# Patient Record
Sex: FEMALE | Race: BLACK OR AFRICAN AMERICAN | Employment: UNEMPLOYED | URBAN - METROPOLITAN AREA
[De-identification: names, ages, dates, MRNs, and addresses within clinical notes are randomized per-mention and may not be internally consistent; named-entity substitution may affect disease eponyms.]

---

## 2017-01-02 ENCOUNTER — TELEPHONE (OUTPATIENT)
Dept: FAMILY MEDICINE | Facility: CLINIC | Age: 62
End: 2017-01-02

## 2017-01-02 NOTE — TELEPHONE ENCOUNTER
[facility] phone call message- general phone call:    Reason for call: She wanted to let  know she is having PT on her right arm and shoulder.    Return call needed: Yes    OK to leave a message on voice mail? Yes    Primary language: English      needed? No    Call taken on January 2, 2017 at 9:07 AM by Earline Curry

## 2017-01-03 ENCOUNTER — COMMUNICATION - HEALTHEAST (OUTPATIENT)
Dept: CARDIOLOGY | Facility: CLINIC | Age: 62
End: 2017-01-03

## 2017-01-03 DIAGNOSIS — I25.10 CORONARY ATHEROSCLEROSIS: ICD-10-CM

## 2017-01-06 ENCOUNTER — TELEPHONE (OUTPATIENT)
Dept: FAMILY MEDICINE | Facility: CLINIC | Age: 62
End: 2017-01-06

## 2017-01-06 ENCOUNTER — HOME CARE/HOSPICE - HEALTHEAST (OUTPATIENT)
Dept: HOME HEALTH SERVICES | Facility: HOME HEALTH | Age: 62
End: 2017-01-06

## 2017-01-09 ENCOUNTER — HOME CARE/HOSPICE - HEALTHEAST (OUTPATIENT)
Dept: HOME HEALTH SERVICES | Facility: HOME HEALTH | Age: 62
End: 2017-01-09

## 2017-01-09 NOTE — TELEPHONE ENCOUNTER
"Patient called me on 1/5 and asked to meet with me sometime regarding a Part D plan. I returned patient's call on 1/6 to discuss her questions further. Patient explained that she has to pay larger co-pays for her medications now. Patient's Social Security went up slightly which might be why her co-pays have increased. Patient spoke about a \"level 1 Part D Plan\" that some websites help pay for. She also mentioned that there can be help from the state. I recommended that she consult with a  through [employer] to figure out what her best options are. In her area, Nabeel Hebert at [employer] is a  ([phone]). I provided her with his information and she was in agreement with contacting him.    We also discussed patient bringing all of her medications to her next appointment so that she can see a PharmD. She is interested in taking as few medications as possible, especially if her co-pay is increasing.    Routed to Dr Pierre and PharmD.    Eliz Calderón   "

## 2017-01-10 ENCOUNTER — HOME CARE/HOSPICE - HEALTHEAST (OUTPATIENT)
Dept: HOME HEALTH SERVICES | Facility: HOME HEALTH | Age: 62
End: 2017-01-10

## 2017-01-12 ENCOUNTER — OFFICE VISIT (OUTPATIENT)
Dept: PHARMACY | Facility: CLINIC | Age: 62
End: 2017-01-12

## 2017-01-12 ENCOUNTER — OFFICE VISIT (OUTPATIENT)
Dept: FAMILY MEDICINE | Facility: CLINIC | Age: 62
End: 2017-01-12

## 2017-01-12 VITALS
SYSTOLIC BLOOD PRESSURE: 136 MMHG | DIASTOLIC BLOOD PRESSURE: 82 MMHG | BODY MASS INDEX: 30.12 KG/M2 | TEMPERATURE: 97.5 F | HEIGHT: 65 IN | WEIGHT: 180.8 LBS | HEART RATE: 67 BPM

## 2017-01-12 DIAGNOSIS — I25.119 CORONARY ARTERY DISEASE INVOLVING NATIVE CORONARY ARTERY OF NATIVE HEART WITH ANGINA PECTORIS (H): Primary | ICD-10-CM

## 2017-01-12 DIAGNOSIS — M75.41 ROTATOR CUFF IMPINGEMENT SYNDROME OF RIGHT SHOULDER: ICD-10-CM

## 2017-01-12 DIAGNOSIS — G89.4 CHRONIC PAIN SYNDROME: Primary | ICD-10-CM

## 2017-01-12 LAB
AMPHETAMINES QUAL: NEGATIVE
BARBITURATES QUAL URINE: NEGATIVE
BENZODIAZEPINE QUAL URINE: NEGATIVE
BUPRENORPHINE QUAL URINE: NEGATIVE
CANNABINOIDS UR QL SCN: NEGATIVE
COCAINE QUAL URINE: NEGATIVE
METHAMPHETAMINE: NEGATIVE
METHODONE QUAL: NEGATIVE
MORPHINE QUAL: NEGATIVE
OXYCODONE QUAL: NEGATIVE
TEMPERATURE OF URINE WAS BETWEEN 90-100 DEGREES F: YES

## 2017-01-12 RX ORDER — ACETAMINOPHEN 325 MG/1
650 TABLET ORAL DAILY
Qty: 100 TABLET | Refills: 0 | COMMUNITY
Start: 2017-01-12

## 2017-01-12 NOTE — PROGRESS NOTES
Patient s case reviewed. I agree with the written assessment and plan of care.    Nehal Rivera, PharmD.

## 2017-01-12 NOTE — PROGRESS NOTES
Chronic Pain Follow-Up Visit      Location of pain: right shoulder and neck  Analgesia/pain control:         Recent changes:  same        Overall control: Tolerable with discomfort      Care Plan    Chronic Pain Care Plan completed Yes    Are you able to follow the care plan? Yes    Activity level/function:        Daily activities:  Unable to perform most daily activities - chores, hobbies, social activities, driving      Work:  Unable to work    Adverse effects: No     Adherance    How often do you take extra pain medicine:Never    Did you take your pain medication today? YES    Home Exercise? 6-7 days/week for an average of 15-30 minutes     Other treatments         Counseling ?  Yes, Asya tomorrow.         Physical therapy? Having PT twice a week for her shoulder.    Risk Factors:      Sleep:  Fair      Mood/anxiety:  worsened      Recent family or social stressors:  Trouble with scheduling PT, and  Getting PCA restarted.  Very frustrated with complex medical system right now.    [facility] going to have a baby in February and she plans to go there for a retreat and to see the new baby for about a month.       Other risks: none     Database checked today? Yes. Details: as expected.    PHQ-9 SCORE 9/15/2016 10/21/2016 1/12/2017   Total Score - - -   Total Score 12 8 15     FAYE-7 SCORE 8/18/2015   Total Score 18          Going to [facility] next week.  She is going to [place] for a month for this and for Granddaughter baby shower is 2/12.  Functional Assessment  Questionnaire -5    Self-care ability assessment (bathing, toilet, dressing, moving and eating)  2. Requires frequent assistance 2   Family and social ability assessment (chores, hobbies, driving, sex and social activities)  2. Able to perform some 2   Movement ability assessment (Walk climb stairs)  1. Able to get up and walk with assistance, unable to climb stairs 1   Lifting ability assessment  1. Able to lift up to 10 lbs. occasionally 1    "  Work ability assessment  1. Unable to do any work 1                                                                                                                                                Total  7   Physical Functional Ability (FAQ5) Score    2005 Sheldon Neumann MD.                                                                                 Total  X 5 =     35/100      Problem, Medication and Allergy Lists were reviewed and are current..    Patient is an established patient of this clinic..         Review of Systems:   CONSTITUTIONAL: no fatigue, no unexpected change in weight  RESP: no significant cough, no shortness of breath  CV: no chest pain, no palpitations, no new or worsening peripheral edema  GI: no nausea, no vomiting, no constipation, no diarrhea  MUSCULOSKELETAL:as above         Physical Exam:     Filed Vitals:    01/12/17 1042   BP: 136/82   Pulse: 67   Temp: 97.5  F (36.4  C)   TempSrc: Oral   Height: 5' 4.5\" (163.8 cm)   Weight: 180 lb 12.8 oz (82.01 kg)     Body mass index is 30.57 kg/(m^2).  Vitals were reviewed and were normal  GENERAL: healthy, alert, well nourished, well hydrated, no distress  NECK: no tenderness, no adenopathy, no asymmetry, no masses, no stiffness; thyroid- normal to palpation  RESP: lungs clear to auscultation - no rales, no rhonchi, no wheezes  CV: regular rates and rhythm, normal S1 S2, no S3 or S4 and no murmur, no click or rub -  ABDOMEN: soft, no tenderness, no  hepatosplenomegaly, no masses, normal bowel sounds  MS: extremities- no gross deformities noted, no edema  SHOULDER Exam-Right   Inspection: no swelling, no bruising, no discoloration, no obvious deformity, no asymmetry, no glenohumeral joint anterior bulge, no distal clavicle elevation, no muscle atrophy, no scapular winging   Tenderness of: SC joint- no , clavicle(prox-mid)- no , clavicle-(mid-distal)- no , AC joint- no , acromion- no , anterior capsule- YES, prox bicep tendon- no , greater " tuberosity- no , prox humerus- no , supraspinatous- YES, infraspinatous- no , superior trapezious- YES, rhomboids- no    Range of Motion: Active- forward flexion- 150 degrees, abduction- 150 degrees, external rotation- normal, internal rotation- normal, limited due to pain.  Range of Motion: Passive- forward flexion- normal, abduction- normal, external rotation- normal, internal rotation- normal   Strength: forward flexion- 5/5, abduction- 5/5, internal rotation- 5/5, external rotation- 5/5 and bicep- full   Special tests: Neers- POSITIVE, Gray(supraspinatous)- POSITIVE and negative spurling's         Results:     Results for orders placed or performed in visit on 01/12/17   Rapid Urine Drug Screen ([facility])   Result Value Ref Range    Amphetamines Qual NEGATIVE NEGATIVE    Barbiturates Qual Urine NEGATIVE NEGATIVE    Buprenorphine Qual Urine NEGATIVE NEGATIVE    Benzodiazepine Qual Urine NEGATIVE NEGATIVE    Cocaine Qual Urine NEGATIVE NEGATIVE    Cannabinoids Qual Urine NEGATIVE NEGATIVE    Methamphetamine Qual NEGATIVE NEGATIVE    Methadone Qual NEGATIVE NEGATIVE    Morphine Qual NEGATIVE NEGATIVE    Oxycodone Qual NEGATIVE NEGATIVE    Temperature of Urine was Between  Degrees F YES YES        rapid urine drug screen obtained today: Yes    Assessment and Plan    Shira Guthrie is here for follow up of chronic pain caused by multiple issues, but more acutely worse over the last month or so due to shoulder injury.  Clearly has impingement on today's exam.  Neck MRI show possible c7 neuropathy but sxs clinically are shoulder related..  Pt currently has a functional status FAQ 5  of 35 down from baseline due to shoulder issues..  Shira was seen today for recheck.    Diagnoses and all orders for this visit:    Chronic pain syndrome: had tramadol refill already, but she wanted to discuss that she would be gone for a month and input on shoulder pain as well.  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  See documentation below for injection info.       Exercise discussed and patient      Naloxone WILL NOT be prescribed.      If opioids prescribed patient was asked to bring pill bottle to each appointment and was informed that refills would only be provided at office visits.   Asked patient to F/U in 1 month(s) with same provider for 20 minute  Visit.     Sally Pierre MD          Subacromial Injection Note    Shira Guthrie is a patient of Sally Romero here for Shoulder impingement  Pt opts for shoulder subacromial injection.    Informed Consent:  Affirmation of informed consent was signed and scanned into the medical record. Risks, benefits and alternatives were discussed. Patient's questions were elicited and answered.   Procedure safety checklist was completed:  Yes  Time Out (Pause for the Cause) completed: Yes    Technique:  right shoulder was prepped in the usual sterile fashion.    INJECTION:  Using 4 cc of 1% lidocaine mixed with 40 mg of kenalog, the subacromial space was successfully injected without complication.  Patient did experience some pain relief following injection.    Was entire vial of medication used? Yes all kenalog used, but not all lidocaine used.    Follow up: Pt was instructed that there will be a return to pain in a few hours, followed by some relief over the course of days to a week.         Sally Pierre MD

## 2017-01-12 NOTE — PROGRESS NOTES
Medication Management Note                                                       Shira was referred by Dr. Pierre for pharmacy services for medication management.    MEDICATION REVIEW:  Discussed all medication indications, dosage and effectiveness, adverse effects, and adherence with patient/caregiver.    Pt had meds with them: yes  Pt had med list with them: no  Pt was knowledgeable about meds: yes  Medications set up by: Self  Medications administered by someone else (e.g., LTCF): No  Pt uses a medication box or automated dispenser: no  Called pharmacy to obtain or clarify med list:  no  Called HHN or LTCF to obtain or clarify med list:  no      Medication Discrepancies  Medications on EMR med list that pt is NOT taking:  none  Medications pt IS taking that are NOT on EMR med list (e.g., from specialist, hospital): none  OTC meds/ dietary supplements pt taking on own that are NOT on EMR med list:  yes, Tylenol 650 mg  Dosage listed differently than how patient is taking: none  Frequency listed differently than how patient is taking: yes, Gabapentin 2 HS and 1 during night  Duplicate medication on list (two occurrences of the same medication):  none  TOTAL NUMBER OF MEDICATION DISCREPANCIES:  2    Subjective                                                       Patient reports the following problems or concerns with their medications:  Patient was concerned about medication costs, copays recently increased.  Patient reports the following adverse reactions to medications:  none  Pt reports missing doses:  never  Additional subjective information (e.g., reason for visit, frequency of PRNs, reasons meds were D/C ed):  Medications were reviewed to optimize drug therapy in an attempt to decrease medication costs.     Objective                                                       Patient Active Problem List   Diagnosis     CAD (coronary artery disease)     MDD (major depressive disorder)     Chronic constipation      Stroke (H)     Benign essential hypertension     Osteoporosis     PAD (peripheral artery disease) (H)     Fracture closed of upper end of forearm     Synovial sarcoma (H)     Esophageal stricture     Anginal pain (H)     Health Care Home     B12 deficiency     Cataracts, bilateral     Malignant neoplasm of connective and other soft tissue     Chronic pain syndrome     Other polyneuropathy (H)     Primary osteoarthritis involving multiple joints     Lymphedema of left leg     False positive serological test for hepatitis C     Cervical radiculopathy at C7       Current Outpatient Prescriptions   Medication Sig Dispense Refill     traMADol (ULTRAM) 50 MG tablet Take 1 tablet (50 mg) by mouth 3 times daily as needed 90 tablet 1     rosuvastatin (CRESTOR) 20 MG tablet Take 1 tablet (20 mg) by mouth daily 90 tablet 4     diphenhydrAMINE (BENADRYL) 25 MG tablet Take 1 tablet (25 mg) by mouth every 6 hours as needed for itching or other (cough) Do not use with the hydroxyzine. 60 tablet 1     isosorbide mononitrate (IMDUR) 30 MG 24 hr tablet Take 1 tablet (30 mg) by mouth daily 90 tablet 4     dexlansoprazole (DEXILANT) 30 MG CPDR Take 1 capsule (30 mg) by mouth daily 90 capsule 4     linaclotide (LINZESS) 290 MCG capsule Take 1 capsule (290 mcg) by mouth every morning (before breakfast)       order for DME Equipment being ordered: Jobst stockings thigh high 15-20mmHg.  Please measure for fit. 2 pairs. 2 Device 0     docusate sodium (COLACE) 100 MG tablet Take 100 mg by mouth daily 60 tablet 11     gabapentin (NEURONTIN) 300 MG capsule Take 1 capsule (300 mg) by mouth 3 times daily 90 capsule 11     clopidogrel (PLAVIX) 75 MG tablet Take 1 tablet (75 mg) by mouth daily . Give name brand Plavix. Approved by insurance through 2/2/1016. 30 tablet 11     aspirin 81 MG EC tablet Take 1 tablet (81 mg) by mouth daily 90 tablet 4     DULoxetine (CYMBALTA) 60 MG capsule Take 1 capsule (60 mg) by mouth daily 30 capsule 12      polyethylene glycol (MIRALAX) powder Take 17 g (1 capful) by mouth 2 times daily 510 g 12     senna (SENOKOT) 8.6 MG tablet Take 1 tablet by mouth 2 times daily 120 tablet 12     hydrOXYzine (ATARAX) 25 MG tablet 1 to 2 tablets three times a day as needed. 100 tablet 2     metoprolol (TOPROL-XL) 25 MG 24 hr tablet Take 1 tablet (25 mg) by mouth daily 90 tablet 4     nitroglycerin (NITROSTAT) 0.4 MG SL tablet Place 1 tablet (0.4 mg) under the tongue every 5 minutes as needed 30 tablet 12     CANE, ANY MATERIAL 1 Device by Device route as needed 1 Device 0     Heating Pads PADS Apply to painful areas prn. 1 each 0       Social History   Substance Use Topics     Smoking status: Former Smoker -- 0.30 packs/day for 35 years     Types: Cigarettes, Cigars     Quit date: 04/23/2006     Smokeless tobacco: Never Used      Comment: 2 cigars per week, now quit cigarettes in 2006.     Alcohol Use: No       A1C      5.9   8/5/2014  A1C      6.0   4/2/2013  Last Basic Metabolic Panel:  NA      143   8/5/2014   POTASSIUM      4.3   8/5/2014  CHLORIDE      111   8/5/2014  HERMES      9.5   8/5/2014  CO2     28.0   7/11/2013  BUN        5   8/5/2014  CR     0.80   8/5/2014  GLC       84   8/5/2014    BP Readings from Last 3 Encounters:   01/12/17 136/82   12/16/16 124/78   12/02/16 158/81     Assessment                                                       Antiplatlet Therapy:  controlled    Patient currently takes Plavix 75 mg daily and Aspirin 81 mg daily.     Medication Costs:  uncontrolled    Insurance Co-Pays increased recently.      Plan/Recommendations                                                       Updated medication list in the EMR; deleted meds patient no longer taking and added meds patient is now taking, and changed doses where there was a dose discrepancy.    17 medications were reviewed and found to be indicated, effective, safe and convenient/ affordable unless drug therapy problem(s) was/were identified, as are  described below.      Completed at this visit  Antiplatelet Therapy    Discussed with Dr. Pierre patient's extended therapy with Plavix and Aspirin. Deferred to cardiology and neurology.     Medication Costs    Assessed for medication therapy changes yet did not discover any optimization. Referred patient to contact insurance company to determine reason for copay increases.       Options for treatment and/or follow-up care were reviewed with the patient.  Shira was engaged and actively involved in the decision making process, verbalized understanding of the options discussed, and was satisfied with the final plan.      Follow-up                                                       Patient should follow up with Dr. Pierre.      Dr. Pierre was provided the recommendations above  in clinic today and Dr. Pierre was available for supervision during this visit and is the authorizing prescriber for this visit through the pharmacist collaborative practice agreement.    Ash Mckeon PharmD Student      Drug therapy problems identified  1. None    # of medical conditions addressed: 8  # of medications addressed: 16  # of medication discrepancies identified: 2  # of DTP identified: 0  Time spent: 30 minutes  Level of service: 1 NC    The student acted as scribe and the encounter documented was completely performed by myself. I have reviewed and verified the student s documentation and found it to be correct and complete.  Lois Allen, Pharm.D.

## 2017-01-12 NOTE — MR AVS SNAPSHOT
After Visit Summary   2017    Shira Guthrie    MRN: [id number]           Patient Information     Date Of Birth          1955        Visit Information        Provider Department      2017 10:40 AM Sally Pierre MD [facility]        Today's Diagnoses     Chronic pain syndrome    -  1        Follow-ups after your visit        Your next 10 appointments already scheduled     2017  8:30 AM   RETURN EXTENDED with Dior Sky PHD   [facility] ([facility])    [address] [place] [id number]   [phone]              Who to contact     Please call your clinic at [phone] to:    Ask questions about your health    Make or cancel appointments    Discuss your medicines    Learn about your test results    Speak to your doctor   If you have compliments or concerns about an experience at your clinic, or if you wish to file a complaint, please contact [facility] Patient Relations at [phone] or email us at [email or web address]         Additional Information About Your Visit        MyChart Information     [employer] is an electronic gateway that provides easy, online access to your medical records. With [employer], you can request a clinic appointment, read your test results, renew a prescription or communicate with your care team.     To sign up for [employer] visit the website at [email or web address]   You will be asked to enter the access code listed below, as well as some personal information. Please follow the directions to create your username and password.     Your access code is: [email or web address]  Expires: 3/2/2017  9:00 AM     Your access code will  in 90 days. If you need help or a new code, please contact your [facility] or call [phone] for assistance.        Care EveryWhere ID     This is your Care EveryWhere ID. This could be used by other organizations to access your  "[facility] medical records  [id number]        Your Vitals Were     Pulse Temperature Height BMI (Body Mass Index)          67 97.5  F (36.4  C) (Oral) 5' 4.5\" (163.8 cm) 30.57 kg/m2         Blood Pressure from Last 3 Encounters:   01/12/17 136/82   12/16/16 124/78   12/02/16 158/81    Weight from Last 3 Encounters:   01/12/17 180 lb 12.8 oz (82.01 kg)   11/04/16 179 lb (81.194 kg)   10/12/16 176 lb 6.4 oz (80.015 kg)              We Performed the Following     Rapid Urine Drug Screen ([facility])        Primary Care Provider Office Phone # Fax #    Sally Pierre [phone]       [address] [address]        Thank you!     Thank you for choosing [facility]  for your care. Our goal is always to provide you with excellent care. Hearing back from our patients is one way we can continue to improve our services. Please take a few minutes to complete the written survey that you may receive in the mail after your visit with us. Thank you!             Your Updated Medication List - Protect others around you: Learn how to safely use, store and throw away your medicines at www.disposemymeds.org.          This list is accurate as of: 1/12/17 12:09 PM.  Always use your most recent med list.                   Brand Name Dispense Instructions for use    aspirin 81 MG EC tablet     90 tablet    Take 1 tablet (81 mg) by mouth daily       CANE, ANY MATERIAL     1 Device    1 Device by Device route as needed       clopidogrel 75 MG tablet    PLAVIX    30 tablet    Take 1 tablet (75 mg) by mouth daily . Give name brand Plavix. Approved by insurance through 2/2/1016.       dexlansoprazole 30 MG Cpdr CR capsule    DEXILANT    90 capsule    Take 1 capsule (30 mg) by mouth daily       diphenhydrAMINE 25 MG tablet    BENADRYL    60 tablet    Take 1 tablet (25 mg) by mouth every 6 hours as needed for itching or other (cough) Do not use with the hydroxyzine.       docusate sodium 100 MG " tablet    COLACE    60 tablet    Take 100 mg by mouth daily       DULoxetine 60 MG EC capsule    CYMBALTA    30 capsule    Take 1 capsule (60 mg) by mouth daily       gabapentin 300 MG capsule    NEURONTIN    90 capsule    Take 1 capsule (300 mg) by mouth 3 times daily       Heating Pads Pads     1 each    Apply to painful areas prn.       hydrOXYzine 25 MG tablet    ATARAX    100 tablet    1 to 2 tablets three times a day as needed.       isosorbide mononitrate 30 MG 24 hr tablet    IMDUR    90 tablet    Take 1 tablet (30 mg) by mouth daily       LINZESS 290 MCG capsule   Generic drug:  linaclotide      Take 1 capsule (290 mcg) by mouth every morning (before breakfast)       metoprolol 25 MG 24 hr tablet    TOPROL-XL    90 tablet    Take 1 tablet (25 mg) by mouth daily       nitroglycerin 0.4 MG sublingual tablet    NITROSTAT    30 tablet    Place 1 tablet (0.4 mg) under the tongue every 5 minutes as needed       order for DME     2 Device    Equipment being ordered: Jobst stockings thigh high 15-20mmHg.  Please measure for fit. 2 pairs.       polyethylene glycol powder    MIRALAX    510 g    Take 17 g (1 capful) by mouth 2 times daily       rosuvastatin 20 MG tablet    CRESTOR    90 tablet    Take 1 tablet (20 mg) by mouth daily       senna 8.6 MG tablet    SENOKOT    120 tablet    Take 1 tablet by mouth 2 times daily       traMADol 50 MG tablet    ULTRAM    90 tablet    Take 1 tablet (50 mg) by mouth 3 times daily as needed

## 2017-01-13 ENCOUNTER — HOME CARE/HOSPICE - HEALTHEAST (OUTPATIENT)
Dept: HOME HEALTH SERVICES | Facility: HOME HEALTH | Age: 62
End: 2017-01-13

## 2017-01-13 ENCOUNTER — OFFICE VISIT (OUTPATIENT)
Dept: PSYCHOLOGY | Facility: CLINIC | Age: 62
End: 2017-01-13

## 2017-01-13 DIAGNOSIS — G89.4 CHRONIC PAIN SYNDROME: Primary | ICD-10-CM

## 2017-01-13 ASSESSMENT — PATIENT HEALTH QUESTIONNAIRE - PHQ9: SUM OF ALL RESPONSES TO PHQ QUESTIONS 1-9: 15

## 2017-01-13 NOTE — PATIENT INSTRUCTIONS
Personal Care Plan for Chronic Pain    1.  Personal Goals:                * take less medication              * lose weight: goal of losing 30 pounds.              * continue working on keeping thoughts positive through Tawn[employer]   * to feel confident enough that when someone gives me a compliment I can simply say thank you    2.  Sleep:                 *  Basic sleep plan:                          *reduce or eliminate caffeine and daytime naps                          * relaxation before bed                          * limit screen time 1-2 hours prior to bed                          * establish dark/quiet sleep environment                          * go to bed at target bedtime:   pm                          * keep consistent wake time:   am.   *  Minimize switching days and nights by staying active throughout the day.   *  We'll talk more about a consistent sleep plan when we meet next time.                3.  Physical Activity:                 * Formal physical rehabilitation:                          None right now.    Keep practicing your physical therapy exercises while you are away.              * Home/community based activity:                          * Home based exercises given by lymphedema nurse with breathing exercises built in as well - daily                          * Aerobic exercise/endurance exercise:  elliptical machine - 5 minute intervals with sit ups in between for 30 minutes - daily.  Has a  in the builiding.                          * Cleaning and baking with body awareness and stretching    *  Plan to fill out paperwork for PCA with the help of family over the next month.              * Listen to your body.  Pace yourself for success.  Don't over-do it.                  4.  Nutrition/Weight:      * Keep up the good work in keeping food journal so you can track what you are eating.   * Continue to review your I Can Prevent Diabetes  materials.              * Limit processed foods and foods high in sugar, sodium and fat.              * Keep up the good work eating many healthy foods.   * When you make a less healthy choice approach yourself with kindness and compassion.  Try to understand what contributed to that choice:  Was I too hungry?  Was I too tired?  Stressed?  Worried?  Use this information to help support healthier choices in the future.   * Goal weight:  150lbs.  Remember this is a long term goal.  Healthy, sustainable weight loss is about 1-2 lbs per week.    5.  Mood/Stress Management:                 * Formal interventions:    * schedule follow up with Dr. Sky in March.              * Home/community based interventions:                          * Regular contact with family  * Relaxation techniques - breathing exercises  * Create your pyramid to focus you on your strengths and hopes.  * Movies  * Meditation  * Yoga  * Creative activity  * Spiritual /prayer:  Prayer at home, attending services at [facility], wellness auxiliary group  * Service-based activity.              * Medications: Cymbalta, Hydroxyzine as needed.    6.  Tobacco/Alcohol/Drug Use:         * Congratulations on quitting smoking and staying quit!  Keep up the good work managing stress/anxiety in other ways (prayer, talking it out, deep breaths, exercise, etc..                         * Maintain healthy relationship with alcohol                          * For women this would be no more than 1 drink per day                          * For men, this would be no more than 2 drinks per day              * Eliminate recreational drugs    7.  Pain:                 * Non-medication treatments:                          * ice/heat: use heating pad as you are doing.                          * massage                          * acupuncture                          * chiropractor    8.  Pain Medications: as prescribed by Dr. Pierre

## 2017-01-13 NOTE — MR AVS SNAPSHOT
After Visit Summary   1/13/2017    Shira Guthrie    MRN: [id number]           Patient Information     Date Of Birth          1955        Visit Information        Provider Department      1/13/2017 8:30 AM Dior Sky, PHD [facility]        Care Instructions                                           Personal Care Plan for Chronic Pain    1.  Personal Goals:                * take less medication              * lose weight: goal of losing 30 pounds.              * continue working on keeping thoughts positive through Tawny   * to feel confident enough that when someone gives me a compliment I can simply say thank you    2.  Sleep:                 *  Basic sleep plan:                          *reduce or eliminate caffeine and daytime naps                          * relaxation before bed                          * limit screen time 1-2 hours prior to bed                          * establish dark/quiet sleep environment                          * go to bed at target bedtime:   pm                          * keep consistent wake time:   am.   *  Minimize switching days and nights by staying active throughout the day.   *  We'll talk more about a consistent sleep plan when we meet next time.                3.  Physical Activity:                 * Formal physical rehabilitation:                          None right now.    Keep practicing your physical therapy exercises while you are away.              * Home/community based activity:                          * Home based exercises given by lymphedema nurse with breathing exercises built in as well - daily                          * Aerobic exercise/endurance exercise:  elliptical machine - 5 minute intervals with sit ups in between for 30 minutes - daily.  Has a  in the builiding.                          * Cleaning and baking with body awareness and stretching    *  Plan to fill out paperwork for PCA with the help of family over the next  month.              * Listen to your body.  Pace yourself for success.  Don't over-do it.                  4.  Nutrition/Weight:      * Keep up the good work in keeping food journal so you can track what you are eating.   * Continue to review your I Can Prevent Diabetes materials.              * Limit processed foods and foods high in sugar, sodium and fat.              * Keep up the good work eating many healthy foods.   * When you make a less healthy choice approach yourself with kindness and compassion.  Try to understand what contributed to that choice:  Was I too hungry?  Was I too tired?  Stressed?  Worried?  Use this information to help support healthier choices in the future.   * Goal weight:  150lbs.  Remember this is a long term goal.  Healthy, sustainable weight loss is about 1-2 lbs per week.    5.  Mood/Stress Management:                 * Formal interventions:    * schedule follow up with Dr. Sky in March.              * Home/community based interventions:                          * Regular contact with family  * Relaxation techniques - breathing exercises  * Create your pyramid to focus you on your strengths and hopes.  * Movies  * Meditation  * Yoga  * Creative activity  * Spiritual /prayer:  Prayer at home, attending services at [facility], wellness auxiliary group  * Service-based activity.              * Medications: Cymbalta, Hydroxyzine as needed.    6.  Tobacco/Alcohol/Drug Use:         * Congratulations on quitting smoking and staying quit!  Keep up the good work managing stress/anxiety in other ways (prayer, talking it out, deep breaths, exercise, etc..                         * Maintain healthy relationship with alcohol                          * For women this would be no more than 1 drink per day                          * For men, this would be no more than 2 drinks per day              * Eliminate recreational drugs    7.  Pain:                 *  Non-medication treatments:                          * ice/heat: use heating pad as you are doing.                          * massage                          * acupuncture                          * chiropractor    8.  Pain Medications: as prescribed by Dr. Pierre        Follow-ups after your visit        Who to contact     Please call your clinic at [phone] to:    Ask questions about your health    Make or cancel appointments    Discuss your medicines    Learn about your test results    Speak to your doctor   If you have compliments or concerns about an experience at your clinic, or if you wish to file a complaint, please contact [facility] Patient Relations at [phone] or email us at [email or web address]         Additional Information About Your Visit        [employer] Information     [employer] is an electronic gateway that provides easy, online access to your medical records. With [employer], you can request a clinic appointment, read your test results, renew a prescription or communicate with your care team.     To sign up for [employer] visit the website at [email or web address]   You will be asked to enter the access code listed below, as well as some personal information. Please follow the directions to create your username and password.     Your access code is: [email or web address]  Expires: 3/2/2017  9:00 AM     Your access code will  in 90 days. If you need help or a new code, please contact your [facility] or call [phone] for assistance.        Care EveryWhere ID     This is your Care EveryWhere ID. This could be used by other organizations to access your [facility] medical records  [id number]         Blood Pressure from Last 3 Encounters:   17 136/82   16 124/78   16 158/81    Weight from Last 3 Encounters:   17 180 lb 12.8 oz (82.01 kg)   16 179 lb (81.194 kg)   10/12/16 176 lb 6.4 oz (80.015 kg)               Today, you had the following     No orders found for display       Primary Care Provider Office Phone # Fax #    Sally Pierre [phone]       [address] [address]        Thank you!     Thank you for choosing [facility]  for your care. Our goal is always to provide you with excellent care. Hearing back from our patients is one way we can continue to improve our services. Please take a few minutes to complete the written survey that you may receive in the mail after your visit with us. Thank you!             Your Updated Medication List - Protect others around you: Learn how to safely use, store and throw away your medicines at www.disposemymeds.org.          This list is accurate as of: 1/13/17  9:20 AM.  Always use your most recent med list.                   Brand Name Dispense Instructions for use    aspirin 81 MG EC tablet     90 tablet    Take 1 tablet (81 mg) by mouth daily       CANE, ANY MATERIAL     1 Device    1 Device by Device route as needed       clopidogrel 75 MG tablet    PLAVIX    30 tablet    Take 1 tablet (75 mg) by mouth daily . Give name brand Plavix. Approved by insurance through 2/2/1016.       dexlansoprazole 30 MG Cpdr CR capsule    DEXILANT    90 capsule    Take 1 capsule (30 mg) by mouth daily       diphenhydrAMINE 25 MG tablet    BENADRYL    60 tablet    Take 1 tablet (25 mg) by mouth every 6 hours as needed for itching or other (cough) Do not use with the hydroxyzine.       docusate sodium 100 MG tablet    COLACE    60 tablet    Take 100 mg by mouth daily       DULoxetine 60 MG EC capsule    CYMBALTA    30 capsule    Take 1 capsule (60 mg) by mouth daily       gabapentin 300 MG capsule    NEURONTIN    90 capsule    Take 1 capsule (300 mg) by mouth 3 times daily       Heating Pads Pads     1 each    Apply to painful areas prn.       hydrOXYzine 25 MG tablet    ATARAX    100 tablet    1 to 2 tablets three times a day as  needed.       isosorbide mononitrate 30 MG 24 hr tablet    IMDUR    90 tablet    Take 1 tablet (30 mg) by mouth daily       LINZESS 290 MCG capsule   Generic drug:  linaclotide      Take 1 capsule (290 mcg) by mouth every morning (before breakfast)       metoprolol 25 MG 24 hr tablet    TOPROL-XL    90 tablet    Take 1 tablet (25 mg) by mouth daily       nitroglycerin 0.4 MG sublingual tablet    NITROSTAT    30 tablet    Place 1 tablet (0.4 mg) under the tongue every 5 minutes as needed       order for DME     2 Device    Equipment being ordered: Jobst stockings thigh high 15-20mmHg.  Please measure for fit. 2 pairs.       polyethylene glycol powder    MIRALAX    510 g    Take 17 g (1 capful) by mouth 2 times daily       rosuvastatin 20 MG tablet    CRESTOR    90 tablet    Take 1 tablet (20 mg) by mouth daily       senna 8.6 MG tablet    SENOKOT    120 tablet    Take 1 tablet by mouth 2 times daily       traMADol 50 MG tablet    ULTRAM    90 tablet    Take 1 tablet (50 mg) by mouth 3 times daily as needed       TYLENOL 325 MG tablet   Generic drug:  acetaminophen     100 tablet    Take 2 tablets (650 mg) by mouth daily

## 2017-01-13 NOTE — PROGRESS NOTES
Spoke with patent regarding insurance coverage and transportation. Patient has completed the application for MA and will drop it off tomorrow. Discussed that if she gets MA she will have MNET. We can request a transportation evaluation for Dr Pierre to complete and if she is eligible, patient could get medical rides to appointments. Patient is going to spend some time in [place] with her daughter but will see what happens with her insurance coverage after she returns and we can go from there. Patient has a [employer] application that she will get help with for her other rides.    Eliz Calderón
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
## 2017-01-16 PROBLEM — M75.41 ROTATOR CUFF IMPINGEMENT SYNDROME OF RIGHT SHOULDER: Status: ACTIVE | Noted: 2017-01-16

## 2017-01-16 RX ORDER — TRIAMCINOLONE ACETONIDE 40 MG/ML
40 INJECTION, SUSPENSION INTRA-ARTICULAR; INTRAMUSCULAR ONCE
Qty: 1 ML | Refills: 0 | OUTPATIENT
Start: 2017-01-16 | End: 2017-01-16

## 2017-01-17 ENCOUNTER — HOME CARE/HOSPICE - HEALTHEAST (OUTPATIENT)
Dept: HOME HEALTH SERVICES | Facility: HOME HEALTH | Age: 62
End: 2017-01-17

## 2017-01-20 ENCOUNTER — HOME CARE/HOSPICE - HEALTHEAST (OUTPATIENT)
Dept: HOME HEALTH SERVICES | Facility: HOME HEALTH | Age: 62
End: 2017-01-20

## 2017-02-16 ENCOUNTER — MEDICAL CORRESPONDENCE (OUTPATIENT)
Dept: HEALTH INFORMATION MANAGEMENT | Facility: CLINIC | Age: 62
End: 2017-02-16

## 2017-02-22 DIAGNOSIS — I25.9 CHRONIC ISCHEMIC HEART DISEASE: ICD-10-CM

## 2017-02-22 RX ORDER — CLOPIDOGREL BISULFATE 75 MG/1
75 TABLET ORAL DAILY
Qty: 30 TABLET | Refills: 11 | Status: SHIPPED | OUTPATIENT
Start: 2017-02-22 | End: 2017-05-24

## 2017-02-27 ENCOUNTER — OFFICE VISIT (OUTPATIENT)
Dept: FAMILY MEDICINE | Facility: CLINIC | Age: 62
End: 2017-02-27

## 2017-02-27 VITALS
TEMPERATURE: 97.5 F | HEIGHT: 64 IN | DIASTOLIC BLOOD PRESSURE: 77 MMHG | HEART RATE: 70 BPM | BODY MASS INDEX: 30.93 KG/M2 | SYSTOLIC BLOOD PRESSURE: 124 MMHG | WEIGHT: 181.2 LBS

## 2017-02-27 DIAGNOSIS — G62.89 OTHER POLYNEUROPATHY: ICD-10-CM

## 2017-02-27 DIAGNOSIS — G89.4 CHRONIC PAIN SYNDROME: Primary | ICD-10-CM

## 2017-02-27 DIAGNOSIS — M75.41 ROTATOR CUFF IMPINGEMENT SYNDROME OF RIGHT SHOULDER: ICD-10-CM

## 2017-02-27 DIAGNOSIS — M15.0 PRIMARY OSTEOARTHRITIS INVOLVING MULTIPLE JOINTS: ICD-10-CM

## 2017-02-27 RX ORDER — TRAMADOL HYDROCHLORIDE 50 MG/1
50 TABLET ORAL 3 TIMES DAILY PRN
Qty: 90 TABLET | Refills: 1 | Status: SHIPPED | OUTPATIENT
Start: 2017-02-27 | End: 2017-04-14

## 2017-02-27 NOTE — Clinical Note
Trouble with losing weight and this is depressing.  Wondering about taking Pauline wintersia. She bought it in the vitamin store.

## 2017-02-27 NOTE — PATIENT INSTRUCTIONS
Personal Care Plan for Chronic Pain    1.  Personal Goals:                * take less medication              * lose weight: goal of losing 15 to 20 pounds.              * continue working on keeping thoughts positive through Tawny              * to feel confident enough that when someone gives me a compliment I can simply say thank you    2.  Sleep:                 *  Basic sleep plan:                          *reduce or eliminate caffeine and daytime naps                          * relaxation before bed                          * limit screen time 1-2 hours prior to bed                          * establish dark/quiet sleep environment                          * go to bed at target bedtime:   pm                          * keep consistent wake time:   am.              *  Minimize switching days and nights by staying active throughout the day.              * We'll talk more about a consistent sleep plan when we meet next time.                3.  Physical Activity:                 * Formal physical rehabilitation:                          None right now.              * Home/community based activity:                          * Home based exercises given by lymphedema nurse with breathing exercises built in as well - daily                          * Aerobic exercise/endurance exercise:  elliptical machine - 5 minute intervals with sit ups in between for 30 minutes - daily.  Has a  in the builiding. Has exercise tape in her apartment.                          * Cleaning and baking with body awareness and stretching              * Listen to your body.  Pace yourself for success.  Don't over-do it.  Plan to get PCA back to help with cleaning and laundry so as to not overdo it.                4.  Nutrition/Weight:                 * Keep a food journal in a notebook at home and bring this to your next visit with Dr. Sky.  Eat small frequent meals.              * Review your I Can Prevent Diabetes  materials.              * Limit processed foods and foods high in sugar, sodium and fat.              * Keep up the good work eating many healthy foods.              * When you make a less healthy choice approach yourself with kindness and compassion.  Try to understand what contributed to that choice:  Was I too hungry?  Was I too tired?  Stressed?  Worried?  Use this information to help support healthier choices in the future.    5.  Mood/Stress Management:                 * Formal interventions:                          * schedule follow up with Dr. kSy in January to check in on how things are going.                * Home/community based interventions:                          * Regular contact with family  * Relaxation techniques - breathing exercises  * Create your pyramid to focus you on your strengths and hopes.  * Movies  * Meditation  * Yoga  * Creative activity  * Spiritual /prayer:  Prayer at home, attending services at [facility], wellness auxiliary group  * Service-based activity.              * Medications: Cymbalta, Hydroxyzine as needed.    6.  Tobacco/Alcohol/Drug Use:                               * Congratulations on quitting smoking and staying quit!  Keep up the good work managing stress/anxiety in other ways (prayer, talking it out, deep breaths, exercise, etc..                         * Maintain healthy relationship with alcohol                          * For women this would be no more than 1 drink per day                          * For men, this would be no more than 2 drinks per day              * Eliminate recreational drugs    7.  Pain:                 * Non-medication treatments:                          * ice/heat: use heating pad as you are doing.                          * massage                          * acupuncture                          * chiropractor    Get MRI for your shoulder.    8.  Pain Medications: Gabapentin 3 at night, Tramadol one pill  three times day.  Tylenol arthritis as needed for break through.          You are scheduled for MRI referral at:  [address] [address]  [phone]  Date:  Saturday March 4, 2017  Time:  12:00 noon  Please arrive 15 minutes early.  If you have any questions or need to reschedule your appointment, please call the number listed above.    Any other concerns please call the clinic at [phone].    Neva ROWLAND  Referral Coordinator   2/27/17

## 2017-02-27 NOTE — LETTER
March 1, 2017      Shira Guthrie  [address]  [place] [place] [address]        Dear Shira,    Please see below for your test results.    Resulted Orders   Rapid Urine Drug Screen ([facility])   Result Value Ref Range    Amphetamines Qual NEGATIVE NEGATIVE    Barbiturates Qual Urine NEGATIVE NEGATIVE    Buprenorphine Qual Urine NEGATIVE NEGATIVE    Benzodiazepine Qual Urine NEGATIVE NEGATIVE    Cocaine Qual Urine NEGATIVE NEGATIVE    Cannabinoids Qual Urine NEGATIVE NEGATIVE    Methamphetamine Qual NEGATIVE NEGATIVE    Methadone Qual NEGATIVE NEGATIVE    Morphine Qual NEGATIVE NEGATIVE    Oxycodone Qual NEGATIVE NEGATIVE    Temperature of Urine was Between  Degrees F YES YES      Comment:      This is a preliminary screening test that detects drugs-of-abuse in urine at   specified detection levels.  To confirm preliminary results, a more specific   method such as Gas Chromatography/Mass Spectrometry (GC/MS) must be used.        Urine test is as expected    If you have any questions, please call the clinic to make an appointment.    Sincerely,    Sally Pierre MD

## 2017-02-27 NOTE — PROGRESS NOTES
"  Chronic Pain Follow-Up Visit    Pain Update:  Location of pain: right shoulder is the worst pain.  Usually 7/10 but up to 10/10 when it locks. Subacromial injection helped some.  Continues to do home exercise learned from PT for right shoulder.  Has not really gotten better.  not able to drive as the turning motion with the shoulder makes it feel like it pops out and shoots pain down her arm.     Left foot pain from neuropathy is also troublesome but stable.    Analgesia/pain control: Recent changes:  same    Overall control: Tolerable with discomfort    Trouble with losing weight and this is depressing.  Wondering about taking Garcina cambogia.     Adherance     How often do you take extra pain medicine:Never    Did you take your pain medication today? YES, this am.    Adverse effects: No      Database checked today? Yes. Details: as expected    PHQ-9 SCORE 9/15/2016 10/21/2016 1/12/2017   Total Score - - -   Total Score 12 8 15     FAYE-7 SCORE 8/18/2015   Total Score 18       Care Plan discussed and updated as needed.  See the end of this note.         FUNCTIONAL ASSESSMENT QUESTIONNAIRE SCORE 2/27/2017   Total Score 40          Problem, Medication and Allergy Lists were reviewed and are current..           Physical Exam:     Vitals:    02/27/17 1315   BP: 124/77   BP Location: Left arm   Patient Position: Chair   Cuff Size: Adult Large   Pulse: 70   Temp: 97.5  F (36.4  C)   TempSrc: Oral   Weight: 181 lb 3.2 oz (82.2 kg)   Height: 5' 4.37\" (163.5 cm)     Body mass index is 30.75 kg/(m^2).  Vitals were reviewed and were normal  GENERAL: healthy, alert, well nourished, well hydrated, no distress  RESP: lungs clear to auscultation - no rales, no rhonchi, no wheezes  CV: regular rates and rhythm, normal S1 S2, no S3 or S4 and no murmur, no click or rub -  ABDOMEN: soft, no tenderness, no  hepatosplenomegaly, no masses, normal bowel sounds  MS: extremities- no gross deformities noted, no edema.  Right shoulder " decreased abduction and flexion. 5/5 strength through out.  Positive Hawkings and Neer's. Extreme external rotation causing apprehension and feels that it pops out of place.        Results:     Results for orders placed or performed in visit on 02/27/17   Rapid Urine Drug Screen ([facility])   Result Value Ref Range    Amphetamines Qual NEGATIVE NEGATIVE    Barbiturates Qual Urine NEGATIVE NEGATIVE    Buprenorphine Qual Urine NEGATIVE NEGATIVE    Benzodiazepine Qual Urine NEGATIVE NEGATIVE    Cocaine Qual Urine NEGATIVE NEGATIVE    Cannabinoids Qual Urine NEGATIVE NEGATIVE    Methamphetamine Qual NEGATIVE NEGATIVE    Methadone Qual NEGATIVE NEGATIVE    Morphine Qual NEGATIVE NEGATIVE    Oxycodone Qual NEGATIVE NEGATIVE    Temperature of Urine was Between  Degrees F YES YES      rapid urine drug screen obtained today: Yes    Assessment and Plan    Shira Guthrie is here for follow up of chronic pain caused by peripheral neuropathy and acute shoulder issue with impingement..    Shira was seen today for shoulder and medication question.    Diagnoses and all orders for this visit:    Chronic pain syndrome: secondary to peripheral neuropathy and OA. Refilled tramadol.  Chronic pain is stable.  Right shoulder is more acute issue below. Continue current stool regimen.  Other polyneuropathy (H)  Primary osteoarthritis involving multiple joints  -     traMADol (ULTRAM) 50 MG tablet; Take 1 tablet (50 mg) by mouth 3 times daily as needed  -     Rapid Urine Drug Screen ([facility])    Rotator cuff impingement syndrome of right shoulder: now I am also concerned about a labral tear as she feel the shoulder popping out of place.  Due to trauma history and the fact that it is significantly better.  Get MRI and likely Ortho referral.  -     MRI SHOULDER - RIGHT; Future    Chronic Pain Syndrome:  Care plan updated with patient, see below for details.  Naloxone has not been prescribed.       If opioids prescribed patient was asked  to bring pill bottle to each appointment and was informed that refills would only be provided at office visits.   Asked patient to F/U in 1 month(s) with same provider for 20 minute  Visit.     A1c, lipids at next visit for screening.    Sally Pierre MD    Patient Instructions          Personal Care Plan for Chronic Pain    1.  Personal Goals:                * take less medication              * lose weight: goal of losing 15 to 20 pounds.              * continue working on keeping thoughts positive through Tawny              * to feel confident enough that when someone gives me a compliment I can simply say thank you    2.  Sleep:                 *  Basic sleep plan:                          *reduce or eliminate caffeine and daytime naps                          * relaxation before bed                          * limit screen time 1-2 hours prior to bed                          * establish dark/quiet sleep environment                          * go to bed at target bedtime:   pm                          * keep consistent wake time:   am.              *  Minimize switching days and nights by staying active throughout the day.              * We'll talk more about a consistent sleep plan when we meet next time.                3.  Physical Activity:                 * Formal physical rehabilitation:                          None right now.              * Home/community based activity:                          * Home based exercises given by lymphedema nurse with breathing exercises built in as well - daily                          * Aerobic exercise/endurance exercise:  elliptical machine - 5 minute intervals with sit ups in between for 30 minutes - daily.  Has a  in the builiding. Has exercise tape in her apartment.                          * Cleaning and baking with body awareness and stretching              * Listen to your body.  Pace yourself for success.  Don't over-do it.  Plan to get PCA back to help with  cleaning and laundry so as to not overdo it.                4.  Nutrition/Weight:                 * Keep a food journal in a notebook at home and bring this to your next visit with Dr. Sky.  Eat small frequent meals.              * Review your I Can Prevent Diabetes materials.              * Limit processed foods and foods high in sugar, sodium and fat.              * Keep up the good work eating many healthy foods.              * When you make a less healthy choice approach yourself with kindness and compassion.  Try to understand what contributed to that choice:  Was I too hungry?  Was I too tired?  Stressed?  Worried?  Use this information to help support healthier choices in the future.    5.  Mood/Stress Management:                 * Formal interventions:                          * schedule follow up with Dr. Sky in January to check in on how things are going.                * Home/community based interventions:                          * Regular contact with family  * Relaxation techniques - breathing exercises  * Create your pyramid to focus you on your strengths and hopes.  * Movies  * Meditation  * Yoga  * Creative activity  * Spiritual /prayer:  Prayer at home, attending services at [facility], wellness auxiliary group  * Service-based activity.              * Medications: Cymbalta, Hydroxyzine as needed.    6.  Tobacco/Alcohol/Drug Use:                               * Congratulations on quitting smoking and staying quit!  Keep up the good work managing stress/anxiety in other ways (prayer, talking it out, deep breaths, exercise, etc..                         * Maintain healthy relationship with alcohol                          * For women this would be no more than 1 drink per day                          * For men, this would be no more than 2 drinks per day              * Eliminate recreational drugs    7.  Pain:                 * Non-medication  treatments:                          * ice/heat: use heating pad as you are doing.                          * massage                          * acupuncture                          * chiropractor    Get MRI for your shoulder.    8.  Pain Medications: Gabapentin 3 at night, Tramadol one pill three times day.  Tylenol arthritis as needed for break through.          You are scheduled for MRI referral at:  [address] [address]  [phone]  Date:  Saturday March 4, 2017  Time:  12:00 noon  Please arrive 15 minutes early.  If you have any questions or need to reschedule your appointment, please call the number listed above.    Any other concerns please call the clinic at [phone].    Neva ROWLAND  Referral Coordinator   2/27/17

## 2017-02-27 NOTE — MR AVS SNAPSHOT
After Visit Summary   2/27/2017    Shira Guthrie    MRN: [id number]           Patient Information     Date Of Birth          1955        Visit Information        Provider Department      2/27/2017 1:10 PM Sally Pierre MD [facility]        Today's Diagnoses     Chronic pain syndrome    -  1    Other polyneuropathy (H)        Primary osteoarthritis involving multiple joints        Rotator cuff impingement syndrome of right shoulder          Care Instructions         Personal Care Plan for Chronic Pain    1.  Personal Goals:                * take less medication              * lose weight: goal of losing 15 to 20 pounds.              * continue working on keeping thoughts positive through Tawny              * to feel confident enough that when someone gives me a compliment I can simply say thank you    2.  Sleep:                 *  Basic sleep plan:                          *reduce or eliminate caffeine and daytime naps                          * relaxation before bed                          * limit screen time 1-2 hours prior to bed                          * establish dark/quiet sleep environment                          * go to bed at target bedtime:   pm                          * keep consistent wake time:   am.              *  Minimize switching days and nights by staying active throughout the day.              * We'll talk more about a consistent sleep plan when we meet next time.                3.  Physical Activity:                 * Formal physical rehabilitation:                          None right now.              * Home/community based activity:                          * Home based exercises given by lymphedema nurse with breathing exercises built in as well - daily                          * Aerobic exercise/endurance exercise:  elliptical machine - 5 minute intervals with sit ups in between for 30 minutes - daily.  Has a  in the builiding. Has exercise tape in her  apartment.                          * Cleaning and baking with body awareness and stretching              * Listen to your body.  Pace yourself for success.  Don't over-do it.  Plan to get PCA back to help with cleaning and laundry so as to not overdo it.                4.  Nutrition/Weight:                 * Keep a food journal in a notebook at home and bring this to your next visit with Dr. Sky.  Eat small frequent meals.              * Review your I Can Prevent Diabetes materials.              * Limit processed foods and foods high in sugar, sodium and fat.              * Keep up the good work eating many healthy foods.              * When you make a less healthy choice approach yourself with kindness and compassion.  Try to understand what contributed to that choice:  Was I too hungry?  Was I too tired?  Stressed?  Worried?  Use this information to help support healthier choices in the future.    5.  Mood/Stress Management:                 * Formal interventions:                          * schedule follow up with Dr. Sky in January to check in on how things are going.                * Home/community based interventions:                          * Regular contact with family  * Relaxation techniques - breathing exercises  * Create your pyramid to focus you on your strengths and hopes.  * Movies  * Meditation  * Yoga  * Creative activity  * Spiritual /prayer:  Prayer at home, attending services at [facility], wellness auxiliary group  * Service-based activity.              * Medications: Cymbalta, Hydroxyzine as needed.    6.  Tobacco/Alcohol/Drug Use:                               * Congratulations on quitting smoking and staying quit!  Keep up the good work managing stress/anxiety in other ways (prayer, talking it out, deep breaths, exercise, etc..                         * Maintain healthy relationship with alcohol                          * For women this would be no more  than 1 drink per day                          * For men, this would be no more than 2 drinks per day              * Eliminate recreational drugs    7.  Pain:                 * Non-medication treatments:                          * ice/heat: use heating pad as you are doing.                          * massage                          * acupuncture                          * chiropractor    Get MRI for your shoulder.    8.  Pain Medications: Gabapentin 3 at night, Tramadol one pill three times day.  Tylenol arthritis as needed for break through.              Follow-ups after your visit        Follow-up notes from your care team     Return in about 4 weeks (around 3/27/2017), or Chronic pain and shoulder issue.      Future tests that were ordered for you today     Open Future Orders        Priority Expected Expires Ordered    MRI SHOULDER - RIGHT Routine  2/27/2018 2/27/2017            Who to contact     Please call your clinic at [phone] to:    Ask questions about your health    Make or cancel appointments    Discuss your medicines    Learn about your test results    Speak to your doctor   If you have compliments or concerns about an experience at your clinic, or if you wish to file a complaint, please contact [facility] Patient Relations at [phone] or email us at [email or web address]         Additional Information About Your Visit        [employer] Information     Go Vocab is an electronic gateway that provides easy, online access to your medical records. With Go Vocab, you can request a clinic appointment, read your test results, renew a prescription or communicate with your care team.     To sign up for Levert visit the website at [email or web address]   You will be asked to enter the access code listed below, as well as some personal information. Please follow the directions to create your username and password.     Your access code is: [email or web address]  Expires:  "3/2/2017  9:00 AM     Your access code will  in 90 days. If you need help or a new code, please contact your [facility] or call [phone] for assistance.        Care EveryWhere ID     This is your Care EveryWhere ID. This could be used by other organizations to access your [place] medical records  [id number]        Your Vitals Were     Pulse Temperature Height BMI (Body Mass Index)          70 97.5  F (36.4  C) (Oral) 5' 4.37\" (163.5 cm) 30.75 kg/m2         Blood Pressure from Last 3 Encounters:   17 124/77   17 136/82   16 124/78    Weight from Last 3 Encounters:   17 181 lb 3.2 oz (82.2 kg)   17 180 lb 12.8 oz (82 kg)   16 179 lb (81.2 kg)              We Performed the Following     Rapid Urine Drug Screen ([facility])          Where to get your medicines      Some of these will need a paper prescription and others can be bought over the counter.  Ask your nurse if you have questions.     Bring a paper prescription for each of these medications     traMADol 50 MG tablet          Primary Care Provider Office Phone # Fax #    Sally Pierre [phone]       [address] [address]        Thank you!     Thank you for choosing [facility]  for your care. Our goal is always to provide you with excellent care. Hearing back from our patients is one way we can continue to improve our services. Please take a few minutes to complete the written survey that you may receive in the mail after your visit with us. Thank you!             Your Updated Medication List - Protect others around you: Learn how to safely use, store and throw away your medicines at www.disposemymeds.org.          This list is accurate as of: 17  1:49 PM.  Always use your most recent med list.                   Brand Name Dispense Instructions for use    aspirin 81 MG EC tablet     90 tablet    Take 1 tablet (81 mg) by mouth " daily       CANE, ANY MATERIAL     1 Device    1 Device by Device route as needed       clopidogrel 75 MG tablet    PLAVIX    30 tablet    Take 1 tablet (75 mg) by mouth daily . Give name brand Plavix. Approved by insurance through 2/2/1016.       dexlansoprazole 30 MG Cpdr CR capsule    DEXILANT    90 capsule    Take 1 capsule (30 mg) by mouth daily       diphenhydrAMINE 25 MG tablet    BENADRYL    60 tablet    Take 1 tablet (25 mg) by mouth every 6 hours as needed for itching or other (cough) Do not use with the hydroxyzine.       docusate sodium 100 MG tablet    COLACE    60 tablet    Take 100 mg by mouth daily       DULoxetine 60 MG EC capsule    CYMBALTA    30 capsule    Take 1 capsule (60 mg) by mouth daily       gabapentin 300 MG capsule    NEURONTIN    90 capsule    Take 1 capsule (300 mg) by mouth 3 times daily       Heating Pads Pads     1 each    Apply to painful areas prn.       hydrOXYzine 25 MG tablet    ATARAX    100 tablet    1 to 2 tablets three times a day as needed.       isosorbide mononitrate 30 MG 24 hr tablet    IMDUR    90 tablet    Take 1 tablet (30 mg) by mouth daily       LINZESS 290 MCG capsule   Generic drug:  linaclotide      Take 1 capsule (290 mcg) by mouth every morning (before breakfast)       metoprolol 25 MG 24 hr tablet    TOPROL-XL    90 tablet    Take 1 tablet (25 mg) by mouth daily       nitroglycerin 0.4 MG sublingual tablet    NITROSTAT    30 tablet    Place 1 tablet (0.4 mg) under the tongue every 5 minutes as needed       order for DME     2 Device    Equipment being ordered: Jobst stockings thigh high 15-20mmHg.  Please measure for fit. 2 pairs.       polyethylene glycol powder    MIRALAX    510 g    Take 17 g (1 capful) by mouth 2 times daily       rosuvastatin 20 MG tablet    CRESTOR    90 tablet    Take 1 tablet (20 mg) by mouth daily       senna 8.6 MG tablet    SENOKOT    120 tablet    Take 1 tablet by mouth 2 times daily       traMADol 50 MG tablet    ULTRAM    90  tablet    Take 1 tablet (50 mg) by mouth 3 times daily as needed       TYLENOL 325 MG tablet   Generic drug:  acetaminophen     100 tablet    Take 2 tablets (650 mg) by mouth daily

## 2017-02-28 PROBLEM — Z90.710 H/O HYSTERECTOMY FOR BENIGN DISEASE: Status: ACTIVE | Noted: 2017-02-28

## 2017-03-02 ENCOUNTER — TELEPHONE (OUTPATIENT)
Dept: FAMILY MEDICINE | Facility: CLINIC | Age: 62
End: 2017-03-02

## 2017-03-02 NOTE — TELEPHONE ENCOUNTER
Talked to the patient she cant come this month she scheduled for 4/14/17 with  for a CMP appointment. [id number]

## 2017-03-02 NOTE — TELEPHONE ENCOUNTER
----- Message from Dior Sky sent at 3/2/2017 11:34 AM CST -----  Regarding: Outreach call  This patient was supposed to schedule a follow up visit with me for CPM in March, but I do not see her on my schedule yet.  Currently, I have only two lifestyle slots left in March and I was worried she may not get a slot if this was not set up soon.  Would you be able to reach out to her to see if she is still interested in this.  If so, could you help her set this up.  Ok if she no longer wants/needs this, but I just didn't want her to get stuck and not be able to get a slot.  Let me know if you have questions for me on this!  Thanks!  Dior

## 2017-03-06 ENCOUNTER — TELEPHONE (OUTPATIENT)
Dept: FAMILY MEDICINE | Facility: CLINIC | Age: 62
End: 2017-03-06

## 2017-03-06 NOTE — TELEPHONE ENCOUNTER
Patient states that she went to have her MRI of her right shoulder on Saturday at 12 at [facility]. She wanted to let Dr. Pierre know so she can watch for the results. /LATANYA Guthrie  Routed to Dr. Pierre

## 2017-03-06 NOTE — TELEPHONE ENCOUNTER
[facility] phone call message- patient requesting results:    Test: Lab and MRI    Date of test: 2 days ago    Additional Comments: Patient would like a call back in regards to her results.     OK to leave a message on voice mail? Yes    Primary language: English      needed? No    Call taken on March 6, 2017 at 9:53 AM by Michoacano Jackson

## 2017-03-08 DIAGNOSIS — M75.41 ROTATOR CUFF IMPINGEMENT SYNDROME OF RIGHT SHOULDER: ICD-10-CM

## 2017-03-09 DIAGNOSIS — S46.811D TRAUMATIC TEAR OF SUPRASPINATUS TENDON OF RIGHT SHOULDER, SUBSEQUENT ENCOUNTER: Primary | ICD-10-CM

## 2017-03-09 NOTE — PROGRESS NOTES
Discussed with patient on the phone.  She has a partial thickness tear of the supraspinatus tendon in the right shoulder.  Chronic labral tear.    She would like to see ortho.  Referral placed.

## 2017-03-10 NOTE — PATIENT INSTRUCTIONS
Your referral has been scheduled for:    [facility]  [address] [place] [id number]  [phone]  Shoulder Consult  Date: Tuesday March 28 2017   Time: 2:00 PM Dr. Myrna Ornelas     If you have any questions or need to reschedule please call the number listed above.  Any other concerns please call our referral coordinator at [phone]    Deb  Care Coordinator     GAVE TO PATIENT VIA PHONE 10:44 AM 3/10/2017 Deb Cobos

## 2017-03-21 ENCOUNTER — OFFICE VISIT (OUTPATIENT)
Dept: FAMILY MEDICINE | Facility: CLINIC | Age: 62
End: 2017-03-21

## 2017-03-21 VITALS
SYSTOLIC BLOOD PRESSURE: 124 MMHG | BODY MASS INDEX: 30.1 KG/M2 | TEMPERATURE: 97.7 F | DIASTOLIC BLOOD PRESSURE: 78 MMHG | HEART RATE: 74 BPM | WEIGHT: 177.4 LBS

## 2017-03-21 DIAGNOSIS — F33.2 MAJOR DEPRESSIVE DISORDER, RECURRENT, SEVERE WITHOUT PSYCHOTIC FEATURES (H): ICD-10-CM

## 2017-03-21 DIAGNOSIS — Z00.00 ROUTINE GENERAL MEDICAL EXAMINATION AT A HEALTH CARE FACILITY: Primary | ICD-10-CM

## 2017-03-21 DIAGNOSIS — K59.09 CHRONIC CONSTIPATION: ICD-10-CM

## 2017-03-21 DIAGNOSIS — I10 BENIGN ESSENTIAL HYPERTENSION: ICD-10-CM

## 2017-03-21 DIAGNOSIS — M75.111 PARTIAL TEAR OF RIGHT ROTATOR CUFF: ICD-10-CM

## 2017-03-21 DIAGNOSIS — I25.119 CORONARY ARTERY DISEASE INVOLVING NATIVE CORONARY ARTERY OF NATIVE HEART WITH ANGINA PECTORIS (H): ICD-10-CM

## 2017-03-21 DIAGNOSIS — Z11.3 SCREEN FOR STD (SEXUALLY TRANSMITTED DISEASE): ICD-10-CM

## 2017-03-21 LAB
BUN SERPL-MCNC: 11.1 MG/DL (ref 7–19)
CALCIUM SERPL-MCNC: 9.3 MG/DL (ref 8.5–10.1)
CHLORIDE SERPLBLD-SCNC: 107.2 MMOL/L (ref 98–110)
CHOLEST SERPL-MCNC: 161.5 MG/DL (ref 0–200)
CHOLEST/HDLC SERPL: 2.1 {RATIO} (ref 0–5)
CO2 SERPL-SCNC: 21.2 MMOL/L (ref 20–32)
CREAT SERPL-MCNC: 0.9 MG/DL (ref 0.5–1)
GFR SERPL CREATININE-BSD FRML MDRD: 67.2 ML/MIN/1.7 M2
GLUCOSE SERPL-MCNC: 98.6 MG'DL (ref 70–99)
HBA1C MFR BLD: 5.6 % (ref 4.1–5.7)
HDLC SERPL-MCNC: 76 MG/DL
HIV 1+2 AB+HIV1 P24 AG SERPL QL IA: NEGATIVE
LDLC SERPL CALC-MCNC: 72 MG/DL (ref 0–129)
POTASSIUM SERPL-SCNC: 3.8 MMOL/DL (ref 3.2–4.6)
SODIUM SERPL-SCNC: 140.2 MMOL/L (ref 132–142)
TRIGL SERPL-MCNC: 67 MG/DL (ref 0–150)
TSH SERPL DL<=0.05 MIU/L-ACNC: 0.83 UIU/ML (ref 0.3–5)
VLDL CHOLESTEROL: 13.4 MG/DL (ref 7–32)

## 2017-03-21 RX ORDER — DULOXETIN HYDROCHLORIDE 60 MG/1
60 CAPSULE, DELAYED RELEASE ORAL DAILY
Qty: 30 CAPSULE | Refills: 12 | Status: SHIPPED | OUTPATIENT
Start: 2017-03-21 | End: 2018-04-25

## 2017-03-21 RX ORDER — METOPROLOL SUCCINATE 25 MG/1
25 TABLET, EXTENDED RELEASE ORAL DAILY
Qty: 90 TABLET | Refills: 4 | Status: SHIPPED | OUTPATIENT
Start: 2017-03-21 | End: 2018-04-25

## 2017-03-21 NOTE — PATIENT INSTRUCTIONS
Stop colace to see if it helps.  Decrease miralax to every other day if stools are runny.  We will consider switching to fiber powder if you are still having trouble with constipation and diarrhea.

## 2017-03-21 NOTE — MR AVS SNAPSHOT
After Visit Summary   3/21/2017    Shira Guthrie    MRN: [id number]           Patient Information     Date Of Birth          1955        Visit Information        Provider Department      3/21/2017 2:30 PM Sally Pierre MD [facility]        Today's Diagnoses     Routine general medical examination at a health care facility    -  1    Screen for STD (sexually transmitted disease)        Chronic constipation        Partial tear of right rotator cuff        Benign essential hypertension        Coronary artery disease involving native coronary artery of native heart with angina pectoris (H)        Major depressive disorder, recurrent, severe without psychotic features (H)          Care Instructions    Stop colace to see if it helps.  Decrease miralax to every other day if stools are runny.  We will consider switching to fiber powder if you are still having trouble with constipation and diarrhea.            Follow-ups after your visit        Follow-up notes from your care team     Return in about 4 weeks (around 4/18/2017) for Chronic Pain and shoulder, stools..      Your next 10 appointments already scheduled     Apr 14, 2017 10:00 AM CDT   Return Lifestyle with Dior Sky PhD   [facility] ([facility])    [address] [place] [address]   [phone]              Who to contact     Please call your clinic at [phone] to:    Ask questions about your health    Make or cancel appointments    Discuss your medicines    Learn about your test results    Speak to your doctor   If you have compliments or concerns about an experience at your clinic, or if you wish to file a complaint, please contact [facility] Patient Relations at [phone] or email us at [email or web address]         Additional Information About Your Visit        MyChart Information     [employer] is an electronic gateway that provides easy, online access to your medical  records. With Windward, you can request a clinic appointment, read your test results, renew a prescription or communicate with your care team.     To sign up for [facility]ward visit the website at [email or web address]   You will be asked to enter the access code listed below, as well as some personal information. Please follow the directions to create your username and password.     Your access code is: [email or web address]  Expires: 2017  3:34 PM     Your access code will  in 90 days. If you need help or a new code, please contact your [facility] or call [phone] for assistance.        Care EveryWhere ID     This is your Care EveryWhere ID. This could be used by other organizations to access your [place] medical records  [id number]        Your Vitals Were     Pulse Temperature BMI (Body Mass Index)             74 97.7  F (36.5  C) (Oral) 30.1 kg/m2          Blood Pressure from Last 3 Encounters:   17 124/78   17 124/77   17 136/82    Weight from Last 3 Encounters:   17 177 lb 6.4 oz (80.5 kg)   17 181 lb 3.2 oz (82.2 kg)   17 180 lb 12.8 oz (82 kg)              We Performed the Following     Basic Metabolic Panel ([facility])     Chlamydia/Gono Amplified ([facility])     Hemoglobin A1c ([facility])     Hepatitis B Surface Ag ([facility])     HIV Ag/Ab Screen [place] ([facility])     Lipid Panel ([facility])     Syphilis Screen [place] ([facility])     Thyroid [place] ([facility])          Today's Medication Changes          These changes are accurate as of: 3/21/17  3:34 PM.  If you have any questions, ask your nurse or doctor.               Stop taking these medicines if you haven't already. Please contact your care team if you have questions.     diphenhydrAMINE 25 MG tablet   Commonly known as:  BENADRYL   Stopped by:  Sally Pierre MD           docusate sodium 100 MG tablet   Commonly known as:  COLACE   Stopped by:  Sally Pierre MD            senna 8.6 MG tablet   Commonly known as:  SENOKOT   Stopped by:  Sally Pierre MD                Where to get your medicines      These medications were sent to [facility] - [place], [place] - [address], [place] [address]     Phone:  [phone]     aspirin 81 MG EC tablet    DULoxetine 60 MG EC capsule    metoprolol 25 MG 24 hr tablet                Primary Care Provider Office Phone # Fax #    Sally Pierre [phone]       [address] [address]        Thank you!     Thank you for choosing [facility]  for your care. Our goal is always to provide you with excellent care. Hearing back from our patients is one way we can continue to improve our services. Please take a few minutes to complete the written survey that you may receive in the mail after your visit with us. Thank you!             Your Updated Medication List - Protect others around you: Learn how to safely use, store and throw away your medicines at www.disposemymeds.org.          This list is accurate as of: 3/21/17  3:34 PM.  Always use your most recent med list.                   Brand Name Dispense Instructions for use    aspirin 81 MG EC tablet     90 tablet    Take 1 tablet (81 mg) by mouth daily       CANE, ANY MATERIAL     1 Device    1 Device by Device route as needed       clopidogrel 75 MG tablet    PLAVIX    30 tablet    Take 1 tablet (75 mg) by mouth daily . Give name brand Plavix. Approved by insurance through 2/2/1016.       dexlansoprazole 30 MG Cpdr CR capsule    DEXILANT    90 capsule    Take 1 capsule (30 mg) by mouth daily       DULoxetine 60 MG EC capsule    CYMBALTA    30 capsule    Take 1 capsule (60 mg) by mouth daily       gabapentin 300 MG capsule    NEURONTIN    90 capsule    Take 1 capsule (300 mg) by mouth 3 times daily       Heating Pads Pads     1 each    Apply to painful areas prn.       hydrOXYzine 25 MG tablet    ATARAX     100 tablet    1 to 2 tablets three times a day as needed.       isosorbide mononitrate 30 MG 24 hr tablet    IMDUR    90 tablet    Take 1 tablet (30 mg) by mouth daily       LINZESS 290 MCG capsule   Generic drug:  linaclotide      Take 1 capsule (290 mcg) by mouth every morning (before breakfast)       metoprolol 25 MG 24 hr tablet    TOPROL-XL    90 tablet    Take 1 tablet (25 mg) by mouth daily       nitroglycerin 0.4 MG sublingual tablet    NITROSTAT    30 tablet    Place 1 tablet (0.4 mg) under the tongue every 5 minutes as needed       order for DME     2 Device    Equipment being ordered: Jobst stockings thigh high 15-20mmHg.  Please measure for fit. 2 pairs.       polyethylene glycol powder    MIRALAX    510 g    Take 17 g (1 capful) by mouth 2 times daily       rosuvastatin 20 MG tablet    CRESTOR    90 tablet    Take 1 tablet (20 mg) by mouth daily       traMADol 50 MG tablet    ULTRAM    90 tablet    Take 1 tablet (50 mg) by mouth 3 times daily as needed       TYLENOL 325 MG tablet   Generic drug:  acetaminophen     100 tablet    Take 2 tablets (650 mg) by mouth daily

## 2017-03-21 NOTE — LETTER
March 22, 2017      Shira JACOB [place]  [address]  [place] [address]      Please see below for your test results.    Resulted Orders   Hemoglobin A1c ([facility])   Result Value Ref Range    Hemoglobin A1C 5.6 4.1 - 5.7 %   Basic Metabolic Panel ([facility])   Result Value Ref Range    Urea Nitrogen 11.1 7.0 - 19.0 mg/dL    Calcium 9.3 8.5 - 10.1 mg/dL    Chloride 107.2 98.0 - 110.0 mmol/L    Carbon Dioxide 21.2 20.0 - 32.0 mmol/L    Creatinine 0.9 0.5 - 1.0 mg/dL    Glucose 98.6 70.0 - 99.0 mg'dL    Potassium 3.8 3.2 - 4.6 mmol/dL    Sodium 140.2 132.0 - 142.0 mmol/L    GFR Estimate 67.2 >60.0 mL/min/1.7 m2    GFR Estimate If Black 81.3 >60.0 mL/min/1.7 m2   Lipid Panel ([facility])   Result Value Ref Range    Cholesterol 161.5 0.0 - 200.0 mg/dL    Cholesterol/HDL Ratio 2.1 0.0 - 5.0    HDL Cholesterol 76.0 >40.0 mg/dL    LDL Cholesterol Calculated 72 0 - 129 mg/dL    Triglycerides 67.0 0.0 - 150.0 mg/dL    VLDL Cholesterol 13.4 7.0 - 32.0 mg/dL   Chlamydia/Gono Amplified ([employer])   Result Value Ref Range    Chlamydia trac,Amplified Prb Negative Negative    N gonorrhoeae,Amplified Prb Negative Negative    Narrative    Test performed by:  [facility] [address] [place] [address]   Hepatitis B Surface Ag ([facility])   Result Value Ref Range    Hepatitis B Surface Antigen Negative Negative    Narrative    Test performed by:  [facility] [address] [place] [address]   Syphilis Screen [place] ([facility])   Result Value Ref Range    Syphilis Screen [place] Non-Reactive Non-Reactive    Narrative    Test performed by:  [facility] [address] [place] [address]   HIV Ag/Ab Screen [place] ([facility])   Result Value Ref Range    HIV Antigen/Antibody Negative Negative    Narrative    Test performed by:  [facility] [address] [place] [address]   Thyroid [place] ([facility])   Result Value Ref Range    TSH 0.83 0.30 - 5.00 uIU/mL     Narrative    Test performed by:  [facility]  [address] [place] [address]     All of your labs were normal.  Negative for sexual infections.  Normal kidney, liver and thyroid tests.  Normal cholesterol and diabetes screening.      If you have any questions, please call the clinic to make an appointment.    Sincerely,    Sally Pierre MD

## 2017-03-21 NOTE — PROGRESS NOTES
There are no exam notes on file for this visit.  Chief Complaint   Patient presents with     Shoulder     Pt is here to discuss her shoulder and pain medication.      Labs Only     Pt would like blood work for STD's and labs for stool.  She is not sure if it is a bacteria or if it is because she is on too many stool softeners.     Blood pressure 124/78, pulse 74, temperature 97.7  F (36.5  C), temperature source Oral, weight 177 lb 6.4 oz (80.5 kg), not currently breastfeeding.                 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      Has partial rotator cuff tear and sees ortho on 3/28.  Taking tramadol 3 times a day. Is not due for refill yet.     She also would like STD screening as has new sexual partner that she is unsure if he has other partners.  No sxs but just wants to be safe.    Also due for lab work up for yearly lipids and diabetes screening.  Trouble losing weight despite many diet and exercise changes.     She has chronic constipation secondary to multiple GI surgeries and tramadol, which is better since starting linzess  from GI.  Sometimes she is getting lose stool though.  She is wondering about stopping colace or miralax.      PMH, Medications and Allergies were reviewed and updated as needed.         Physical Exam:     Vitals:    03/21/17 1435   BP: 124/78   BP Location: Left arm   Patient Position: Chair   Cuff Size: Adult Large   Pulse: 74   Temp: 97.7  F (36.5  C)   TempSrc: Oral   Weight: 177 lb 6.4 oz (80.5 kg)     Body mass index is 30.1 kg/(m^2).    Exam:  Constitutional: healthy, alert and no distress  Neck: Neck supple. No adenopathy. Thyroid symmetric, normal size,  Cardiovascular:RRR. No murmurs, clicks gallops or rub  Respiratory:  normal respiratory rate and rhythm, lungs clear to auscultation. No wheezes or crackles.  Abdomen: +BS, soft, nontender, nondistended. No HSM.  Extremities: No C/C/E in BLE. 2+DP pulses.    Psychiatric: mentation appears normal and affect normal/bright    PHQ-9 SCORE 9/15/2016 10/21/2016 1/12/2017   Total Score - - -   Total Score 12 8 15     Results for orders placed or performed in visit on 03/21/17   Hemoglobin A1c ([facility])   Result Value Ref Range    Hemoglobin A1C 5.6 4.1 - 5.7 %   Basic Metabolic Panel ([facility])   Result Value Ref Range    Urea Nitrogen 11.1 7.0 - 19.0 mg/dL    Calcium 9.3 8.5 - 10.1 mg/dL    Chloride 107.2 98.0 - 110.0 mmol/L    Carbon Dioxide 21.2 20.0 - 32.0 mmol/L    Creatinine 0.9 0.5 - 1.0 mg/dL    Glucose 98.6 70.0 - 99.0 mg'dL    Potassium 3.8 3.2 - 4.6 mmol/dL    Sodium 140.2 132.0 - 142.0 mmol/L    GFR Estimate 67.2 >60.0 mL/min/1.7 m2    GFR Estimate If Black 81.3 >60.0 mL/min/1.7 m2   Lipid Panel ([facility])   Result Value Ref Range    Cholesterol 161.5 0.0 - 200.0 mg/dL    Cholesterol/HDL Ratio 2.1 0.0 - 5.0    HDL Cholesterol 76.0 >40.0 mg/dL    LDL Cholesterol Calculated 72 0 - 129 mg/dL    Triglycerides 67.0 0.0 - 150.0 mg/dL    VLDL Cholesterol 13.4 7.0 - 32.0 mg/dL   Chlamydia/Gono Amplified ([facility])   Result Value Ref Range    Chlamydia  trac,Amplified Prb Negative Negative    N gonorrhoeae,Amplified Prb Negative Negative    Narrative    Test performed by:  [facility] [address] [place] [address]   Hepatitis B Surface Ag ([facility])   Result Value Ref Range    Hepatitis B Surface Antigen Negative Negative    Narrative    Test performed by:  [facility] [address] [place] [address]   Syphilis Screen [place] ([facility])   Result Value Ref Range    Syphilis Screen [place] Non-Reactive Non-Reactive    Narrative    Test performed by:  [facility] [address] [place] [address]   HIV Ag/Ab Screen [place] ([facility])   Result Value Ref Range    HIV Antigen/Antibody Negative Negative    Narrative    Test performed by:  [facility] [address] [place] [address]   Thyroid [place] ([facility])   Result Value Ref Range    TSH 0.83 0.30 - 5.00 uIU/mL    Narrative    Test performed by:  [facility] [address] [place] [address]       Assessment and Plan     Shira was seen today for shoulder and labs only.    Diagnoses and all orders for this visit:    Routine general medical examination at a health care facility: due for screening labs and check thyroid due to difficulty losing weight.  -     Hemoglobin A1c ([facility])  -     Basic Metabolic Panel ([facility])  -     Lipid Panel ([facility])  -     Thyroid [place] ([facility])    Screen for STD (sexually transmitted disease): screening due to new partner.  -     Chlamydia/Gono Amplified ([facility])  -     Hepatitis B Surface Ag ([facility])  -     Syphilis Screen [place] ([facility])  -     HIV Ag/Ab Screen [place] ([facility])    Chronic constipation: better with current meds but loose stools now and issues.  Discuss that she should stop colace and decrease miralax prn for regular stools.  May benefit from fiber supplement as well.    Partial tear of right rotator cuff: Seeing ortho next week for management  plan, will likely need procedure due to catching sxs and persistent pain not allowing her to drive despite PT for a couple months. Tramadol prn still had refill.    Benign essential hypertension:  Refilled today. Stable.  -     metoprolol (TOPROL-XL) 25 MG 24 hr tablet; Take 1 tablet (25 mg) by mouth daily    Coronary artery disease involving native coronary artery of native heart with angina pectoris (H): stable refilled.  -     aspirin 81 MG EC tablet; Take 1 tablet (81 mg) by mouth daily    Major depressive disorder, recurrent, severe without psychotic features (H): Refilled medication, stable.  -     DULoxetine (CYMBALTA) 60 MG EC capsule; Take 1 capsule (60 mg) by mouth daily      Patient Instructions   Stop colace to see if it helps.  Decrease miralax to every other day if stools are runny.  We will consider switching to fiber powder if you are still having trouble with constipation and diarrhea.        Return in about 4 weeks (around 4/18/2017) for Chronic Pain and shoulder, stools..       Options for treatment and/or follow-up care were reviewed with the patient. Shira Guthrie was engaged and actively involved in the decision making process. She verbalized understanding of the options discussed and was satisfied with the final plan.    Sally Pierre MD

## 2017-03-22 LAB
C TRACH RRNA SPEC QL NAA+PROBE: NEGATIVE
HBV SURFACE AG SERPL QL IA: NEGATIVE
N GONORRHOEA RRNA SPEC QL NAA+PROBE: NEGATIVE
RPR SER QL: NORMAL

## 2017-03-22 NOTE — PROGRESS NOTES
All of your labs were normal.  Negative for sexual infections.  Normal kidney, liver and thyroid tests.  Normal cholesterol and diabetes screening.

## 2017-03-28 ENCOUNTER — TRANSFERRED RECORDS (OUTPATIENT)
Dept: HEALTH INFORMATION MANAGEMENT | Facility: CLINIC | Age: 62
End: 2017-03-28

## 2017-03-29 ENCOUNTER — TELEPHONE (OUTPATIENT)
Dept: FAMILY MEDICINE | Facility: CLINIC | Age: 62
End: 2017-03-29

## 2017-03-29 NOTE — TELEPHONE ENCOUNTER
Called [place] and left a message for Dr. Coronado's team.  Asked them to call the patient and clarify the plan and help her set of PT.  Discussed too that she had already had 2 month of therapy.    Sally Pierre MD

## 2017-03-29 NOTE — TELEPHONE ENCOUNTER
P Family Medicine phone call message- general phone call:    Reason for call: the pt called to let the Dr know that she is going to get a Cortizone shot at [facility] and would like to talk to the Dr about it     Return call needed: Yes    OK to leave a message on voice mail? Yes    Primary language: English      needed? No    Call taken on March 29, 2017 at 9:54 AM by Karel Diaz

## 2017-03-29 NOTE — TELEPHONE ENCOUNTER
Patient states she saw her ortho provider today and is scheduled for a cortisone injection on April 3rd. She was also given an rx for PT for up to 12 visits. Patient is confused as to how she goes about setting up home PT for herself and she was not given any direction on how to do this from the ortho provider. She states she has had PT in the past for 2 months and it did not help. She would like Dr. Pierre opinion on this treatment plan. Please advise. /LATANYA Guthrie  Routed to Dr. Pierre

## 2017-04-03 ENCOUNTER — TELEPHONE (OUTPATIENT)
Dept: FAMILY MEDICINE | Facility: CLINIC | Age: 62
End: 2017-04-03

## 2017-04-03 NOTE — TELEPHONE ENCOUNTER
Calling regarding her message from last week. States she is going to the radiologist office today for her injection but would like to talk with Dr. Pierre about a recent fall and also about the recommendations of her ortho provider. Patient would like a call back. /LATANYA Guthrie  Routed to Dr. Pierre

## 2017-04-03 NOTE — TELEPHONE ENCOUNTER
[facility] Family Medicine phone call message- general phone call:    Reason for call: she needs a call back re a fall she had this weekend.She is on her way to her radiology appointment so please call her before 10:30pm    Return call needed: Yes    OK to leave a message on voice mail? Yes      Primary language: English      needed? No    Call taken on April 3, 2017 at 9:39 AM by Earline Curry

## 2017-04-05 NOTE — TELEPHONE ENCOUNTER
Wondering about whether she should do PT for 12 sessions like ORtho recommended.  She got her steroid injection on Monday and it went okay.  She is going to see her great grandbaby in [place] April 18 until May 18.  Would like to wait for PT until after that.  Discussed that is fine.  She will come for med refills for pain etc before her trip.    Fell recently at home when her left leg gave out in the AM.   her fall with hand elbow and left arm. Elbow is still a little sore.  Plans to see Dr. Smith if continues to hurt, but improving.  Using cane for ambulation regularly.    Sally Pierre MD

## 2017-04-14 ENCOUNTER — OFFICE VISIT (OUTPATIENT)
Dept: FAMILY MEDICINE | Facility: CLINIC | Age: 62
End: 2017-04-14

## 2017-04-14 ENCOUNTER — OFFICE VISIT (OUTPATIENT)
Dept: PSYCHOLOGY | Facility: CLINIC | Age: 62
End: 2017-04-14

## 2017-04-14 VITALS
DIASTOLIC BLOOD PRESSURE: 82 MMHG | TEMPERATURE: 98 F | SYSTOLIC BLOOD PRESSURE: 134 MMHG | WEIGHT: 177.8 LBS | BODY MASS INDEX: 30.17 KG/M2 | HEART RATE: 98 BPM

## 2017-04-14 DIAGNOSIS — M15.0 PRIMARY OSTEOARTHRITIS INVOLVING MULTIPLE JOINTS: ICD-10-CM

## 2017-04-14 DIAGNOSIS — G62.89 OTHER POLYNEUROPATHY: ICD-10-CM

## 2017-04-14 DIAGNOSIS — G89.4 CHRONIC PAIN SYNDROME: Primary | ICD-10-CM

## 2017-04-14 DIAGNOSIS — M75.41 ROTATOR CUFF IMPINGEMENT SYNDROME OF RIGHT SHOULDER: ICD-10-CM

## 2017-04-14 DIAGNOSIS — K59.09 CHRONIC CONSTIPATION: ICD-10-CM

## 2017-04-14 RX ORDER — TRAMADOL HYDROCHLORIDE 50 MG/1
50 TABLET ORAL 3 TIMES DAILY PRN
Qty: 90 TABLET | Refills: 1 | Status: SHIPPED | OUTPATIENT
Start: 2017-04-14 | End: 2017-06-07

## 2017-04-14 RX ORDER — POLYETHYLENE GLYCOL 3350 17 G/17G
1 POWDER, FOR SOLUTION ORAL 2 TIMES DAILY
Qty: 510 G | Refills: 12 | Status: SHIPPED | OUTPATIENT
Start: 2017-04-14 | End: 2018-04-25

## 2017-04-14 NOTE — PROGRESS NOTES
Chronic Pain Follow-Up Visit    Pain Update:  Location of pain: Shoulder (R), legs and back  Analgesia/pain control: Improving Recent changes:  improved    Overall control: Tolerable with discomfort    Doing PT at home and exercise room every day.    Fell one morning and hurting left arm.   Created space by giving away furniture to neighbors.    Plans to remove rug which is trip hazard.    Adherance     How often do you take extra pain medicine:as below.  2 tramadol a day usually, occasionally need the third.     Did you take your pain medication today? YES, tramadol    3 gabapentin a night.  Had a couple Percocet from ortho.  Flushed them. Got too constipated and did not help.  Adverse effects:  YES constipation from percocet, better since avoiding percocet.     Database checked today? Yes. Details: as expected.    PHQ-9 SCORE 10/21/2016 1/12/2017 4/17/2017   Total Score - - -   Total Score 8 15 8     FAYE-7 SCORE 8/18/2015   Total Score 18       Care Plan discussed and updated as needed.  See the end of this note.         FUNCTIONAL ASSESSMENT QUESTIONNAIRE SCORE 2/27/2017 4/14/2017   Total Score 40 40          Problem, Medication and Allergy Lists were reviewed and are current..           Physical Exam:     Vitals:    04/14/17 1610   BP: 134/82   BP Location: Left arm   Patient Position: Chair   Cuff Size: Adult Large   Pulse: 98   Temp: 98  F (36.7  C)   TempSrc: Oral   Weight: 177 lb 12.8 oz (80.6 kg)     Body mass index is 30.17 kg/(m^2).  Vitals were reviewed and were normal  GENERAL: healthy, alert, well nourished, well hydrated, no distress  NECK: no tenderness, no adenopathy, no asymmetry, no masses, no stiffness; thyroid- normal to palpation  RESP: lungs clear to auscultation - no rales, no rhonchi, no wheezes  CV: regular rates and rhythm, normal S1 S2, no S3 or S4 and no murmur, no click or rub -  ABDOMEN: soft, no tenderness, no  hepatosplenomegaly, no masses, normal bowel sounds  MS: extremities-  no gross deformities noted, no edema        Results:     Results for orders placed or performed in visit on 04/14/17   Rapid Urine Drug Screen ([facility] [place])   Result Value Ref Range    Amphetamines Qual NEGATIVE NEGATIVE    Barbiturates Qual Urine NEGATIVE NEGATIVE    Buprenorphine Qual Urine NEGATIVE NEGATIVE    Benzodiazepine Qual Urine NEGATIVE NEGATIVE    Cocaine Qual Urine NEGATIVE NEGATIVE    Cannabinoids Qual Urine NEGATIVE NEGATIVE    Methamphetamine Qual NEGATIVE NEGATIVE    Methadone Qual NEGATIVE NEGATIVE    Morphine Qual NEGATIVE NEGATIVE    Oxycodone Qual NEGATIVE NEGATIVE    Temperature of Urine was Between  Degrees F YES YES      rapid urine drug screen obtained today: Yes    Assessment and Plan    Shira Guthrie is here for follow up of chronic pain caused by OA, rotator cuff tendonitis.    Shira was seen today for pain management.    Diagnoses and all orders for this visit:    Chronic pain syndrome: refilled tramadol.  Stable pain. See full plan below.  -     Rapid Urine Drug Screen ([facility] [place])  -     traMADol (ULTRAM) 50 MG tablet; Take 1 tablet (50 mg) by mouth 3 times daily as needed    Chronic constipation: refilled meds for constipation.  -     linaclotide (LINZESS) 290 MCG capsule; Take 1 capsule (290 mcg) by mouth every morning (before breakfast)  -     polyethylene glycol (MIRALAX) powder; Take 17 g (1 capful) by mouth 2 times daily    Rotator cuff impingement syndrome of right shoulder: plans to do PT at Ortho when she return from [place] trip.     Primary osteoarthritis involving multiple joints:  -     traMADol (ULTRAM) 50 MG tablet; Take 1 tablet (50 mg) by mouth 3 times daily as needed    Other polyneuropathy (H)  -     traMADol (ULTRAM) 50 MG tablet; Take 1 tablet (50 mg) by mouth 3 times daily as needed      Chronic Pain Syndrome:  Care plan updated with patient, see below for details.  Naloxone has not been prescribed.       If opioids prescribed patient was asked to bring pill  bottle to each appointment and was informed that refills would only be provided at office visits.   Asked patient to F/U in 1 month(s) with same provider for 20 minute  Visit.     Sally Pierre MD    Patient Instructions          Personal Care Plan for Chronic Pain    1.  Personal Goals:                * take less medication              * lose weight: goal of losing 15 to 20 pounds.              * continue working on keeping thoughts positive through Tawny              * to feel confident enough that when someone gives me a compliment I can simply say thank you    2.  Sleep:                 *  Basic sleep plan:                          *reduce or eliminate caffeine and daytime naps                          * relaxation before bed                          * limit screen time 1-2 hours prior to bed                          * establish dark/quiet sleep environment                          * go to bed at target bedtime:   pm                          * keep consistent wake time:   am.              *  Minimize switching days and nights by staying active throughout the day.              * We'll talk more about a consistent sleep plan when we meet next time.                3.  Physical Activity:                 * Formal physical rehabilitation:                          None right now.              * Home/community based activity:                          * Home based exercises given by lymphedema nurse with breathing exercises built in as well - daily                          * Aerobic exercise/endurance exercise:  elliptical machine - 5 minute intervals with sit ups in between for 30 minutes - daily.  Has a  in the builiding. Has exercise tape in her apartment.                          * Cleaning and baking with body awareness and stretching              * Listen to your body.  Pace yourself for success.  Don't over-do it.  Plan to get PCA back to help with cleaning and laundry so as to not overdo  it.                4.  Nutrition/Weight:                 * Keep a food journal in a notebook at home and bring this to your next visit with Dr. Sky.  Eat small frequent meals.              * Review your I Can Prevent Diabetes materials.              * Limit processed foods and foods high in sugar, sodium and fat.              * Keep up the good work eating many healthy foods.              * When you make a less healthy choice approach yourself with kindness and compassion.  Try to understand what contributed to that choice:  Was I too hungry?  Was I too tired?  Stressed?  Worried?  Use this information to help support healthier choices in the future.    5.  Mood/Stress Management:     SELF COMPASSION!              * Formal interventions:                        * schedule follow up with Dr. Sky in about month or so.              * Home/community based interventions:                          * Regular contact with family  * Relaxation techniques - breathing exercises  * Create your pyramid to focus you on your strengths and hopes.  * Movies  * Meditation  * Yoga  * Creative activity  * Spiritual /prayer:  Prayer at home, attending services at [facility], wellness auxiliary group  * Service-based activity.              * Medications: Cymbalta, Hydroxyzine as needed.    6.  Tobacco/Alcohol/Drug Use:                               * Congratulations on quitting smoking and staying quit!  Keep up the good work managing stress/anxiety in other ways (prayer, talking it out, deep breaths, exercise, etc..                         * Maintain healthy relationship with alcohol                          * For women this would be no more than 1 drink per day                          * For men, this would be no more than 2 drinks per day              * Eliminate recreational drugs    7.  Pain:                 * Non-medication treatments:                          * ice/heat: use heating pad as you are  doing.                          * massage                          * acupuncture  [facility] is planned for when you return.  PT for shoulder when you return.                           8.  Pain Medications: Gabapentin 3 at night, Tramadol one pill three times day.  Tylenol arthritis as needed for break through.

## 2017-04-14 NOTE — PATIENT INSTRUCTIONS
Personal Care Plan for Chronic Pain    1.  Personal Goals:                * take less medication              * lose weight: goal of losing 30 pounds.              * continue working on keeping thoughts positive through Tawny   * to feel confident enough that when someone gives me a compliment I can simply say thank you    2.  Sleep:                 *  Basic sleep plan:                          *reduce or eliminate caffeine and daytime naps                          * relaxation before bed                          * limit screen time 1-2 hours prior to bed                          * establish dark/quiet sleep environment                          * go to bed at target bedtime:   pm                          * keep consistent wake time:   am.   *  Minimize switching days and nights by staying active throughout the day.   *  We'll talk more about a consistent sleep plan when we meet next time.                3.  Physical Activity:                 * Formal physical rehabilitation:     * Look into taking class to improve balance in your building.    * Keep practicing your physical therapy exercises while you are away.              * Home/community based activity:    * Let Dr. Sky know if and when you are ready to consider community yoga class                          * Home based exercises given by lymphedema nurse with breathing exercises built in as well - daily                          * Aerobic exercise/endurance exercise:  elliptical machine - 5 minute intervals with sit ups in between for 30 minutes - daily.  Has a  in the building.                          * Cleaning and baking with body awareness and stretching    *  Plan to fill out paperwork for PCA with the help of family when you get back from your trip.              * Listen to your body.  Pace yourself for success.  Don't over-do it.                  4.  Nutrition/Weight:      * Keep up the good work in keeping food journal so you can track what you  are eating.   * Continue to review your I Can Prevent Diabetes materials.   * When you get the urge to snack (as a replacement to tobacco), go exercise instead.   * If you do snack, have fruits and veggies or a smoothie.              * Limit processed foods and foods high in sugar, sodium and fat.              * Keep up the good work eating many healthy foods.   * When you make a less healthy choice approach yourself with kindness and compassion.  Try to understand what contributed to that choice:  Was I too hungry?  Was I too tired?  Stressed?  Worried?  Use this information to help support healthier choices in the future.   * Goal weight:  150lbs.  Remember this is a long term goal.  Healthy, sustainable weight loss is about 1-2 lbs per week.    5.  Mood/Stress Management:                 * Formal interventions:    * schedule follow up with Dr. Sky in May.              * Home/community based interventions:                          * Regular contact with family  * Relaxation techniques - breathing exercises  * Create your pyramid to focus you on your strengths and hopes.  * Movies  * Meditation  * Yoga  * Creative activity  * Spiritual /prayer:  Prayer at home, attending services at [facility], wellness auxiliary group    * Keep exploring development of prayer line.  * Service-based activity.              * Medications: Cymbalta, Hydroxyzine as needed.    6.  Tobacco/Alcohol/Drug Use:         * Congratulations on quitting smoking and staying quit!  Keep up the good work managing stress/anxiety in other ways (prayer, talking it out, deep breaths, exercise, etc..                         * Maintain healthy relationship with alcohol                          * For women this would be no more than 1 drink per day                          * For men, this would be no more than 2 drinks per day              * Eliminate recreational drugs    7.  Pain:                 * Non-medication  treatments:                          * ice/heat: use heating pad as you are doing.                          * massage                          * acupuncture                          * chiropractor    8.  Pain Medications: as prescribed by Dr. Pierre    Schedule follow up with Dr. Sky when you return from your trip (late May or early June)

## 2017-04-14 NOTE — PROGRESS NOTES
"Behavioral Health Chronic Pain Management Visit     Visit type: Follow Up  Number of visits post Initial Assessment:  4  Meeting lasted: 20-30 minutes  Others present: no one    Reason for Consultation:  Shira Guthrie is a 61 year old,  female referred by Dr. Pierre for behavioral health consultation as part of the Chronic Pain Management protocol at [facility]. Goal of behavioral health visit is to help the patient with the psychosocial aspects of pain management. Ms. Guthrie was initially seen by this provider for her initial  CPM consult on 12/15/16.  This is the fourth follow up visit since that time.  Current goals for our work include support with weight loss to improve pain experience and to manage stress/anxiety.  Ultimately, Ms. Guthrie would like to be able to reduce her reliance on medications for pain control.    Topics Discussed:   1. Weight:  Has lost a few pounds and felt good about this.  Forgot her \"I Can Prevent Diabetes\" Book today, but still using this as a guide for healthy eating/lifestyle.  Has gotten a ZeaKal  recently and plans to start making fruit smoothies to support getting sufficient servings of fruits and vegetables.   2.  Tobacco free:  Still tobacco free and congratulated on this.  Worried she has replaced tobacco with too many snacks.  Chocolate and peanut butter.  Discussed plan for reducing this.  3.  Shoulder pain:  Stopped PT as it was making pain in arm worse.  Pain was not related to pinched nerve in neck.  Recent MRI indicated partial rotator cuff tear.  Has also been to [facility] - ordered injection and PT. Got injection.  Wants to take a break from PT to understand if injection was effective (without confounding variable of PT). Will plan to keep doing PT exercises she knows at home independently.  4.  Falls:  Balance issues related to stroke.  Has had a few falls which exacerbate injury/cause bruises.  Eliminated excess furniture " "to see if this will help reduce tripping hazards.  Discussed physical activity to improve balance.  They offer a class on this in her building.  Discussed yoga as another option.  See care plan for updated activity plan.  5.  Social/spiritual wellbeing:  Planning trip to visit family, including grandchildren, from April 18 - May 18.  Really looking forward to this.  \"They fill me with estefania.\"  Also connected to Sikhism/spiritual life. Considering starting her own prayer line with friends so they can offer support to one another whenever needed.  \"For people who don't have someone like you or can't reach you when needed.\"  May open this up to others in the public at some point.  Excited about this opportunity.  6.  Additional stressors: Broke her glasses.  Needs a new pair.  Has still not obtained a PCA as she has not completed the paperwork for this.  We continued goal of asking family for help with this.    Objective: Ms. Guthrie appears to be awake and alert for today's visit.        Wt Readings from Last 4 Encounters:   04/14/17 177 lb 12.8 oz (80.6 kg)   03/21/17 177 lb 6.4 oz (80.5 kg)   02/27/17 181 lb 3.2 oz (82.2 kg)   01/12/17 180 lb 12.8 oz (82 kg)       Assessment:   Ms. Guthrie was referred by Dr. Pierre for a behavioral health evaluation to address chronic pain syndrome. She is making good progress on goals set in our work together.  Shira would likely benefit from continued meetings with this provider to support improved pain management via weight loss, healthy food choices, regular physical activity, improved sleep and improved management of mood/stress.    Stage of change: Action  Diagnosis: chronic pain syndrome    Plan:   1.  Updated Personal Care Plan for Chronic Pain (see patient instructions).  2.  We did not address sleep at our visit today.  Will plan to check back in around this at our next visit.  3.  Shira stated her intention to schedule follow up with this provider when she returns from her " trip.  Will perform outreach call if I do not see her on my schedule by the end of May.

## 2017-04-14 NOTE — PATIENT INSTRUCTIONS
Personal Care Plan for Chronic Pain    1.  Personal Goals:                * take less medication              * lose weight: goal of losing 15 to 20 pounds.              * continue working on keeping thoughts positive through Tawny              * to feel confident enough that when someone gives me a compliment I can simply say thank you    2.  Sleep:                 *  Basic sleep plan:                          *reduce or eliminate caffeine and daytime naps                          * relaxation before bed                          * limit screen time 1-2 hours prior to bed                          * establish dark/quiet sleep environment                          * go to bed at target bedtime:   pm                          * keep consistent wake time:   am.              *  Minimize switching days and nights by staying active throughout the day.              * We'll talk more about a consistent sleep plan when we meet next time.                3.  Physical Activity:                 * Formal physical rehabilitation:                          None right now.              * Home/community based activity:                          * Home based exercises given by lymphedema nurse with breathing exercises built in as well - daily                          * Aerobic exercise/endurance exercise:  elliptical machine - 5 minute intervals with sit ups in between for 30 minutes - daily.  Has a  in the builiding. Has exercise tape in her apartment.                          * Cleaning and baking with body awareness and stretching              * Listen to your body.  Pace yourself for success.  Don't over-do it.  Plan to get PCA back to help with cleaning and laundry so as to not overdo it.                4.  Nutrition/Weight:                 * Keep a food journal in a notebook at home and bring this to your next visit with Dr. Sky.  Eat small frequent meals.              * Review your I Can Prevent Diabetes  materials.              * Limit processed foods and foods high in sugar, sodium and fat.              * Keep up the good work eating many healthy foods.              * When you make a less healthy choice approach yourself with kindness and compassion.  Try to understand what contributed to that choice:  Was I too hungry?  Was I too tired?  Stressed?  Worried?  Use this information to help support healthier choices in the future.    5.  Mood/Stress Management:     SELF COMPASSION!              * Formal interventions:                        * schedule follow up with Dr. Sky in about month or so.              * Home/community based interventions:                          * Regular contact with family  * Relaxation techniques - breathing exercises  * Create your pyramid to focus you on your strengths and hopes.  * Movies  * Meditation  * Yoga  * Creative activity  * Spiritual /prayer:  Prayer at home, attending services at [facility], wellness auxiliary group  * Service-based activity.              * Medications: Cymbalta, Hydroxyzine as needed.    6.  Tobacco/Alcohol/Drug Use:                               * Congratulations on quitting smoking and staying quit!  Keep up the good work managing stress/anxiety in other ways (prayer, talking it out, deep breaths, exercise, etc..                         * Maintain healthy relationship with alcohol                          * For women this would be no more than 1 drink per day                          * For men, this would be no more than 2 drinks per day              * Eliminate recreational drugs    7.  Pain:                 * Non-medication treatments:                          * ice/heat: use heating pad as you are doing.                          * massage                          * acupuncture  [facility] is planned for when you return.  PT for shoulder when you return.                           8.  Pain Medications: Gabapentin 3 at night,  Tramadol one pill three times day.  Tylenol arthritis as needed for break through.

## 2017-04-14 NOTE — MR AVS SNAPSHOT
After Visit Summary   4/14/2017    Shira Guthrie    MRN: [id number]           Patient Information     Date Of Birth          1955        Visit Information        Provider Department      4/14/2017 4:10 PM Sally Pierre MD [facility]        Today's Diagnoses     Chronic pain syndrome    -  1    Chronic constipation        Rotator cuff impingement syndrome of right shoulder        Primary osteoarthritis involving multiple joints        Other polyneuropathy (H)          Care Instructions         Personal Care Plan for Chronic Pain    1.  Personal Goals:                * take less medication              * lose weight: goal of losing 15 to 20 pounds.              * continue working on keeping thoughts positive through [employer]              * to feel confident enough that when someone gives me a compliment I can simply say thank you    2.  Sleep:                 *  Basic sleep plan:                          *reduce or eliminate caffeine and daytime naps                          * relaxation before bed                          * limit screen time 1-2 hours prior to bed                          * establish dark/quiet sleep environment                          * go to bed at target bedtime:   pm                          * keep consistent wake time:   am.              *  Minimize switching days and nights by staying active throughout the day.              * We'll talk more about a consistent sleep plan when we meet next time.                3.  Physical Activity:                 * Formal physical rehabilitation:                          None right now.              * Home/community based activity:                          * Home based exercises given by lymphedema nurse with breathing exercises built in as well - daily                          * Aerobic exercise/endurance exercise:  elliptical machine - 5 minute intervals with sit ups in between for 30 minutes - daily.  Has a  in the builiding.  Has exercise tape in her apartment.                          * Cleaning and baking with body awareness and stretching              * Listen to your body.  Pace yourself for success.  Don't over-do it.  Plan to get PCA back to help with cleaning and laundry so as to not overdo it.                4.  Nutrition/Weight:                 * Keep a food journal in a notebook at home and bring this to your next visit with Dr. Sky.  Eat small frequent meals.              * Review your I Can Prevent Diabetes materials.              * Limit processed foods and foods high in sugar, sodium and fat.              * Keep up the good work eating many healthy foods.              * When you make a less healthy choice approach yourself with kindness and compassion.  Try to understand what contributed to that choice:  Was I too hungry?  Was I too tired?  Stressed?  Worried?  Use this information to help support healthier choices in the future.    5.  Mood/Stress Management:     SELF COMPASSION!              * Formal interventions:                        * schedule follow up with Dr. Sky in about month or so.              * Home/community based interventions:                          * Regular contact with family  * Relaxation techniques - breathing exercises  * Create your pyramid to focus you on your strengths and hopes.  * Movies  * Meditation  * Yoga  * Creative activity  * Spiritual /prayer:  Prayer at home, attending services at [facility], wellness auxiliary group  * Service-based activity.              * Medications: Cymbalta, Hydroxyzine as needed.    6.  Tobacco/Alcohol/Drug Use:                               * Congratulations on quitting smoking and staying quit!  Keep up the good work managing stress/anxiety in other ways (prayer, talking it out, deep breaths, exercise, etc..                         * Maintain healthy relationship with alcohol                          * For women this would  be no more than 1 drink per day                          * For men, this would be no more than 2 drinks per day              * Eliminate recreational drugs    7.  Pain:                 * Non-medication treatments:                          * ice/heat: use heating pad as you are doing.                          * massage                          * acupuncture  YOGA is planned for when you return.  PT for shoulder when you return.                           8.  Pain Medications: Gabapentin 3 at night, Tramadol one pill three times day.  Tylenol arthritis as needed for break through.                Follow-ups after your visit        Follow-up notes from your care team     Return in about 4 weeks (around 2017) for Chronic pain and weight loss..      Who to contact     Please call your clinic at [phone] to:    Ask questions about your health    Make or cancel appointments    Discuss your medicines    Learn about your test results    Speak to your doctor   If you have compliments or concerns about an experience at your clinic, or if you wish to file a complaint, please contact [facility] Patient Relations at [phone] or email us at [email or web address]         Additional Information About Your Visit        [employer] Information     [employer] is an electronic gateway that provides easy, online access to your medical records. With [employer], you can request a clinic appointment, read your test results, renew a prescription or communicate with your care team.     To sign up for [employer] visit the website at [email or web address]   You will be asked to enter the access code listed below, as well as some personal information. Please follow the directions to create your username and password.     Your access code is: [email or web address]  Expires: 2017  3:34 PM     Your access code will  in 90 days. If you need help or a new code, please contact your [facility] or call [phone] for assistance.        Care EveryWhere ID     This is your Care EveryWhere ID. This could be used by other organizations to access your [place] medical records  [id number]        Your Vitals Were     Pulse Temperature BMI (Body Mass Index)             98 98  F (36.7  C) (Oral) 30.17 kg/m2          Blood Pressure from Last 3 Encounters:   04/14/17 134/82   03/21/17 124/78   02/27/17 124/77    Weight from Last 3 Encounters:   04/14/17 177 lb 12.8 oz (80.6 kg)   03/21/17 177 lb 6.4 oz (80.5 kg)   02/27/17 181 lb 3.2 oz (82.2 kg)              We Performed the Following     Rapid Urine Drug Screen ([facility])          Where to get your medicines      These medications were sent to [facility] - [place], [place] - [address], [place] [address]     Phone:  [phone]     linaclotide 290 MCG capsule    polyethylene glycol powder         Some of these will need a paper prescription and others can be bought over the counter.  Ask your nurse if you have questions.     Bring a paper prescription for each of these medications     traMADol 50 MG tablet          Primary Care Provider Office Phone # Fax #    Sally Pierre [phone]       [address] [address]        Thank you!     Thank you for choosing [facility]  for your care. Our goal is always to provide you with excellent care. Hearing back from our patients is one way we can continue to improve our services. Please take a few minutes to complete the written survey that you may receive in the mail after your visit with us. Thank you!             Your Updated Medication List - Protect others around you: Learn how to safely use, store and throw away your medicines at www.disposemymeds.org.          This list is accurate as of: 4/14/17  5:08 PM.  Always use your most recent med list.                   Brand Name Dispense Instructions for use     aspirin 81 MG EC tablet     90 tablet    Take 1 tablet (81 mg) by mouth daily       CANE, ANY MATERIAL     1 Device    1 Device by Device route as needed       clopidogrel 75 MG tablet    PLAVIX    30 tablet    Take 1 tablet (75 mg) by mouth daily . Give name brand Plavix. Approved by insurance through 2/2/1016.       dexlansoprazole 30 MG Cpdr CR capsule    DEXILANT    90 capsule    Take 1 capsule (30 mg) by mouth daily       DULoxetine 60 MG EC capsule    CYMBALTA    30 capsule    Take 1 capsule (60 mg) by mouth daily       gabapentin 300 MG capsule    NEURONTIN    90 capsule    Take 1 capsule (300 mg) by mouth 3 times daily       Heating Pads Pads     1 each    Apply to painful areas prn.       hydrOXYzine 25 MG tablet    ATARAX    100 tablet    1 to 2 tablets three times a day as needed.       isosorbide mononitrate 30 MG 24 hr tablet    IMDUR    90 tablet    Take 1 tablet (30 mg) by mouth daily       linaclotide 290 MCG capsule    LINZESS    90 capsule    Take 1 capsule (290 mcg) by mouth every morning (before breakfast)       metoprolol 25 MG 24 hr tablet    TOPROL-XL    90 tablet    Take 1 tablet (25 mg) by mouth daily       nitroglycerin 0.4 MG sublingual tablet    NITROSTAT    30 tablet    Place 1 tablet (0.4 mg) under the tongue every 5 minutes as needed       order for DME     2 Device    Equipment being ordered: Jobst stockings thigh high 15-20mmHg.  Please measure for fit. 2 pairs.       polyethylene glycol powder    MIRALAX    510 g    Take 17 g (1 capful) by mouth 2 times daily       rosuvastatin 20 MG tablet    CRESTOR    90 tablet    Take 1 tablet (20 mg) by mouth daily       traMADol 50 MG tablet    ULTRAM    90 tablet    Take 1 tablet (50 mg) by mouth 3 times daily as needed       TYLENOL 325 MG tablet   Generic drug:  acetaminophen     100 tablet    Take 2 tablets (650 mg) by mouth daily

## 2017-04-18 ASSESSMENT — PATIENT HEALTH QUESTIONNAIRE - PHQ9: SUM OF ALL RESPONSES TO PHQ QUESTIONS 1-9: 8

## 2017-05-12 DIAGNOSIS — G62.89 OTHER POLYNEUROPATHY: ICD-10-CM

## 2017-05-15 RX ORDER — GABAPENTIN 300 MG/1
300 CAPSULE ORAL 3 TIMES DAILY
Qty: 90 CAPSULE | Refills: 11 | Status: SHIPPED | OUTPATIENT
Start: 2017-05-15 | End: 2018-04-25

## 2017-05-24 DIAGNOSIS — I25.9 CHRONIC ISCHEMIC HEART DISEASE: ICD-10-CM

## 2017-05-24 RX ORDER — CLOPIDOGREL BISULFATE 75 MG/1
75 TABLET ORAL DAILY
Qty: 90 TABLET | Refills: 3 | Status: SHIPPED | OUTPATIENT
Start: 2017-05-24 | End: 2018-03-06

## 2017-06-07 ENCOUNTER — OFFICE VISIT (OUTPATIENT)
Dept: FAMILY MEDICINE | Facility: CLINIC | Age: 62
End: 2017-06-07

## 2017-06-07 VITALS
SYSTOLIC BLOOD PRESSURE: 129 MMHG | BODY MASS INDEX: 29.93 KG/M2 | DIASTOLIC BLOOD PRESSURE: 70 MMHG | HEART RATE: 70 BPM | WEIGHT: 176.4 LBS

## 2017-06-07 DIAGNOSIS — M15.0 PRIMARY OSTEOARTHRITIS INVOLVING MULTIPLE JOINTS: ICD-10-CM

## 2017-06-07 DIAGNOSIS — G62.89 OTHER POLYNEUROPATHY: ICD-10-CM

## 2017-06-07 DIAGNOSIS — G89.4 CHRONIC PAIN SYNDROME: Primary | ICD-10-CM

## 2017-06-07 DIAGNOSIS — I89.0 LYMPHEDEMA OF LEFT LEG: ICD-10-CM

## 2017-06-07 LAB
AMPHETAMINES QUAL: NEGATIVE
BARBITURATES QUAL URINE: NEGATIVE
BENZODIAZEPINE QUAL URINE: NEGATIVE
BUN SERPL-MCNC: 7.2 MG/DL (ref 7–19)
BUPRENORPHINE QUAL URINE: NEGATIVE
CALCIUM SERPL-MCNC: 9.3 MG/DL (ref 8.5–10.1)
CANNABINOIDS UR QL SCN: NEGATIVE
CHLORIDE SERPLBLD-SCNC: 98.7 MMOL/L (ref 98–110)
CO2 SERPL-SCNC: 24.1 MMOL/L (ref 20–32)
COCAINE QUAL URINE: NEGATIVE
CREAT SERPL-MCNC: 1 MG/DL (ref 0.5–1)
GFR SERPL CREATININE-BSD FRML MDRD: 59.5 ML/MIN/1.7 M2
GLUCOSE SERPL-MCNC: 89.7 MG'DL (ref 70–99)
METHAMPHETAMINE: NEGATIVE
METHODONE QUAL: NEGATIVE
MORPHINE QUAL: NEGATIVE
OXYCODONE QUAL: NEGATIVE
POTASSIUM SERPL-SCNC: 3.7 MMOL/DL (ref 3.2–4.6)
SODIUM SERPL-SCNC: 131.2 MMOL/L (ref 132–142)
TEMPERATURE OF URINE WAS BETWEEN 90-100 DEGREES F: YES

## 2017-06-07 RX ORDER — FUROSEMIDE 20 MG
TABLET ORAL
Qty: 30 TABLET | Refills: 3 | Status: SHIPPED | OUTPATIENT
Start: 2017-06-07 | End: 2018-04-11

## 2017-06-07 RX ORDER — TRAMADOL HYDROCHLORIDE 50 MG/1
50 TABLET ORAL 3 TIMES DAILY PRN
Qty: 90 TABLET | Refills: 1 | Status: SHIPPED | OUTPATIENT
Start: 2017-06-07 | End: 2017-07-17

## 2017-06-07 NOTE — PROGRESS NOTES
Chronic Pain Follow-Up Visit    Pain Update:  Location of pain: back and feet  Analgesia/pain control: Recent changes:  same    Overall control: Tolerable with discomfort    Adherance     How often do you take extra pain medicine:Never    Did you take your pain medication today? YES    Shoulder injection helped for about 3 weeks and then it wore off.  She also thinks she overdid things a little too.    Adverse effects: No     Took two lasix water pills from a friend and it helped with her swelling. She is wondering if she can have some of these to help at times with her swelling.  She continues to wear compression stockings as well.  Swelling does cause discomfort at times as well.     Database checked today? Yes. Details: as expected    PHQ-9 SCORE 10/21/2016 1/12/2017 4/17/2017   Total Score - - -   Total Score 8 15 8     FAYE-7 SCORE 8/18/2015   Total Score 18       Care Plan discussed and updated as needed.  See the end of this note.         FUNCTIONAL ASSESSMENT QUESTIONNAIRE SCORE 4/14/2017 6/7/2017   Total Score 40 40          Problem, Medication and Allergy Lists were reviewed and are current..           Physical Exam:     Vitals:    06/07/17 1418   BP: 129/70   BP Location: Right arm   Patient Position: Chair   Cuff Size: Adult Regular   Pulse: 70   Weight: 176 lb 6.4 oz (80 kg)     Body mass index is 29.93 kg/(m^2).  Vitals were reviewed and were normal  GENERAL: healthy, alert, well nourished, well hydrated, no distress  RESP: lungs clear to auscultation - no rales, no rhonchi, no wheezes  CV: regular rates and rhythm, normal S1 S2, no S3 or S4 and no murmur, no click or rub -  ABDOMEN: soft, no tenderness,, no masses, normal bowel sounds        Results:     Results for orders placed or performed in visit on 04/14/17   Rapid Urine Drug Screen ([facility])   Result Value Ref Range    Amphetamines Qual NEGATIVE NEGATIVE    Barbiturates Qual Urine NEGATIVE NEGATIVE    Buprenorphine Qual Urine NEGATIVE  NEGATIVE    Benzodiazepine Qual Urine NEGATIVE NEGATIVE    Cocaine Qual Urine NEGATIVE NEGATIVE    Cannabinoids Qual Urine NEGATIVE NEGATIVE    Methamphetamine Qual NEGATIVE NEGATIVE    Methadone Qual NEGATIVE NEGATIVE    Morphine Qual NEGATIVE NEGATIVE    Oxycodone Qual NEGATIVE NEGATIVE    Temperature of Urine was Between  Degrees F YES YES      rapid urine drug screen obtained today: Yes    Assessment and Plan    Shira Guthrie is here for follow up of chronic pain caused by neuropathy and arthritis.    Shira was seen today for pain management.    Diagnoses and all orders for this visit:    Chronic pain syndrome: stable. Refilled meds.    -     traMADol (ULTRAM) 50 MG tablet; Take 1 tablet (50 mg) by mouth 3 times daily as needed  -     Rapid Urine Drug Screen ([facility])    Other polyneuropathy (H)  -     traMADol (ULTRAM) 50 MG tablet; Take 1 tablet (50 mg) by mouth 3 times daily as needed    Primary osteoarthritis involving multiple joints  -     traMADol (ULTRAM) 50 MG tablet; Take 1 tablet (50 mg) by mouth 3 times daily as needed    Lymphedema of left leg: would like to try prn lasix for swelling.  Baseline BMP and follow potassium .  -     Basic Metabolic Panel ([facility])  -     furosemide (LASIX) 20 MG tablet; Take 1 tablet (20mg) every other day as needed.          Chronic Pain Syndrome:  Care plan updated with patient, see below for details.  Naloxone has not been prescribed.           If opioids prescribed patient was asked to bring pill bottle to each appointment and was informed that refills would only be provided at office visits.   Asked patient to F/U in 1 month(s) with same provider for 20 minute  Visit.     Sally Pierre MD    Patient Instructions          Personal Care Plan for Chronic Pain    1.  Personal Goals:                * take less medication              * lose weight: goal of losing 15 to 20 pounds.              * continue working on keeping thoughts positive through  [employer]              * to feel confident enough that when someone gives me a compliment I can simply say thank you    2.  Sleep:                 *  Basic sleep plan:                          *reduce or eliminate caffeine and daytime naps                          * relaxation before bed                          * limit screen time 1-2 hours prior to bed                          * establish dark/quiet sleep environment                          * go to bed at target bedtime:   pm                          * keep consistent wake time:   am.              *  Minimize switching days and nights by staying active throughout the day.              * We'll talk more about a consistent sleep plan when we meet next time.                3.  Physical Activity:                 * Formal physical rehabilitation:                          None right now.              * Home/community based activity:                          * Home based exercises given by lymphedema nurse with breathing exercises built in as well - daily                          * Aerobic exercise/endurance exercise:  elliptical machine - 5 minute intervals with sit ups in between for 30 minutes - daily.  Has a  in the builiding. Has exercise tape in her apartment.                          * Cleaning and baking with body awareness and stretching              * Listen to your body.  Pace yourself for success.  Don't over-do it.  Plan to get PCA back to help with cleaning and laundry so as to not overdo it.                4.  Nutrition/Weight:                 * Keep a food journal in a notebook at home and bring this to your next visit with Dr. Sky.  Eat small frequent meals.              * Review your I Can Prevent Diabetes materials.              * Limit processed foods and foods high in sugar, sodium and fat.              * Keep up the good work eating many healthy foods.              * When you make a less healthy choice approach yourself with kindness and  compassion.  Try to understand what contributed to that choice:  Was I too hungry?  Was I too tired?  Stressed?  Worried?  Use this information to help support healthier choices in the future.    5.  Mood/Stress Management:     SELF COMPASSION!              * Formal interventions:                        * schedule follow up with Dr. Sky in about month or so.              * Home/community based interventions:                          * Regular contact with family  * Relaxation techniques - breathing exercises  * Create your pyramid to focus you on your strengths and hopes.  * Movies  * Meditation  * Yoga  * Creative activity  * Spiritual /prayer:  Prayer at home, attending services at [facility], wellness auxiliary group  * Service-based activity.              * Medications: Cymbalta, Hydroxyzine as needed.    6.  Tobacco/Alcohol/Drug Use:                               * Congratulations on quitting smoking and staying quit!  Keep up the good work managing stress/anxiety in other ways (prayer, talking it out, deep breaths, exercise, etc..                         * Maintain healthy relationship with alcohol                          * For women this would be no more than 1 drink per day                          * For men, this would be no more than 2 drinks per day              * Eliminate recreational drugs    7.  Pain:                 * Non-medication treatments:                          * ice/heat: use heating pad as you are doing.                          * massage                          * acupuncture  YOGA is planned for when you return.  PT for shoulder when you return.                           8.  Pain Medications: Gabapentin 3 at night, Tramadol one pill three times day.  Tylenol arthritis as needed for break through.          The [place] Suns: The Epic Story of [place]'s [place] Migration   Author Laura Wong  Pulitzer Prize Winner

## 2017-06-07 NOTE — LETTER
June 8, 2017      Shira Guthrie  [address]  [address]  [place] [address]        Dear Shira,    Your sodium is a tiny bit low.  We should watch this closely.  If it is lower at the next visit, let's rethink the lasix (furosemide) medication.    Please see below for your test results.    Resulted Orders   Rapid Urine Drug Screen ([facility])   Result Value Ref Range    Amphetamines Qual NEGATIVE NEGATIVE    Barbiturates Qual Urine NEGATIVE NEGATIVE    Buprenorphine Qual Urine NEGATIVE NEGATIVE    Benzodiazepine Qual Urine NEGATIVE NEGATIVE    Cocaine Qual Urine NEGATIVE NEGATIVE    Cannabinoids Qual Urine NEGATIVE NEGATIVE    Methamphetamine Qual NEGATIVE NEGATIVE    Methadone Qual NEGATIVE NEGATIVE    Morphine Qual NEGATIVE NEGATIVE    Oxycodone Qual NEGATIVE NEGATIVE    Temperature of Urine was Between  Degrees F YES YES      Comment:      This is a preliminary screening test that detects drugs-of-abuse in urine at   specified detection levels.  To confirm preliminary results, a more specific   method such as Gas Chromatography/Mass Spectrometry (GC/MS) must be used.        Basic Metabolic Panel ([facility])   Result Value Ref Range    Urea Nitrogen 7.2 7.0 - 19.0 mg/dL    Calcium 9.3 8.5 - 10.1 mg/dL    Chloride 98.7 98.0 - 110.0 mmol/L    Carbon Dioxide 24.1 20.0 - 32.0 mmol/L    Creatinine 1.0 0.5 - 1.0 mg/dL    Glucose 89.7 70.0 - 99.0 mg'dL    Potassium 3.7 3.2 - 4.6 mmol/dL    Sodium 131.2 (L) 132.0 - 142.0 mmol/L    GFR Estimate 59.5 (L) >60.0 mL/min/1.7 m2    GFR Estimate If Black 72.0 >60.0 mL/min/1.7 m2       If you have any questions, please call the clinic to make an appointment.    Sincerely,    Sally Pierre MD

## 2017-06-07 NOTE — MR AVS SNAPSHOT
After Visit Summary   6/7/2017    Shira Guthrie    MRN: [id number]           Patient Information     Date Of Birth          1955        Visit Information        Provider Department      6/7/2017 2:10 PM Sally Pierre MD [facility]        Today's Diagnoses     Chronic pain syndrome    -  1    Other polyneuropathy (H)        Primary osteoarthritis involving multiple joints        Lymphedema of left leg          Care Instructions         Personal Care Plan for Chronic Pain    1.  Personal Goals:                * take less medication              * lose weight: goal of losing 15 to 20 pounds.              * continue working on keeping thoughts positive through Tawny              * to feel confident enough that when someone gives me a compliment I can simply say thank you    2.  Sleep:                 *  Basic sleep plan:                          *reduce or eliminate caffeine and daytime naps                          * relaxation before bed                          * limit screen time 1-2 hours prior to bed                          * establish dark/quiet sleep environment                          * go to bed at target bedtime:   pm                          * keep consistent wake time:   am.              *  Minimize switching days and nights by staying active throughout the day.              * We'll talk more about a consistent sleep plan when we meet next time.                3.  Physical Activity:                 * Formal physical rehabilitation:                          None right now.              * Home/community based activity:                          * Home based exercises given by lymphedema nurse with breathing exercises built in as well - daily                          * Aerobic exercise/endurance exercise:  elliptical machine - 5 minute intervals with sit ups in between for 30 minutes - daily.  Has a  in the builiding. Has exercise tape in her  apartment.                          * Cleaning and baking with body awareness and stretching              * Listen to your body.  Pace yourself for success.  Don't over-do it.  Plan to get PCA back to help with cleaning and laundry so as to not overdo it.                4.  Nutrition/Weight:                 * Keep a food journal in a notebook at home and bring this to your next visit with Dr. Sky.  Eat small frequent meals.              * Review your I Can Prevent Diabetes materials.              * Limit processed foods and foods high in sugar, sodium and fat.              * Keep up the good work eating many healthy foods.              * When you make a less healthy choice approach yourself with kindness and compassion.  Try to understand what contributed to that choice:  Was I too hungry?  Was I too tired?  Stressed?  Worried?  Use this information to help support healthier choices in the future.    5.  Mood/Stress Management:     SELF COMPASSION!              * Formal interventions:                        * schedule follow up with Dr. Sky in about month or so.              * Home/community based interventions:                          * Regular contact with family  * Relaxation techniques - breathing exercises  * Create your pyramid to focus you on your strengths and hopes.  * Movies  * Meditation  * Yoga  * Creative activity  * Spiritual /prayer:  Prayer at home, attending services at [facility], wellness auxiliary group  * Service-based activity.              * Medications: Cymbalta, Hydroxyzine as needed.    6.  Tobacco/Alcohol/Drug Use:                               * Congratulations on quitting smoking and staying quit!  Keep up the good work managing stress/anxiety in other ways (prayer, talking it out, deep breaths, exercise, etc..                         * Maintain healthy relationship with alcohol                          * For women this would be no more than 1 drink  per day                          * For men, this would be no more than 2 drinks per day              * Eliminate recreational drugs    7.  Pain:                 * Non-medication treatments:                          * ice/heat: use heating pad as you are doing.                          * massage                          * acupuncture  YOGA is planned for when you return.  PT for shoulder when you return.                           8.  Pain Medications: Gabapentin 3 at night, Tramadol one pill three times day.  Tylenol arthritis as needed for break through.          The Warmth of other Suns: The Epic Story of [place]'s Great Migration   Author Laura Wong  Pulitzer Prize Winner            Follow-ups after your visit        Follow-up notes from your care team     Return in about 4 weeks (around 7/5/2017) for Chronic pain, shoulder pain.      Who to contact     Please call your clinic at [phone] to:    Ask questions about your health    Make or cancel appointments    Discuss your medicines    Learn about your test results    Speak to your doctor   If you have compliments or concerns about an experience at your clinic, or if you wish to file a complaint, please contact [facility] Patient Relations at [phone] or email us at [email or web address]         Additional Information About Your Visit        [employer]hart Information     Xunleit is an electronic gateway that provides easy, online access to your medical records. With nap- [employer], you can request a clinic appointment, read your test results, renew a prescription or communicate with your care team.     To sign up for [employer]leit visit the website at [email or web address]   You will be asked to enter the access code listed below, as well as some personal information. Please follow the directions to create your username and password.     Your access code is: [email or web address]  Expires: 6/19/2017  3:34 PM     Your access code will   in 90 days. If you need help or a new code, please contact your [facility] or call [phone] for assistance.        Care EveryWhere ID     This is your Care EveryWhere ID. This could be used by other organizations to access your [facility] medical records  [id number]        Your Vitals Were     Pulse BMI (Body Mass Index)                70 29.93 kg/m2           Blood Pressure from Last 3 Encounters:   17 129/70   17 134/82   17 124/78    Weight from Last 3 Encounters:   17 176 lb 6.4 oz (80 kg)   17 177 lb 12.8 oz (80.6 kg)   17 177 lb 6.4 oz (80.5 kg)              We Performed the Following     Basic Metabolic Panel (LabDAQ)     Rapid Urine Drug Screen ([facility])          Today's Medication Changes          These changes are accurate as of: 17  2:54 PM.  If you have any questions, ask your nurse or doctor.               Start taking these medicines.        Dose/Directions    furosemide 20 MG tablet   Commonly known as:  LASIX   Used for:  Lymphedema of left leg   Started by:  Sally Pierre MD        Take 1 tablet (20mg) every other day as needed.   Quantity:  30 tablet   Refills:  3            Where to get your medicines      These medications were sent to [facility] - [place], [place] - [address], [place] [address]     Phone:  [phone]     furosemide 20 MG tablet         Some of these will need a paper prescription and others can be bought over the counter.  Ask your nurse if you have questions.     Bring a paper prescription for each of these medications     traMADol 50 MG tablet                Primary Care Provider Office Phone # Fax #    Sally Pierre [phone]       [address] [address]        Thank you!     Thank you for choosing [facility]  for your care. Our goal is always to provide you with excellent care. Hearing back from our  patients is one way we can continue to improve our services. Please take a few minutes to complete the written survey that you may receive in the mail after your visit with us. Thank you!             Your Updated Medication List - Protect others around you: Learn how to safely use, store and throw away your medicines at www.disposemymeds.org.          This list is accurate as of: 6/7/17  2:54 PM.  Always use your most recent med list.                   Brand Name Dispense Instructions for use    aspirin 81 MG EC tablet     90 tablet    Take 1 tablet (81 mg) by mouth daily       CANE, ANY MATERIAL     1 Device    1 Device by Device route as needed       clopidogrel 75 MG tablet    PLAVIX    90 tablet    Take 1 tablet (75 mg) by mouth daily . Give name brand Plavix. Approved by insurance through 2/2/1016.       dexlansoprazole 30 MG Cpdr CR capsule    DEXILANT    90 capsule    Take 1 capsule (30 mg) by mouth daily       DULoxetine 60 MG EC capsule    CYMBALTA    30 capsule    Take 1 capsule (60 mg) by mouth daily       furosemide 20 MG tablet    LASIX    30 tablet    Take 1 tablet (20mg) every other day as needed.       gabapentin 300 MG capsule    NEURONTIN    90 capsule    Take 1 capsule (300 mg) by mouth 3 times daily       Heating Pads Pads     1 each    Apply to painful areas prn.       hydrOXYzine 25 MG tablet    ATARAX    100 tablet    1 to 2 tablets three times a day as needed.       isosorbide mononitrate 30 MG 24 hr tablet    IMDUR    90 tablet    Take 1 tablet (30 mg) by mouth daily       linaclotide 290 MCG capsule    LINZESS    90 capsule    Take 1 capsule (290 mcg) by mouth every morning (before breakfast)       metoprolol 25 MG 24 hr tablet    TOPROL-XL    90 tablet    Take 1 tablet (25 mg) by mouth daily       nitroglycerin 0.4 MG sublingual tablet    NITROSTAT    30 tablet    Place 1 tablet (0.4 mg) under the tongue every 5 minutes as needed       order for DME     2 Device    Equipment being ordered:  Jobst stockings thigh high 15-20mmHg.  Please measure for fit. 2 pairs.       polyethylene glycol powder    MIRALAX    510 g    Take 17 g (1 capful) by mouth 2 times daily       rosuvastatin 20 MG tablet    CRESTOR    90 tablet    Take 1 tablet (20 mg) by mouth daily       traMADol 50 MG tablet    ULTRAM    90 tablet    Take 1 tablet (50 mg) by mouth 3 times daily as needed       TYLENOL 325 MG tablet   Generic drug:  acetaminophen     100 tablet    Take 2 tablets (650 mg) by mouth daily

## 2017-06-07 NOTE — PROGRESS NOTES
Your sodium is a tiny bit low.  We should watch this closely.  If it is lower at the next visit, let's rethink the lasix (furosemide) medication.

## 2017-06-07 NOTE — PATIENT INSTRUCTIONS
Personal Care Plan for Chronic Pain    1.  Personal Goals:                * take less medication              * lose weight: goal of losing 15 to 20 pounds.              * continue working on keeping thoughts positive through Tawny              * to feel confident enough that when someone gives me a compliment I can simply say thank you    2.  Sleep:                 *  Basic sleep plan:                          *reduce or eliminate caffeine and daytime naps                          * relaxation before bed                          * limit screen time 1-2 hours prior to bed                          * establish dark/quiet sleep environment                          * go to bed at target bedtime:   pm                          * keep consistent wake time:   am.              *  Minimize switching days and nights by staying active throughout the day.              * We'll talk more about a consistent sleep plan when we meet next time.                3.  Physical Activity:                 * Formal physical rehabilitation:                          None right now.              * Home/community based activity:                          * Home based exercises given by lymphedema nurse with breathing exercises built in as well - daily                          * Aerobic exercise/endurance exercise:  elliptical machine - 5 minute intervals with sit ups in between for 30 minutes - daily.  Has a  in the builiding. Has exercise tape in her apartment.                          * Cleaning and baking with body awareness and stretching              * Listen to your body.  Pace yourself for success.  Don't over-do it.  Plan to get PCA back to help with cleaning and laundry so as to not overdo it.                4.  Nutrition/Weight:                 * Keep a food journal in a notebook at home and bring this to your next visit with Dr. Sky.  Eat small frequent meals.              * Review your I Can Prevent Diabetes  materials.              * Limit processed foods and foods high in sugar, sodium and fat.              * Keep up the good work eating many healthy foods.              * When you make a less healthy choice approach yourself with kindness and compassion.  Try to understand what contributed to that choice:  Was I too hungry?  Was I too tired?  Stressed?  Worried?  Use this information to help support healthier choices in the future.    5.  Mood/Stress Management:     SELF COMPASSION!              * Formal interventions:                        * schedule follow up with Dr. Sky in about month or so.              * Home/community based interventions:                          * Regular contact with family  * Relaxation techniques - breathing exercises  * Create your pyramid to focus you on your strengths and hopes.  * Movies  * Meditation  * Yoga  * Creative activity  * Spiritual /prayer:  Prayer at home, attending services at [facility], wellness auxiliary group  * Service-based activity.              * Medications: Cymbalta, Hydroxyzine as needed.    6.  Tobacco/Alcohol/Drug Use:                               * Congratulations on quitting smoking and staying quit!  Keep up the good work managing stress/anxiety in other ways (prayer, talking it out, deep breaths, exercise, etc..                         * Maintain healthy relationship with alcohol                          * For women this would be no more than 1 drink per day                          * For men, this would be no more than 2 drinks per day              * Eliminate recreational drugs    7.  Pain:                 * Non-medication treatments:                          * ice/heat: use heating pad as you are doing.                          * massage                          * acupuncture  YOGA is planned for when you return.  PT for shoulder when you return.                           8.  Pain Medications: Gabapentin 3 at night,  Tramadol one pill three times day.  Tylenol arthritis as needed for break through.          The Warmth of other Suns: The Epic Story of [place]'s Great Migration   Author Laura Wong  Pulitzer Prize Winner

## 2017-07-10 ENCOUNTER — HOME CARE/HOSPICE - HEALTHEAST (OUTPATIENT)
Dept: HOME HEALTH SERVICES | Facility: HOME HEALTH | Age: 62
End: 2017-07-10

## 2017-07-12 ENCOUNTER — HOME CARE/HOSPICE - HEALTHEAST (OUTPATIENT)
Dept: HOME HEALTH SERVICES | Facility: HOME HEALTH | Age: 62
End: 2017-07-12

## 2017-07-15 ENCOUNTER — HOME CARE/HOSPICE - HEALTHEAST (OUTPATIENT)
Dept: HOME HEALTH SERVICES | Facility: HOME HEALTH | Age: 62
End: 2017-07-15

## 2017-07-17 ENCOUNTER — OFFICE VISIT (OUTPATIENT)
Dept: FAMILY MEDICINE | Facility: CLINIC | Age: 62
End: 2017-07-17

## 2017-07-17 VITALS
TEMPERATURE: 97.7 F | SYSTOLIC BLOOD PRESSURE: 115 MMHG | OXYGEN SATURATION: 96 % | HEART RATE: 76 BPM | DIASTOLIC BLOOD PRESSURE: 71 MMHG | BODY MASS INDEX: 31.12 KG/M2 | WEIGHT: 183.4 LBS

## 2017-07-17 DIAGNOSIS — M15.0 PRIMARY OSTEOARTHRITIS INVOLVING MULTIPLE JOINTS: ICD-10-CM

## 2017-07-17 DIAGNOSIS — G89.4 CHRONIC PAIN SYNDROME: Primary | ICD-10-CM

## 2017-07-17 DIAGNOSIS — G62.89 OTHER POLYNEUROPATHY: ICD-10-CM

## 2017-07-17 RX ORDER — TRAMADOL HYDROCHLORIDE 50 MG/1
50 TABLET ORAL 3 TIMES DAILY PRN
Qty: 90 TABLET | Refills: 1 | Status: SHIPPED | OUTPATIENT
Start: 2017-07-17 | End: 2017-10-02

## 2017-07-17 NOTE — PROGRESS NOTES
Chronic Pain Follow-Up Visit    Pain Update:  Location of pain: back, elbow and lower legs  Analgesia/pain control: Recent changes:  Same, worse with home pt program starting.    Overall control: Tolerable with discomfort    Taking 3 gabapentin at night and sometimes one in the day.    Doing PT at home for right rotator cuff.  Plans to let Dr. Dao at [facility].      Adherance     How often do you take extra pain medicine:Never    Did you take your pain medication today? YES, one this AM.    Adverse effects: No      Database checked today? Yes. Details: as expected.    PHQ-9 SCORE 1/12/2017 4/17/2017 7/18/2017   Total Score - - -   Total Score 15 8 3     FAYE-7 SCORE 8/18/2015 7/18/2017   Total Score 18 -   Total Score - 5       Care Plan discussed and updated as needed.  See the end of this note.           FUNCTIONAL ASSESSMENT QUESTIONNAIRE SCORE 6/7/2017 7/17/2017   Total Score 40 40          Problem, Medication and Allergy Lists were reviewed and are current..           Physical Exam:     Vitals:    07/17/17 1453   BP: 115/71   Pulse: 76   Temp: 97.7  F (36.5  C)   TempSrc: Oral   SpO2: 96%   Weight: 183 lb 6.4 oz (83.2 kg)     Body mass index is 31.12 kg/(m^2).  Vitals were reviewed and were normal  GENERAL: healthy, alert, well nourished, well hydrated, no distress  RESP: lungs clear to auscultation - no rales, no rhonchi, no wheezes  CV: regular rates and rhythm, normal S1 S2, no S3 or S4 and no murmur, no click or rub -  ABDOMEN: soft, no tenderness, no  hepatosplenomegaly, no masses, normal bowel sounds  MS: extremities- no gross deformities noted, no edema        Results:     Results for orders placed or performed in visit on 07/17/17   Rapid Urine Drug Screen ([facility])   Result Value Ref Range    Amphetamines Qual NEGATIVE NEGATIVE    Barbiturates Qual Urine NEGATIVE NEGATIVE    Buprenorphine Qual Urine NEGATIVE NEGATIVE    Benzodiazepine Qual Urine NEGATIVE NEGATIVE    Cocaine Qual Urine  NEGATIVE NEGATIVE    Cannabinoids Qual Urine NEGATIVE NEGATIVE    Methamphetamine Qual NEGATIVE NEGATIVE    Methadone Qual NEGATIVE NEGATIVE    Morphine Qual NEGATIVE NEGATIVE    Oxycodone Qual NEGATIVE NEGATIVE    Temperature of Urine was Between  Degrees F YES YES      rapid urine drug screen obtained today: Yes    Assessment and Plan    Shira Guthrie is here for follow up of chronic pain caused by arthritis and neuropathy.    Shira was seen today for recheck.    Diagnoses and all orders for this visit:    Chronic pain syndrome: stable refilled meds. Getting PCA.  Self cares  -     Rapid Urine Drug Screen ([facility])  -     traMADol (ULTRAM) 50 MG tablet; Take 1 tablet (50 mg) by mouth 3 times daily as needed    Other polyneuropathy (H)  -     traMADol (ULTRAM) 50 MG tablet; Take 1 tablet (50 mg) by mouth 3 times daily as needed    Primary osteoarthritis involving multiple joints  -     traMADol (ULTRAM) 50 MG tablet; Take 1 tablet (50 mg) by mouth 3 times daily as needed    MDD: having a rough day today and a little tearful.  Plans to do self cares as noted below.    Chronic Pain Syndrome:  Care plan updated with patient, see below for details.  Naloxone has been prescribed.       If opioids prescribed patient was asked to bring pill bottle to each appointment and was informed that refills would only be provided at office visits.   Asked patient to F/U in 1 month(s) with same provider for 20 minute  Visit.     Sally Pierre MD    Patient Instructions          Personal Care Plan for Chronic Pain    1.  Personal Goals:                * take less medication              * lose weight: goal of losing 15 to 20 pounds.              * continue working on keeping thoughts positive through Tawny              * to feel confident enough that when someone gives me a compliment I can simply say thank you    2.  Sleep:                 *  Basic sleep plan:                          *reduce or eliminate caffeine and  daytime naps                          * relaxation before bed                          * limit screen time 1-2 hours prior to bed                          * establish dark/quiet sleep environment                          * go to bed at target bedtime:   pm                          * keep consistent wake time:   am.              *  Minimize switching days and nights by staying active throughout the day.              * We'll talk more about a consistent sleep plan when we meet next time.                3.  Physical Activity:                 * Formal physical rehabilitation:                          HOME PT for shoulder              * Home/community based activity:                          * Home based exercises given by lymphedema nurse with breathing exercises built in as well - daily                          * Aerobic exercise/endurance exercise:  elliptical machine - 5 minute intervals with sit ups in between for 30 minutes - daily.  Has a  in the builiding. Has exercise tape in her apartment.                          * Cleaning and baking with body awareness and stretching              * Listen to your body.  Pace yourself for success.  Don't over-do it.  Plan to get PCA back to help with cleaning and laundry so as to not overdo it.                4.  Nutrition/Weight:                 * Keep a food journal in a notebook at home and bring this to your next visit with Dr. Sky.  Eat small frequent meals.              * Review your I Can Prevent Diabetes materials.              * Limit processed foods and foods high in sugar, sodium and fat.              * Keep up the good work eating many healthy foods.              * When you make a less healthy choice approach yourself with kindness and compassion.  Try to understand what contributed to that choice:  Was I too hungry?  Was I too tired?  Stressed?  Worried?  Use this information to help support healthier choices in the future.    5.  Mood/Stress Management:      SELF COMPASSION!              * Formal interventions:                        * schedule follow up with Dr. Sky in about month or so.              * Home/community based interventions:                          * Regular contact with family  * Relaxation techniques - breathing exercises  * Create your pyramid to focus you on your strengths and hopes.  * Movies  * Meditation  * Yoga  * Creative activity  * Spiritual /prayer:  Prayer at home, attending services at [facility], wellness auxiliary group  * Service-based activity.              * Medications: Cymbalta, Hydroxyzine as needed.    6.  Tobacco/Alcohol/Drug Use:                               * Congratulations on quitting smoking and staying quit!  Keep up the good work managing stress/anxiety in other ways (prayer, talking it out, deep breaths, exercise, etc..                         * Maintain healthy relationship with alcohol                          * For women this would be no more than 1 drink per day                          * For men, this would be no more than 2 drinks per day              * Eliminate recreational drugs    7.  Pain:                 * Non-medication treatments:                          * ice/heat: use heating pad as you are doing.                          * massage                          * acupuncture  YOGA is planned for when you return.  PT for shoulder when you return.                           8.  Pain Medications: Gabapentin 3 at night, Tramadol one pill three times day.  Tylenol arthritis as needed for break through.          The Warmth of other Suns: The Epic Story of [place]'s Great Migration   Author Laura Wong  Pulitzer Prize Winner

## 2017-07-17 NOTE — PATIENT INSTRUCTIONS
Personal Care Plan for Chronic Pain    1.  Personal Goals:                * take less medication              * lose weight: goal of losing 15 to 20 pounds.              * continue working on keeping thoughts positive through [employer]              * to feel confident enough that when someone gives me a compliment I can simply say thank you    2.  Sleep:                 *  Basic sleep plan:                          *reduce or eliminate caffeine and daytime naps                          * relaxation before bed                          * limit screen time 1-2 hours prior to bed                          * establish dark/quiet sleep environment                          * go to bed at target bedtime:   pm                          * keep consistent wake time:   am.              *  Minimize switching days and nights by staying active throughout the day.              * We'll talk more about a consistent sleep plan when we meet next time.                3.  Physical Activity:                 * Formal physical rehabilitation:                          HOME PT for shoulder              * Home/community based activity:                          * Home based exercises given by lymphedema nurse with breathing exercises built in as well - daily                          * Aerobic exercise/endurance exercise:  elliptical machine - 5 minute intervals with sit ups in between for 30 minutes - daily.  Has a  in the builiding. Has exercise tape in her apartment.                          * Cleaning and baking with body awareness and stretching              * Listen to your body.  Pace yourself for success.  Don't over-do it.  Plan to get PCA back to help with cleaning and laundry so as to not overdo it.                4.  Nutrition/Weight:                 * Keep a food journal in a notebook at home and bring this to your next visit with Dr. Sky.  Eat small frequent meals.              * Review your I Can Prevent Diabetes  materials.              * Limit processed foods and foods high in sugar, sodium and fat.              * Keep up the good work eating many healthy foods.              * When you make a less healthy choice approach yourself with kindness and compassion.  Try to understand what contributed to that choice:  Was I too hungry?  Was I too tired?  Stressed?  Worried?  Use this information to help support healthier choices in the future.    5.  Mood/Stress Management:     SELF COMPASSION!              * Formal interventions:                        * schedule follow up with Dr. Sky in about month or so.              * Home/community based interventions:                          * Regular contact with family  * Relaxation techniques - breathing exercises  * Create your pyramid to focus you on your strengths and hopes.  * Movies  * Meditation  * Yoga  * Creative activity  * Spiritual /prayer:  Prayer at home, attending services at [facility], wellness auxiliary group  * Service-based activity.              * Medications: Cymbalta, Hydroxyzine as needed.    6.  Tobacco/Alcohol/Drug Use:                               * Congratulations on quitting smoking and staying quit!  Keep up the good work managing stress/anxiety in other ways (prayer, talking it out, deep breaths, exercise, etc..                         * Maintain healthy relationship with alcohol                          * For women this would be no more than 1 drink per day                          * For men, this would be no more than 2 drinks per day              * Eliminate recreational drugs    7.  Pain:                 * Non-medication treatments:                          * ice/heat: use heating pad as you are doing.                          * massage                          * acupuncture  [facility] is planned for when you return.  PT for shoulder when you return.                           8.  Pain Medications: Gabapentin 3 at night,  Tramadol one pill three times day.  Tylenol arthritis as needed for break through.          The Warmth [employer] other Suns: The Epic Story of [place]'s Great Migration   Author Laura Wong  Pulitzer Prize Winner

## 2017-07-17 NOTE — NURSING NOTE
Don't review the medication today because patient doesn't recall the name of the medication she is taking.

## 2017-07-17 NOTE — LETTER
July 18, 2017      Shira Guthrie  [address]  [place] [address]        Dear Shira,    Your urine is as expected.     Please see below for your test results.    Resulted Orders   Rapid Urine Drug Screen ([facility])   Result Value Ref Range    Amphetamines Qual NEGATIVE NEGATIVE    Barbiturates Qual Urine NEGATIVE NEGATIVE    Buprenorphine Qual Urine NEGATIVE NEGATIVE    Benzodiazepine Qual Urine NEGATIVE NEGATIVE    Cocaine Qual Urine NEGATIVE NEGATIVE    Cannabinoids Qual Urine NEGATIVE NEGATIVE    Methamphetamine Qual NEGATIVE NEGATIVE    Methadone Qual NEGATIVE NEGATIVE    Morphine Qual NEGATIVE NEGATIVE    Oxycodone Qual NEGATIVE NEGATIVE    Temperature of Urine was Between  Degrees F YES YES      Comment:      This is a preliminary screening test that detects drugs-of-abuse in urine at   specified detection levels.  To confirm preliminary results, a more specific   method such as Gas Chromatography/Mass Spectrometry (GC/MS) must be used.            If you have any questions, please call the clinic to make an appointment.    Sincerely,    Sally Pierre MD

## 2017-07-18 ENCOUNTER — HOME CARE/HOSPICE - HEALTHEAST (OUTPATIENT)
Dept: HOME HEALTH SERVICES | Facility: HOME HEALTH | Age: 62
End: 2017-07-18

## 2017-07-18 ASSESSMENT — ANXIETY QUESTIONNAIRES
5. BEING SO RESTLESS THAT IT IS HARD TO SIT STILL: NOT AT ALL
2. NOT BEING ABLE TO STOP OR CONTROL WORRYING: SEVERAL DAYS
6. BECOMING EASILY ANNOYED OR IRRITABLE: MORE THAN HALF THE DAYS
7. FEELING AFRAID AS IF SOMETHING AWFUL MIGHT HAPPEN: NOT AT ALL
3. WORRYING TOO MUCH ABOUT DIFFERENT THINGS: NOT AT ALL
1. FEELING NERVOUS, ANXIOUS, OR ON EDGE: SEVERAL DAYS
GAD7 TOTAL SCORE: 5

## 2017-07-18 ASSESSMENT — PATIENT HEALTH QUESTIONNAIRE - PHQ9: 5. POOR APPETITE OR OVEREATING: SEVERAL DAYS

## 2017-07-19 ASSESSMENT — PATIENT HEALTH QUESTIONNAIRE - PHQ9: SUM OF ALL RESPONSES TO PHQ QUESTIONS 1-9: 3

## 2017-07-19 ASSESSMENT — ANXIETY QUESTIONNAIRES: GAD7 TOTAL SCORE: 5

## 2017-07-21 ENCOUNTER — TELEPHONE (OUTPATIENT)
Dept: FAMILY MEDICINE | Facility: CLINIC | Age: 62
End: 2017-07-21

## 2017-07-21 ENCOUNTER — HOME CARE/HOSPICE - HEALTHEAST (OUTPATIENT)
Dept: HOME HEALTH SERVICES | Facility: HOME HEALTH | Age: 62
End: 2017-07-21

## 2017-07-21 NOTE — TELEPHONE ENCOUNTER
Need a prior auth for her Crestor. Patient states she gets all of her medication at [facility]. Please advise./LATANYA Guthrie  Routed to Dr. Pierre and Routed to Resource Nurse. /[place] RN

## 2017-07-21 NOTE — TELEPHONE ENCOUNTER
[facility] phone call message- medication clarification/question:    Full Medication Name: rosuvastatin   Dose: 20 MG    Question: The pt called to ask for the name brand she is allergic to the generic and would like a call back -[phone]    Pharmacy confirmed as    [facility] - [place], [place] - [address] [facility] [address] [place] [address] [facility] - [place], [place] - [address] [place] [address].: Yes    OK to leave a message on voice mail? Yes    Primary language: English      needed? No    Call taken on July 21, 2017 at 10:08 AM by Karel Diaz

## 2017-07-25 ENCOUNTER — HOME CARE/HOSPICE - HEALTHEAST (OUTPATIENT)
Dept: HOME HEALTH SERVICES | Facility: HOME HEALTH | Age: 62
End: 2017-07-25

## 2017-07-25 NOTE — TELEPHONE ENCOUNTER
Discussed w/ Dr. Pierre, it's okay for pt to take rosuvastatin. Gave this msg to pt. She is okay with this. Called [facility] pharmacy, they will get this ready for her. /LATANYA Sun

## 2017-07-28 ENCOUNTER — HOME CARE/HOSPICE - HEALTHEAST (OUTPATIENT)
Dept: HOME HEALTH SERVICES | Facility: HOME HEALTH | Age: 62
End: 2017-07-28

## 2017-08-02 ENCOUNTER — HOME CARE/HOSPICE - HEALTHEAST (OUTPATIENT)
Dept: HOME HEALTH SERVICES | Facility: HOME HEALTH | Age: 62
End: 2017-08-02

## 2017-08-15 ENCOUNTER — COMMUNICATION - HEALTHEAST (OUTPATIENT)
Dept: ADMINISTRATIVE | Facility: CLINIC | Age: 62
End: 2017-08-15

## 2017-08-31 DIAGNOSIS — I25.9 CHRONIC ISCHEMIC HEART DISEASE: ICD-10-CM

## 2017-09-01 RX ORDER — ISOSORBIDE MONONITRATE 30 MG/1
30 TABLET, EXTENDED RELEASE ORAL DAILY
Qty: 90 TABLET | Refills: 4 | Status: SHIPPED | OUTPATIENT
Start: 2017-09-01 | End: 2018-09-21

## 2017-09-06 ENCOUNTER — TELEPHONE (OUTPATIENT)
Dept: FAMILY MEDICINE | Facility: CLINIC | Age: 62
End: 2017-09-06

## 2017-09-06 DIAGNOSIS — G89.4 CHRONIC PAIN SYNDROME: ICD-10-CM

## 2017-09-06 DIAGNOSIS — K59.09 CHRONIC CONSTIPATION: ICD-10-CM

## 2017-09-06 RX ORDER — ASPIRIN 81 MG
100 TABLET, DELAYED RELEASE (ENTERIC COATED) ORAL DAILY
Qty: 60 TABLET | Refills: 11 | Status: SHIPPED | OUTPATIENT
Start: 2017-09-06 | End: 2018-08-16

## 2017-10-02 ENCOUNTER — OFFICE VISIT (OUTPATIENT)
Dept: FAMILY MEDICINE | Facility: CLINIC | Age: 62
End: 2017-10-02

## 2017-10-02 VITALS
BODY MASS INDEX: 32.74 KG/M2 | SYSTOLIC BLOOD PRESSURE: 123 MMHG | HEART RATE: 70 BPM | TEMPERATURE: 97.5 F | DIASTOLIC BLOOD PRESSURE: 78 MMHG | WEIGHT: 191.8 LBS | HEIGHT: 64 IN | OXYGEN SATURATION: 100 %

## 2017-10-02 DIAGNOSIS — I89.0 LYMPHEDEMA OF LEFT LEG: ICD-10-CM

## 2017-10-02 DIAGNOSIS — Z11.3 SCREEN FOR STD (SEXUALLY TRANSMITTED DISEASE): ICD-10-CM

## 2017-10-02 DIAGNOSIS — Z23 NEED FOR PROPHYLACTIC VACCINATION/INOCULATION AGAINST VIRAL DISEASE: ICD-10-CM

## 2017-10-02 DIAGNOSIS — G62.89 OTHER POLYNEUROPATHY: ICD-10-CM

## 2017-10-02 DIAGNOSIS — G89.4 CHRONIC PAIN SYNDROME: Primary | ICD-10-CM

## 2017-10-02 DIAGNOSIS — M15.0 PRIMARY OSTEOARTHRITIS INVOLVING MULTIPLE JOINTS: ICD-10-CM

## 2017-10-02 RX ORDER — TRAMADOL HYDROCHLORIDE 50 MG/1
50 TABLET ORAL 3 TIMES DAILY PRN
Qty: 90 TABLET | Refills: 5 | Status: SHIPPED | OUTPATIENT
Start: 2017-10-02 | End: 2018-04-25

## 2017-10-02 RX ORDER — BACLOFEN 10 MG/1
10 TABLET ORAL 3 TIMES DAILY PRN
Qty: 90 TABLET | Refills: 3 | Status: SHIPPED | OUTPATIENT
Start: 2017-10-02 | End: 2019-02-01

## 2017-10-02 NOTE — PROGRESS NOTES
"  Chronic Pain Follow-Up Visit    Pain Update:  Location of pain: shoulders, legs and elbow  Analgesia/pain control: Recent changes:  same    Overall control: Tolerable with discomfort    Adherance     How often do you take extra pain medicine:Never    Did you take your pain medication today? No, ran out.    Adverse effects: No      Database checked today? Yes. Details: as expected.     She also has had increased lymphedema in the left leg and now in right as well. The furosemide does not seem to help.  She is taking it every other day. She does have wraps that vascular clinic had done for her in the past and she knows how to do the wraps.    She also would like STD testing.      She is planning to move to [place] for the winter and may be considering marrying boyfriend.    PHQ-9 SCORE 1/12/2017 4/17/2017 7/18/2017   Total Score - - -   Total Score 15 8 3     FAYE-7 SCORE 8/18/2015 7/18/2017   Total Score 18 -   Total Score - 5         FUNCTIONAL ASSESSMENT QUESTIONNAIRE SCORE 7/17/2017 10/2/2017   Total Score 40 35        Problem, Medication and Allergy Lists were reviewed and are current..           Physical Exam:     Vitals:    10/02/17 1438   BP: 123/78   Pulse: 70   Temp: 97.5  F (36.4  C)   SpO2: 100%   Weight: 191 lb 12.8 oz (87 kg)   Height: 5' 4.25\" (163.2 cm)     Body mass index is 32.67 kg/(m^2).  Vitals were reviewed and were normal  GENERAL: healthy, alert, well nourished, well hydrated, no distress  RESP: lungs clear to auscultation - no rales, no rhonchi, no wheezes  CV: regular rates and rhythm, normal S1 S2, no S3 or S4 and no murmur, no click or rub -  ABDOMEN: soft, no tenderness, no  hepatosplenomegaly, no masses, normal bowel sounds  MS: extremities- BLE edema with L>R.  Some stasis dermatitis, no infections or ulcers.        Results:     Results for orders placed or performed in visit on 10/02/17   Rapid Urine Drug Screen ([facility])   Result Value Ref Range    Amphetamines Qual NEGATIVE " NEGATIVE    Barbiturates Qual Urine NEGATIVE NEGATIVE    Buprenorphine Qual Urine NEGATIVE NEGATIVE    Benzodiazepine Qual Urine NEGATIVE NEGATIVE    Cocaine Qual Urine NEGATIVE NEGATIVE    Cannabinoids Qual Urine NEGATIVE NEGATIVE    Methamphetamine Qual NEGATIVE NEGATIVE    Methadone Qual NEGATIVE NEGATIVE    Morphine Qual NEGATIVE NEGATIVE    Oxycodone Qual NEGATIVE NEGATIVE    Temperature of Urine was Between  Degrees F YES YES   Chlamydia/Gono Amplified ([facility])   Result Value Ref Range    Chlamydia trac,Amplified Prb Negative Negative    N gonorrhoeae,Amplified Prb Negative Negative    Narrative    Test performed by:  [facility] [address] [place] [address]      rapid urine drug screen obtained today: Yes    Assessment and Plan    Shira Guthrie is here for follow up of chronic pain     Shira was seen today for pain, std and swelling.    Diagnoses and all orders for this visit:    Chronic pain syndrome: Stable. Refilled meds.  -     Rapid Urine Drug Screen ([facility])  -     traMADol (ULTRAM) 50 MG tablet; Take 1 tablet (50 mg) by mouth 3 times daily as needed  -     baclofen (LIORESAL) 10 MG tablet; Take 1 tablet (10 mg) by mouth 3 times daily as needed for muscle spasms  Primary osteoarthritis involving multiple joints: shoulder is still not functional.  Discussed that she should see the specialist and consider surgical repair since she still can't drive.  -     traMADol (ULTRAM) 50 MG tablet; Take 1 tablet (50 mg) by mouth 3 times daily as needed  -     baclofen (LIORESAL) 10 MG tablet; Take 1 tablet (10 mg) by mouth 3 times daily as needed for muscle spasms  Other polyneuropathy  -     traMADol (ULTRAM) 50 MG tablet; Take 1 tablet (50 mg) by mouth 3 times daily as needed    Lymphedema of left leg: new compression stockings and try the lymphedema wraps again.   -     order for DME; Bilateral compression stockings 15-20mmHg. One pair knee high and one pair thigh high.  Please  measure for fit.    Screen for STD (sexually transmitted disease): needed screening due to new sexual contact.   -     Chlamydia/Gono Amplified ([facility])    Need for prophylactic vaccination/inoculation against viral disease  -     ADMIN VACCINE, INITIAL  -     Flu vaccine, quad, with preservative, 0.5 ml      Chronic Pain Syndrome:  Care plan updated with patient, see below for details.  Naloxone has not been prescribed.           If opioids prescribed patient was asked to bring pill bottle to each appointment and was informed that refills would only be provided at office visits.   Asked patient to F/U in 1 month(s) with same provider for 20 minute  Visit.  Discussed that we should do complete physical and recheck labs in the Spring as that is when they are last checked.     Sally Pierre MD    Patient Instructions   Make appointment with Shoulder specialist.    Try baclofen for breakthrough pain.    Refilled tramadol for 6 months.    Continue your current exercise.    Try rewrapping legs for swelling.    Myrna Dao -[phone]

## 2017-10-02 NOTE — PATIENT INSTRUCTIONS
Make appointment with Shoulder specialist.    Try baclofen for breakthrough pain.    Refilled tramadol for 6 months.    Continue your current exercise.    Try rewrapping legs for swelling.    Myrna Dao -[phone]

## 2017-10-02 NOTE — NURSING NOTE
Shira Guthrie      1.  Has the patient received the information for the influenza vaccine? YES    2.  Does the patient have any of the following contraindications?     Allergy to eggs? No     Allergic reaction to previous influenza vaccines? No     Any other problems to previous influenza vaccines? No     Paralyzed by Guillain-Woodland syndrome? No     Currently pregnant? NO     Current moderate or severe illness? No    Vaccination given by Bernice Huynh, CMA.  Recorded by Bernice Huynh

## 2017-10-02 NOTE — LETTER
October 4, 2017      Shira Guthrie  [address]  [address]  [place] [address]        Dear Shira,    Urine tests negative for infections.    Please see below for your test results.    Resulted Orders   Rapid Urine Drug Screen ([facility])   Result Value Ref Range    Amphetamines Qual NEGATIVE NEGATIVE    Barbiturates Qual Urine NEGATIVE NEGATIVE    Buprenorphine Qual Urine NEGATIVE NEGATIVE    Benzodiazepine Qual Urine NEGATIVE NEGATIVE    Cocaine Qual Urine NEGATIVE NEGATIVE    Cannabinoids Qual Urine NEGATIVE NEGATIVE    Methamphetamine Qual NEGATIVE NEGATIVE    Methadone Qual NEGATIVE NEGATIVE    Morphine Qual NEGATIVE NEGATIVE    Oxycodone Qual NEGATIVE NEGATIVE    Temperature of Urine was Between  Degrees F YES YES      Comment:      This is a preliminary screening test that detects drugs-of-abuse in urine at   specified detection levels.  To confirm preliminary results, a more specific   method such as Gas Chromatography/Mass Spectrometry (GC/MS) must be used.        Chlamydia/Gono Amplified ([facility])   Result Value Ref Range    Chlamydia trac,Amplified Prb Negative Negative    N gonorrhoeae,Amplified Prb Negative Negative    Narrative    Test performed by:  [facility] [address] [place] [address]       If you have any questions, please call the clinic to make an appointment.    Sincerely,    Sally Pierre MD

## 2017-10-02 NOTE — MR AVS SNAPSHOT
After Visit Summary   10/2/2017    Shira Guthrie    MRN: [id number]           Patient Information     Date Of Birth          1955        Visit Information        Provider Department      10/2/2017 2:30 PM Sally Pierre MD [facility]        Today's Diagnoses     Chronic pain syndrome    -  1    Lymphedema of left leg        Primary osteoarthritis involving multiple joints        Other polyneuropathy (H)        Screen for STD (sexually transmitted disease)          Care Instructions    Make appointment with Shoulder specialist.    Try baclofen for breakthrough pain.    Refilled tramadol for 6 months.    Continue your current exercise.    Try rewrapping legs for swelling.    Myrna Dao -[phone]          Follow-ups after your visit        Who to contact     Please call your clinic at [phone] to:    Ask questions about your health    Make or cancel appointments    Discuss your medicines    Learn about your test results    Speak to your doctor   If you have compliments or concerns about an experience at your clinic, or if you wish to file a complaint, please contact [facility] Patient Relations at [phone] or email us at [email or web address]         Additional Information About Your Visit        MyChart Information     [employer] is an electronic gateway that provides easy, online access to your medical records. With [employer], you can request a clinic appointment, read your test results, renew a prescription or communicate with your care team.     To sign up for [employer] visit the website at [email or web address]   You will be asked to enter the access code listed below, as well as some personal information. Please follow the directions to create your username and password.     Your access code is: [email or web address]  Expires: 10/15/2017  3:30 PM     Your access code will  in 90 days. If you need help or a new code, please contact your  "[facility] or call [phone] for assistance.        Care EveryWhere ID     This is your Care EveryWhere ID. This could be used by other organizations to access your [place] medical records  [id number]        Your Vitals Were     Pulse Temperature Height Pulse Oximetry BMI (Body Mass Index)       70 97.5  F (36.4  C) 5' 4.25\" (163.2 cm) 100% 32.67 kg/m2        Blood Pressure from Last 3 Encounters:   10/02/17 123/78   07/17/17 115/71   06/07/17 129/70    Weight from Last 3 Encounters:   10/02/17 191 lb 12.8 oz (87 kg)   07/17/17 183 lb 6.4 oz (83.2 kg)   06/07/17 176 lb 6.4 oz (80 kg)              We Performed the Following     Chlamydia/Gono Amplified ([facility])     Rapid Urine Drug Screen ([facility])          Today's Medication Changes          These changes are accurate as of: 10/2/17  3:31 PM.  If you have any questions, ask your nurse or doctor.               Start taking these medicines.        Dose/Directions    baclofen 10 MG tablet   Commonly known as:  LIORESAL   Used for:  Chronic pain syndrome, Primary osteoarthritis involving multiple joints   Started by:  Sally Pierre MD        Dose:  10 mg   Take 1 tablet (10 mg) by mouth 3 times daily as needed for muscle spasms   Quantity:  90 tablet   Refills:  3         These medicines have changed or have updated prescriptions.        Dose/Directions    * order for DME   This may have changed:  Another medication with the same name was added. Make sure you understand how and when to take each.   Used for:  Leg edema, left   Changed by:  Sally Pierre MD        Equipment being ordered: Jobst stockings thigh high 15-20mmHg.  Please measure for fit. 2 pairs.   Quantity:  2 Device   Refills:  0       * order for DME   This may have changed:  You were already taking a medication with the same name, and this prescription was added. Make sure you understand how and when to take each.   Used for:  Lymphedema of left leg   Changed " by:  Sally Pierre MD        Bilateral compression stockings 15-20mmHg. One pair knee high and one pair thigh high.  Please measure for fit.   Quantity:  2 Device   Refills:  0       * Notice:  This list has 2 medication(s) that are the same as other medications prescribed for you. Read the directions carefully, and ask your doctor or other care provider to review them with you.         Where to get your medicines      These medications were sent to [facility] - [place], [place] - [address], [place] [address]     Phone:  [phone]     baclofen 10 MG tablet         Some of these will need a paper prescription and others can be bought over the counter.  Ask your nurse if you have questions.     Bring a paper prescription for each of these medications     order for DME    traMADol 50 MG tablet                Primary Care Provider Office Phone # Fax #    Sally Pierre [phone]       [address] [address]        Equal Access to Services     KASANDRA VALLECILLO AH: Hadii aad ku hadasho Soomaali, waaxda luqadaha, qaybta kaalmada adeegyada, waxay idiin hayaan shell mcbride . So [facility] [phone].    ATENCIÓN: Si habla español, tiene a monson disposición servicios gratuitos de asistencia lingüística. Llame al [phone].    We comply with applicable federal civil rights laws and [place] laws. We do not discriminate on the basis of race, color, national origin, age, disability, sex, sexual orientation, or gender identity.            Thank you!     Thank you for choosing [facility]  for your care. Our goal is always to provide you with excellent care. Hearing back from our patients is one way we can continue to improve our services. Please take a few minutes to complete the written survey that you may receive in the mail after your visit with us. Thank you!             Your Updated Medication List - Protect others around you:  Learn how to safely use, store and throw away your medicines at www.disposemymeds.org.          This list is accurate as of: 10/2/17  3:31 PM.  Always use your most recent med list.                   Brand Name Dispense Instructions for use Diagnosis    aspirin 81 MG EC tablet     90 tablet    Take 1 tablet (81 mg) by mouth daily    Coronary artery disease involving native coronary artery of native heart with angina pectoris (H)       baclofen 10 MG tablet    LIORESAL    90 tablet    Take 1 tablet (10 mg) by mouth 3 times daily as needed for muscle spasms    Chronic pain syndrome, Primary osteoarthritis involving multiple joints       CANE, ANY MATERIAL     1 Device    1 Device by Device route as needed    Peripheral neuropathy (H), Leg edema, left, Stroke (H)       clopidogrel 75 MG tablet    PLAVIX    90 tablet    Take 1 tablet (75 mg) by mouth daily . Give name brand Plavix. Approved by insurance through 2/2/1016.    Chronic ischemic heart disease       dexlansoprazole 30 MG Cpdr CR capsule    DEXILANT    90 capsule    Take 1 capsule (30 mg) by mouth daily    Esophageal stricture       docusate sodium 100 MG tablet    COLACE    60 tablet    Take 100 mg by mouth daily    Chronic constipation, Chronic pain syndrome       DULoxetine 60 MG EC capsule    CYMBALTA    30 capsule    Take 1 capsule (60 mg) by mouth daily    Major depressive disorder, recurrent, severe without psychotic features (H)       furosemide 20 MG tablet    LASIX    30 tablet    Take 1 tablet (20mg) every other day as needed.    Lymphedema of left leg       gabapentin 300 MG capsule    NEURONTIN    90 capsule    Take 1 capsule (300 mg) by mouth 3 times daily    Other polyneuropathy (H)       Heating Pads Pads     1 each    Apply to painful areas prn.    Stroke (H), Peripheral neuropathy (H), Leg edema, left       hydrOXYzine 25 MG tablet    ATARAX    100 tablet    1 to 2 tablets three times a day as needed.    Major depressive disorder, recurrent  episode, moderate (H), Anxiety       isosorbide mononitrate 30 MG 24 hr tablet    IMDUR    90 tablet    Take 1 tablet (30 mg) by mouth daily    Chronic ischemic heart disease       linaclotide 290 MCG capsule    LINZESS    90 capsule    Take 1 capsule (290 mcg) by mouth every morning (before breakfast)    Chronic constipation       metoprolol 25 MG 24 hr tablet    TOPROL-XL    90 tablet    Take 1 tablet (25 mg) by mouth daily    Benign essential hypertension       nitroGLYcerin 0.4 MG sublingual tablet    NITROSTAT    30 tablet    Place 1 tablet (0.4 mg) under the tongue every 5 minutes as needed    Anginal pain (H)       * order for DME     2 Device    Equipment being ordered: Jobst stockings thigh high 15-20mmHg.  Please measure for fit. 2 pairs.    Leg edema, left       * order for DME     2 Device    Bilateral compression stockings 15-20mmHg. One pair knee high and one pair thigh high.  Please measure for fit.    Lymphedema of left leg       polyethylene glycol powder    MIRALAX    510 g    Take 17 g (1 capful) by mouth 2 times daily    Chronic constipation       rosuvastatin 20 MG tablet    CRESTOR    90 tablet    Take 1 tablet (20 mg) by mouth daily    Coronary artery disease involving native coronary artery of native heart with angina pectoris (H)       traMADol 50 MG tablet    ULTRAM    90 tablet    Take 1 tablet (50 mg) by mouth 3 times daily as needed    Chronic pain syndrome, Other polyneuropathy (H), Primary osteoarthritis involving multiple joints       TYLENOL 325 MG tablet   Generic drug:  acetaminophen     100 tablet    Take 2 tablets (650 mg) by mouth daily        * Notice:  This list has 2 medication(s) that are the same as other medications prescribed for you. Read the directions carefully, and ask your doctor or other care provider to review them with you.

## 2017-10-03 LAB
C TRACH RRNA SPEC QL NAA+PROBE: NEGATIVE
N GONORRHOEA RRNA SPEC QL NAA+PROBE: NEGATIVE

## 2017-10-11 DIAGNOSIS — K22.2 ESOPHAGEAL STRICTURE: ICD-10-CM

## 2017-10-11 RX ORDER — DEXLANSOPRAZOLE 30 MG/1
30 CAPSULE, DELAYED RELEASE ORAL DAILY
Qty: 90 CAPSULE | Refills: 4 | Status: SHIPPED | OUTPATIENT
Start: 2017-10-11 | End: 2018-10-30

## 2017-10-24 ENCOUNTER — TELEPHONE (OUTPATIENT)
Dept: FAMILY MEDICINE | Facility: CLINIC | Age: 62
End: 2017-10-24

## 2017-10-24 DIAGNOSIS — K59.09 CHRONIC CONSTIPATION: ICD-10-CM

## 2017-10-24 NOTE — TELEPHONE ENCOUNTER
She can have mammogram every year if she wishes, but needs at least every 2 years.    I will send linzess for her.  Sally Pierre MD

## 2017-10-24 NOTE — TELEPHONE ENCOUNTER
Pt called back and she would like Dr Pierre to take a look and decide if she needs a mammogram or not. She did have one in August of 2016.

## 2017-10-24 NOTE — TELEPHONE ENCOUNTER
It would like to continue to take the medication Linzess, it helps have regular BMs. Pt received a letter to see the GI doctor or Dr. Pierre to continue to get this medication. Pt is wondering if Dr. Pierre would write this rx for her.     Pt had a mammo in Aug 2016, pt wants to know if she should have one every year? According to Health maintenance, next mammo due 2018 but I did not give this to the pt, would like Dr. Pierre to review first. Appt has been scheduled already for her next mammo. Pt states she doesn't have any breast symptoms and would like to double check w/ Dr. Pierre about this.     Routed to Dr. Pierre. /LATANYA Sun

## 2017-10-24 NOTE — TELEPHONE ENCOUNTER
[facility] phone call message- general phone call:    Reason for call: She needs a call back re her medication and if  is filling her medication or if she has to get them from her specialist     Return call needed: Yes    OK to leave a message on voice mail? Yes    Primary language: English      needed? No    Call taken on October 24, 2017 at 9:45 AM by Earline Curry

## 2017-10-27 ENCOUNTER — TRANSFERRED RECORDS (OUTPATIENT)
Dept: HEALTH INFORMATION MANAGEMENT | Facility: CLINIC | Age: 62
End: 2017-10-27

## 2017-11-06 ENCOUNTER — COMMUNICATION - HEALTHEAST (OUTPATIENT)
Dept: CARDIOLOGY | Facility: CLINIC | Age: 62
End: 2017-11-06

## 2017-11-06 ENCOUNTER — TRANSFERRED RECORDS (OUTPATIENT)
Dept: HEALTH INFORMATION MANAGEMENT | Facility: CLINIC | Age: 62
End: 2017-11-06

## 2017-11-06 ENCOUNTER — OFFICE VISIT - HEALTHEAST (OUTPATIENT)
Dept: CARDIOLOGY | Facility: CLINIC | Age: 62
End: 2017-11-06

## 2017-11-06 DIAGNOSIS — I69.359: ICD-10-CM

## 2017-11-06 DIAGNOSIS — R60.0 PERIPHERAL EDEMA: ICD-10-CM

## 2017-11-06 DIAGNOSIS — I25.10 CORONARY ARTERY DISEASE INVOLVING NATIVE CORONARY ARTERY OF NATIVE HEART WITHOUT ANGINA PECTORIS: ICD-10-CM

## 2017-11-06 DIAGNOSIS — I10 ESSENTIAL HYPERTENSION: ICD-10-CM

## 2017-11-06 DIAGNOSIS — E78.00 PURE HYPERCHOLESTEROLEMIA: ICD-10-CM

## 2017-11-06 ASSESSMENT — MIFFLIN-ST. JEOR: SCORE: 1359.15

## 2017-11-07 ENCOUNTER — TRANSFERRED RECORDS (OUTPATIENT)
Dept: HEALTH INFORMATION MANAGEMENT | Facility: CLINIC | Age: 62
End: 2017-11-07

## 2017-11-07 ENCOUNTER — MEDICAL CORRESPONDENCE (OUTPATIENT)
Dept: HEALTH INFORMATION MANAGEMENT | Facility: CLINIC | Age: 62
End: 2017-11-07

## 2017-11-08 ENCOUNTER — OFFICE VISIT (OUTPATIENT)
Dept: FAMILY MEDICINE | Facility: CLINIC | Age: 62
End: 2017-11-08

## 2017-11-08 VITALS
DIASTOLIC BLOOD PRESSURE: 83 MMHG | SYSTOLIC BLOOD PRESSURE: 138 MMHG | WEIGHT: 186.2 LBS | HEIGHT: 65 IN | HEART RATE: 62 BPM | BODY MASS INDEX: 31.02 KG/M2

## 2017-11-08 DIAGNOSIS — G89.4 CHRONIC PAIN SYNDROME: Primary | ICD-10-CM

## 2017-11-08 DIAGNOSIS — M15.0 PRIMARY OSTEOARTHRITIS INVOLVING MULTIPLE JOINTS: ICD-10-CM

## 2017-11-08 DIAGNOSIS — I25.119 CORONARY ARTERY DISEASE INVOLVING NATIVE CORONARY ARTERY OF NATIVE HEART WITH ANGINA PECTORIS (H): ICD-10-CM

## 2017-11-08 DIAGNOSIS — M75.41 ROTATOR CUFF IMPINGEMENT SYNDROME OF RIGHT SHOULDER: ICD-10-CM

## 2017-11-08 DIAGNOSIS — I89.0 LYMPHEDEMA OF LEFT LEG: ICD-10-CM

## 2017-11-08 ASSESSMENT — ANXIETY QUESTIONNAIRES
3. WORRYING TOO MUCH ABOUT DIFFERENT THINGS: SEVERAL DAYS
6. BECOMING EASILY ANNOYED OR IRRITABLE: MORE THAN HALF THE DAYS
GAD7 TOTAL SCORE: 5
1. FEELING NERVOUS, ANXIOUS, OR ON EDGE: NOT AT ALL
2. NOT BEING ABLE TO STOP OR CONTROL WORRYING: SEVERAL DAYS
7. FEELING AFRAID AS IF SOMETHING AWFUL MIGHT HAPPEN: NOT AT ALL
5. BEING SO RESTLESS THAT IT IS HARD TO SIT STILL: NOT AT ALL

## 2017-11-08 ASSESSMENT — PATIENT HEALTH QUESTIONNAIRE - PHQ9
5. POOR APPETITE OR OVEREATING: SEVERAL DAYS
SUM OF ALL RESPONSES TO PHQ QUESTIONS 1-9: 6

## 2017-11-08 NOTE — PROGRESS NOTES
"  Chronic Pain Follow-Up Visit    Pain Update:  Location of pain: right shoulder and arm, both feet, and lower legs  Analgesia/pain control: Recent changes:  same    Overall control: Tolerable with discomfort    Adherance     How often do you take extra pain medicine:Never    Did you take your pain medication today? YES,     Adverse effects: No, constipation very good right now.     Database checked today? Yes. Details: as expected.    Saw Dr. Leos, Cardiology.  Has echo and venous ultrasound tomorrow. Increased lasix to twice a day.  Left leg swelling is getting worse and feeling firm.    Saw Dr. Muniz yesterday at [facility].  Reviewed MRIs with him and he thinks she needs surgery for torn rotator cuff in the right shoulder.  She is planning this after the holidays.  6 months to a year to heal well.  She will schedule preop after the first of the year.       PHQ-9 SCORE 4/17/2017 7/18/2017 11/8/2017   Total Score - - -   Total Score 8 3 6     FAYE-7 SCORE 8/18/2015 7/18/2017 11/8/2017   Total Score 18 - -   Total Score - 5 5       Care Plan discussed and updated as needed.  See the end of this note.       FUNCTIONAL ASSESSMENT QUESTIONNAIRE SCORE 10/2/2017 11/8/2017   Total Score 35 30          Problem, Medication and Allergy Lists were reviewed and are current..           Physical Exam:     Vitals:    11/08/17 1006 11/08/17 1010   BP: 144/81 138/83   Pulse: 66 62   Weight: 186 lb 3.2 oz (84.5 kg)    Height: 5' 4.75\" (164.5 cm)      Body mass index is 31.23 kg/(m^2).  Vitals were reviewed and were normal  GENERAL: healthy, alert, well nourished, well hydrated, no distress  RESP: lungs clear to auscultation - no rales, no rhonchi, no wheezes  CV: regular rates and rhythm, normal S1 S2, no S3 or S4 and no murmur, no click or rub -  ABDOMEN: soft, no tenderness, no  hepatosplenomegaly, no masses, normal bowel sounds  MS: extremities- trace edema in right leg 2+ in left with chronic venous stasis changes, firm " in places.  BACK: no CVA tenderness, no paralumbar tenderness        Results:   Utox in past have been as expected.     rapid urine drug screen obtained today: No, opioid refill not due today.    Assessment and Plan    Shira Guthrie is here for follow up of chronic pain caused by OA, neuropathy in leg, and rotator cuff tear.    Shira was seen today for recheck.    Diagnoses and all orders for this visit:    Chronic pain syndrome  Rotator cuff impingement syndrome of right shoulder  Primary osteoarthritis involving multiple joints:   Planning surgery for shoulder in January 2018.  Will come for preop.  Continue current pain management plan.    Coronary artery disease involving native coronary artery of native heart with angina pectoris (H): echo and venous US tomorrow per cardiology.      Lymphedema of left leg: Has seen vascular clinic and confirmed lymphedema in the past. This as been chronic but due to worsening recently agree with cardiology workup.    Legal circumstance: Issue with the county not dispersing all of the money benefits that she is due from disability. She brought paperwork and requested that I make a copy for the [facility] .  She will not get money until January but needs it now for her bills.  -     Legal Services Referral - [place] only        Chronic Pain Syndrome:  Care plan updated with patient, see below for details.  Naloxone has not been prescribed.       If opioids prescribed patient was asked to bring pill bottle to each appointment and was informed that refills would only be provided at office visits.     Asked patient to F/U in 2 month(s) with same provider for Preop     Sally Pierre MD    Patient Instructions          Personal Care Plan for Chronic Pain    1.  Personal Goals:                * take less medication              * lose weight: goal of losing 15 to 20 pounds.              * continue working on keeping thoughts positive through [employer]              * to feel  confident enough that when someone gives me a compliment I can simply say thank you    2.  Sleep:                 *  Basic sleep plan:                          *reduce or eliminate caffeine and daytime naps                          * relaxation before bed                          * limit screen time 1-2 hours prior to bed                          * establish dark/quiet sleep environment                          * go to bed at target bedtime:   pm                          * keep consistent wake time:   am.              *  Minimize switching days and nights by staying active throughout the day.              * We'll talk more about a consistent sleep plan when we meet next time.                3.  Physical Activity:                 * Formal physical rehabilitation:                          HOME PT for shoulder              * Home/community based activity:                          * Home based exercises given by lymphedema nurse with breathing exercises built in as well - daily                          * Aerobic exercise/endurance exercise:  elliptical machine - 5 minute intervals with sit ups in between for 30 minutes - daily.  Has a  in the builiding. Has exercise tape in her apartment.                          * Cleaning and baking with body awareness and stretching              * Listen to your body.  Pace yourself for success.  Don't over-do it.  Plan to get PCA back to help with cleaning and laundry so as to not overdo it.                4.  Nutrition/Weight:                 * Keep a food journal in a notebook at home and bring this to your next visit with Dr. Sky.  Eat small frequent meals.              * Review your I Can Prevent Diabetes materials.              * Limit processed foods and foods high in sugar, sodium and fat.              * Keep up the good work eating many healthy foods.              * When you make a less healthy choice approach yourself with kindness and compassion.  Try to understand  what contributed to that choice:  Was I too hungry?  Was I too tired?  Stressed?  Worried?  Use this information to help support healthier choices in the future.    5.  Mood/Stress Management:     SELF COMPASSION!              * Formal interventions:                        * schedule follow up with Dr. Sky in about month or so.              * Home/community based interventions:                          * Regular contact with family  * Relaxation techniques - breathing exercises  * Create your pyramid to focus you on your strengths and hopes.  * Movies  * Meditation  * Yoga  * Creative activity  * Spiritual /prayer:  Prayer at home, attending services at [facility], wellness auxiliary group  * Service-based activity.              * Medications: Cymbalta, Hydroxyzine as needed.    6.  Tobacco/Alcohol/Drug Use:                               * Congratulations on quitting smoking and staying quit!  Keep up the good work managing stress/anxiety in other ways (prayer, talking it out, deep breaths, exercise, etc..                         * Maintain healthy relationship with alcohol                          * For women this would be no more than 1 drink per day                          * For men, this would be no more than 2 drinks per day              * Eliminate recreational drugs    7.  Pain:                 * Non-medication treatments:                          * ice/heat: use heating pad as you are doing.                          * massage                          * acupuncture  YOGA is planned for when you return.  PT for shoulder when you return.                           8.  Pain Medications: Gabapentin 3 at night, Tramadol one pill three times day.  Tylenol arthritis as needed for break through.        Legal referral has been sent to Jasmyn Malone from [facility].  She will review, advise, and contact the patient.  Na Zacarias  11/08/17

## 2017-11-08 NOTE — MR AVS SNAPSHOT
After Visit Summary   11/8/2017    Shira Guthrie    MRN: [id number]           Patient Information     Date Of Birth          1955        Visit Information        Provider Department      11/8/2017 10:00 AM Sally Pierre MD [facility]        Today's Diagnoses     Chronic pain syndrome    -  1    Rotator cuff impingement syndrome of right shoulder        Primary osteoarthritis involving multiple joints        Coronary artery disease involving native coronary artery of native heart with angina pectoris (H)        Lymphedema of left leg        Legal circumstance          Care Instructions         Personal Care Plan for Chronic Pain    1.  Personal Goals:                * take less medication              * lose weight: goal of losing 15 to 20 pounds.              * continue working on keeping thoughts positive through Tawny              * to feel confident enough that when someone gives me a compliment I can simply say thank you    2.  Sleep:                 *  Basic sleep plan:                          *reduce or eliminate caffeine and daytime naps                          * relaxation before bed                          * limit screen time 1-2 hours prior to bed                          * establish dark/quiet sleep environment                          * go to bed at target bedtime:   pm                          * keep consistent wake time:   am.              *  Minimize switching days and nights by staying active throughout the day.              * We'll talk more about a consistent sleep plan when we meet next time.                3.  Physical Activity:                 * Formal physical rehabilitation:                          HOME PT for shoulder              * Home/community based activity:                          * Home based exercises given by lymphedema nurse with breathing exercises built in as well - daily                          * Aerobic exercise/endurance exercise:  elliptical  machine - 5 minute intervals with sit ups in between for 30 minutes - daily.  Has a  in the builiding. Has exercise tape in her apartment.                          * Cleaning and baking with body awareness and stretching              * Listen to your body.  Pace yourself for success.  Don't over-do it.  Plan to get PCA back to help with cleaning and laundry so as to not overdo it.                4.  Nutrition/Weight:                 * Keep a food journal in a notebook at home and bring this to your next visit with Dr. Sky.  Eat small frequent meals.              * Review your I Can Prevent Diabetes materials.              * Limit processed foods and foods high in sugar, sodium and fat.              * Keep up the good work eating many healthy foods.              * When you make a less healthy choice approach yourself with kindness and compassion.  Try to understand what contributed to that choice:  Was I too hungry?  Was I too tired?  Stressed?  Worried?  Use this information to help support healthier choices in the future.    5.  Mood/Stress Management:     SELF COMPASSION!              * Formal interventions:                        * schedule follow up with Dr. Sky in about month or so.              * Home/community based interventions:                          * Regular contact with family  * Relaxation techniques - breathing exercises  * Create your pyramid to focus you on your strengths and hopes.  * Movies  * Meditation  * Yoga  * Creative activity  * Spiritual /prayer:  Prayer at home, attending services at [facility], wellness auxiliary group  * Service-based activity.              * Medications: Cymbalta, Hydroxyzine as needed.    6.  Tobacco/Alcohol/Drug Use:                               * Congratulations on quitting smoking and staying quit!  Keep up the good work managing stress/anxiety in other ways (prayer, talking it out, deep breaths, exercise,  etc..                         * Maintain healthy relationship with alcohol                          * For women this would be no more than 1 drink per day                          * For men, this would be no more than 2 drinks per day              * Eliminate recreational drugs    7.  Pain:                 * Non-medication treatments:                          * ice/heat: use heating pad as you are doing.                          * massage                          * acupuncture  YOGA is planned for when you return.  PT for shoulder when you return.                           8.  Pain Medications: Gabapentin 3 at night, Tramadol one pill three times day.  Tylenol arthritis as needed for break through.                  Follow-ups after your visit        Follow-up notes from your care team     Return in about 2 months (around 1/8/2018) for Preop.      Who to contact     Please call your clinic at [phone] to:    Ask questions about your health    Make or cancel appointments    Discuss your medicines    Learn about your test results    Speak to your doctor   If you have compliments or concerns about an experience at your clinic, or if you wish to file a complaint, please contact [facility] Patient Relations at [phone] or email us at [email or web address]         Additional Information About Your Visit        [employer] Information     appweevr is an electronic gateway that provides easy, online access to your medical records. With appweevr, you can request a clinic appointment, read your test results, renew a prescription or communicate with your care team.     To sign up for [employer] visit the website at [email or web address]   You will be asked to enter the access code listed below, as well as some personal information. Please follow the directions to create your username and password.     Your access code is: [email or web address]  Expires: 2/6/2018 10:40 AM     Your access code  "will  in 90 days. If you need help or a new code, please contact your [facility] or call [phone] for assistance.        Care EveryWhere ID     This is your Care EveryWhere ID. This could be used by other organizations to access your [place] medical records  [id number]        Your Vitals Were     Pulse Height BMI (Body Mass Index)             62 5' 4.75\" (164.5 cm) 31.23 kg/m2          Blood Pressure from Last 3 Encounters:   17 138/83   10/02/17 123/78   17 115/71    Weight from Last 3 Encounters:   17 186 lb 3.2 oz (84.5 kg)   10/02/17 191 lb 12.8 oz (87 kg)   17 183 lb 6.4 oz (83.2 kg)              Today, you had the following     No orders found for display       Primary Care Provider Office Phone # Fax #    Sally Pierre [phone]       [place] [address]        Equal Access to Services     [facility]: Hadii aad ku hadasho Soomaali, waaxda luqadaha, qaybta kaalmada adeegyada, una mcbride . So [facility] [phone].    ATENCIÓN: Si habla español, tiene a monson disposición servicios gratuitos de asistencia lingüística. Llame al [phone].    We comply with applicable federal civil rights laws and [place] laws. We do not discriminate on the basis of race, color, national origin, age, disability, sex, sexual orientation, or gender identity.            Thank you!     Thank you for choosing [facility]  for your care. Our goal is always to provide you with excellent care. Hearing back from our patients is one way we can continue to improve our services. Please take a few minutes to complete the written survey that you may receive in the mail after your visit with us. Thank you!             Your Updated Medication List - Protect others around you: Learn how to safely use, store and throw away your medicines at www.disposemymeds.org.          This list is accurate " as of: 11/8/17 10:40 AM.  Always use your most recent med list.                   Brand Name Dispense Instructions for use Diagnosis    aspirin 81 MG EC tablet     90 tablet    Take 1 tablet (81 mg) by mouth daily    Coronary artery disease involving native coronary artery of native heart with angina pectoris (H)       baclofen 10 MG tablet    LIORESAL    90 tablet    Take 1 tablet (10 mg) by mouth 3 times daily as needed for muscle spasms    Chronic pain syndrome, Primary osteoarthritis involving multiple joints       CANE, ANY MATERIAL     1 Device    1 Device by Device route as needed    Peripheral neuropathy, Leg edema, left, Stroke (H)       clopidogrel 75 MG tablet    PLAVIX    90 tablet    Take 1 tablet (75 mg) by mouth daily . Give name brand Plavix. Approved by insurance through 2/2/1016.    Chronic ischemic heart disease       dexlansoprazole 30 MG Cpdr CR capsule    DEXILANT    90 capsule    Take 1 capsule (30 mg) by mouth daily    Esophageal stricture       docusate sodium 100 MG tablet    COLACE    60 tablet    Take 100 mg by mouth daily    Chronic constipation, Chronic pain syndrome       DULoxetine 60 MG EC capsule    CYMBALTA    30 capsule    Take 1 capsule (60 mg) by mouth daily    Major depressive disorder, recurrent, severe without psychotic features (H)       furosemide 20 MG tablet    LASIX    30 tablet    Take 1 tablet (20mg) every other day as needed.    Lymphedema of left leg       gabapentin 300 MG capsule    NEURONTIN    90 capsule    Take 1 capsule (300 mg) by mouth 3 times daily    Other polyneuropathy       Heating Pads Pads     1 each    Apply to painful areas prn.    Stroke (H), Peripheral neuropathy, Leg edema, left       hydrOXYzine 25 MG tablet    ATARAX    100 tablet    1 to 2 tablets three times a day as needed.    Major depressive disorder, recurrent episode, moderate (H), Anxiety       isosorbide mononitrate 30 MG 24 hr tablet    IMDUR    90 tablet    Take 1 tablet (30 mg) by  mouth daily    Chronic ischemic heart disease       linaclotide 290 MCG capsule    LINZESS    90 capsule    Take 1 capsule (290 mcg) by mouth every morning (before breakfast)    Chronic constipation       metoprolol 25 MG 24 hr tablet    TOPROL-XL    90 tablet    Take 1 tablet (25 mg) by mouth daily    Benign essential hypertension       nitroGLYcerin 0.4 MG sublingual tablet    NITROSTAT    30 tablet    Place 1 tablet (0.4 mg) under the tongue every 5 minutes as needed    Anginal pain (H)       * order for DME     2 Device    Equipment being ordered: Jobst stockings thigh high 15-20mmHg.  Please measure for fit. 2 pairs.    Leg edema, left       * order for DME     2 Device    Bilateral compression stockings 15-20mmHg. One pair knee high and one pair thigh high.  Please measure for fit.    Lymphedema of left leg       polyethylene glycol powder    MIRALAX    510 g    Take 17 g (1 capful) by mouth 2 times daily    Chronic constipation       rosuvastatin 20 MG tablet    CRESTOR    90 tablet    Take 1 tablet (20 mg) by mouth daily    Coronary artery disease involving native coronary artery of native heart with angina pectoris (H)       traMADol 50 MG tablet    ULTRAM    90 tablet    Take 1 tablet (50 mg) by mouth 3 times daily as needed    Chronic pain syndrome, Other polyneuropathy, Primary osteoarthritis involving multiple joints       TYLENOL 325 MG tablet   Generic drug:  acetaminophen     100 tablet    Take 2 tablets (650 mg) by mouth daily        * Notice:  This list has 2 medication(s) that are the same as other medications prescribed for you. Read the directions carefully, and ask your doctor or other care provider to review them with you.

## 2017-11-08 NOTE — PATIENT INSTRUCTIONS
Personal Care Plan for Chronic Pain    1.  Personal Goals:                * take less medication              * lose weight: goal of losing 15 to 20 pounds.              * continue working on keeping thoughts positive through [employer]              * to feel confident enough that when someone gives me a compliment I can simply say thank you    2.  Sleep:                 *  Basic sleep plan:                          *reduce or eliminate caffeine and daytime naps                          * relaxation before bed                          * limit screen time 1-2 hours prior to bed                          * establish dark/quiet sleep environment                          * go to bed at target bedtime:   pm                          * keep consistent wake time:   am.              *  Minimize switching days and nights by staying active throughout the day.              * We'll talk more about a consistent sleep plan when we meet next time.                3.  Physical Activity:                 * Formal physical rehabilitation:                          HOME PT for shoulder              * Home/community based activity:                          * Home based exercises given by lymphedema nurse with breathing exercises built in as well - daily                          * Aerobic exercise/endurance exercise:  elliptical machine - 5 minute intervals with sit ups in between for 30 minutes - daily.  Has a  in the builiding. Has exercise tape in her apartment.                          * Cleaning and baking with body awareness and stretching              * Listen to your body.  Pace yourself for success.  Don't over-do it.  Plan to get PCA back to help with cleaning and laundry so as to not overdo it.                4.  Nutrition/Weight:                 * Keep a food journal in a notebook at home and bring this to your next visit with Dr. Sky.  Eat small frequent meals.              * Review your I Can Prevent Diabetes  materials.              * Limit processed foods and foods high in sugar, sodium and fat.              * Keep up the good work eating many healthy foods.              * When you make a less healthy choice approach yourself with kindness and compassion.  Try to understand what contributed to that choice:  Was I too hungry?  Was I too tired?  Stressed?  Worried?  Use this information to help support healthier choices in the future.    5.  Mood/Stress Management:     SELF COMPASSION!              * Formal interventions:                        * schedule follow up with Dr. Sky in about month or so.              * Home/community based interventions:                          * Regular contact with family  * Relaxation techniques - breathing exercises  * Create your pyramid to focus you on your strengths and hopes.  * Movies  * Meditation  * Yoga  * Creative activity  * Spiritual /prayer:  Prayer at home, attending services at [facility], wellness auxiliary group  * Service-based activity.              * Medications: Cymbalta, Hydroxyzine as needed.    6.  Tobacco/Alcohol/Drug Use:                               * Congratulations on quitting smoking and staying quit!  Keep up the good work managing stress/anxiety in other ways (prayer, talking it out, deep breaths, exercise, etc..                         * Maintain healthy relationship with alcohol                          * For women this would be no more than 1 drink per day                          * For men, this would be no more than 2 drinks per day              * Eliminate recreational drugs    7.  Pain:                 * Non-medication treatments:                          * ice/heat: use heating pad as you are doing.                          * massage                          * acupuncture  [facility] is planned for when you return.  PT for shoulder when you return.                           8.  Pain Medications: Gabapentin 3 at night,  Tramadol one pill three times day.  Tylenol arthritis as needed for break through.        Legal referral has been sent to Jasmyn Malone from [facility].  She will review, advise, and contact the patient.  Na Zacarias  11/08/17

## 2017-11-09 ENCOUNTER — HOSPITAL ENCOUNTER (OUTPATIENT)
Dept: CARDIOLOGY | Facility: CLINIC | Age: 62
Discharge: HOME | End: 2017-11-09
Attending: INTERNAL MEDICINE

## 2017-11-09 ENCOUNTER — RECORDS - HEALTHEAST (OUTPATIENT)
Dept: VASCULAR ULTRASOUND | Facility: CLINIC | Age: 62
End: 2017-11-09

## 2017-11-09 ENCOUNTER — TRANSFERRED RECORDS (OUTPATIENT)
Dept: HEALTH INFORMATION MANAGEMENT | Facility: CLINIC | Age: 62
End: 2017-11-09

## 2017-11-09 ENCOUNTER — RECORDS - HEALTHEAST (OUTPATIENT)
Dept: ADMINISTRATIVE | Facility: OTHER | Age: 62
End: 2017-11-09

## 2017-11-09 DIAGNOSIS — R60.9 EDEMA, UNSPECIFIED: ICD-10-CM

## 2017-11-09 DIAGNOSIS — R60.0 PERIPHERAL EDEMA: ICD-10-CM

## 2017-11-09 LAB
AORTIC ROOT: 2.5 CM
AORTIC VALVE MEAN VELOCITY: 89.2 CM/S
AV DIMENSIONLESS INDEX VTI: 0.9
AV MEAN GRADIENT: 4 MMHG
AV PEAK GRADIENT: 6.8 MMHG
AV VALVE AREA: 2.4 CM2
AV VELOCITY RATIO: 0.9
BSA FOR ECHO PROCEDURE: 1.94 M2
CV BLOOD PRESSURE: NORMAL MMHG
CV ECHO HEIGHT: 64 IN
CV ECHO WEIGHT: 185 LBS
DOP CALC AO PEAK VEL: 130 CM/S
DOP CALC AO VTI: 29.6 CM
DOP CALC LVOT AREA: 2.54 CM2
DOP CALC LVOT DIAMETER: 1.8 CM
DOP CALC LVOT PEAK VEL: 122 CM/S
DOP CALC LVOT STROKE VOLUME: 70.7 CM3
DOP CALCLVOT PEAK VEL VTI: 27.8 CM
EJECTION FRACTION: 59 % (ref 55–75)
FRACTIONAL SHORTENING: 37.8 % (ref 28–44)
INTERVENTRICULAR SEPTUM IN END DIASTOLE: 1 CM (ref 0.6–0.9)
IVS/PW RATIO: 1.1
LA AREA 1: 16.5 CM2
LA AREA 2: 18.1 CM2
LEFT ATRIUM LENGTH: 4.94 CM
LEFT ATRIUM SIZE: 3.6 CM
LEFT ATRIUM TO AORTIC ROOT RATIO: 1.44 NO UNITS
LEFT ATRIUM VOLUME INDEX: 26.5 ML/M2
LEFT ATRIUM VOLUME: 51.4 CM3
LEFT VENTRICLE CARDIAC INDEX: 2.5 L/MIN/M2
LEFT VENTRICLE CARDIAC OUTPUT: 4.8 L/MIN
LEFT VENTRICLE DIASTOLIC VOLUME INDEX: 40.2 CM3/M2 (ref 34–74)
LEFT VENTRICLE DIASTOLIC VOLUME: 78 CM3 (ref 46–106)
LEFT VENTRICLE HEART RATE: 68 BPM
LEFT VENTRICLE MASS INDEX: 73.7 G/M2
LEFT VENTRICLE SYSTOLIC VOLUME INDEX: 16.5 CM3/M2 (ref 11–31)
LEFT VENTRICLE SYSTOLIC VOLUME: 32 CM3 (ref 14–42)
LEFT VENTRICULAR INTERNAL DIMENSION IN DIASTOLE: 4.5 CM (ref 3.8–5.2)
LEFT VENTRICULAR INTERNAL DIMENSION IN SYSTOLE: 2.8 CM (ref 2.2–3.5)
LEFT VENTRICULAR MASS: 142.9 G
LEFT VENTRICULAR OUTFLOW TRACT MEAN GRADIENT: 3 MMHG
LEFT VENTRICULAR OUTFLOW TRACT MEAN VELOCITY: 80.3 CM/S
LEFT VENTRICULAR OUTFLOW TRACT PEAK GRADIENT: 6 MMHG
LEFT VENTRICULAR POSTERIOR WALL IN END DIASTOLE: 0.9 CM (ref 0.6–0.9)
LV STROKE VOLUME INDEX: 36.4 ML/M2
MITRAL VALVE E/A RATIO: 0.9
MV AVERAGE E/E' RATIO: 7.3 CM/S
MV DECELERATION TIME: 182 MS
MV E'TISSUE VEL-LAT: 8.97 CM/S
MV E'TISSUE VEL-MED: 13 CM/S
MV LATERAL E/E' RATIO: 8.9
MV MEDIAL E/E' RATIO: 6.2
MV PEAK A VELOCITY: 87.1 CM/S
MV PEAK E VELOCITY: 80.2 CM/S
NUC REST DIASTOLIC VOLUME INDEX: 2960 LBS
NUC REST SYSTOLIC VOLUME INDEX: 64 IN
TRICUSPID REGURGITATION PEAK PRESSURE GRADIENT: 35 MMHG
TRICUSPID VALVE ANULAR PLANE SYSTOLIC EXCURSION: 2.6 CM
TRICUSPID VALVE PEAK REGURGITANT VELOCITY: 296 CM/S

## 2017-11-09 ASSESSMENT — ANXIETY QUESTIONNAIRES: GAD7 TOTAL SCORE: 5

## 2017-11-09 ASSESSMENT — MIFFLIN-ST. JEOR: SCORE: 1359.15

## 2017-11-10 ENCOUNTER — TELEPHONE (OUTPATIENT)
Dept: FAMILY MEDICINE | Facility: CLINIC | Age: 62
End: 2017-11-10

## 2017-11-10 NOTE — TELEPHONE ENCOUNTER
[facility] phone call message- general phone call:    Reason for call: She has a venous ultrasound and a echo cardiogram yesterday she would like the results on these.    Return call needed: Yes    OK to leave a message on voice mail? Yes    Primary language: English      needed? No    Call taken on November 10, 2017 at 10:46 AM by Earline Curry

## 2017-11-13 NOTE — TELEPHONE ENCOUNTER
Normal results below given to patient. . /LATANYA Guthrie  Routed to Dr. Pierre         BILATERAL LOWER EXTREMITY VENOUS DUPLEX ULTRASOUND WITH INSUFFICIENCY STUDY  2017 9:29 AM     INDICATIONS: Symptomatic varicose veins. Leg pain and swelling.     TECHNIQUE: Venous duplex ultrasound with gray-scale images, graded compression, and color-flow, Doppler, and spectral analysis. Abnormal reflux is defined as 600 msec for superficial veins, 350 msec for perforating veins, and 1 sec for deep veins.  COMPARISONS: 9/3/2015.     FINDINGS: No abnormal reflux is detected in the right or left greater or lesser saphenous veins. No significant deep venous reflux is detected, and no incompetent perforating veins are identified.     There is no evidence for deep vein thrombosis. There is normal flow and compressibility in the femoral, popliteal, and posterior tibial veins.     CONCLUSIONS:   1.  No significant abnormal venous reflux detected.  2.  No evidence for lower extremity deep venous thrombosis.       Echo Complete  Order# [id number]  Reading physician: Ish Mo MDOrdering physician: JAZMINE Armentatudy date: 17  Patient Information  Patient Name MRN Sex              Age  Shira Guthrie [id number] Female 1955 (62 y.o.)  Indications  Dx: Peripheral edema [R60.9 (ICD-10-CM)]  Summary  Normal left ventricular size and systolic performance with a visually estimated ejection fraction of 60%.   No significant valvular heart disease is identified on this study.   Normal right ventricular size and systolic performance.      When compared to the prior real-time echocardiogram dated 2013, there has been little appreciable interval change.   Measurements  Height:   64 in  Weight:   185 lbs  BSA:   1.94 m2  HR:   68 bpm  BP:   125/80 mmHg  Technical Details  Technical Quality  Sonographer: JUAN   Technical Quality: Adequate visualization   Rhythm:   Contrast Medium:  Findings

## 2017-11-29 DIAGNOSIS — M15.0 PRIMARY OSTEOARTHRITIS INVOLVING MULTIPLE JOINTS: ICD-10-CM

## 2017-11-29 DIAGNOSIS — G62.89 OTHER POLYNEUROPATHY: ICD-10-CM

## 2017-11-29 DIAGNOSIS — G89.4 CHRONIC PAIN SYNDROME: ICD-10-CM

## 2017-11-30 RX ORDER — TRAMADOL HYDROCHLORIDE 50 MG/1
50 TABLET ORAL 3 TIMES DAILY PRN
Qty: 90 TABLET | Refills: 5 | OUTPATIENT
Start: 2017-11-30

## 2017-11-30 NOTE — TELEPHONE ENCOUNTER
Tramadol was refilled October for 6 months worth (5 refills).  Refill is not due yet.  Due in April. Please call pharmacy and inform them of this.    Routed to [facility]  Sally Pierre MD

## 2017-12-20 ENCOUNTER — TELEPHONE (OUTPATIENT)
Dept: FAMILY MEDICINE | Facility: CLINIC | Age: 62
End: 2017-12-20

## 2017-12-20 NOTE — TELEPHONE ENCOUNTER
[facility] phone call message- general phone call:    Reason for call: She need a call re her surgery date.she has some questions.    Return call needed: Yes    OK to leave a message on voice mail? Yes    Primary language: English      needed? No    Call taken on December 20, 2017 at 4:33 PM by Earline Curyr

## 2017-12-21 NOTE — TELEPHONE ENCOUNTER
[facility] Jan 30th surgery. Pt would like to schedule a pre-op, transferred call to appt line. /LATANYA Sun

## 2018-01-18 ENCOUNTER — TELEPHONE (OUTPATIENT)
Dept: FAMILY MEDICINE | Facility: CLINIC | Age: 63
End: 2018-01-18

## 2018-01-18 ENCOUNTER — OFFICE VISIT (OUTPATIENT)
Dept: FAMILY MEDICINE | Facility: CLINIC | Age: 63
End: 2018-01-18
Payer: MEDICARE

## 2018-01-18 VITALS
HEART RATE: 98 BPM | TEMPERATURE: 98.2 F | HEIGHT: 64 IN | BODY MASS INDEX: 32.37 KG/M2 | DIASTOLIC BLOOD PRESSURE: 68 MMHG | OXYGEN SATURATION: 97 % | SYSTOLIC BLOOD PRESSURE: 107 MMHG | WEIGHT: 189.6 LBS

## 2018-01-18 DIAGNOSIS — Z01.818 PRE-OP EXAM: ICD-10-CM

## 2018-01-18 DIAGNOSIS — Z01.818 PRE-OP EXAM: Primary | ICD-10-CM

## 2018-01-18 DIAGNOSIS — J00 ACUTE NASOPHARYNGITIS: Primary | ICD-10-CM

## 2018-01-18 LAB
BUN SERPL-MCNC: 5.5 MG/DL (ref 7–19)
CALCIUM SERPL-MCNC: 9.6 MG/DL (ref 8.5–10.1)
CHLORIDE SERPLBLD-SCNC: 107.3 MMOL/L (ref 98–110)
CO2 SERPL-SCNC: 27.6 MMOL/L (ref 20–32)
CREAT SERPL-MCNC: 0.9 MG/DL (ref 0.5–1)
GFR SERPL CREATININE-BSD FRML MDRD: 67 ML/MIN/1.7 M2
GLUCOSE SERPL-MCNC: 89.5 MG'DL (ref 70–99)
HCT VFR BLD AUTO: 37.2 % (ref 35–47)
HEMOGLOBIN: 12.7 G/DL (ref 11.7–15.7)
MCH RBC QN AUTO: 31.1 PG (ref 26.5–35)
MCHC RBC AUTO-ENTMCNC: 34.1 G/DL (ref 32–36)
MCV RBC AUTO: 90.9 FL (ref 78–100)
PLATELET # BLD AUTO: 277 K/UL (ref 150–450)
POTASSIUM SERPL-SCNC: 4 MMOL/DL (ref 3.2–4.6)
RBC # BLD AUTO: 4.1 M/UL (ref 3.8–5.2)
SODIUM SERPL-SCNC: 142.4 MMOL/L (ref 132–142)
WBC # BLD AUTO: 5.6 K/UL (ref 4–11)

## 2018-01-18 RX ORDER — DIPHENHYDRAMINE HCL 25 MG
25-50 TABLET ORAL EVERY 6 HOURS PRN
Qty: 30 TABLET | Refills: 1 | Status: SHIPPED | OUTPATIENT
Start: 2018-01-18 | End: 2018-02-06

## 2018-01-18 NOTE — PATIENT INSTRUCTIONS
Stop aspirin and plavix 7 days before the surgery 1/23/2018  Hold furosemide and tramadol on the day of surgery.

## 2018-01-18 NOTE — MR AVS SNAPSHOT
After Visit Summary   2018    Shira Guthrie    MRN: [id number]           Patient Information     Date Of Birth          1955        Visit Information        Provider Department      2018 10:40 AM Sally Pierre MD [facility]        Today's Diagnoses     Pre-op exam          Care Instructions    Stop aspirin and plavix 7 days before the surgery 2018  Hold furosemide and tramadol on the day of surgery.          Follow-ups after your visit        Follow-up notes from your care team     Return in about 4 weeks (around 2/15/2018) for Chronic Pain.      Who to contact     Please call your clinic at [phone] to:    Ask questions about your health    Make or cancel appointments    Discuss your medicines    Learn about your test results    Speak to your doctor   If you have compliments or concerns about an experience at your clinic, or if you wish to file a complaint, please contact [facility] Patient Relations at [phone] or email us at [email or web address]         Additional Information About Your Visit        MyChart Information     Rosum is an electronic gateway that provides easy, online access to your medical records. With Rosum, you can request a clinic appointment, read your test results, renew a prescription or communicate with your care team.     To sign up for Rosum visit the website at [email or web address]   You will be asked to enter the access code listed below, as well as some personal information. Please follow the directions to create your username and password.     Your access code is: [email or web address]  Expires: 2018 10:40 AM     Your access code will  in 90 days. If you need help or a new code, please contact your [facility] Clinic or call [phone] for assistance.        Care EveryWhere ID     This is your Care EveryWhere ID. This could be used by other organizations to  "access your [facility] medical records  [id number]        Your Vitals Were     Pulse Temperature Height Pulse Oximetry BMI (Body Mass Index)       98 98.2  F (36.8  C) 5' 4.25\" (163.2 cm) 97% 32.29 kg/m2        Blood Pressure from Last 3 Encounters:   01/18/18 107/68   11/08/17 138/83   10/02/17 123/78    Weight from Last 3 Encounters:   01/18/18 189 lb 9.6 oz (86 kg)   11/08/17 186 lb 3.2 oz (84.5 kg)   10/02/17 191 lb 12.8 oz (87 kg)              We Performed the Following     Basic Metabolic Panel ([facility])     CBC with Plt ([facility])     EKG 12-lead complete w/read - Clinics        Primary Care Provider Office Phone # Fax #    Sally Pierre [phone]       [place] [address]        Equal Access to Services     KASANDRA VALLECILLO : Hadii aad ku hadasho Soomaali, waaxda luqadaha, qaybta kaalmada adeegyada, una mcbride . So [facility] [phone].    ATENCIÓN: Si alfiela espshalom, tiene a monson disposición servicios gratuitos de asistencia lingüística. Llame al [phone].    We comply with applicable federal civil rights laws and [place] laws. We do not discriminate on the basis of race, color, national origin, age, disability, sex, sexual orientation, or gender identity.            Thank you!     Thank you for choosing [facility]  for your care. Our goal is always to provide you with excellent care. Hearing back from our patients is one way we can continue to improve our services. Please take a few minutes to complete the written survey that you may receive in the mail after your visit with us. Thank you!             Your Updated Medication List - Protect others around you: Learn how to safely use, store and throw away your medicines at www.disposemymeds.org.          This list is accurate as of: 1/18/18 11:44 AM.  Always use your most recent med list.                   Brand Name Dispense Instructions for use Diagnosis    aspirin 81 MG EC " tablet     90 tablet    Take 1 tablet (81 mg) by mouth daily    Coronary artery disease involving native coronary artery of native heart with angina pectoris (H)       baclofen 10 MG tablet    LIORESAL    90 tablet    Take 1 tablet (10 mg) by mouth 3 times daily as needed for muscle spasms    Chronic pain syndrome, Primary osteoarthritis involving multiple joints       CANE, ANY MATERIAL     1 Device    1 Device by Device route as needed    Peripheral neuropathy, Leg edema, left, Stroke (H)       clopidogrel 75 MG tablet    PLAVIX    90 tablet    Take 1 tablet (75 mg) by mouth daily . Give name brand Plavix. Approved by insurance through 2/2/1016.    Chronic ischemic heart disease       dexlansoprazole 30 MG Cpdr CR capsule    DEXILANT    90 capsule    Take 1 capsule (30 mg) by mouth daily    Esophageal stricture       docusate sodium 100 MG tablet    COLACE    60 tablet    Take 100 mg by mouth daily    Chronic constipation, Chronic pain syndrome       DULoxetine 60 MG EC capsule    CYMBALTA    30 capsule    Take 1 capsule (60 mg) by mouth daily    Major depressive disorder, recurrent, severe without psychotic features (H)       furosemide 20 MG tablet    LASIX    30 tablet    Take 1 tablet (20mg) every other day as needed.    Lymphedema of left leg       gabapentin 300 MG capsule    NEURONTIN    90 capsule    Take 1 capsule (300 mg) by mouth 3 times daily    Other polyneuropathy       Heating Pads Pads     1 each    Apply to painful areas prn.    Stroke (H), Peripheral neuropathy, Leg edema, left       hydrOXYzine 25 MG tablet    ATARAX    100 tablet    1 to 2 tablets three times a day as needed.    Major depressive disorder, recurrent episode, moderate (H), Anxiety       isosorbide mononitrate 30 MG 24 hr tablet    IMDUR    90 tablet    Take 1 tablet (30 mg) by mouth daily    Chronic ischemic heart disease       linaclotide 290 MCG capsule    LINZESS    90 capsule    Take 1 capsule (290 mcg) by mouth every morning  (before breakfast)    Chronic constipation       metoprolol succinate 25 MG 24 hr tablet    TOPROL-XL    90 tablet    Take 1 tablet (25 mg) by mouth daily    Benign essential hypertension       nitroGLYcerin 0.4 MG sublingual tablet    NITROSTAT    30 tablet    Place 1 tablet (0.4 mg) under the tongue every 5 minutes as needed    Anginal pain (H)       * order for DME     2 Device    Equipment being ordered: Jobst stockings thigh high 15-20mmHg.  Please measure for fit. 2 pairs.    Leg edema, left       * order for DME     2 Device    Bilateral compression stockings 15-20mmHg. One pair knee high and one pair thigh high.  Please measure for fit.    Lymphedema of left leg       polyethylene glycol powder    MIRALAX    510 g    Take 17 g (1 capful) by mouth 2 times daily    Chronic constipation       rosuvastatin 20 MG tablet    CRESTOR    90 tablet    Take 1 tablet (20 mg) by mouth daily    Coronary artery disease involving native coronary artery of native heart with angina pectoris (H)       traMADol 50 MG tablet    ULTRAM    90 tablet    Take 1 tablet (50 mg) by mouth 3 times daily as needed    Chronic pain syndrome, Other polyneuropathy, Primary osteoarthritis involving multiple joints       TYLENOL 325 MG tablet   Generic drug:  acetaminophen     100 tablet    Take 2 tablets (650 mg) by mouth daily        * Notice:  This list has 2 medication(s) that are the same as other medications prescribed for you. Read the directions carefully, and ask your doctor or other care provider to review them with you.

## 2018-01-18 NOTE — TELEPHONE ENCOUNTER
Pt was seen today for a pre op and was told to call when she gets home to give the MA some additional information.  Please call.

## 2018-01-18 NOTE — PROGRESS NOTES
Preoperative History and Physical Examination  Date of Examination: 1/18/2018   Proposed Surgery: Right Rotator cuff repair  Surgeon: Dr. Muniz, [facility].  Date of Surgery: 1/30/2018  [place] [facility]   Fax Number for H&P: [phone]  Dr. Muniz has asked to have Shira Guthrie see us for a pre-operative evaluation for the above-mentioned procedure. She  is otherwise in her usual state of health except getting over a cold.  Indication for Surgery: Rotator cuff tear.    Previous diagnostic and therapeutic evaluation: MRI and Xray.  Past Medical History:   Past Medical History:   Diagnosis Date     Cancer (H)      Coronary artery disease      CVA (cerebral infarction)      Depressive disorder      History of blood transfusion      Hypertension       Patient Active Problem List   Diagnosis     CAD (coronary artery disease)     MDD (major depressive disorder)     Chronic constipation     Stroke (H)     Benign essential hypertension     Osteoporosis     PAD (peripheral artery disease) (H)     Fracture closed of upper end of forearm     Synovial sarcoma (H)     Esophageal stricture     Anginal pain (H)     Health Care Home     B12 deficiency     Cataracts, bilateral     Malignant neoplasm of connective and other soft tissue     Chronic pain syndrome     Other polyneuropathy     Primary osteoarthritis involving multiple joints     Lymphedema of left leg     False positive serological test for hepatitis C     Cervical radiculopathy at C7     Rotator cuff impingement syndrome of right shoulder     H/O hysterectomy for benign disease      Current Outpatient Prescriptions   Medication     linaclotide (LINZESS) 290 MCG capsule     dexlansoprazole (DEXILANT) 30 MG CPDR CR capsule     traMADol (ULTRAM) 50 MG tablet     baclofen (LIORESAL) 10 MG tablet     order for DME     docusate sodium (COLACE) 100 MG tablet     isosorbide mononitrate (IMDUR) 30 MG 24 hr tablet     furosemide (LASIX) 20  MG tablet     clopidogrel (PLAVIX) 75 MG tablet     gabapentin (NEURONTIN) 300 MG capsule     polyethylene glycol (MIRALAX) powder     metoprolol (TOPROL-XL) 25 MG 24 hr tablet     aspirin 81 MG EC tablet     DULoxetine (CYMBALTA) 60 MG EC capsule     acetaminophen (TYLENOL) 325 MG tablet     rosuvastatin (CRESTOR) 20 MG tablet     order for DME     hydrOXYzine (ATARAX) 25 MG tablet     nitroglycerin (NITROSTAT) 0.4 MG SL tablet     CANE, ANY MATERIAL     Heating Pads PADS     Allergies   Allergen Reactions     Jerry Aspirin      FULL DOSE - gives her dry heeves     Penicillins Itching     Gives her the dry heeves      PAST SURGICAL HISTORY:   Past Surgical History:   Procedure Laterality Date     APPENDECTOMY  1968     C REPAIR VAGINAL PROCTOCOLECTOMY SPACE FISTULA  1976    9 procedures in 6 months     EXCISE MASS WRIST  2012    right wrist sarcoma rmoved     HC LAP,LYSIS OF ADHESIONS  2000     HYSTERECTOMY TOTAL ABDOMINAL  2000    ovaries still in place     OPEN REDUCTION INTERNAL FIXATION FOREARM  2011    left elbow     RELEASE CARPAL TUNNEL  2012    right     TAKEDOWN COLOSTOMY  1976      SURGICAL EVALUATION QUESTIONS:  Any more short of breath on exertion than other people of your age? No   Do you have any chest pain on exertion (anginal type)? No   What is the date of your last menstrual period?No   Personal/Family History of Anesthetic Reaction? No   Personal/Family History of easy bruising/bleeding disorder? Yes - but on asa and plavix so gets easy bruising.   Personal History of Snoring/Sleep Apnea? No   Personal/Family History of VTE/PE? No   Current Aspirin Use? Yes - baby aspirin and plavix daily.   Recent Steroid Use? No   Other Significant Family History:   Family History   Problem Relation Age of Onset     C.A.D. Mother      Respiratory Mother      Emphysema     Hypertension Mother      DIABETES Mother      Alcohol/Drug Father      GASTROINTESTINAL DISEASE Father      cirrhosis from etoh      Hypertension Father      CEREBROVASCULAR DISEASE Sister      TORRIE. Brother      2 brothers     HIV/AIDS Brother       of AIDS     Coronary Artery Disease No family hx of      Hyperlipidemia No family hx of      Breast Cancer No family hx of      Colon Cancer No family hx of      Prostate Cancer No family hx of      Other Cancer No family hx of      Depression No family hx of      Anxiety Disorder No family hx of      MENTAL ILLNESS No family hx of      Substance Abuse No family hx of      Anesthesia Reaction No family hx of      Asthma No family hx of      OSTEOPOROSIS No family hx of      Genetic Disorder No family hx of      Thyroid Disease No family hx of      Obesity No family hx of      Unknown/Adopted No family hx of       Social History     Social History     Marital status: Single     Spouse name: N/A     Number of children: N/A     Years of education: N/A     Social History Main Topics     Smoking status: Former Smoker     Packs/day: 0.30     Years: 35.00     Types: Cigarettes, Cigars     Quit date: 2006     Smokeless tobacco: Never Used      Comment: 2 cigars per week, now quit cigarettes in .     Alcohol use No     Drug use: No     Sexual activity: No     Other Topics Concern     Not on file     Social History Narrative    Ms. Guthrie previously lived in [place], but since she has had increasing health problems including heart attacks, stroke and fall causing an elbow fracture in , she decided to live closer to her family in the [facility].  She moved here in 2013.          She has two children, her daughter lives in [place] and her son lives here.  She is living independently. She has four grandchildren all of who are in college and she is very proud of them. 2 Great grandchildren.  Her family is very supportive of her.  She has never been .  She was working and would like to work.  However, she has not been able to maintain employment since she had a stroke and heart  "attack and multiple medical problems.  She has been on disability for the last few years.     Review of Systems -   General: No fevers, chills, weight loss  Head: No headaches or recent trauma  Eyes: No acute change in vision or other eye disease  Ears: No acute change in hearing  Oropharynx: No sore throat. Having some postnasal drip and congestion from a cold that is getting better.   CV: No chest pain or palpitations.  Resp: No shortness of breath or cough. No hemoptysis.  GI: No nausea, vomiting, constipation, diarrhea, melena or abdominal pain  : No urinary pain, change in color, or frequency   MS: No arthralgias or myalgias. Arthritis pain, but is stable.  Psych: No significant change in mood, anxiety level   Skin: No new rashes or lesions  Neuro: No alterations in thinking, paresthesias, or weakness     OBJECTIVE:  /68  Pulse 98  Temp 98.2  F (36.8  C)  Ht 5' 4.25\" (163.2 cm)  Wt 189 lb 9.6 oz (86 kg)  SpO2 97%  BMI 32.29 kg/m2    General - healthy, alert and no distress    Head - Normocephalic, atraumatic.    Eyes - Pupils are equal, round and reactive to light bilaterally.  Extraocular movements are intact bilaterally. Sclera and conjunctiva clear. Lids without lesions    Ears - Tympanic membranes clear bilaterally. External canals without lesion. Nose: congestion,clear discharge.    Mouth - oropharynx is clear without exudates.    Neck - no cervical adenopathy, thyromegally, JVD or carotid bruits noted.    Lungs - Clear to auscultation bilaterally, no wheezes, rales or rhonchi.    CV - Regular rate and rhythm, no murmurs, rubs or gallops.    Abdomen - Non-tender, non-distended, positive bowel sounds, no masses, no hepatosplenomegaly. No rebound or guarding.     Upper Extremities - No edema or deformities. Radial pulses strong bilaterally.    Lower Extremities - No edema in right leg, left has chronic edema, 1+. Dorsalis pedis pulses strong bilaterally. Cap refill is brisk bilaterally.    Skin " - warm, dry, intact. No rashes or erythema.    Neurologic - Cranial nerves 2-12 intact, Pateller, achilles, biceps  reflexes intact. Muscle tone, bulk and strength within normal limits throughout. Left leg is weaker at baseline than left.    Psych - Judgment and mental status are clear, patient has reasonable insight. Mood is stable.    Results for orders placed or performed in visit on 01/18/18 (from the past 24 hour(s))   Basic Metabolic Panel ([facility])   Result Value Ref Range    Urea Nitrogen 5.5 (L) 7.0 - 19.0 mg/dL    Calcium 9.6 8.5 - 10.1 mg/dL    Chloride 107.3 98.0 - 110.0 mmol/L    Carbon Dioxide 27.6 20.0 - 32.0 mmol/L    Creatinine 0.9 0.5 - 1.0 mg/dL    Glucose 89.5 70.0 - 99.0 mg'dL    Potassium 4.0 3.2 - 4.6 mmol/dL    Sodium 142.4 (H) 132.0 - 142.0 mmol/L    GFR Estimate 67.0 >60.0 mL/min/1.7 m2    GFR Estimate If Black 81.1 >60.0 mL/min/1.7 m2   CBC with Plt (P FM)   Result Value Ref Range    WBC 5.6 4.0 - 11.0 K/uL    RBC 4.1 3.8 - 5.2 M/uL    Hemoglobin 12.7 11.7 - 15.7 g/dL    Hematocrit 37.2 35.0 - 47.0 %    MCV 90.9 78.0 - 100.0 fL    MCH 31.1 26.5 - 35.0    MCHC 34.1 32.0 - 36.0 g/dL    Platelets 277.0 150.0 - 450.0 K/uL       EKG: indicated and ordered  CXR: not indicated    EKG Interpretation  Indication:Pre op evaluation    Interpretation: Normal Sinus Rhythm, rate 80, normal axis, normal intervals, no acute ST/T changes c/w ischemia  Unchanged from 11/2016,  Poor R wave progression and q waves in III and AVF, but unchanged from prior  Patient informed at visit.    RECOMMENDATIONS:  1) Take her  usual medications except for furosemide and tramadol on the morning of surgery with a sip of water.  2) Hold plavix starting 1/23/18.  3) Hold aspirin starting 1/23/18.    Shira Guthrie is a 62 year old female here for preoperative evaluation.  Approval given to proceed with proposed procedure, without further diagnostic evaluation.     Dr. Muniz,Please see the above history and physical on  our mutual patient, Shira Guthrie. Thank you for allowing me to participate in the pre operative consultation of this patient. Regards,   Sally Pierre

## 2018-01-24 ASSESSMENT — MIFFLIN-ST. JEOR: SCORE: 1381.27

## 2018-01-25 ASSESSMENT — MIFFLIN-ST. JEOR: SCORE: 1377.3

## 2018-01-29 ENCOUNTER — HOME CARE/HOSPICE - HEALTHEAST (OUTPATIENT)
Dept: HOME HEALTH SERVICES | Facility: HOME HEALTH | Age: 63
End: 2018-01-29

## 2018-01-30 ENCOUNTER — TRANSFERRED RECORDS (OUTPATIENT)
Dept: HEALTH INFORMATION MANAGEMENT | Facility: CLINIC | Age: 63
End: 2018-01-30

## 2018-01-30 ENCOUNTER — ANESTHESIA - HEALTHEAST (OUTPATIENT)
Dept: SURGERY | Facility: CLINIC | Age: 63
End: 2018-01-30

## 2018-01-30 ENCOUNTER — SURGERY - HEALTHEAST (OUTPATIENT)
Dept: SURGERY | Facility: CLINIC | Age: 63
End: 2018-01-30

## 2018-01-30 ASSESSMENT — MIFFLIN-ST. JEOR
SCORE: 1350.65
SCORE: 1350.65

## 2018-02-02 ENCOUNTER — HOME CARE/HOSPICE - HEALTHEAST (OUTPATIENT)
Dept: HOME HEALTH SERVICES | Facility: HOME HEALTH | Age: 63
End: 2018-02-02

## 2018-02-05 ENCOUNTER — HOME CARE/HOSPICE - HEALTHEAST (OUTPATIENT)
Dept: HOME HEALTH SERVICES | Facility: HOME HEALTH | Age: 63
End: 2018-02-05

## 2018-02-06 ENCOUNTER — HOME CARE/HOSPICE - HEALTHEAST (OUTPATIENT)
Dept: HOME HEALTH SERVICES | Facility: HOME HEALTH | Age: 63
End: 2018-02-06

## 2018-02-06 DIAGNOSIS — J00 ACUTE NASOPHARYNGITIS: ICD-10-CM

## 2018-02-06 RX ORDER — DIPHENHYDRAMINE HCL 25 MG
25-50 TABLET ORAL EVERY 6 HOURS PRN
Qty: 100 TABLET | Refills: 1 | Status: SHIPPED | OUTPATIENT
Start: 2018-02-06 | End: 2020-04-24

## 2018-02-08 ENCOUNTER — HOME CARE/HOSPICE - HEALTHEAST (OUTPATIENT)
Dept: HOME HEALTH SERVICES | Facility: HOME HEALTH | Age: 63
End: 2018-02-08

## 2018-02-12 ENCOUNTER — HOME CARE/HOSPICE - HEALTHEAST (OUTPATIENT)
Dept: HOME HEALTH SERVICES | Facility: HOME HEALTH | Age: 63
End: 2018-02-12

## 2018-02-13 ENCOUNTER — HOME CARE/HOSPICE - HEALTHEAST (OUTPATIENT)
Dept: HOME HEALTH SERVICES | Facility: HOME HEALTH | Age: 63
End: 2018-02-13

## 2018-02-14 ENCOUNTER — HOME CARE/HOSPICE - HEALTHEAST (OUTPATIENT)
Dept: HOME HEALTH SERVICES | Facility: HOME HEALTH | Age: 63
End: 2018-02-14

## 2018-02-15 ENCOUNTER — HOME CARE/HOSPICE - HEALTHEAST (OUTPATIENT)
Dept: HOME HEALTH SERVICES | Facility: HOME HEALTH | Age: 63
End: 2018-02-15

## 2018-02-16 ENCOUNTER — HOME CARE/HOSPICE - HEALTHEAST (OUTPATIENT)
Dept: HOME HEALTH SERVICES | Facility: HOME HEALTH | Age: 63
End: 2018-02-16

## 2018-02-19 ENCOUNTER — HOME CARE/HOSPICE - HEALTHEAST (OUTPATIENT)
Dept: HOME HEALTH SERVICES | Facility: HOME HEALTH | Age: 63
End: 2018-02-19

## 2018-02-21 ENCOUNTER — HOME CARE/HOSPICE - HEALTHEAST (OUTPATIENT)
Dept: HOME HEALTH SERVICES | Facility: HOME HEALTH | Age: 63
End: 2018-02-21

## 2018-02-22 ENCOUNTER — HOME CARE/HOSPICE - HEALTHEAST (OUTPATIENT)
Dept: HOME HEALTH SERVICES | Facility: HOME HEALTH | Age: 63
End: 2018-02-22

## 2018-02-23 ENCOUNTER — HOME CARE/HOSPICE - HEALTHEAST (OUTPATIENT)
Dept: HOME HEALTH SERVICES | Facility: HOME HEALTH | Age: 63
End: 2018-02-23

## 2018-02-23 DIAGNOSIS — I25.119 CORONARY ARTERY DISEASE INVOLVING NATIVE CORONARY ARTERY OF NATIVE HEART WITH ANGINA PECTORIS (H): ICD-10-CM

## 2018-02-23 RX ORDER — ROSUVASTATIN CALCIUM 20 MG/1
20 TABLET, COATED ORAL DAILY
Qty: 90 TABLET | Refills: 4 | Status: SHIPPED | OUTPATIENT
Start: 2018-02-23 | End: 2019-04-24

## 2018-02-27 ENCOUNTER — HOME CARE/HOSPICE - HEALTHEAST (OUTPATIENT)
Dept: HOME HEALTH SERVICES | Facility: HOME HEALTH | Age: 63
End: 2018-02-27

## 2018-02-28 ENCOUNTER — HOME CARE/HOSPICE - HEALTHEAST (OUTPATIENT)
Dept: HOME HEALTH SERVICES | Facility: HOME HEALTH | Age: 63
End: 2018-02-28

## 2018-03-02 ENCOUNTER — HOME CARE/HOSPICE - HEALTHEAST (OUTPATIENT)
Dept: HOME HEALTH SERVICES | Facility: HOME HEALTH | Age: 63
End: 2018-03-02

## 2018-03-06 ENCOUNTER — HOME CARE/HOSPICE - HEALTHEAST (OUTPATIENT)
Dept: HOME HEALTH SERVICES | Facility: HOME HEALTH | Age: 63
End: 2018-03-06

## 2018-03-06 DIAGNOSIS — I25.9 CHRONIC ISCHEMIC HEART DISEASE: ICD-10-CM

## 2018-03-06 RX ORDER — CLOPIDOGREL BISULFATE 75 MG/1
75 TABLET ORAL DAILY
Qty: 90 TABLET | Refills: 3 | Status: SHIPPED | OUTPATIENT
Start: 2018-03-06 | End: 2019-08-12

## 2018-03-07 ENCOUNTER — HOME CARE/HOSPICE - HEALTHEAST (OUTPATIENT)
Dept: HOME HEALTH SERVICES | Facility: HOME HEALTH | Age: 63
End: 2018-03-07

## 2018-03-07 ENCOUNTER — TELEPHONE (OUTPATIENT)
Dept: FAMILY MEDICINE | Facility: CLINIC | Age: 63
End: 2018-03-07

## 2018-03-07 NOTE — TELEPHONE ENCOUNTER
"Patient states that her son was recently diagnosis with Mucinous adenocarcioma and she would like more information regarding this type of cancer. She states she was there at the time of discharge and was given information but she would rather speak with \"her doctor because he trust her doctor\" She is aware that information from her sons records can not be shared, she only wants to know what Dr. Pierre could tell her about the dx only so she can know how to best support her son at this time. /LATANYA Guthrie    Routed to Dr. Pierre    "

## 2018-03-08 ENCOUNTER — HOME CARE/HOSPICE - HEALTHEAST (OUTPATIENT)
Dept: HOME HEALTH SERVICES | Facility: HOME HEALTH | Age: 63
End: 2018-03-08

## 2018-03-09 ENCOUNTER — HOME CARE/HOSPICE - HEALTHEAST (OUTPATIENT)
Dept: HOME HEALTH SERVICES | Facility: HOME HEALTH | Age: 63
End: 2018-03-09

## 2018-03-15 ENCOUNTER — HOME CARE/HOSPICE - HEALTHEAST (OUTPATIENT)
Dept: HOME HEALTH SERVICES | Facility: HOME HEALTH | Age: 63
End: 2018-03-15

## 2018-03-16 NOTE — TELEPHONE ENCOUNTER
Called patient back.  Son Randolph Montejo is starting chemo at [facility] today.  He has meet oncology and other specialist and repeated some imaging there this week.  He will be there for 5 hours or so today.  She is wondering if I could be his primary because they were told I was close to new patients.  I said that I often make exception for family members of current patients.  Also she is wondering if I know anymore about the type of cancer he has.  Discussed that I know it is newly identified as a separate type of cancer, that it is rare, and poor prognosis but could be improving sine the articles I read were from 2014 or so.  I suggested that the oncologist and cancer team at the [facility] were much more knowledgeable than me about this and they will be a great resource.  I am happy to help explain anything more if they don't understand.    Sally Pierre MD

## 2018-03-27 ENCOUNTER — HOME CARE/HOSPICE - HEALTHEAST (OUTPATIENT)
Dept: HOME HEALTH SERVICES | Facility: HOME HEALTH | Age: 63
End: 2018-03-27

## 2018-03-27 ENCOUNTER — MEDICAL CORRESPONDENCE (OUTPATIENT)
Dept: HEALTH INFORMATION MANAGEMENT | Facility: CLINIC | Age: 63
End: 2018-03-27

## 2018-04-11 ENCOUNTER — OFFICE VISIT (OUTPATIENT)
Dept: FAMILY MEDICINE | Facility: CLINIC | Age: 63
End: 2018-04-11
Payer: MEDICARE

## 2018-04-11 VITALS
DIASTOLIC BLOOD PRESSURE: 73 MMHG | BODY MASS INDEX: 31.99 KG/M2 | SYSTOLIC BLOOD PRESSURE: 112 MMHG | HEART RATE: 79 BPM | TEMPERATURE: 97.8 F | WEIGHT: 187.8 LBS

## 2018-04-11 DIAGNOSIS — Z98.890 S/P ROTATOR CUFF REPAIR: Primary | ICD-10-CM

## 2018-04-11 DIAGNOSIS — I89.0 LYMPHEDEMA OF LEFT LEG: ICD-10-CM

## 2018-04-11 RX ORDER — FUROSEMIDE 20 MG
TABLET ORAL
COMMUNITY
Start: 2018-04-11 | End: 2018-07-27

## 2018-04-11 NOTE — PATIENT INSTRUCTIONS
We will try and get more home therapy for your shoulder to get more strength and range of motion.    Follow up in 2 weeks for pain management.  Appointment is scheduled for March 25, 2018 at 1:10 pm with Dr. Pierre for CPM.

## 2018-04-11 NOTE — MR AVS SNAPSHOT
After Visit Summary   2018    Shira Guthrie    MRN: [id number]           Patient Information     Date Of Birth          1955        Visit Information        Provider Department      2018 8:40 AM Sally Pierre MD [facility]        Today's Diagnoses     S/P rotator cuff repair    -  1    Lymphedema of left leg          Care Instructions    We will try and get more home therapy for your shoulder to get more strength and range of motion.    Follow up in 2 weeks for pain management.  Appointment is scheduled for 2018 at 1:10 pm with Dr. Pierre for CPM.           Follow-ups after your visit        Your next 10 appointments already scheduled     2018  1:10 PM CDT   Return Visit with Sally Pierre MD   [facility])    [address] [place] [address]   [phone]              Who to contact     Please call your clinic at [phone] to:    Ask questions about your health    Make or cancel appointments    Discuss your medicines    Learn about your test results    Speak to your doctor            Additional Information About Your Visit        MyChart Information     [employer] is an electronic gateway that provides easy, online access to your medical records. With [employer], you can request a clinic appointment, read your test results, renew a prescription or communicate with your care team.     To sign up for Tripology visit the website at [email or web address]   You will be asked to enter the access code listed below, as well as some personal information. Please follow the directions to create your username and password.     Your access code is: [email or web address]  Expires: 7/10/2018  9:31 AM     Your access code will  in 90 days. If you need help or a new code, please contact your [facility] or call [phone] for assistance.        Care EveryWhere ID     This is your Care EveryWhere ID. This could be used  by other organizations to access your [facility] medical records  [id number]        Your Vitals Were     Pulse Temperature BMI (Body Mass Index)             79 97.8  F (36.6  C) (Oral) 31.99 kg/m2          Blood Pressure from Last 3 Encounters:   04/11/18 112/73   01/18/18 107/68   11/08/17 138/83    Weight from Last 3 Encounters:   04/11/18 187 lb 12.8 oz (85.2 kg)   01/18/18 189 lb 9.6 oz (86 kg)   11/08/17 186 lb 3.2 oz (84.5 kg)              Today, you had the following     No orders found for display         Today's Medication Changes          These changes are accurate as of 4/11/18  9:32 AM.  If you have any questions, ask your nurse or doctor.               These medicines have changed or have updated prescriptions.        Dose/Directions    LASIX 20 MG tablet   This may have changed:  additional instructions   Used for:  Lymphedema of left leg   Generic drug:  furosemide   Changed by:  Sally Pierre MD        2 tablets a day per cardiology.   Refills:  0                Primary Care Provider Office Phone # Fax #    Sally Pierre [phone]       [place] [address]        Equal Access to Services     KASANDRA VALLECILLO : Hadii thelma cormier hadasho Soomaali, waaxda luqadaha, qaybta kaalmada adeegyada, una tafoya. So [facility] [phone].    ATENCIÓN: Si habla español, tiene a monson disposición servicios gratuitos de asistencia lingüística. Llame al [phone].    We comply with applicable federal civil rights laws and [place] laws. We do not discriminate on the basis of race, color, national origin, age, disability, sex, sexual orientation, or gender identity.            Thank you!     Thank you for choosing [facility]  for your care. Our goal is always to provide you with excellent care. Hearing back from our patients is one way we can continue to improve our services. Please take a few minutes to complete the written survey that  you may receive in the mail after your visit with us. Thank you!             Your Updated Medication List - Protect others around you: Learn how to safely use, store and throw away your medicines at www.Taking [email or web address].          This list is accurate as of 4/11/18  9:32 AM.  Always use your most recent med list.                   Brand Name Dispense Instructions for use Diagnosis    aspirin 81 MG EC tablet     90 tablet    Take 1 tablet (81 mg) by mouth daily    Coronary artery disease involving native coronary artery of native heart with angina pectoris (H)       baclofen 10 MG tablet    LIORESAL    90 tablet    Take 1 tablet (10 mg) by mouth 3 times daily as needed for muscle spasms    Chronic pain syndrome, Primary osteoarthritis involving multiple joints       CANE, ANY MATERIAL     1 Device    1 Device by Device route as needed    Peripheral neuropathy, Leg edema, left, Stroke (H)       clopidogrel 75 MG tablet    PLAVIX    90 tablet    Take 1 tablet (75 mg) by mouth daily . Give name brand Plavix. Approved by insurance through 2/2/1016.    Chronic ischemic heart disease       dexlansoprazole 30 MG Cpdr CR capsule    DEXILANT    90 capsule    Take 1 capsule (30 mg) by mouth daily    Esophageal stricture       diphenhydrAMINE 25 MG tablet    BENADRYL    100 tablet    Take 1-2 tablets (25-50 mg) by mouth every 6 hours as needed for other (for congestion, cough)    Acute nasopharyngitis       docusate sodium 100 MG tablet    COLACE    60 tablet    Take 100 mg by mouth daily    Chronic constipation, Chronic pain syndrome       DULoxetine 60 MG EC capsule    CYMBALTA    30 capsule    Take 1 capsule (60 mg) by mouth daily    Major depressive disorder, recurrent, severe without psychotic features (H)       gabapentin 300 MG capsule    NEURONTIN    90 capsule    Take 1 capsule (300 mg) by mouth 3 times daily    Other polyneuropathy       Heating Pads Pads     1 each    Apply to painful areas prn.    Stroke (H),  Peripheral neuropathy, Leg edema, left       isosorbide mononitrate 30 MG 24 hr tablet    IMDUR    90 tablet    Take 1 tablet (30 mg) by mouth daily    Chronic ischemic heart disease       LASIX 20 MG tablet   Generic drug:  furosemide      2 tablets a day per cardiology.    Lymphedema of left leg       linaclotide 290 MCG capsule    LINZESS    90 capsule    Take 1 capsule (290 mcg) by mouth every morning (before breakfast)    Chronic constipation       metoprolol succinate 25 MG 24 hr tablet    TOPROL-XL    90 tablet    Take 1 tablet (25 mg) by mouth daily    Benign essential hypertension       nitroGLYcerin 0.4 MG sublingual tablet    NITROSTAT    30 tablet    Place 1 tablet (0.4 mg) under the tongue every 5 minutes as needed    Anginal pain (H)       * order for DME     2 Device    Equipment being ordered: Jobst stockings thigh high 15-20mmHg.  Please measure for fit. 2 pairs.    Leg edema, left       * order for DME     2 Device    Bilateral compression stockings 15-20mmHg. One pair knee high and one pair thigh high.  Please measure for fit.    Lymphedema of left leg       polyethylene glycol powder    MIRALAX    510 g    Take 17 g (1 capful) by mouth 2 times daily    Chronic constipation       rosuvastatin 20 MG tablet    CRESTOR    90 tablet    Take 1 tablet (20 mg) by mouth daily    Coronary artery disease involving native coronary artery of native heart with angina pectoris (H)       traMADol 50 MG tablet    ULTRAM    90 tablet    Take 1 tablet (50 mg) by mouth 3 times daily as needed    Chronic pain syndrome, Other polyneuropathy, Primary osteoarthritis involving multiple joints       TYLENOL 325 MG tablet   Generic drug:  acetaminophen     100 tablet    Take 2 tablets (650 mg) by mouth daily        * Notice:  This list has 2 medication(s) that are the same as other medications prescribed for you. Read the directions carefully, and ask your doctor or other care provider to review them with you.

## 2018-04-11 NOTE — PROGRESS NOTES
There are no exam notes on file for this visit.  Chief Complaint   Patient presents with     RECHECK     f/u from surgry and medication refills      Blood pressure 112/73, pulse 79, temperature 97.8  F (36.6  C), temperature source Oral, weight 187 lb 12.8 oz (85.2 kg), not currently breastfeeding.                 HPI     Shira Guthrie is a 62 year old  female with a PMH significant for:     Patient Active Problem List   Diagnosis     CAD (coronary artery disease)     MDD (major depressive disorder)     Chronic constipation     Stroke (H)     Benign essential hypertension     Osteoporosis     PAD (peripheral artery disease) (H)     Fracture closed of upper end of forearm     Synovial sarcoma (H)     Esophageal stricture     Anginal pain (H)     Health Care Home     B12 deficiency     Cataracts, bilateral     Malignant neoplasm of connective and other soft tissue     Chronic pain syndrome     Other polyneuropathy     Primary osteoarthritis involving multiple joints     Lymphedema of left leg     False positive serological test for hepatitis C     Cervical radiculopathy at C7     Rotator cuff impingement syndrome of right shoulder     H/O hysterectomy for benign disease     She presents with follow-up of rotator cuff repair of right shoulder.  Repaired by [facility] Dr. Sahni on 1/30/18.  After the surgery she did have home OT PT and nursing come to her home to help her.  This ended at the end of March.  She still has decreased range of motion and cannot comb her hair or do other things with her right shoulder that she wishes to do.  She asked for more therapy however the orthopedic office ordered outpatient therapy instead of home therapy which is nearly impossible for her to get to.  She cannot drive due to the pain in decreased range of motion in her shoulder.  She can use public transportation if absolutely necessary however in the cold weather especially she has chronic neuropathic pain  lymphedema and peripheral artery disease that decrease her ability to be mobile outside the home.  Her son had previously helped her with things outside the home and in her home.  He was just diagnosed with a rare form of lung cancer that has a poor prognosis however.  See last phone note about more details.  He is unable to help her at this time.    She has not had any chest pain or shortness of breath.  Cardiology increased her furosemide to 2 pills daily.  She does think this helps her leg swelling.  He has chronic lymphedema especially in the left leg secondary to a stroke on that side.    She continues to do self-care including going to Buddhist regular prayers and praise to God.  Very connected spiritually.  Family is very close around dealing with son's lung cancer diagnosis.  He did not really fell well cared for at the [facility] treatment center.  He likes the doctors but felt he was being studied more than being cared for when getting his medications.  Due to this factor he went to [facility] treatment [facility] near [place] recently and is going to get his lung cancer cared for there.  They are happier with the attention paid to their spirituality there.    Chronic pain has been stable she has had increased pain in her right shoulder after the surgery and been on stronger pain medicines due to this.  She has tramadol left.  We will discuss her chronic pain in more detail at the next visit.  She still has postoperative pain pills left and she is breaking them in half and in fourths and using very minimal dosing.  She also would like to follow-up with Dr. Sky when the weather is warmer.    PMH, Medications and Allergies were reviewed and updated as needed.                Physical Exam:     Vitals:    04/11/18 0901   BP: 112/73   Pulse: 79   Temp: 97.8  F (36.6  C)   TempSrc: Oral   Weight: 187 lb 12.8 oz (85.2 kg)     Body mass index is 31.99 kg/(m^2).    Exam:  Constitutional: healthy, alert and no  distress  Cardiovascular:RRR. No murmurs, clicks gallops or rub  Respiratory:  normal respiratory rate and rhythm, lungs clear to auscultation. No wheezes or crackles.  Extremities: 1+ edema on right leg 2+ on left leg.  Right shoulder has decreased active range of motion both flexion and abduction to only about 45 .  Limited by severe pain.    Psychiatric: mentation appears normal and affect normal/bright      PHQ-9 SCORE 4/17/2017 7/18/2017 11/8/2017   Total Score - - -   Total Score 8 3 6     Results for orders placed or performed in visit on 01/18/18   Basic Metabolic Panel ([facility])   Result Value Ref Range    Urea Nitrogen 5.5 (L) 7.0 - 19.0 mg/dL    Calcium 9.6 8.5 - 10.1 mg/dL    Chloride 107.3 98.0 - 110.0 mmol/L    Carbon Dioxide 27.6 20.0 - 32.0 mmol/L    Creatinine 0.9 0.5 - 1.0 mg/dL    Glucose 89.5 70.0 - 99.0 mg'dL    Potassium 4.0 3.2 - 4.6 mmol/dL    Sodium 142.4 (H) 132.0 - 142.0 mmol/L    GFR Estimate 67.0 >60.0 mL/min/1.7 m2    GFR Estimate If Black 81.1 >60.0 mL/min/1.7 m2   CBC with Plt ([facility])   Result Value Ref Range    WBC 5.6 4.0 - 11.0 K/uL    RBC 4.1 3.8 - 5.2 M/uL    Hemoglobin 12.7 11.7 - 15.7 g/dL    Hematocrit 37.2 35.0 - 47.0 %    MCV 90.9 78.0 - 100.0 fL    MCH 31.1 26.5 - 35.0    MCHC 34.1 32.0 - 36.0 g/dL    Platelets 277.0 150.0 - 450.0 K/uL       Assessment and Plan     Shira was seen today for recheck.    Diagnoses and all orders for this visit:    S/P rotator cuff repair: She is upset with the orthopedic office for not helping her more to get the therapy she needs.  She would like more home therapy.  Ask care coordinator to call orthopedic office and requested that they order it this way.  I will ordered if we cannot get the correct order from them.  I did warn her that sometimes insurance does not want to pay for home therapy unless we verify that your homebound.  As stated above she has significant barriers to leaving her home above and beyond her shoulder issue.   Including peripheral neuropathy chronic back and leg pain peripheral artery disease and lymphedema.  She cannot drive herself due to shoulder pain and range of motion at this time.  Using public transportation is not feasible in the cold weather due to it exacerbating chronic pain when she has to wait outside.    I do think that more therapy is essential to regain more range of motion for her.  She is at risk of frozen shoulder at this time.  We discussed that in detail.    Lymphedema of left leg: Increased furosemide dose per cardiology is helping more with this.  Will repeat BMP at next visit to make sure potassium is not too low.    Coronary artery disease: Due for lipid check will do this at next visit as well.    CPM: We will discuss her chronic pain more at next visit and refill tramadol.        Patient Instructions   We will try and get more home therapy for your shoulder to get more strength and range of motion.    Follow up in 2 weeks for pain management.  Appointment is scheduled for March 25, 2018 at 1:10 pm with Dr. Pierre for CPM.        Options for treatment and/or follow-up care were reviewed with the patient. Shira Guthrie was engaged and actively involved in the decision making process. She verbalized understanding of the options discussed and was satisfied with the final plan.    Sally Pierre MD

## 2018-04-16 ENCOUNTER — HOME CARE/HOSPICE - HEALTHEAST (OUTPATIENT)
Dept: HOME HEALTH SERVICES | Facility: HOME HEALTH | Age: 63
End: 2018-04-16

## 2018-04-16 NOTE — PROGRESS NOTES
LONG followed up with Tawanna De Los Santos, Dr.Brent Muniz's office, as a request from . SW was unable to meet with patient in clinic but was able to gather information from  that patient is primarily home bound. She requires more in home PT to increase her ROM.     Call to [facility] (P: [phone]). LONG was able to speak with 's team. They referred patient to in home PT through [facility] in the past. They will resend the orders. They anticipate the only barrier, if any, would be insurance coverage. However, [facility] will help to facilitate and work through that part of the process.       VIBHA Varghese

## 2018-04-18 ENCOUNTER — HOME CARE/HOSPICE - HEALTHEAST (OUTPATIENT)
Dept: HOME HEALTH SERVICES | Facility: HOME HEALTH | Age: 63
End: 2018-04-18

## 2018-04-21 ENCOUNTER — HOME CARE/HOSPICE - HEALTHEAST (OUTPATIENT)
Dept: HOME HEALTH SERVICES | Facility: HOME HEALTH | Age: 63
End: 2018-04-21

## 2018-04-23 ENCOUNTER — HOME CARE/HOSPICE - HEALTHEAST (OUTPATIENT)
Dept: HOME HEALTH SERVICES | Facility: HOME HEALTH | Age: 63
End: 2018-04-23

## 2018-04-25 ENCOUNTER — HOME CARE/HOSPICE - HEALTHEAST (OUTPATIENT)
Dept: HOME HEALTH SERVICES | Facility: HOME HEALTH | Age: 63
End: 2018-04-25

## 2018-04-25 ENCOUNTER — OFFICE VISIT (OUTPATIENT)
Dept: FAMILY MEDICINE | Facility: CLINIC | Age: 63
End: 2018-04-25
Payer: MEDICARE

## 2018-04-25 VITALS
SYSTOLIC BLOOD PRESSURE: 127 MMHG | HEIGHT: 65 IN | HEART RATE: 75 BPM | DIASTOLIC BLOOD PRESSURE: 75 MMHG | OXYGEN SATURATION: 99 % | BODY MASS INDEX: 30.92 KG/M2 | WEIGHT: 185.6 LBS

## 2018-04-25 DIAGNOSIS — I10 BENIGN ESSENTIAL HYPERTENSION: ICD-10-CM

## 2018-04-25 DIAGNOSIS — M75.41 ROTATOR CUFF IMPINGEMENT SYNDROME OF RIGHT SHOULDER: ICD-10-CM

## 2018-04-25 DIAGNOSIS — I20.9 ANGINAL PAIN (H): ICD-10-CM

## 2018-04-25 DIAGNOSIS — K59.09 CHRONIC CONSTIPATION: ICD-10-CM

## 2018-04-25 DIAGNOSIS — G62.89 OTHER POLYNEUROPATHY: ICD-10-CM

## 2018-04-25 DIAGNOSIS — F33.2 MAJOR DEPRESSIVE DISORDER, RECURRENT, SEVERE WITHOUT PSYCHOTIC FEATURES (H): ICD-10-CM

## 2018-04-25 DIAGNOSIS — M15.0 PRIMARY OSTEOARTHRITIS INVOLVING MULTIPLE JOINTS: ICD-10-CM

## 2018-04-25 DIAGNOSIS — G89.4 CHRONIC PAIN SYNDROME: Primary | ICD-10-CM

## 2018-04-25 LAB
AMPHETAMINES QUAL: NEGATIVE
BARBITURATES QUAL URINE: NEGATIVE
BENZODIAZEPINE QUAL URINE: NEGATIVE
BUPRENORPHINE QUAL URINE: NEGATIVE
CANNABINOIDS UR QL SCN: NEGATIVE
COCAINE QUAL URINE: NEGATIVE
METHAMPHETAMINE: NEGATIVE
METHODONE QUAL: NEGATIVE
MORPHINE QUAL: POSITIVE
OXYCODONE QUAL: NEGATIVE
TEMPERATURE OF URINE WAS BETWEEN 90-100 DEGREES F: YES

## 2018-04-25 RX ORDER — TRAMADOL HYDROCHLORIDE 50 MG/1
50 TABLET ORAL 3 TIMES DAILY PRN
Qty: 90 TABLET | Refills: 0 | Status: SHIPPED | OUTPATIENT
Start: 2018-04-25 | End: 2018-05-31

## 2018-04-25 RX ORDER — DULOXETIN HYDROCHLORIDE 60 MG/1
60 CAPSULE, DELAYED RELEASE ORAL DAILY
Qty: 90 CAPSULE | Refills: 4 | Status: SHIPPED | OUTPATIENT
Start: 2018-04-25 | End: 2019-04-22

## 2018-04-25 RX ORDER — POLYETHYLENE GLYCOL 3350 17 G/17G
1 POWDER, FOR SOLUTION ORAL 2 TIMES DAILY
Qty: 510 G | Refills: 12 | Status: SHIPPED | OUTPATIENT
Start: 2018-04-25

## 2018-04-25 RX ORDER — GABAPENTIN 300 MG/1
600 CAPSULE ORAL
Qty: 360 CAPSULE | Refills: 3 | Status: SHIPPED | OUTPATIENT
Start: 2018-04-25 | End: 2018-11-06

## 2018-04-25 RX ORDER — METOPROLOL SUCCINATE 25 MG/1
25 TABLET, EXTENDED RELEASE ORAL DAILY
Qty: 90 TABLET | Refills: 4 | Status: SHIPPED | OUTPATIENT
Start: 2018-04-25 | End: 2019-08-07

## 2018-04-25 RX ORDER — OXYCODONE HYDROCHLORIDE 5 MG/1
5 TABLET ORAL AT BEDTIME
Qty: 30 TABLET | Refills: 0 | Status: SHIPPED | OUTPATIENT
Start: 2018-04-25 | End: 2018-05-30

## 2018-04-25 RX ORDER — NITROGLYCERIN 0.4 MG/1
0.4 TABLET SUBLINGUAL EVERY 5 MIN PRN
Qty: 30 TABLET | Refills: 12 | Status: SHIPPED | OUTPATIENT
Start: 2018-04-25 | End: 2019-08-12

## 2018-04-25 NOTE — PROGRESS NOTES
Chronic Pain Follow-Up Visit    Pain Update:  Location of pain: right shoulder post op pain.  Burning like bee sting and goes straight through.    Arthritis in low back, and wrists.  Fingers are stiff and lock.  Analgesia/pain control: Recent changes:  same    Overall control: Tolerable with discomfort    Adherance     How often do you take extra pain medicine: recently took extra due to post op pain and out of post op oxycodone. Was taking 5mg  At bedtime.    Did you take your pain medication today? Tramadol today.    Adverse effects: No     Doing PT at home 3 times a week.  Has good and bad days.  Still in pain a lot at night and after therapy. In between it is manageable.    Doubled up on tramadol. Because out of post surgery oxycodone.   She has been working through the pain but would like stronger pain medication at night to help with pain.  Working with posture with PT too.  Wants to do tramadol in the AM to help with arthritis pain because stronger medicine does not help as much with that.    23rd psalm helps her with getting through pain.      Still wants to do Yoga to help with pain as well.  Continues to follow with Dr. Sky.     Working on eating better with her son and family. Worried about vit D deficiency.  Looking for foods with vit d.      Working on getting stronger for herself.  Son not able to help her as much due to his chemo.  Going to get [employer] mobility.       Database checked today? No.    PHQ-9 SCORE 4/17/2017 7/18/2017 11/8/2017   Total Score - - -   Total Score 8 3 6     FAYE-7 SCORE 8/18/2015 7/18/2017 11/8/2017   Total Score 18 - -   Total Score - 5 5       Care Plan discussed and updated as needed.  See the end of this note.     FUNCTIONAL ASSESSMENT QUESTIONNAIRE SCORE 11/8/2017 4/25/2018   Total Score 30 40          Problem, Medication and Allergy Lists were reviewed and are current..           Physical Exam:     Vitals:    04/25/18 1324   BP: 127/75   Pulse: 75   SpO2: 99%  "  Weight: 185 lb 9.6 oz (84.2 kg)   Height: 5' 4.5\" (163.8 cm)     Body mass index is 31.37 kg/(m^2).  Vitals were reviewed and were normal  GENERAL: healthy, alert, well nourished, well hydrated, no distress  RESP: lungs clear to auscultation - no rales, no rhonchi, no wheezes  CV: regular rates and rhythm, normal S1 S2, no S3 or S4 and no murmur, no click or rub -  ABDOMEN: soft, no tenderness, no  hepatosplenomegaly, no masses, normal bowel sounds  MS: extremities- no gross deformities noted, Left leg has 1+ edema, chronic.        Results:     Results for orders placed or performed in visit on 04/25/18   Rapid Urine Drug Screen ([facility])   Result Value Ref Range    Amphetamines Qual NEGATIVE NEGATIVE    Barbiturates Qual Urine NEGATIVE NEGATIVE    Buprenorphine Qual Urine NEGATIVE NEGATIVE    Benzodiazepine Qual Urine NEGATIVE NEGATIVE    Cocaine Qual Urine NEGATIVE NEGATIVE    Cannabinoids Qual Urine NEGATIVE NEGATIVE    Methamphetamine Qual NEGATIVE NEGATIVE    Methadone Qual NEGATIVE NEGATIVE    Morphine Qual POSITIVE (A) NEGATIVE    Oxycodone Qual NEGATIVE NEGATIVE    Temperature of Urine was Between  Degrees F YES YES      rapid urine drug screen obtained today: Yes    Assessment and Plan    Shira Guthrie is here for follow up of chronic pain caused by right rotator cuff repair and arthritis.    Shira was seen today for pain.    Diagnoses and all orders for this visit:    Chronic pain syndrome: Going to do 2 tramadol a day and one oxycodone at night for the next month as she rehabs shoulder.  Plan to stop oxycodone in the long run and just go back to tramadol for chronic arthritis pain.  She is very aware to not mix the two medications.   -     Rapid Urine Drug Screen ([facility])  -     traMADol (ULTRAM) 50 MG tablet; Take 1 tablet (50 mg) by mouth 3 times daily as needed  -     oxyCODONE IR (ROXICODONE) 5 MG tablet; Take 1 tablet (5 mg) by mouth At Bedtime    Rotator cuff impingement syndrome of right " shoulder:  Continue home PT to work on ROM. May need to follow up with orthopedics again.  Does not want to see the surgeon but likes Myrna Dao MD and will make follow up with her in [facility].    Primary osteoarthritis involving multiple joints  -     traMADol (ULTRAM) 50 MG tablet; Take 1 tablet (50 mg) by mouth 3 times daily as needed    Other polyneuropathy: Increasing gabapentin to 600mg twice a day.  -     gabapentin (NEURONTIN) 300 MG capsule; Take 2 capsules (600 mg) by mouth 2 times daily  -     traMADol (ULTRAM) 50 MG tablet; Take 1 tablet (50 mg) by mouth 3 times daily as needed      Refilled multiple medications and will fill out [employer] mobility forms when she gets them.    Chronic Pain Syndrome:  Care plan updated with patient, see below for details.  Patient is being prescribed 5mg of oxycodone IR (Percocet) per day. 15 mg MEDD  Care Plan Date: April/2018  Naloxone has not been prescribed.       If opioids prescribed patient was asked to bring pill bottle to each appointment and was informed that refills would only be provided at office visits.   Asked patient to F/U in 1 month(s) with same provider for 20 minute  Visit.     Sally Pierre MD    Patient Instructions          Myrna Dao at [facility]. [phone]        Personal Care Plan for Chronic Pain    1.  Personal Goals:                * take less medication              * lose weight: goal of losing 15 to 20 pounds.              * continue working on keeping thoughts positive through [employer]              * to feel confident enough that when someone gives me a compliment I can simply say thank you    2.  Sleep:                 *  Basic sleep plan:                          *reduce or eliminate caffeine and daytime naps                          * relaxation before bed                          * limit screen time 1-2 hours prior to bed                          * establish dark/quiet sleep environment                          * go to bed at target  bedtime:   pm                          * keep consistent wake time:   am.              *  Minimize switching days and nights by staying active throughout the day.              * We'll talk more about a consistent sleep plan when we meet next time.                3.  Physical Activity:                 * Formal physical rehabilitation:                          HOME PT for shoulder              * Home/community based activity:                          * Home based exercises given by lymphedema nurse with breathing exercises built in as well - daily                          * Aerobic exercise/endurance exercise:  elliptical machine - 5 minute intervals with sit ups in between for 30 minutes - daily.  Has a  in the builiding. Has exercise tape in her apartment.                          * Cleaning and baking with body awareness and stretching              * Listen to your body.  Pace yourself for success.  Don't over-do it.  Plan to get PCA back to help with cleaning and laundry so as to not overdo it.                4.  Nutrition/Weight:                 * Keep a food journal in a notebook at home and bring this to your next visit with Dr. Sky.  Eat small frequent meals.              * Review your I Can Prevent Diabetes materials.              * Limit processed foods and foods high in sugar, sodium and fat.              * Keep up the good work eating many healthy foods.              * When you make a less healthy choice approach yourself with kindness and compassion.  Try to understand what contributed to that choice:  Was I too hungry?  Was I too tired?  Stressed?  Worried?  Use this information to help support healthier choices in the future.    5.  Mood/Stress Management:     SELF COMPASSION!              * Formal interventions:                        * schedule follow up with Dr. Sky in about month or so.              * Home/community based interventions:                          * Regular contact with  family  * Relaxation techniques - breathing exercises  * Create your pyramid to focus you on your strengths and hopes.  * Movies  * Meditation  * Yoga  * Creative activity  * Spiritual /prayer:  Prayer at home, attending services at [facility], wellness auxiliary group  * Service-based activity.              * Medications: Cymbalta, Hydroxyzine as needed.    6.  Tobacco/Alcohol/Drug Use:                               * Congratulations on quitting smoking and staying quit!  Keep up the good work managing stress/anxiety in other ways (prayer, talking it out, deep breaths, exercise, etc..                         * Maintain healthy relationship with alcohol                          * For women this would be no more than 1 drink per day                          * For men, this would be no more than 2 drinks per day              * Eliminate recreational drugs    7.  Pain:                 * Non-medication treatments:                          * ice/heat: use heating pad as you are doing.                          * massage                          * acupuncture  [facility] is planned for when you return.                          8.  Pain Medications: Gabapentin 600mg twice a day Tramadol one pill two times day.   Oxycodone 5mg at night for the next month and reevaluate.  Tylenol arthritis as needed for break through.

## 2018-04-25 NOTE — MR AVS SNAPSHOT
After Visit Summary   4/25/2018    Shira Guthrie    MRN: [id number]           Patient Information     Date Of Birth          1955        Visit Information        Provider Department      4/25/2018 1:10 PM Sally Pierre MD [facility]        Today's Diagnoses     Chronic pain syndrome    -  1    Rotator cuff impingement syndrome of right shoulder        Primary osteoarthritis involving multiple joints        Other polyneuropathy          Care Instructions         Myrna Dao at [facility]. [phone]        Personal Care Plan for Chronic Pain    1.  Personal Goals:                * take less medication              * lose weight: goal of losing 15 to 20 pounds.              * continue working on keeping thoughts positive through Tawny              * to feel confident enough that when someone gives me a compliment I can simply say thank you    2.  Sleep:                 *  Basic sleep plan:                          *reduce or eliminate caffeine and daytime naps                          * relaxation before bed                          * limit screen time 1-2 hours prior to bed                          * establish dark/quiet sleep environment                          * go to bed at target bedtime:   pm                          * keep consistent wake time:   am.              *  Minimize switching days and nights by staying active throughout the day.              * We'll talk more about a consistent sleep plan when we meet next time.                3.  Physical Activity:                 * Formal physical rehabilitation:                          HOME PT for shoulder              * Home/community based activity:                          * Home based exercises given by lymphedema nurse with breathing exercises built in as well - daily                          * Aerobic exercise/endurance exercise:  elliptical machine - 5 minute intervals with sit ups in between for 30 minutes - daily.  Has a   in the builiding. Has exercise tape in her apartment.                          * Cleaning and baking with body awareness and stretching              * Listen to your body.  Pace yourself for success.  Don't over-do it.  Plan to get PCA back to help with cleaning and laundry so as to not overdo it.                4.  Nutrition/Weight:                 * Keep a food journal in a notebook at home and bring this to your next visit with Dr. Sky.  Eat small frequent meals.              * Review your I Can Prevent Diabetes materials.              * Limit processed foods and foods high in sugar, sodium and fat.              * Keep up the good work eating many healthy foods.              * When you make a less healthy choice approach yourself with kindness and compassion.  Try to understand what contributed to that choice:  Was I too hungry?  Was I too tired?  Stressed?  Worried?  Use this information to help support healthier choices in the future.    5.  Mood/Stress Management:     SELF COMPASSION!              * Formal interventions:                        * schedule follow up with Dr. Sky in about month or so.              * Home/community based interventions:                          * Regular contact with family  * Relaxation techniques - breathing exercises  * Create your pyramid to focus you on your strengths and hopes.  * Movies  * Meditation  * Yoga  * Creative activity  * Spiritual /prayer:  Prayer at home, attending services at [facility], wellness auxiliary group  * Service-based activity.              * Medications: Cymbalta, Hydroxyzine as needed.    6.  Tobacco/Alcohol/Drug Use:                               * Congratulations on quitting smoking and staying quit!  Keep up the good work managing stress/anxiety in other ways (prayer, talking it out, deep breaths, exercise, etc..                         * Maintain healthy relationship with  alcohol                          * For women this would be no more than 1 drink per day                          * For men, this would be no more than 2 drinks per day              * Eliminate recreational drugs    7.  Pain:                 * Non-medication treatments:                          * ice/heat: use heating pad as you are doing.                          * massage                          * acupuncture  YOGA is planned for when you return.                          8.  Pain Medications: Gabapentin 600mg twice a day Tramadol one pill two times day.   Oxycodone 5mg at night for the next month and reevaluate.  Tylenol arthritis as needed for break through.                      Follow-ups after your visit        Follow-up notes from your care team     Return in about 1 month (around 2018).      Your next 10 appointments already scheduled     May 02, 2018  9:00 AM CDT   Return Visit with Dior Sky, PhD   [facility] ([facility])    [address] [place] [id number] [phone]              Who to contact     Please call your clinic at [phone] to:    Ask questions about your health    Make or cancel appointments    Discuss your medicines    Learn about your test results    Speak to your doctor            Additional Information About Your Visit        [employer] Information     [employer] is an electronic gateway that provides easy, online access to your medical records. With [employer], you can request a clinic appointment, read your test results, renew a prescription or communicate with your care team.     To sign up for [employer] visit the website at [email or web address]   You will be asked to enter the access code listed below, as well as some personal information. Please follow the directions to create your username and password.     Your access code is: [email or web address]  Expires: 7/10/2018  9:31 AM     Your access code will  in 90 days. If you need help or a new code, please  "contact your [facility] or call [phone] for assistance.        Care EveryWhere ID     This is your Care EveryWhere ID. This could be used by other organizations to access your [place] medical records  [id number]        Your Vitals Were     Pulse Height Pulse Oximetry BMI (Body Mass Index)          75 5' 4.5\" (163.8 cm) 99% 31.37 kg/m2         Blood Pressure from Last 3 Encounters:   04/25/18 127/75   04/11/18 112/73   01/18/18 107/68    Weight from Last 3 Encounters:   04/25/18 185 lb 9.6 oz (84.2 kg)   04/11/18 187 lb 12.8 oz (85.2 kg)   01/18/18 189 lb 9.6 oz (86 kg)              We Performed the Following     Rapid Urine Drug Screen ([facility])          Today's Medication Changes          These changes are accurate as of 4/25/18  2:01 PM.  If you have any questions, ask your nurse or doctor.               Start taking these medicines.        Dose/Directions    oxyCODONE IR 5 MG tablet   Commonly known as:  ROXICODONE   Used for:  Chronic pain syndrome   Started by:  Sally Pierre MD        Dose:  5 mg   Take 1 tablet (5 mg) by mouth At Bedtime maximum 6 tablet(s) per day   Quantity:  30 tablet   Refills:  0         These medicines have changed or have updated prescriptions.        Dose/Directions    gabapentin 300 MG capsule   Commonly known as:  NEURONTIN   This may have changed:    - how much to take  - when to take this   Used for:  Other polyneuropathy   Changed by:  Sally Pierre MD        Dose:  600 mg   Take 2 capsules (600 mg) by mouth 2 times daily   Quantity:  360 capsule   Refills:  3            Where to get your medicines      [facility] [place], [place] - [address], [place] [address]     Phone:  [phone]     gabapentin 300 MG capsule         Some of these will need a paper prescription and others can be bought over the counter.  Ask your nurse if you have questions.     Bring a paper " prescription for each of these medications     oxyCODONE IR 5 MG tablet    traMADol 50 MG tablet               Information about OPIOIDS     PRESCRIPTION OPIOIDS: WHAT YOU NEED TO KNOW   You have a prescription for an opioid (narcotic) pain medicine. Opioids can cause addiction. If you have a history of chemical dependency of any type, you are at a higher risk of becoming addicted to opioids. Only take this medicine after all other options have been tried. Take it for as short a time and as few doses as possible.     Do not:    Drive. If you drive while taking these medicines, you could be arrested for driving under the influence (DUI).    Operate heavy machinery    Do any other dangerous activities while taking these medicines.     Drink any alcohol while taking these medicines.      Take with any other medicines that contain acetaminophen. Read all labels carefully. Look for the word  acetaminophen  or  Tylenol.  Ask your pharmacist if you have questions or are unsure.    Store your pills in a secure place, locked if possible. We will not replace any lost or stolen medicine. If you don t finish your medicine, please throw away (dispose) as directed by your pharmacist. The [place] Pollution Control Agency has more information about safe disposal: https://www.pca.[facility].[place].us/living-green/managing-unwanted-medications    All opioids tend to cause constipation. Drink plenty of water and eat foods that have a lot of fiber, such as fruits, vegetables, prune juice, apple juice and high-fiber cereal. Take a laxative (Miralax, milk of magnesia, Colace, Senna) if you don t move your bowels at least every other day.          Primary Care Provider Office Phone # Fax #    Sally Pierre [phone]       [address] [address]        Equal Access to Services     [facility] RUTH ANN AH: Raj Bautista, lori holt, una elias  bentley cruzaaloida ah. So [facility] [phone].    ATENCIÓN: Si alfiela monty, tiene a monson disposición servicios gratuitos de asistencia lingüística. Vipul de la garza [phone].    We comply with applicable federal civil rights laws and [place] laws. We do not discriminate on the basis of race, color, national origin, age, disability, sex, sexual orientation, or gender identity.            Thank you!     Thank you for choosing [facility]  for your care. Our goal is always to provide you with excellent care. Hearing back from our patients is one way we can continue to improve our services. Please take a few minutes to complete the written survey that you may receive in the mail after your visit with us. Thank you!             Your Updated Medication List - Protect others around you: Learn how to safely use, store and throw away your medicines at www.disposemymeds.org.          This list is accurate as of 4/25/18  2:01 PM.  Always use your most recent med list.                   Brand Name Dispense Instructions for use Diagnosis    aspirin 81 MG EC tablet     90 tablet    Take 1 tablet (81 mg) by mouth daily    Coronary artery disease involving native coronary artery of native heart with angina pectoris (H)       baclofen 10 MG tablet    LIORESAL    90 tablet    Take 1 tablet (10 mg) by mouth 3 times daily as needed for muscle spasms    Chronic pain syndrome, Primary osteoarthritis involving multiple joints       CANE, ANY MATERIAL     1 Device    1 Device by Device route as needed    Peripheral neuropathy, Leg edema, left, Stroke (H)       clopidogrel 75 MG tablet    PLAVIX    90 tablet    Take 1 tablet (75 mg) by mouth daily . Give name brand Plavix. Approved by insurance through 2/2/1016.    Chronic ischemic heart disease       dexlansoprazole 30 MG Cpdr CR capsule    DEXILANT    90 capsule    Take 1 capsule (30 mg) by mouth daily    Esophageal stricture       diphenhydrAMINE 25 MG tablet    BENADRYL    100 tablet    Take  1-2 tablets (25-50 mg) by mouth every 6 hours as needed for other (for congestion, cough)    Acute nasopharyngitis       docusate sodium 100 MG tablet    COLACE    60 tablet    Take 100 mg by mouth daily    Chronic constipation, Chronic pain syndrome       DULoxetine 60 MG EC capsule    CYMBALTA    30 capsule    Take 1 capsule (60 mg) by mouth daily    Major depressive disorder, recurrent, severe without psychotic features (H)       gabapentin 300 MG capsule    NEURONTIN    360 capsule    Take 2 capsules (600 mg) by mouth 2 times daily    Other polyneuropathy       Heating Pads Pads     1 each    Apply to painful areas prn.    Stroke (H), Peripheral neuropathy, Leg edema, left       isosorbide mononitrate 30 MG 24 hr tablet    IMDUR    90 tablet    Take 1 tablet (30 mg) by mouth daily    Chronic ischemic heart disease       LASIX 20 MG tablet   Generic drug:  furosemide      2 tablets a day per cardiology.    Lymphedema of left leg       linaclotide 290 MCG capsule    LINZESS    90 capsule    Take 1 capsule (290 mcg) by mouth every morning (before breakfast)    Chronic constipation       metoprolol succinate 25 MG 24 hr tablet    TOPROL-XL    90 tablet    Take 1 tablet (25 mg) by mouth daily    Benign essential hypertension       nitroGLYcerin 0.4 MG sublingual tablet    NITROSTAT    30 tablet    Place 1 tablet (0.4 mg) under the tongue every 5 minutes as needed    Anginal pain (H)       * order for DME     2 Device    Equipment being ordered: Jobst stockings thigh high 15-20mmHg.  Please measure for fit. 2 pairs.    Leg edema, left       * order for DME     2 Device    Bilateral compression stockings 15-20mmHg. One pair knee high and one pair thigh high.  Please measure for fit.    Lymphedema of left leg       oxyCODONE IR 5 MG tablet    ROXICODONE    30 tablet    Take 1 tablet (5 mg) by mouth At Bedtime maximum 6 tablet(s) per day    Chronic pain syndrome       polyethylene glycol powder    MIRALAX    510 g     Take 17 g (1 capful) by mouth 2 times daily    Chronic constipation       rosuvastatin 20 MG tablet    CRESTOR    90 tablet    Take 1 tablet (20 mg) by mouth daily    Coronary artery disease involving native coronary artery of native heart with angina pectoris (H)       traMADol 50 MG tablet    ULTRAM    90 tablet    Take 1 tablet (50 mg) by mouth 3 times daily as needed    Chronic pain syndrome, Other polyneuropathy, Primary osteoarthritis involving multiple joints       TYLENOL 325 MG tablet   Generic drug:  acetaminophen     100 tablet    Take 2 tablets (650 mg) by mouth daily        * Notice:  This list has 2 medication(s) that are the same as other medications prescribed for you. Read the directions carefully, and ask your doctor or other care provider to review them with you.

## 2018-04-25 NOTE — PATIENT INSTRUCTIONS
Myrna Dao at [facility]. [phone]        Personal Care Plan for Chronic Pain    1.  Personal Goals:                * take less medication              * lose weight: goal of losing 15 to 20 pounds.              * continue working on keeping thoughts positive through Tawny              * to feel confident enough that when someone gives me a compliment I can simply say thank you    2.  Sleep:                 *  Basic sleep plan:                          *reduce or eliminate caffeine and daytime naps                          * relaxation before bed                          * limit screen time 1-2 hours prior to bed                          * establish dark/quiet sleep environment                          * go to bed at target bedtime:   pm                          * keep consistent wake time:   am.              *  Minimize switching days and nights by staying active throughout the day.              * We'll talk more about a consistent sleep plan when we meet next time.                3.  Physical Activity:                 * Formal physical rehabilitation:                          HOME PT for shoulder              * Home/community based activity:                          * Home based exercises given by lymphedema nurse with breathing exercises built in as well - daily                          * Aerobic exercise/endurance exercise:  elliptical machine - 5 minute intervals with sit ups in between for 30 minutes - daily.  Has a  in the builiding. Has exercise tape in her apartment.                          * Cleaning and baking with body awareness and stretching              * Listen to your body.  Pace yourself for success.  Don't over-do it.  Plan to get PCA back to help with cleaning and laundry so as to not overdo it.                4.  Nutrition/Weight:                 * Keep a food journal in a notebook at home and bring this to your next visit with Dr. Sky.  Eat small frequent  meals.              * Review your I Can Prevent Diabetes materials.              * Limit processed foods and foods high in sugar, sodium and fat.              * Keep up the good work eating many healthy foods.              * When you make a less healthy choice approach yourself with kindness and compassion.  Try to understand what contributed to that choice:  Was I too hungry?  Was I too tired?  Stressed?  Worried?  Use this information to help support healthier choices in the future.    5.  Mood/Stress Management:     SELF COMPASSION!              * Formal interventions:                        * schedule follow up with Dr. Sky in about month or so.              * Home/community based interventions:                          * Regular contact with family  * Relaxation techniques - breathing exercises  * Create your pyramid to focus you on your strengths and hopes.  * Movies  * Meditation  * Yoga  * Creative activity  * Spiritual /prayer:  Prayer at home, attending services at [facility], wellness auxiliary group  * Service-based activity.              * Medications: Cymbalta, Hydroxyzine as needed.    6.  Tobacco/Alcohol/Drug Use:                               * Congratulations on quitting smoking and staying quit!  Keep up the good work managing stress/anxiety in other ways (prayer, talking it out, deep breaths, exercise, etc..                         * Maintain healthy relationship with alcohol                          * For women this would be no more than 1 drink per day                          * For men, this would be no more than 2 drinks per day              * Eliminate recreational drugs    7.  Pain:                 * Non-medication treatments:                          * ice/heat: use heating pad as you are doing.                          * massage                          * acupuncture  YOGA is planned for when you return.                          8.  Pain Medications:  Gabapentin 600mg twice a day Tramadol one pill two times day.   Oxycodone 5mg at night for the next month and reevaluate.  Tylenol arthritis as needed for break through.

## 2018-04-26 DIAGNOSIS — I10 BENIGN ESSENTIAL HYPERTENSION: ICD-10-CM

## 2018-04-26 RX ORDER — METOPROLOL SUCCINATE 25 MG/1
25 TABLET, EXTENDED RELEASE ORAL DAILY
Qty: 90 TABLET | Refills: 4 | Status: CANCELLED | OUTPATIENT
Start: 2018-04-26

## 2018-04-27 ENCOUNTER — HOME CARE/HOSPICE - HEALTHEAST (OUTPATIENT)
Dept: HOME HEALTH SERVICES | Facility: HOME HEALTH | Age: 63
End: 2018-04-27

## 2018-04-30 ENCOUNTER — HOME CARE/HOSPICE - HEALTHEAST (OUTPATIENT)
Dept: HOME HEALTH SERVICES | Facility: HOME HEALTH | Age: 63
End: 2018-04-30

## 2018-05-02 ENCOUNTER — HOME CARE/HOSPICE - HEALTHEAST (OUTPATIENT)
Dept: HOME HEALTH SERVICES | Facility: HOME HEALTH | Age: 63
End: 2018-05-02

## 2018-05-02 ENCOUNTER — OFFICE VISIT (OUTPATIENT)
Dept: PSYCHOLOGY | Facility: CLINIC | Age: 63
End: 2018-05-02
Payer: MEDICARE

## 2018-05-02 VITALS — BODY MASS INDEX: 31.23 KG/M2 | WEIGHT: 184.8 LBS

## 2018-05-02 DIAGNOSIS — G89.4 CHRONIC PAIN SYNDROME: Primary | ICD-10-CM

## 2018-05-02 NOTE — Clinical Note
Saw Ms. Guthrie today and per her report today she thinks she is doing better than when you last met.  See my note for additional detail.  Let me know if you have questions or would like additional follow up from me.  Thanks!  Dior

## 2018-05-02 NOTE — PROGRESS NOTES
"Behavioral Health Chronic Pain Management Visit     Visit type: Follow Up  Number of visits post Initial Assessment:  5  Meeting lasted: 60 minutes  Others present: no one    Reason for Consultation:  Shira Guthrie is a 62 year old,  female referred by Dr. Pierre for behavioral health consultation as part of the Chronic Pain Management protocol at [facility]. The goal of behavioral health visits is to help the patient with the psychosocial aspects of pain management. Ms. Guthrie was initially seen by this provider for her initial  CPM consult on 12/15/16.  Our last visit was in April 2017.  Given that it has been over one year since our last visit, will check in on current goals for our work.  Past goals included support with weight loss to improve pain experience and to manage stress/anxiety as well as reduced reliance on medications for pain control.    Topics Discussed:   1.  Pain experience:  Has been working to recover from shoulder surgery which was difficult and triggered depressed mood.  Was quite frustrated by lack of function and lack of supportive services in home (e.g. Was supposed to get PCA services, but this never happened).  Had a session of physical therapy on Monday in which the therapist used Accupressure/massage which was quite painful initially, but has resulted in increased range of motion and decreased pain for which Ms. Guthrie is very appreciative.  \"I can comb my hair now!\"     * Pacing:  Ms. Guthrie does report that pacing continues to be an issue for her as she \"over-did it\" in response to decreased pain which led to increase in pain experience.  She is open to feedback on pacing and will try to balance activity with rest going forward.  2.  Physical activity:   * Physical therapy:  Ms. Guthrie thinks she may have 2-3 weeks remaining for physical therapy which takes place three times per week.  She thinks this has been helpful although it is hard work.     * " "Balance:  Has re-arranged her apartment a bit (got rid of love seat) to decrease risk of tripping/falling.  Also practicing exercises at home to help with balance.  Trying to walk more slowly/cautiously.   * Yoga:  Would like help finding class to increase her activity which she hopes will help with weight loss goal as well as balance and stress management.  Reviewed resources for this and put in AVS.  Asked her to call us if she encounters difficulty with this so that we can provide more support as needed.  3.  Weight: Requested that we take a weight today and was pleased to see continued progress towards weight loss goal.  Would like to be under 160lbs if possible.  Had put on some weight over the winter as it was hard to be active due to shoulder surgery.  She has been focusing on nutrition.  Eating lots of salad.  Small dishes.  A bit hard to cook due to shoulder, but starting to be able to do more there.  Eating less meat and more fruits and veggies/smoothies (bought Nykaa ).  Encouraged focus on behaviors/choices rather than outcome given complexity of weight loss.  Encouraged self-compassion and curiosity if she gets off track in order to get back on the right path.  Ms. Guthrie also noted that she would like to start a food journal again as this has been helpful in the past.  Still using \"I Can Prevent Diabetes\" workbook as a guide.  Congratulated on her sustained efforts in this area.  4.  Mood/Anxiety:  Reports being unable to get out of bed for a week a couple of weeks ago due to depression.  Disappointed with self over this.  Receiving spiritual  that this is a \"spiritual issue\" which needs to be resolved.   * Triggers for negative mood:  Son was diagnosed with lung cancer recently.  Frustration with pain/loss of function.  Can become self-critical if she gets angry, etc.   * Making space for sadness:  Reflected on how Ms. Guthrie often tries to suppress negative emotion which can lead to " "\"break downs\" of high intensity.  Discussed potential value of \"making a little space for sadness\" and normalized feelings of sadness, anger, fear and frustration as part of the human experience.  Discussed seeing our suffering as connecting us to other human beings rather than isolating us or making us \"less than\" others.   Ms. Guthrie appeared receptive to this feedback.   * Coping:  Tawny continues to be very important (participates in 5am prayer group every morning - felt guilty about missing this morning as she allowed herself to sleep in.  Encouraged greater self-compassion and flexibility in the face of these situations).  Receives good support from family/son/grandchildren.   * ACT:  Provided education on the basic principles of acceptance and commitment approach to managing suffering.  Used horizon line metaphor for identified values (e.g., I want to act with self-compassion, I want to be supportive and encouraging of my family, but accept that I cannot control their lives/choices).  Reflected on the value of evaluating our thoughts/actions in light of identified values and approaching self with curiosity and compassion when we get off track.  Ask yourself \"how can I help myself get back on track?\" rather than approach self with judgement and anger.  Ms. Guthrie was receptive to this feedback and willing to incorporate into plan today.   * Addressing thinking:  Reviewed value of using \"the 3Cs\" for healthy thinking:  Catch it, Check It, Change it.  This is in line with work that Ms. Guthrie has already been doing and she was receptive to trying to use this strategy more purposefully going forward.  Getting better at identifying problematic thinking and \"self-editing.\"  5.  Community based services:   * Transportation:  Looking into [employer] [facility] and plans to complete paperwork jointly with Dr. Pierre at their next visit given past frustrations with lost paperwork, etc.   * PCA:  Had decided to give up on getting " PCA (particularly in light of frustrations trying to set this up after surgery).  Thinks she may not need this anymore due to recent increased function.  Encouraged her to let us know if she needed more help in the future so we could provide support and advocacy if needed.    Objective: Ms. Guthrie appears to be awake and alert for today's visit.      PHQ-9 SCORE 4/17/2017 7/18/2017 11/8/2017   Total Score - - -   Total Score 8 3 6     FAYE-7 SCORE 8/18/2015 7/18/2017 11/8/2017   Total Score 18 - -   Total Score - 5 5     Wt Readings from Last 4 Encounters:   05/02/18 184 lb 12.8 oz (83.8 kg)   04/25/18 185 lb 9.6 oz (84.2 kg)   04/11/18 187 lb 12.8 oz (85.2 kg)   01/18/18 189 lb 9.6 oz (86 kg)       Assessment:   Ms. Guthrie was referred by Dr. Pierre for a behavioral health evaluation to address chronic pain syndrome. Although she has had some ups and downs in the year since we last met, today she appears to be making good progress on goals set in our work together.  Ms. Guthrie may benefit from continued meetings with this provider to support improved pain management via weight loss, healthy food choices, regular physical activity, improved sleep and improved management of mood/stress and we have agreed to schedule this on an as needed basis.    Stage of change: Action  Diagnosis: chronic pain syndrome    Plan:   1.  Updated Personal Care Plan for Chronic Pain (see patient instructions).  2.  Shira would like to check in after she has completed course of physical therapy (possibly first week in July?) or sooner if needed. Agreed that I would place outreach call if I did not see her on my schedule by that time.    3.  Follow up with Dr. Pierre on June 5.

## 2018-05-02 NOTE — PATIENT INSTRUCTIONS
Personal Care Plan for Chronic Pain     1.  Personal Goals:                * take less medication              * lose weight: goal of losing 15 to 20 pounds.              * continue working on keeping thoughts positive through [employer]              * to feel confident enough that when someone gives me a compliment I can simply say thank you   * routine practice of mindful self-compassion     2.  Sleep:                 *  Basic sleep plan:                          *reduce or eliminate caffeine and daytime naps                          * relaxation before bed                          * limit screen time 1-2 hours prior to bed                          * establish dark/quiet sleep environment                          * go to bed at target bedtime:   pm                          * keep consistent wake time:   am.              *  Minimize switching days and nights by staying active throughout the day.              * We'll talk more about a consistent sleep plan when we meet next time.      3.  Physical Activity:                 * Formal physical rehabilitation:                          HOME PT for shoulder              * Home/community based activity:                          * Home based exercises given by lymphedema nurse with breathing exercises built in as well - daily                          * Aerobic exercise/endurance exercise:  elliptical machine - 5 minute intervals with sit ups in between for 30 minutes - daily.  Has a  in the building. Has exercise tape in her apartment.                          * Cleaning and baking with body awareness and stretching              * Listen to your body.  Pace yourself for success.  Don't over-do it.      Options for Adaptive Yoga via [employer]:    [facility]   [address] [place] [facility] [place] [id number] [place] [address]   [address] [place] [address]   [address] [place] [address]              * [facility] classes require preregistration. Call [phone] to register.  [facility]   [address] [place] [address]    FEES & REGISTRATION INFORMATION  Thanks to the generous support of our donors we subsidize the cost of adaptive classes and individual instruction for those living with mobility disabilities. [facility] also offers two classes exclusively for our veterans, one of which is for women only. These classes are free to veterans (and a family member accompanying them) as is any class held in our [facility] studio.       4.  Nutrition/Weight:                 * Keep a food journal in a notebook at home and bring this to your next visit with Dr. Sky.  Eat small frequent meals.              * Review your I Can Prevent Diabetes materials.              * Limit processed foods and foods high in sugar, sodium and fat.              * Keep up the good work eating many healthy foods.              * When you make a less healthy choice approach yourself with kindness and compassion.  Try to understand what contributed to that choice:  Was I too hungry?  Was I too tired?  Stressed?  Worried?  Use this information to help support healthier choices in the future.     5.  Mood/Stress Management:     SELF COMPASSION!              * Formal interventions:                        * schedule follow up with Dr. Sky as needed (possibly two months - first week in July)                               3 C's for Healthy Thinking    Catch it    Pay attention to thoughts    Observe which ones are happening when you notice you feel more depressed    Notice how these thoughts make you feel    Check it    Is the thought accurate?    Does it tell the whole story?    Is it helpful?    Change it    If it's not telling the full story, change it to make it more accurate    If it's not helpful, refocus on what's important to you    Acceptance and Commitment:  Remember your values and keep moving towards them.  If you get off the path  towards that horizon line, approach yourself with self-compassion and ask yourself what will help you to get back on this path.  Accept what you don't have control over and shift your focus back towards your own actions and how to keep approaching your values.                * Home/community based interventions:                          * Regular contact with family  * Relaxation techniques - breathing exercises  * Create your pyramid to focus you on your strengths and hopes.  * Movies  * Meditation  * Yoga  * Creative activity  * Spiritual /prayer:  Prayer at home, attending services at [facility], wellness auxiliary group  * Service-based activity.              * Medications: Cymbalta, Hydroxyzine as needed.     6.  Tobacco/Alcohol/Drug Use:                               * Congratulations on quitting smoking and staying quit!  Keep up the good work managing stress/anxiety in other ways (prayer, talking it out, deep breaths, exercise, etc..                         * Maintain healthy relationship with alcohol                          * For women this would be no more than 1 drink per day                          * For men, this would be no more than 2 drinks per day              * Eliminate recreational drugs     7.  Pain:                 * Non-medication treatments:                          * ice/heat: use heating pad as you are doing.                          * massage                          * acupuncture     8.  Pain Medications: Gabapentin 600mg twice a day Tramadol one pill two times day.   Oxycodone 5mg at night for the next month and reevaluate.  Tylenol arthritis as needed for break through.

## 2018-05-04 ENCOUNTER — HOME CARE/HOSPICE - HEALTHEAST (OUTPATIENT)
Dept: HOME HEALTH SERVICES | Facility: HOME HEALTH | Age: 63
End: 2018-05-04

## 2018-05-07 ENCOUNTER — HOME CARE/HOSPICE - HEALTHEAST (OUTPATIENT)
Dept: HOME HEALTH SERVICES | Facility: HOME HEALTH | Age: 63
End: 2018-05-07

## 2018-05-09 ENCOUNTER — HOME CARE/HOSPICE - HEALTHEAST (OUTPATIENT)
Dept: HOME HEALTH SERVICES | Facility: HOME HEALTH | Age: 63
End: 2018-05-09

## 2018-05-10 ENCOUNTER — MEDICAL CORRESPONDENCE (OUTPATIENT)
Dept: HEALTH INFORMATION MANAGEMENT | Facility: CLINIC | Age: 63
End: 2018-05-10

## 2018-05-10 ENCOUNTER — HOME CARE/HOSPICE - HEALTHEAST (OUTPATIENT)
Dept: HOME HEALTH SERVICES | Facility: HOME HEALTH | Age: 63
End: 2018-05-10

## 2018-05-29 ENCOUNTER — TELEPHONE (OUTPATIENT)
Dept: FAMILY MEDICINE | Facility: CLINIC | Age: 63
End: 2018-05-29

## 2018-05-29 NOTE — TELEPHONE ENCOUNTER
She has an appointment on 6/5.  We should just do a UDS on that day.  I agree with what Isacc stated.  Why does she want to do it early?    Sally Pierre MD

## 2018-05-29 NOTE — TELEPHONE ENCOUNTER
[facility] phone call message- general phone call:    Reason for call: She would like a call back re if she can come in before her appointment on the 5th with  to do her urine analysis .There are no orders in please give a call back.    Return call needed: Yes    OK to leave a message on voice mail? Yes      Primary language: English      needed? No    Call taken on May 29, 2018 at 11:09 AM by Earline Curry

## 2018-05-29 NOTE — TELEPHONE ENCOUNTER
Patient calling to see if she can have an order placed for her UDS. She would like to come in this week to have the done if possible. She questions if she can only have the urine or If she needs to come in to see the doctor. It was explained whether a new med is added or not an office visit is always required for cpm and the UDS is done on the same day. Patient verbalize understanding and would like to know Dr. Pierre opinion.   Routed to Dr. Pierre

## 2018-05-30 NOTE — TELEPHONE ENCOUNTER
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 She questions if Dr. Pierre would be willing to give her a call later on today if possible. /LATANYA Guthrie      Routed to Dr. Pierre

## 2018-05-30 NOTE — TELEPHONE ENCOUNTER
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  She is very happy because her pain is much better after recent PT treatment.  She has not needed oxycodone for a long time and is back to Tramadol twice a day.  Sometimes needs the  Third pill at night but since pain is better has decreased the need for it.     She would like to reschedule appointment with me from 6/5 to tomorrow but I am working in the hospital. She will not have enough tramadol for the whole trip to [place].  Discussed that she should see one of my partners tomorrow.  Dr. Lorenzo in 3 in 1 in the AM.  Asked her to schedule with her and cancel 6/5 appointment.  She should schedule follow up with me at the end of June now as well to make sure it works in her schedule.    We discussed giving her tramadol 50mg BID prn #60 tomorrow until she can see me next month. Discontinued oxycodone off the list.     Routed to Dr. Lorenzo for FYI.  Sally Pierre MD

## 2018-05-31 ENCOUNTER — OFFICE VISIT (OUTPATIENT)
Dept: FAMILY MEDICINE | Facility: CLINIC | Age: 63
End: 2018-05-31
Payer: MEDICARE

## 2018-05-31 VITALS
HEIGHT: 65 IN | SYSTOLIC BLOOD PRESSURE: 109 MMHG | TEMPERATURE: 97.8 F | DIASTOLIC BLOOD PRESSURE: 69 MMHG | HEART RATE: 70 BPM | BODY MASS INDEX: 30.69 KG/M2 | OXYGEN SATURATION: 98 % | WEIGHT: 184.2 LBS

## 2018-05-31 DIAGNOSIS — G89.4 CHRONIC PAIN SYNDROME: Primary | ICD-10-CM

## 2018-05-31 DIAGNOSIS — M15.0 PRIMARY OSTEOARTHRITIS INVOLVING MULTIPLE JOINTS: ICD-10-CM

## 2018-05-31 DIAGNOSIS — G62.89 OTHER POLYNEUROPATHY: ICD-10-CM

## 2018-05-31 RX ORDER — TRAMADOL HYDROCHLORIDE 50 MG/1
50 TABLET ORAL 2 TIMES DAILY PRN
Qty: 60 TABLET | Refills: 0 | Status: SHIPPED | OUTPATIENT
Start: 2018-05-31 | End: 2018-07-27

## 2018-05-31 NOTE — PATIENT INSTRUCTIONS
For chronic pain;  1. Continue tramadol 50 mg twice daily as needed  2. Continue to do physical therapy  3. Follow up with Myrna Dao   Call to schedule appointment at [place] [phone]    Follow up with Dr. Pierre and Dr. Sky in July as discussed

## 2018-05-31 NOTE — PROGRESS NOTES
Chronic Pain Follow-Up Visit      Location of pain: right shoulder rotator cuff issue, wrist arthritis, neuropathy  Analgesia/pain control: Using tramadol and oxycodone.  She is doing PT at home TID, which is helpful.  Making big improvements in her functional ability.        Recent changes:  Improved function with PT        Overall control: Tolerable with discomfort      Care Plan    Completed at past visit with PCP    Activity level/function:        Daily activities:  Able to do light housework, cooking      Work:  Unable to work    Adverse effects: No     Adherance    How often do you take extra pain medicine: Had been taking the oxycodone for therapy, but this has gotten better.    Did you take your pain medication today? YES, tramadol    Home Exercise? PT three times daily at home     Other treatments         Counseling ?  yes         Physical therapy? yes       Database checked today? Yes. Details: appropriate.    PHQ-9 SCORE 4/17/2017 7/18/2017 11/8/2017   Total Score - - -   Total Score 8 3 6     FAYE-7 SCORE 8/18/2015 7/18/2017 11/8/2017   Total Score 18 - -   Total Score - 5 5          Functional Assessment  Questionnaire -5  Physical Functional Ability Questionnaire:    Instructions: Select the number (1-4) in each of the groups that best summarizes ability. Add the numbers and multiply by 5 for a total score out of 100.         Self-care ability assessment: 3  1. Require total care: for bathing, toilet, dressing, moving, and eating  2. Require frequent assistance  3. Require occasional assistance  4. Independent with self-care      Family and social ability assessment: 2  1. Unable to perform any: chores, hobbies, driving, sex, and social activities  2. Able to perform some  3. Able to perform many  4. Able to perform all      Movement ability assessment: 1  1. Able to get up and walk with assistance, unable to climb stairs  2. Able to get up and walk independently, able to climb one flight of  "stairs  3. Able to walk short distances and climb more than one flight of stairs  4. Able to walk long distances and climb stairs without difficulty      Lifting Ability Assessment: 1  1. Able to lift up to 10 lbs. occasionally  2. Able to lift up to 20 lbs. occasionally  3. Able to lift up to 50 lbs. occasionally  4. Able to lift over 50 lbs. occasionally      Work Ability Assessment: 1  1. Unable to do any work  2. Able to work part-time and with physical limitations  3. Able to work part-time or with physical limitations  4. Able to perform normal work    Physical Functional Ability (FAQ5) Score: 40/100        FUNCTIONAL ASSESSMENT QUESTIONNAIRE SCORE 11/8/2017 4/25/2018   Total Score 30 40        Problem, Medication and Allergy Lists were reviewed and are current..    Patient is an established patient of this clinic..    5 point ROS is negative, other than noted above.         Physical Exam:     Vitals:    05/31/18 0853   BP: 109/69   Pulse: 70   Temp: 97.8  F (36.6  C)   SpO2: 98%   Weight: 184 lb 3.2 oz (83.6 kg)   Height: 5' 4.5\" (163.8 cm)     Body mass index is 31.13 kg/(m^2).  General: Alert, appropriate, and cooperative.  Appears stated age.  In no acute distress. Painful with right shoulder ROM.  HEENT:  Atraumatic.  Pupils equal, round, and reactive bilaterally.  Extraocular movements intact. MMM.  Neck:  Supple.  No adenopathy.    CV:  Regular rhythm and rate.  No murmurs, rubs, or gallops.  Lungs:  Clear to auscultation bilaterally.  No wheezes, rhonchi, or rales.  Abd:  Soft, non-distended, non-tender.  Bowel sounds present.    Ext:  Trace LE edema bilaterally.  MSK:  Patient needs to assist herself with active ROM of right shoulder.  With assistance, can get to 190 degrees of abduction and 190 degrees of extension and flexion.  In obvious pain with ROM.      Results:     Results for IDALIA SABA (MRN [id number]) as of 5/31/2018 09:20   Ref. Range 5/31/2018 08:55   Buprenorphine Qual Urine " Latest Ref Range: NEGATIVE  NEGATIVE   Methamphetamine Qual Latest Ref Range: NEGATIVE  NEGATIVE   Morphine Qual Latest Ref Range: NEGATIVE  NEGATIVE   Oxycodone Qual Latest Ref Range: NEGATIVE  NEGATIVE   Amphetamines Qual Latest Ref Range: NEGATIVE  NEGATIVE   Methadone Qual Latest Ref Range: NEGATIVE  NEGATIVE   Cocaine Qual Urine Latest Ref Range: NEGATIVE  NEGATIVE   Cannabinoids Qual Urine Latest Ref Range: NEGATIVE  NEGATIVE   Barbiturates Qual Urine Latest Ref Range: NEGATIVE  NEGATIVE   Benzodiazepine Qual Urine Latest Ref Range: NEGATIVE  NEGATIVE   Temperature of Urine was Between  Degrees F Latest Ref Range: YES  YES       Assessment and Plan    Shira Guthrie is here for follow up of chronic pain caused by right shoulder impingement, multiple joint arthritis, polyneuropathy.  Pt currently has a functional status FAQ 5  of 40.    Shira was seen today for pain.    Diagnoses and all orders for this visit:    Chronic pain syndrome  Other polyneuropathy  Primary osteoarthritis involving multiple joints  Patient typically sees Dr. lane for management of her chronic pain syndrome secondary to polyneuropathy, osteoarthritis of multiple joints, right shoulder impingement.  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  Primary care provider recommended refilling tramadol twice a day as needed for the next 30 days.  PCP discontinued oxycodone, as patient is doing better with physical therapy.   Still has not scheduled orthopedics appointment with Myrna Dao yet, but will do this soon.  is appropriate.  UDS negative, but tramadol often has false negatives.  -     Rapid Urine Drug Screen (Labdaq)  -     traMADol (ULTRAM) 50 MG tablet; Take 1 tablet (50 mg) by mouth 2 times daily as needed, 60 tablets provided    Exercise discussed and patient      Naloxone WILL NOT be prescribed.       If opioids prescribed patient was asked  to bring pill bottle to each appointment and was informed that refills would only be provided at office visits.   Asked patient to F/U in 1 month(s) with primary care provider for 20 minute  Visit.    Staffed with Dr. Watt.     Shirley Lorenzo MD PGY-3  [facility]  5/31/2018

## 2018-05-31 NOTE — MR AVS SNAPSHOT
After Visit Summary   2018    Shira Guthrie    MRN: [id number]           Patient Information     Date Of Birth          1955        Visit Information        Provider Department      2018 8:40 AM Shirley Lorenzo MD [facility]        Today's Diagnoses     Chronic pain syndrome    -  1    Other polyneuropathy        Primary osteoarthritis involving multiple joints        Chronic constipation        Chronic ischemic heart disease          Care Instructions    For chronic pain;  1. Continue tramadol 50 mg twice daily as needed  2. Continue to do physical therapy  3. Follow up with Myrna Dao   Call to schedule appointment at [facility] [phone]    Follow up with Dr. Pierre and Dr. Sky in July as discussed          Follow-ups after your visit        Your next 10 appointments already scheduled     2018  1:10 PM CDT   Return Visit with Sally Pierre MD   [facility] ([facility])    [address] [place] [id number]   [phone]              Who to contact     Please call your clinic at [phone] to:    Ask questions about your health    Make or cancel appointments    Discuss your medicines    Learn about your test results    Speak to your doctor            Additional Information About Your Visit        MyChart Information     [employer] is an electronic gateway that provides easy, online access to your medical records. With [employer], you can request a clinic appointment, read your test results, renew a prescription or communicate with your care team.     To sign up for [employer] visit the website at [email or web address]   You will be asked to enter the access code listed below, as well as some personal information. Please follow the directions to create your username and password.     Your access code is: [email or web address]  Expires: 7/10/2018  9:31 AM     Your access code will  in 90 days. If you need help or a new code, please contact your  "[facility] or call [phone] for assistance.        Care EveryWhere ID     This is your Care EveryWhere ID. This could be used by other organizations to access your [place] medical records  [id number]        Your Vitals Were     Pulse Temperature Height Pulse Oximetry BMI (Body Mass Index)       70 97.8  F (36.6  C) 5' 4.5\" (163.8 cm) 98% 31.13 kg/m2        Blood Pressure from Last 3 Encounters:   05/31/18 109/69   04/25/18 127/75   04/11/18 112/73    Weight from Last 3 Encounters:   05/31/18 184 lb 3.2 oz (83.6 kg)   05/02/18 184 lb 12.8 oz (83.8 kg)   04/25/18 185 lb 9.6 oz (84.2 kg)              We Performed the Following     Rapid Urine Drug Screen ([facility])          Today's Medication Changes          These changes are accurate as of 5/31/18  9:17 AM.  If you have any questions, ask your nurse or doctor.               These medicines have changed or have updated prescriptions.        Dose/Directions    traMADol 50 MG tablet   Commonly known as:  ULTRAM   This may have changed:  when to take this   Used for:  Chronic pain syndrome, Other polyneuropathy, Primary osteoarthritis involving multiple joints   Changed by:  Shirley Lorenzo MD        Dose:  50 mg   Take 1 tablet (50 mg) by mouth 2 times daily as needed   Quantity:  60 tablet   Refills:  0            Where to get your medicines      Some of these will need a paper prescription and others can be bought over the counter.  Ask your nurse if you have questions.     Bring a paper prescription for each of these medications     traMADol 50 MG tablet               Information about OPIOIDS     PRESCRIPTION OPIOIDS: WHAT YOU NEED TO KNOW   You have a prescription for an opioid (narcotic) pain medicine. Opioids can cause addiction. If you have a history of chemical dependency of any type, you are at a higher risk of becoming addicted to opioids. Only take this medicine after all other options have been tried. Take it " for as short a time and as few doses as possible.     Do not:    Drive. If you drive while taking these medicines, you could be arrested for driving under the influence (DUI).    Operate heavy machinery    Do any other dangerous activities while taking these medicines.     Drink any alcohol while taking these medicines.      Take with any other medicines that contain acetaminophen. Read all labels carefully. Look for the word  acetaminophen  or  Tylenol.  Ask your pharmacist if you have questions or are unsure.    Store your pills in a secure place, locked if possible. We will not replace any lost or stolen medicine. If you don t finish your medicine, please throw away (dispose) as directed by your pharmacist. The [place] Pollution Control Agency has more information about safe disposal: https://www.pca.[facility].[place].us/living-green/managing-unwanted-medications    All opioids tend to cause constipation. Drink plenty of water and eat foods that have a lot of fiber, such as fruits, vegetables, prune juice, apple juice and high-fiber cereal. Take a laxative (Miralax, milk of magnesia, Colace, Senna) if you don t move your bowels at least every other day.          Primary Care Provider Office Phone # Fax #    Sally Pierre [phone]       [address] [place] [address]        Equal Access to Services     KASANDRA VALLECILLO AH: Hadii aad ku hadasho Somirianali, waaxda luqadaha, qaybta kaalmada adeegyada, una tafoya. So [facility] [phone].    ATENCIÓN: Si habla español, tiene a monson disposición servicios gratuitos de asistencia lingüística. Llame al [phone].    We comply with applicable federal civil rights laws and [place] laws. We do not discriminate on the basis of race, color, national origin, age, disability, sex, sexual orientation, or gender identity.            Thank you!     Thank you for choosing [facility]  for your care. Our goal is always to provide you with  excellent care. Hearing back from our patients is one way we can continue to improve our services. Please take a few minutes to complete the written survey that you may receive in the mail after your visit with us. Thank you!             Your Updated Medication List - Protect others around you: Learn how to safely use, store and throw away your medicines at www.disposemymeds.org.          This list is accurate as of 5/31/18  9:17 AM.  Always use your most recent med list.                   Brand Name Dispense Instructions for use Diagnosis    aspirin 81 MG EC tablet     90 tablet    Take 1 tablet (81 mg) by mouth daily    Coronary artery disease involving native coronary artery of native heart with angina pectoris (H)       baclofen 10 MG tablet    LIORESAL    90 tablet    Take 1 tablet (10 mg) by mouth 3 times daily as needed for muscle spasms    Chronic pain syndrome, Primary osteoarthritis involving multiple joints       CANE, ANY MATERIAL     1 Device    1 Device by Device route as needed    Peripheral neuropathy, Leg edema, left, Stroke (H)       clopidogrel 75 MG tablet    PLAVIX    90 tablet    Take 1 tablet (75 mg) by mouth daily . Give name brand Plavix. Approved by insurance through 2/2/1016.    Chronic ischemic heart disease       dexlansoprazole 30 MG Cpdr CR capsule    DEXILANT    90 capsule    Take 1 capsule (30 mg) by mouth daily    Esophageal stricture       diphenhydrAMINE 25 MG tablet    BENADRYL    100 tablet    Take 1-2 tablets (25-50 mg) by mouth every 6 hours as needed for other (for congestion, cough)    Acute nasopharyngitis       docusate sodium 100 MG tablet    COLACE    60 tablet    Take 100 mg by mouth daily    Chronic constipation, Chronic pain syndrome       DULoxetine 60 MG EC capsule    CYMBALTA    90 capsule    Take 1 capsule (60 mg) by mouth daily    Major depressive disorder, recurrent, severe without psychotic features (H)       gabapentin 300 MG capsule    NEURONTIN    360  capsule    Take 2 capsules (600 mg) by mouth 2 times daily    Other polyneuropathy       Heating Pads Pads     1 each    Apply to painful areas prn.    Stroke (H), Peripheral neuropathy, Leg edema, left       isosorbide mononitrate 30 MG 24 hr tablet    IMDUR    90 tablet    Take 1 tablet (30 mg) by mouth daily    Chronic ischemic heart disease       LASIX 20 MG tablet   Generic drug:  furosemide      2 tablets a day per cardiology.    Lymphedema of left leg       linaclotide 290 MCG capsule    LINZESS    90 capsule    Take 1 capsule (290 mcg) by mouth every morning (before breakfast)    Chronic constipation       metoprolol succinate 25 MG 24 hr tablet    TOPROL-XL    90 tablet    Take 1 tablet (25 mg) by mouth daily    Benign essential hypertension       nitroGLYcerin 0.4 MG sublingual tablet    NITROSTAT    30 tablet    Place 1 tablet (0.4 mg) under the tongue every 5 minutes as needed    Anginal pain (H)       * order for DME     2 Device    Equipment being ordered: Jobst stockings thigh high 15-20mmHg.  Please measure for fit. 2 pairs.    Leg edema, left       * order for DME     2 Device    Bilateral compression stockings 15-20mmHg. One pair knee high and one pair thigh high.  Please measure for fit.    Lymphedema of left leg       polyethylene glycol powder    MIRALAX    510 g    Take 17 g (1 capful) by mouth 2 times daily    Chronic constipation       rosuvastatin 20 MG tablet    CRESTOR    90 tablet    Take 1 tablet (20 mg) by mouth daily    Coronary artery disease involving native coronary artery of native heart with angina pectoris (H)       traMADol 50 MG tablet    ULTRAM    60 tablet    Take 1 tablet (50 mg) by mouth 2 times daily as needed    Chronic pain syndrome, Other polyneuropathy, Primary osteoarthritis involving multiple joints       TYLENOL 325 MG tablet   Generic drug:  acetaminophen     100 tablet    Take 2 tablets (650 mg) by mouth daily        * Notice:  This list has 2 medication(s) that  are the same as other medications prescribed for you. Read the directions carefully, and ask your doctor or other care provider to review them with you.

## 2018-05-31 NOTE — PROGRESS NOTES
Preceptor Attestation:   Patient seen, evaluated and discussed with the resident. I have verified the content of the note, which accurately reflects my assessment of the patient and the plan of care.   Supervising Physician:  Jairo Watt MD

## 2018-06-01 DIAGNOSIS — I25.9 CHRONIC ISCHEMIC HEART DISEASE: ICD-10-CM

## 2018-06-07 RX ORDER — CLOPIDOGREL BISULFATE 75 MG
75 TABLET ORAL DAILY
Qty: 90 TABLET | Refills: 3 | Status: SHIPPED | OUTPATIENT
Start: 2018-06-07 | End: 2019-08-07

## 2018-06-07 RX ORDER — CLOPIDOGREL BISULFATE 75 MG/1
75 TABLET ORAL DAILY
Qty: 90 TABLET | Refills: 3 | Status: CANCELLED | OUTPATIENT
Start: 2018-06-07

## 2018-06-21 ENCOUNTER — TELEPHONE (OUTPATIENT)
Dept: FAMILY MEDICINE | Facility: CLINIC | Age: 63
End: 2018-06-21

## 2018-06-21 NOTE — TELEPHONE ENCOUNTER
[facility] phone call message- general phone call:    Reason for call: The Pt called to ask if the Pa was done yet and if the nurse could call her back     Return call needed: Yes    OK to leave a message on voice mail? Yes    Primary language: English      needed? No    Call taken on June 21, 2018 at 9:45 AM by aKrel Diaz

## 2018-06-26 ENCOUNTER — TELEPHONE (OUTPATIENT)
Dept: FAMILY MEDICINE | Facility: CLINIC | Age: 63
End: 2018-06-26

## 2018-06-26 NOTE — TELEPHONE ENCOUNTER
[facility] phone call message- general phone call:    Reason for call: She is out of town and had a fall. She went to the hospital she wants to talk to  or a nurse .    Return call needed: Yes    OK to leave a message on voice mail? Yes    Primary language: English      needed? No    Call taken on June 26, 2018 at 8:44 AM by Earline Curry

## 2018-06-26 NOTE — TELEPHONE ENCOUNTER
Aditi called back to asked if Dr. Pierre could place an order for pain clinic from injuries sustained from her fall in [place].  Please advise.  Neva De Paz

## 2018-06-26 NOTE — TELEPHONE ENCOUNTER
Patient states she fell at a  gas station on Saturday in [place] and injured her left arm, busted her lip and also has some burning in her right arm. She was seen in the ER in [place] and she was dx'd with a bruised fracture between her wrist and her elbow. She was given a splint and was put on some pain medications.  States she has the print out of her results from the ER and a disk image of her xray.    States she has an appt to see Dr. Pierre when she returns and to see Dr. Smith who is the provider who took the steel out of her left arm of a prior injury. /LATANYA Guthrie        Routed to Dr. Pierre

## 2018-07-02 ENCOUNTER — TELEPHONE (OUTPATIENT)
Dept: FAMILY MEDICINE | Facility: CLINIC | Age: 63
End: 2018-07-02

## 2018-07-02 DIAGNOSIS — G89.4 CHRONIC PAIN SYNDROME: Primary | ICD-10-CM

## 2018-07-02 DIAGNOSIS — I89.0 LYMPHEDEMA OF LEFT LEG: ICD-10-CM

## 2018-07-02 DIAGNOSIS — M75.41 ROTATOR CUFF IMPINGEMENT SYNDROME OF RIGHT SHOULDER: ICD-10-CM

## 2018-07-02 DIAGNOSIS — S52.92XG: ICD-10-CM

## 2018-07-02 DIAGNOSIS — M15.0 PRIMARY OSTEOARTHRITIS INVOLVING MULTIPLE JOINTS: ICD-10-CM

## 2018-07-02 NOTE — TELEPHONE ENCOUNTER
Please call pt:  I'm covering for Dr Pierre but met pt when she was in with Dr Lorenzo.  If she has a fracture that is now causing acute pain, that can be managed by the orthopedist she is seeing in [facility].    A pain specialist would be helpful for chronic pain, but this seems acute, so I don't understand the rationale for the referral, and I don't know of this pain specialist, either.

## 2018-07-02 NOTE — TELEPHONE ENCOUNTER
Patient called today asking for a referral to a pain clinic in [place], [place] for pain in the left arm from injury sustained from a fall. Below is the location and fax number to fax referral:    [facility] [address] [facility] [place]  [address]  PH:  [phone]  FAX:  [phone]  Dr. Mac Brink    Please place order for patient.  Routed to Dr. Watt due to Dr. Pierre on vacation.

## 2018-07-03 ENCOUNTER — TRANSFERRED RECORDS (OUTPATIENT)
Dept: HEALTH INFORMATION MANAGEMENT | Facility: CLINIC | Age: 63
End: 2018-07-03

## 2018-07-03 NOTE — TELEPHONE ENCOUNTER
S/w patient regarding Dr. Watt's note. Pt states she went to the ED in [place], [place] and was recently discharged today. She had a fall and sprained her left arm. ED recommended she sees ortho and pain clinic, per pt. Pt was discharged with tramadol for pain, has not picked this up yet. Pt had tramadol before and it did not help. Pt would like referral to the local pain clinic since it is easier (distance wise) to get to a pain clinic and ED than any other health care centers and she has complicated history of chronic pain, pt prefers to go to pain clinic. Pt states she already scheduled an ortho appt in [place] with Dr. Smith on 7/16 @ [email or web address]. She hasn't called to schedule any ortho appts in [place] yet because they are far from where she is residing currently. She plans on coming back to [place] on 7/12. She is very busy with [employer] events right now.    Advised to take the tramadol as prescribed for her acute pain and she can call the clinic if she has any concerns. Dr. Pierre will be back after 7/4 and can address pain clinic referral at that time if pt is okay with this.     Pt did sign LATONYA for the ED to release ED notes to Dr. Pierre. She was seen at [facility] in [place], [place]. Phone [phone].    Routed to Dr. Pierre and Dr. Watt. /LATANYA Sun

## 2018-07-05 NOTE — TELEPHONE ENCOUNTER
PAIN MANAGEMENT REFERRAL  Referral placed online with [facility] who will contact patient within 24 hours to set up appointment

## 2018-07-06 ENCOUNTER — DOCUMENTATION ONLY (OUTPATIENT)
Dept: FAMILY MEDICINE | Facility: CLINIC | Age: 63
End: 2018-07-06

## 2018-07-06 DIAGNOSIS — G89.4 CHRONIC PAIN SYNDROME: ICD-10-CM

## 2018-07-06 NOTE — PROGRESS NOTES
Bedside opioid-overdose calculator (RIOSORD)      In the past six months, has your patient had an outpatient,          Points for  inpatient or ED visit for any of the following:           'yes' answer    Substance use disorder (addiction) of any kind,     25   including alcohol and cannabis?  Bipolar disorder or schizophrenia?       10  Stroke or other cerebrovascular disease?          9  Signifi cant chronic kidney disease?         8  Heart failure?           7  Nonmalignant pancreatic disease?         7  Chronic pulmonary disease?          5  Chronic headache?           5  Does your patient take:  Fentanyl (transdermal or transmucosal)?      13  Morphine?          11  Methadone?          10  Hydromorphone?           7  Extended-release or long-acting (ER/LA) opioid?          5  Benzodiazepine?           9  Antidepressant?           8   >100 mg MME/day?                      7  TOTAL   17    Average probability of overdose or serious opioid-induced respiratory depression in the next six months   Points    Risk   0-4     2%   5-7     5%   8-9     7%   10-17     15%   18-25     30%   26-41     55%   42     83%

## 2018-07-09 ENCOUNTER — TELEPHONE (OUTPATIENT)
Dept: FAMILY MEDICINE | Facility: CLINIC | Age: 63
End: 2018-07-09

## 2018-07-09 NOTE — TELEPHONE ENCOUNTER
Received phone call from Shannon stating she reached out to patient for scheduling and patient declined services at this time.

## 2018-07-13 NOTE — TELEPHONE ENCOUNTER
FUTURE VISIT INFORMATION      FUTURE VISIT INFORMATION:    Date: 7/20/18    Time:     Location: [facility]  REFERRAL INFORMATION:    Referring provider:  Sally Pierre    Referring providers clinic:      Reason for visit/diagnosis  Chronic pain syndrome. Appt per Pt. Sally Pierre referring    RECORDS REQUESTED FROM:       Clinic name Comments Records Status Imaging Status    Sally Pierre internal internal                                   RECORDS STATUS

## 2018-07-17 NOTE — TELEPHONE ENCOUNTER
PAIN MANAGEMENT REFERRAL:  [address]  4[address]  [facility]  [place] [address]  [phone]  Date:  Tuesday July 24, 2018  Time:  11:30 AM with Dr. Hagen  Patient aware of appointment.  Neva De Paz  7/17/18    ORTHOPEDICS APPOINTMENT:  [facility]  [address] [place] [address]  [phone]  Date:  Monday July 30, 2018  Time:  10:30 AM with Dr. Smith  Patient aware of appointment.  Neva De Paz  7/17/18

## 2018-07-20 ENCOUNTER — PRE VISIT (OUTPATIENT)
Dept: ANESTHESIOLOGY | Facility: CLINIC | Age: 63
End: 2018-07-20

## 2018-07-24 ENCOUNTER — OFFICE VISIT (OUTPATIENT)
Dept: ANESTHESIOLOGY | Facility: CLINIC | Age: 63
End: 2018-07-24
Payer: MEDICARE

## 2018-07-24 VITALS — SYSTOLIC BLOOD PRESSURE: 116 MMHG | DIASTOLIC BLOOD PRESSURE: 71 MMHG | HEART RATE: 68 BPM

## 2018-07-24 DIAGNOSIS — M15.0 PRIMARY OSTEOARTHRITIS INVOLVING MULTIPLE JOINTS: ICD-10-CM

## 2018-07-24 DIAGNOSIS — I89.0 LYMPHEDEMA OF LEFT LEG: ICD-10-CM

## 2018-07-24 DIAGNOSIS — G89.4 CHRONIC PAIN SYNDROME: ICD-10-CM

## 2018-07-24 DIAGNOSIS — S52.92XG: ICD-10-CM

## 2018-07-24 DIAGNOSIS — M75.41 ROTATOR CUFF IMPINGEMENT SYNDROME OF RIGHT SHOULDER: ICD-10-CM

## 2018-07-24 ASSESSMENT — ENCOUNTER SYMPTOMS
WEIGHT GAIN: 1
HEARTBURN: 1
CHILLS: 0
JAUNDICE: 0
HOT FLASHES: 1
CONSTIPATION: 1
PALPITATIONS: 1
ORTHOPNEA: 0
NAIL CHANGES: 0
DIARRHEA: 1
SINUS PAIN: 0
WEIGHT LOSS: 1
LIGHT-HEADEDNESS: 1
SINUS CONGESTION: 0
TINGLING: 1
SMELL DISTURBANCE: 0
HYPERTENSION: 1
ABDOMINAL PAIN: 1
EXERCISE INTOLERANCE: 0
MEMORY LOSS: 0
MUSCLE CRAMPS: 1
SYNCOPE: 0
MUSCLE WEAKNESS: 1
INCREASED ENERGY: 1
DEPRESSION: 1
JOINT SWELLING: 1
NECK PAIN: 1
SEIZURES: 0
DISTURBANCES IN COORDINATION: 1
NECK MASS: 1
DECREASED LIBIDO: 0
BLOOD IN STOOL: 0
BACK PAIN: 1
POOR WOUND HEALING: 0
NUMBNESS: 1
HYPOTENSION: 0
NAUSEA: 1
POLYDIPSIA: 1
NERVOUS/ANXIOUS: 1
TROUBLE SWALLOWING: 1
LOSS OF CONSCIOUSNESS: 0
DECREASED APPETITE: 1
POLYPHAGIA: 0
WEAKNESS: 1
ALTERED TEMPERATURE REGULATION: 0
TASTE DISTURBANCE: 0
ARTHRALGIAS: 1
MYALGIAS: 1
FATIGUE: 1
NIGHT SWEATS: 1
VOMITING: 0
SLEEP DISTURBANCES DUE TO BREATHING: 0
HOARSE VOICE: 0
INSOMNIA: 1
RECTAL PAIN: 0
BLOATING: 1
TREMORS: 1
DECREASED CONCENTRATION: 1
SPEECH CHANGE: 0
SORE THROAT: 0
HALLUCINATIONS: 0
LEG PAIN: 1
FEVER: 1
STIFFNESS: 1

## 2018-07-24 ASSESSMENT — ANXIETY QUESTIONNAIRES
4. TROUBLE RELAXING: MORE THAN HALF THE DAYS
GAD7 TOTAL SCORE: 6
3. WORRYING TOO MUCH ABOUT DIFFERENT THINGS: SEVERAL DAYS
1. FEELING NERVOUS, ANXIOUS, OR ON EDGE: SEVERAL DAYS
6. BECOMING EASILY ANNOYED OR IRRITABLE: SEVERAL DAYS
5. BEING SO RESTLESS THAT IT IS HARD TO SIT STILL: NOT AT ALL
2. NOT BEING ABLE TO STOP OR CONTROL WORRYING: SEVERAL DAYS
7. FEELING AFRAID AS IF SOMETHING AWFUL MIGHT HAPPEN: NOT AT ALL
7. FEELING AFRAID AS IF SOMETHING AWFUL MIGHT HAPPEN: NOT AT ALL

## 2018-07-24 ASSESSMENT — PAIN SCALES - GENERAL: PAINLEVEL: EXTREME PAIN (9)

## 2018-07-24 NOTE — LETTER
"7/24/2018       RE: Shira Guthrie  [address]  [address]  [place] [address]     Dear Colleague,    Thank you for referring your patient, Shira Guthrie, to the [facility] at [facility]. Please see a copy of my visit note below.    I had the pleasure of meeting Ms. Shira Guthrie on 7/24/2018 in the outpatient pain clinic in consult for Dr. Pierre with regards to her chronic pain syndrome.   HPI:  Patient presents with past medical history of synovial sarcoma, lymphedema of left leg, HTN, PAD, rotator cuff syndrome of right shoulder, osteoporosis and polyneuropathy; she is here today for evaluation of chronic pain.  She notes her most prominent complaints as neuropathy of lower extremities which she describes as a burning sensation localized to the dorsal and plantar aspect of bilateral feet, her history of a left humerus fracture with continued pain along forearm and \"locking\", her lower extremity lymphedema for which she does wrapping and attempts to remain off her feet, and her history of a right rotator cuff repair in January 2018.  She states she experienced a fall on June 23 of this year in [place] which greatly aggravated her pain.  She describes her pain as 24/7.  Current and past therapies include psychology with focus on pain coping mechanisms; physical therapy for neck shoulder and arms which she continues to participate in home exercise program; patient plans to soon begin yoga.  She also states that her spiritual believes strongly assist her in managing her chronic pain.  In regards to medications, she takes gabapentin 600 mg at bedtime but states she will increase by 1-2 pills if she is awakening in pain.  She also has taken tramadol 1-2 per day as needed which she believes has worked well, however, patient states she was previously on a regimen of oxycodone \"tends \"which she found to be more helpful.  She notes " that she has an appointment with orthopedics tomorrow for consult.       ?  Past Medical History:   Diagnosis Date     Cancer (H)      Coronary artery disease      CVA (cerebral infarction)      Depressive disorder      History of blood transfusion      Hypertension     past medical history reviewed with patient.   Past Surgical History:   Procedure Laterality Date     APPENDECTOMY  1968     C REPAIR VAGINAL PROCTOCOLECTOMY SPACE FISTULA  1976    9 procedures in 6 months     EXCISE MASS WRIST  2012    right wrist sarcoma rmoved     HC LAP,LYSIS OF ADHESIONS  2000     HYSTERECTOMY TOTAL ABDOMINAL  2000    ovaries still in place     OPEN REDUCTION INTERNAL FIXATION FOREARM  2011    left elbow     RELEASE CARPAL TUNNEL  2012    right     TAKEDOWN COLOSTOMY  1976    past surgical history reviewed with patient.   Medications:  Current Outpatient Prescriptions   Medication     acetaminophen (TYLENOL) 325 MG tablet     aspirin 81 MG EC tablet     baclofen (LIORESAL) 10 MG tablet     CANE, ANY MATERIAL     clopidogrel (PLAVIX) 75 MG tablet     dexlansoprazole (DEXILANT) 30 MG CPDR CR capsule     diphenhydrAMINE (BENADRYL) 25 MG tablet     docusate sodium (COLACE) 100 MG tablet     DULoxetine (CYMBALTA) 60 MG EC capsule     furosemide (LASIX) 20 MG tablet     gabapentin (NEURONTIN) 300 MG capsule     Heating Pads PADS     isosorbide mononitrate (IMDUR) 30 MG 24 hr tablet     linaclotide (LINZESS) 290 MCG capsule     metoprolol succinate (TOPROL-XL) 25 MG 24 hr tablet     order for DME     order for DME     PLAVIX 75 MG tablet     polyethylene glycol (MIRALAX) powder     rosuvastatin (CRESTOR) 20 MG tablet     traMADol (ULTRAM) 50 MG tablet     nitroGLYcerin (NITROSTAT) 0.4 MG sublingual tablet     No current facility-administered medications for this visit.      A multi-state Prescription Monitoring Program reviewed and no aberrancies noted.     Allergies:     Allergies   Allergen Reactions     Jerry Aspirin      FULL DOSE -  gives her dry heeves     Penicillins Itching     Gives her the dry heeves     Family History:  family history includes Alcohol/Drug in her father; C.A.D. in her brother and mother; Cerebrovascular Disease in her sister; Diabetes in her mother; GASTROINTESTINAL DISEASE in her father; HIV/AIDS in her brother; Hypertension in her father and mother; Respiratory in her mother. There is no history of Coronary Artery Disease, Hyperlipidemia, Breast Cancer, Colon Cancer, Prostate Cancer, Other Cancer, Depression, Anxiety Disorder, Mental Illness, Substance Abuse, Anesthesia Reaction, Asthma, Osteoperosis, Genetic Disorder, Thyroid Disease, Obesity, or Unknown/Adopted.  Social history: she lives in [place].  She is disabled.    Smoking: former smoker. Alcohol: denies. Street drugs: denies.     ROS:  A 14 point review of systems was completed; results are listed at end of note.     Objective:   /71 (BP Location: Left arm, Patient Position: Sitting, Cuff Size: Adult Regular)  Pulse 68  There is no height or weight on file to calculate BMI.  General: In no apparent distress  Mental status: Normal mood and affect, pleasant  Neuro: Alert, oriented. No sensory deficits.   Head: Atraumatic, normocephalic  Eyes: Extra-ocular movements grossly intact, no scleral icterus  Neck: Supple, Range of motion full  Respiratory: Non-labored breathing; No respiratory distress  Skin: Bruising noted along left forearm and proximal to olecranon; no further lesions observed. Well healed scar at right shoulder.   Msk:   Patient unable to fully pronate or supinate her left forearm; CMS intact. Grasp strength 5/5 bilaterally. Patient keeps left arm at 90 degree flexion for comfort. Gait is antalgic, but symmetric.     Imaging:   Recent imaging studies performed in [place]: Bilateral knee XR, CT cervical spine, CT brain, right shoulder XR, left elbow XR. As these are on a disc, I was unable to review during our visit.     Assessment:  Ms.  "Shira Guthrie is a 61 yo female seen today for multiple musculoskeletal issues contributing to her chronic pain: she demonstrates multi-joint osteoarthritis, history of lower extremity lymphedema, history of right rotator cuff syndrome of right shoulder, myofascial neck pain, and left olecranon pain and dysfunction.     Plan:   1. Patient education: I went over the above diagnoses and treatment plan with her and answered all of her questions.  2. Imaging review: I was unable to review the images with patient today as she presents a disc from [place]; she has an appointment with Dr. Smith in orthopedics tomorrow, so I did elect to not take the disc from her possession to sent to health information for downloading and filing to system; Dr. Smith will likely want to review imaging prior to health records accomplishing this.   3. Interventions: None indicated at this time. LATONYA signed for [facility] radiology to obtain cervical and lumbar imaging studies.   4. Medications  1. Trial topical diclofenac 1% gel to painful joint areas qid prn.   -PCP may consider alteration of gabapentin dosing to bid/tid as tolerated; be watchful for increased drowsiness. Patient does not appear to be taking a consistent dose of 600 mg at night- often self-escalating to 1200 mg at night.   -Patient is requesting addition of \"oxycodone 10's\" to her medication regimen; I advised that she must discuss this with her primary care provider as I'm uncertain of concerns with abhorrent behavior. UDS are absent of tramadol; there was a positive morphine result with 4/2018 UDS. I would recommend ordering of [id number] for continued urine drug screenings as this will offer a quantitative and qualitative urine drug screening. Typically, I would not recommend continuance of opioid medications for chronic musculoskeletal pain, however, I will leave this at the discretion of primary care provider. Tramadol appears to be dosed appropriately and with improved " function in patient.   5. Exercise program: I did discuss return to physical therapy for new onset pain; patient declines today, stating she persists with her HEP from former therapy sessions.   6. Behavioral Psychology: Would recommend integration with pain psychology; this was not discussed within our visit today.   7. Follow up: Return in 3 months or as needed.     Total time spent was 40 minutes, and more than 50% of face to face time was spent in counseling     Again, thank you for allowing me to participate in the care of your patient.      Sincerely,    ROWENA Middleton CNP

## 2018-07-24 NOTE — NURSING NOTE
LPN reviewed AVS with Pt.  Pt verbalized an understanding of information, and was asked to contact clinic with questions.    Eula Leon LPN

## 2018-07-24 NOTE — PROGRESS NOTES
"I had the pleasure of meeting Ms. Shira Guthrie on 7/24/2018 in the outpatient pain clinic in consult for Dr. Pierre with regards to her chronic pain syndrome.   HPI:  Patient presents with past medical history of synovial sarcoma, lymphedema of left leg, HTN, PAD, rotator cuff syndrome of right shoulder, osteoporosis and polyneuropathy; she is here today for evaluation of chronic pain.  She notes her most prominent complaints as neuropathy of lower extremities which she describes as a burning sensation localized to the dorsal and plantar aspect of bilateral feet, her history of a left humerus fracture with continued pain along forearm and \"locking\", her lower extremity lymphedema for which she does wrapping and attempts to remain off her feet, and her history of a right rotator cuff repair in January 2018.  She states she experienced a fall on June 23 of this year in [place] which greatly aggravated her pain.  She describes her pain as 24/7.  Current and past therapies include psychology with focus on pain coping mechanisms; physical therapy for neck shoulder and arms which she continues to participate in home exercise program; patient plans to soon begin yoga.  She also states that her spiritual believes strongly assist her in managing her chronic pain.  In regards to medications, she takes gabapentin 600 mg at bedtime but states she will increase by 1-2 pills if she is awakening in pain.  She also has taken tramadol 1-2 per day as needed which she believes has worked well, however, patient states she was previously on a regimen of oxycodone \"tends \"which she found to be more helpful.  She notes that she has an appointment with orthopedics tomorrow for consult.       ?  Past Medical History:   Diagnosis Date     Cancer (H)      Coronary artery disease      CVA (cerebral infarction)      Depressive disorder      History of blood transfusion      Hypertension     past medical history reviewed with patient.   Past " Surgical History:   Procedure Laterality Date     APPENDECTOMY  1968     C REPAIR VAGINAL PROCTOCOLECTOMY SPACE FISTULA  1976    9 procedures in 6 months     EXCISE MASS WRIST  2012    right wrist sarcoma rmoved     HC LAP,LYSIS OF ADHESIONS  2000     HYSTERECTOMY TOTAL ABDOMINAL  2000    ovaries still in place     OPEN REDUCTION INTERNAL FIXATION FOREARM  2011    left elbow     RELEASE CARPAL TUNNEL  2012    right     TAKEDOWN COLOSTOMY  1976    past surgical history reviewed with patient.   Medications:  Current Outpatient Prescriptions   Medication     acetaminophen (TYLENOL) 325 MG tablet     aspirin 81 MG EC tablet     baclofen (LIORESAL) 10 MG tablet     CANE, ANY MATERIAL     clopidogrel (PLAVIX) 75 MG tablet     dexlansoprazole (DEXILANT) 30 MG CPDR CR capsule     diphenhydrAMINE (BENADRYL) 25 MG tablet     docusate sodium (COLACE) 100 MG tablet     DULoxetine (CYMBALTA) 60 MG EC capsule     furosemide (LASIX) 20 MG tablet     gabapentin (NEURONTIN) 300 MG capsule     Heating Pads PADS     isosorbide mononitrate (IMDUR) 30 MG 24 hr tablet     linaclotide (LINZESS) 290 MCG capsule     metoprolol succinate (TOPROL-XL) 25 MG 24 hr tablet     order for DME     order for DME     PLAVIX 75 MG tablet     polyethylene glycol (MIRALAX) powder     rosuvastatin (CRESTOR) 20 MG tablet     traMADol (ULTRAM) 50 MG tablet     nitroGLYcerin (NITROSTAT) 0.4 MG sublingual tablet     No current facility-administered medications for this visit.      A multi-state Prescription Monitoring Program reviewed and no aberrancies noted.     Allergies:     Allergies   Allergen Reactions     Jerry Aspirin      FULL DOSE - gives her dry heeves     Penicillins Itching     Gives her the dry heeves     Family History:  family history includes Alcohol/Drug in her father; C.A.D. in her brother and mother; Cerebrovascular Disease in her sister; Diabetes in her mother; GASTROINTESTINAL DISEASE in her father; HIV/AIDS in her brother; Hypertension  in her father and mother; Respiratory in her mother. There is no history of Coronary Artery Disease, Hyperlipidemia, Breast Cancer, Colon Cancer, Prostate Cancer, Other Cancer, Depression, Anxiety Disorder, Mental Illness, Substance Abuse, Anesthesia Reaction, Asthma, Osteoperosis, Genetic Disorder, Thyroid Disease, Obesity, or Unknown/Adopted.  Social history: she lives in [place].  She is disabled.    Smoking: former smoker. Alcohol: denies. Street drugs: denies.     ROS:  A 14 point review of systems was completed; results are listed at end of note.     Objective:   /71 (BP Location: Left arm, Patient Position: Sitting, Cuff Size: Adult Regular)  Pulse 68  There is no height or weight on file to calculate BMI.  General: In no apparent distress  Mental status: Normal mood and affect, pleasant  Neuro: Alert, oriented. No sensory deficits.   Head: Atraumatic, normocephalic  Eyes: Extra-ocular movements grossly intact, no scleral icterus  Neck: Supple, Range of motion full  Respiratory: Non-labored breathing; No respiratory distress  Skin: Bruising noted along left forearm and proximal to olecranon; no further lesions observed. Well healed scar at right shoulder.   Msk:   Patient unable to fully pronate or supinate her left forearm; CMS intact. Grasp strength 5/5 bilaterally. Patient keeps left arm at 90 degree flexion for comfort. Gait is antalgic, but symmetric.     Imaging:   Recent imaging studies performed in [place]: Bilateral knee XR, CT cervical spine, CT brain, right shoulder XR, left elbow XR. As these are on a disc, I was unable to review during our visit.     Assessment:  Ms. Shira Guthrie is a 61 yo female seen today for multiple musculoskeletal issues contributing to her chronic pain: she demonstrates multi-joint osteoarthritis, history of lower extremity lymphedema, history of right rotator cuff syndrome of right shoulder, myofascial neck pain, and left olecranon pain and dysfunction.  "    Plan:   1. Patient education: I went over the above diagnoses and treatment plan with her and answered all of her questions.  2. Imaging review: I was unable to review the images with patient today as she presents a disc from [place]; she has an appointment with Dr. Smith in orthopedics tomorrow, so I did elect to not take the disc from her possession to sent to health information for downloading and filing to system; Dr. Smith will likely want to review imaging prior to health records accomplishing this.   3. Interventions: None indicated at this time. LATONYA signed for [facility] radiology to obtain cervical and lumbar imaging studies.   4. Medications  1. Trial topical diclofenac 1% gel to painful joint areas qid prn.   -PCP may consider alteration of gabapentin dosing to bid/tid as tolerated; be watchful for increased drowsiness. Patient does not appear to be taking a consistent dose of 600 mg at night- often self-escalating to 1200 mg at night.   -Patient is requesting addition of \"oxycodone 10's\" to her medication regimen; I advised that she must discuss this with her primary care provider as I'm uncertain of concerns with abhorrent behavior. UDS are absent of tramadol; there was a positive morphine result with 4/2018 UDS. I would recommend ordering of [id number] for continued urine drug screenings as this will offer a quantitative and qualitative urine drug screening. Typically, I would not recommend continuance of opioid medications for chronic musculoskeletal pain, however, I will leave this at the discretion of primary care provider. Tramadol appears to be dosed appropriately and with improved function in patient.   5. Exercise program: I did discuss return to physical therapy for new onset pain; patient declines today, stating she persists with her HEP from former therapy sessions.   6. Behavioral Psychology: Would recommend integration with pain psychology; this was not discussed within our visit today. "   7. Follow up: Return in 3 months or as needed.     Total time spent was 40 minutes, and more than 50% of face to face time was spent in counseling and/or coordination of care regarding the above plan.    Thank you for the consult.   ROWENA Middleton, [facility]              Answers for HPI/ROS submitted by the patient on 7/24/2018   General Symptoms: Yes  Skin Symptoms: Yes  HENT Symptoms: Yes  EYE SYMPTOMS: No  HEART SYMPTOMS: Yes  LUNG SYMPTOMS: No  INTESTINAL SYMPTOMS: Yes  URINARY SYMPTOMS: No  GYNECOLOGIC SYMPTOMS: Yes  BREAST SYMPTOMS: No  SKELETAL SYMPTOMS: Yes  BLOOD SYMPTOMS: No  NERVOUS SYSTEM SYMPTOMS: Yes  MENTAL HEALTH SYMPTOMS: Yes  Fever: Yes  Loss of appetite: Yes  Weight loss: Yes  Weight gain: Yes  Fatigue: Yes  Night sweats: Yes  Chills: No  Excessive hunger: No  Excessive thirst: Yes  Feeling hot or cold when others believe the temperature is normal: No  Loss of height: No  Surgical site pain: Yes  Hallucinations: No  Change in or Loss of Energy: Yes  Changes in hair: No  Itching: Yes  Rashes: Yes  Changes in nails: No  Acne: Yes  Hair in places you don't want it: No  Change in facial hair: No  Warts: No  Non-healing sores: No  Scarring: No  Flaking of skin: No  Color changes of hands/feet in cold : Yes  Sun sensitivity: No  Skin thickening: No  Ear pain: No  Ear discharge: No  Tinnitus: Yes  Nosebleeds: Yes  Congestion: No  Sinus pain: No  Trouble swallowing: Yes   Voice hoarseness: No  Mouth sores: No  Sore throat: No  Tooth pain: No  Gum tenderness: Yes  Bleeding gums: No  Change in taste: No  Change in sense of smell: No  Dry mouth: Yes  Hearing aid used: No  Neck lump: Yes  Chest pain or pressure: Yes  Fast or irregular heartbeat: Yes  Pain in legs with walking: Yes  Trouble breathing while lying down: No  Fingers or toes appear blue: Yes  High blood pressure: Yes  Low blood pressure: No  Fainting: No  Murmurs: Yes  Pacemaker: No  Varicose  veins: No  Edema or swelling: Yes  Wake up at night with shortness of breath: No  Light-headedness: Yes  Exercise intolerance: No  Heart burn or indigestion: Yes  Nausea: Yes  Vomiting: No  Abdominal pain: Yes  Bloating: Yes  Constipation: Yes  Diarrhea: Yes  Blood in stool: No  Black stools: No  Rectal or Anal pain: No  Yellowing of skin or eyes: No  Vomit with blood: No  Change in stools: No  Back pain: Yes  Muscle aches: Yes  Neck pain: Yes  Swollen joints: Yes  Joint pain: Yes  Bone pain: Yes  Muscle cramps: Yes  Muscle weakness: Yes  Joint stiffness: Yes  Bone fracture: Yes  Trouble with coordination: Yes  Fainting or black-out spells: No  Memory loss: No  Seizures: No  Speech problems: No  Tingling: Yes  Tremor: Yes  Weakness: Yes  Difficulty walking: Yes  Numbness: Yes  Bleeding or spotting between periods: No  Heavy or painful periods: No  Irregular periods: No  Vaginal discharge: No  Hot flashes: Yes  Vaginal dryness: Yes  Genital ulcers: No  Reduced libido: No  Painful intercourse: Yes  Difficulty with sexual arousal: No  Nervous or Anxious: Yes  Depression: Yes  Trouble sleeping: Yes  Trouble thinking or concentrating: Yes  Mood changes: Yes  FAYE 7 TOTAL SCORE: 6  PHQ-2 Score: 1

## 2018-07-24 NOTE — PATIENT INSTRUCTIONS
1. Diclofenac 1 % gel- Apply small amount to the affected aime, three times a day as needed.     2. Medications recommendations will be made in the Providers note for your PCP to review.     3. Please Sign a Release of Information for [facility] for Lumbar and Cervical Imaging Studies.     Follow up: 3 months or as needed.       To speak with a nurse, schedule/reschedule/cancel a clinic appointment, or request a medication refill call: [phone]     You can also reach us by Glimpse: [email or web address]    For refills, please call on Monday, 1 week before your medication runs out. The doctors are not always in clinic, so this gives us time to get your prescriptions ready.  Please let us know the name of the medication you are requesting a refill of.

## 2018-07-24 NOTE — MR AVS SNAPSHOT
After Visit Summary   7/24/2018    Shira Guthrie    MRN: [id number]           Patient Information     Date Of Birth          1955        Visit Information        Provider Department      7/24/2018 11:30 AM Heike Hagen APRN [facility]        Today's Diagnoses     Chronic pain syndrome        Closed fracture of proximal end of left forearm with delayed healing, subsequent encounter        Primary osteoarthritis involving multiple joints        Rotator cuff impingement syndrome of right shoulder        Lymphedema of left leg          Care Instructions    1. Diclofenac 1 % gel- Apply small amount to the affected aime, three times a day as needed.     2. Medications recommendations will be made in the Providers note for your PCP to review.     3. Please Sign a Release of Information for [facility] Studies.     Follow up: 3 months or as needed.       To speak with a nurse, schedule/reschedule/cancel a clinic appointment, or request a medication refill call: [phone]     You can also reach us by [employer]: [email or web address]    For refills, please call on Monday, 1 week before your medication runs out. The doctors are not always in clinic, so this gives us time to get your prescriptions ready.  Please let us know the name of the medication you are requesting a refill of.                                     Follow-ups after your visit        Your next 10 appointments already scheduled     Jul 27, 2018  1:10 PM CDT   Return Visit with Sally Pierre MD   [facility])    [address] [place], [place] [address]   [phone]              Who to contact     Please call your clinic at [phone] to:    Ask questions about your health    Make or cancel appointments    Discuss your medicines    Learn about your test results    Speak to your doctor            Additional  Information About Your Visit        Harry [employer] Information     [employer] is an electronic gateway that provides easy, online access to your medical records. With Harry [employer] David, you can request a clinic appointment, read your test results, renew a prescription or communicate with your care team.     To sign up for [employer] David visit the website at [email or web address]   You will be asked to enter the access code listed below, as well as some personal information. Please follow the directions to create your username and password.     Your access code is: [email or web address]  Expires: 10/16/2018  6:30 AM     Your access code will  in 90 days. If you need help or a new code, please contact your [facility] or call [phone] for assistance.        Care EveryWhere ID     This is your Care EveryWhere ID. This could be used by other organizations to access your [facility] medical records  [id number]        Your Vitals Were     Pulse                   68            Blood Pressure from Last 3 Encounters:   18 116/71   18 109/69   18 127/75    Weight from Last 3 Encounters:   18 83.6 kg (184 lb 3.2 oz)   18 83.8 kg (184 lb 12.8 oz)   18 84.2 kg (185 lb 9.6 oz)              We Performed the Following     PAIN MANAGEMENT REFERRAL (External)        Primary Care Provider Office Phone # Fax #    Sally Pierre [phone]       [address] [place], [place] [address]        Equal Access to Services     KASANDRA VALLECILLO : Hadii aad ku hadasho Soomaali, waaxda luqadaha, qaybta kaalmada adeegyada, una mcbride . So [facility] [phone].    ATENCIÓN: Si habla español, tiene a monson disposición servicios gratuitos de asistencia lingüística. Llame al [phone].    We comply with applicable federal civil rights laws and [place] laws. We do not discriminate on the basis of race, color, national origin, age, disability, sex, sexual orientation, or  gender identity.            Thank you!     Thank you for choosing [facility]  for your care. Our goal is always to provide you with excellent care. Hearing back from our patients is one way we can continue to improve our services. Please take a few minutes to complete the written survey that you may receive in the mail after your visit with us. Thank you!             Your Updated Medication List - Protect others around you: Learn how to safely use, store and throw away your medicines at www.disposemymeds.org.          This list is accurate as of 7/24/18 12:41 PM.  Always use your most recent med list.                   Brand Name Dispense Instructions for use Diagnosis    aspirin 81 MG EC tablet     90 tablet    Take 1 tablet (81 mg) by mouth daily    Coronary artery disease involving native coronary artery of native heart with angina pectoris (H)       baclofen 10 MG tablet    LIORESAL    90 tablet    Take 1 tablet (10 mg) by mouth 3 times daily as needed for muscle spasms    Chronic pain syndrome, Primary osteoarthritis involving multiple joints       CANE, ANY MATERIAL     1 Device    1 Device by Device route as needed    Peripheral neuropathy, Leg edema, left, Stroke (H)       * clopidogrel 75 MG tablet    PLAVIX    90 tablet    Take 1 tablet (75 mg) by mouth daily . Give name brand Plavix. Approved by insurance through 2/2/1016.    Chronic ischemic heart disease       * PLAVIX 75 MG tablet   Generic drug:  clopidogrel     90 tablet    Take 1 tablet (75 mg) by mouth daily    Chronic ischemic heart disease       dexlansoprazole 30 MG Cpdr CR capsule    DEXILANT    90 capsule    Take 1 capsule (30 mg) by mouth daily    Esophageal stricture       diphenhydrAMINE 25 MG tablet    BENADRYL    100 tablet    Take 1-2 tablets (25-50 mg) by mouth every 6 hours as needed for other (for congestion, cough)    Acute nasopharyngitis       docusate sodium 100 MG tablet    COLACE    60  tablet    Take 100 mg by mouth daily    Chronic constipation, Chronic pain syndrome       DULoxetine 60 MG EC capsule    CYMBALTA    90 capsule    Take 1 capsule (60 mg) by mouth daily    Major depressive disorder, recurrent, severe without psychotic features (H)       gabapentin 300 MG capsule    NEURONTIN    360 capsule    Take 2 capsules (600 mg) by mouth 2 times daily    Other polyneuropathy       Heating Pads Pads     1 each    Apply to painful areas prn.    Stroke (H), Peripheral neuropathy, Leg edema, left       isosorbide mononitrate 30 MG 24 hr tablet    IMDUR    90 tablet    Take 1 tablet (30 mg) by mouth daily    Chronic ischemic heart disease       LASIX 20 MG tablet   Generic drug:  furosemide      2 tablets a day per cardiology.    Lymphedema of left leg       linaclotide 290 MCG capsule    LINZESS    90 capsule    Take 1 capsule (290 mcg) by mouth every morning (before breakfast)    Chronic constipation       metoprolol succinate 25 MG 24 hr tablet    TOPROL-XL    90 tablet    Take 1 tablet (25 mg) by mouth daily    Benign essential hypertension       nitroGLYcerin 0.4 MG sublingual tablet    NITROSTAT    30 tablet    Place 1 tablet (0.4 mg) under the tongue every 5 minutes as needed    Anginal pain (H)       * order for DME     2 Device    Equipment being ordered: Jobst stockings thigh high 15-20mmHg.  Please measure for fit. 2 pairs.    Leg edema, left       * order for DME     2 Device    Bilateral compression stockings 15-20mmHg. One pair knee high and one pair thigh high.  Please measure for fit.    Lymphedema of left leg       polyethylene glycol powder    MIRALAX    510 g    Take 17 g (1 capful) by mouth 2 times daily    Chronic constipation       rosuvastatin 20 MG tablet    CRESTOR    90 tablet    Take 1 tablet (20 mg) by mouth daily    Coronary artery disease involving native coronary artery of native heart with angina pectoris (H)       traMADol 50 MG tablet    ULTRAM    60 tablet    Take 1  tablet (50 mg) by mouth 2 times daily as needed    Chronic pain syndrome, Other polyneuropathy, Primary osteoarthritis involving multiple joints       TYLENOL 325 MG tablet   Generic drug:  acetaminophen     100 tablet    Take 2 tablets (650 mg) by mouth daily        * Notice:  This list has 4 medication(s) that are the same as other medications prescribed for you. Read the directions carefully, and ask your doctor or other care provider to review them with you.

## 2018-07-25 ASSESSMENT — ANXIETY QUESTIONNAIRES: GAD7 TOTAL SCORE: 6

## 2018-07-27 ENCOUNTER — OFFICE VISIT (OUTPATIENT)
Dept: FAMILY MEDICINE | Facility: CLINIC | Age: 63
End: 2018-07-27
Payer: MEDICARE

## 2018-07-27 VITALS
WEIGHT: 184.2 LBS | RESPIRATION RATE: 16 BRPM | OXYGEN SATURATION: 100 % | TEMPERATURE: 98.2 F | BODY MASS INDEX: 31.13 KG/M2 | HEART RATE: 72 BPM | SYSTOLIC BLOOD PRESSURE: 130 MMHG | DIASTOLIC BLOOD PRESSURE: 74 MMHG

## 2018-07-27 DIAGNOSIS — G62.89 OTHER POLYNEUROPATHY: ICD-10-CM

## 2018-07-27 DIAGNOSIS — Z11.3 SCREEN FOR STD (SEXUALLY TRANSMITTED DISEASE): ICD-10-CM

## 2018-07-27 DIAGNOSIS — G89.4 CHRONIC PAIN SYNDROME: Primary | ICD-10-CM

## 2018-07-27 DIAGNOSIS — I89.0 LYMPHEDEMA OF LEFT LEG: ICD-10-CM

## 2018-07-27 DIAGNOSIS — M15.0 PRIMARY OSTEOARTHRITIS INVOLVING MULTIPLE JOINTS: ICD-10-CM

## 2018-07-27 DIAGNOSIS — M25.522 LEFT ELBOW PAIN: ICD-10-CM

## 2018-07-27 LAB
AMPHETAMINES QUAL: NEGATIVE
BARBITURATES QUAL URINE: NEGATIVE
BENZODIAZEPINE QUAL URINE: NEGATIVE
BUPRENORPHINE QUAL URINE: NEGATIVE
CANNABINOIDS UR QL SCN: NEGATIVE
COCAINE QUAL URINE: NEGATIVE
HIV 1+2 AB+HIV1 P24 AG SERPL QL IA: NEGATIVE
METHAMPHETAMINE: NEGATIVE
METHODONE QUAL: NEGATIVE
MORPHINE QUAL: NEGATIVE
OXYCODONE QUAL: NEGATIVE
PHENCYCLIDINE: NEGATIVE
PROPOXYPHENE: NEGATIVE
TEMPERATURE OF URINE WAS BETWEEN 90-100 DEGREES F: YES
TRICYCLIC ANTIDEPRESSANTS: NEGATIVE

## 2018-07-27 RX ORDER — OXYCODONE HYDROCHLORIDE 10 MG/1
10 TABLET ORAL
Qty: 20 TABLET | Refills: 0 | Status: SHIPPED | OUTPATIENT
Start: 2018-07-27 | End: 2018-08-16

## 2018-07-27 RX ORDER — FUROSEMIDE 20 MG
40 TABLET ORAL DAILY
Qty: 180 TABLET | Refills: 4 | Status: SHIPPED | OUTPATIENT
Start: 2018-07-27 | End: 2019-08-12

## 2018-07-27 RX ORDER — TRAMADOL HYDROCHLORIDE 50 MG/1
50 TABLET ORAL 2 TIMES DAILY PRN
Qty: 60 TABLET | Refills: 0 | Status: SHIPPED | OUTPATIENT
Start: 2018-07-27 | End: 2018-08-16

## 2018-07-27 NOTE — PATIENT INSTRUCTIONS
Follow up Dr. Smith and Myrna for elbow and shoulder as scheduled.     Make appointment with Dr. Sky!    Personal Care Plan for Chronic Pain    1.  Personal Goals:                * take less medication              * lose weight: goal of losing 15 to 20 pounds.              * continue working on keeping thoughts positive through [employer]              * to feel confident enough that when someone gives me a compliment I can simply say thank you    2.  Sleep:                 *  Basic sleep plan:                          *reduce or eliminate caffeine and daytime naps                          * relaxation before bed                          * limit screen time 1-2 hours prior to bed                          * establish dark/quiet sleep environment                          * go to bed at target bedtime:   pm                          * keep consistent wake time:   am.              *  Minimize switching days and nights by staying active throughout the day.              * We'll talk more about a consistent sleep plan when we meet next time.                3.  Physical Activity:                 * Formal physical rehabilitation:                          HOME PT for shoulder              * Home/community based activity:                          * Home based exercises given by lymphedema nurse with breathing exercises built in as well - daily                          * Aerobic exercise/endurance exercise:  elliptical machine - 5 minute intervals with sit ups in between for 30 minutes - daily.  Has a  in the builiding. Has exercise tape in her apartment.                          * Cleaning and baking with body awareness and stretching              * Listen to your body.  Pace yourself for success.  Don't over-do it.  Plan to get PCA back to help with cleaning and laundry so as to not overdo it.                4.  Nutrition/Weight:                 * Keep a food journal in a notebook at home and bring this to your next  visit with Dr. Sky.  Eat small frequent meals.              * Review your I Can Prevent Diabetes materials.              * Limit processed foods and foods high in sugar, sodium and fat.              * Keep up the good work eating many healthy foods.              * When you make a less healthy choice approach yourself with kindness and compassion.  Try to understand what contributed to that choice:  Was I too hungry?  Was I too tired?  Stressed?  Worried?  Use this information to help support healthier choices in the future.    5.  Mood/Stress Management:     SELF COMPASSION!              * Formal interventions:                        * schedule follow up with Dr. Sky in about month or so.              * Home/community based interventions:                          * Regular contact with family  * Relaxation techniques - breathing exercises  * Create your pyramid to focus you on your strengths and hopes.  * Movies  * Meditation  * Yoga  * Creative activity  * Spiritual /prayer:  Prayer at home, attending services at [facility], wellness auxiliary group  * Service-based activity.              * Medications: Cymbalta, Hydroxyzine as needed.    6.  Tobacco/Alcohol/Drug Use:                               * Congratulations on quitting smoking and staying quit!  Keep up the good work managing stress/anxiety in other ways (prayer, talking it out, deep breaths, exercise, etc..                         * Maintain healthy relationship with alcohol                          * For women this would be no more than 1 drink per day                          * For men, this would be no more than 2 drinks per day              * Eliminate recreational drugs    7.  Pain:                 * Non-medication treatments:                          * ice/heat: use heating pad as you are doing.                          * massage                          * acupuncture  [facility] is planned for when you  return.                          8.  Pain Medications: Gabapentin 600mg twice a day Tramadol one pill two times day.   Oxycodone 10mg at night for #20 for left elbow acute pain after fall last month.  Tylenol arthritis as needed for break through.

## 2018-07-27 NOTE — MR AVS SNAPSHOT
After Visit Summary   7/27/2018    Shira Guthrie    MRN: [id number]           Patient Information     Date Of Birth          1955        Visit Information        Provider Department      7/27/2018 1:10 PM Sally Pierre MD [facility]        Today's Diagnoses     Chronic pain syndrome    -  1    Other polyneuropathy        Primary osteoarthritis involving multiple joints        Screen for STD (sexually transmitted disease)        Left elbow pain          Care Instructions          Follow up Dr. Smith and Myrna for elbow and shoulder as scheduled.     Make appointment with Dr. Sky!    Personal Care Plan for Chronic Pain    1.  Personal Goals:                * take less medication              * lose weight: goal of losing 15 to 20 pounds.              * continue working on keeping thoughts positive through [employer]              * to feel confident enough that when someone gives me a compliment I can simply say thank you    2.  Sleep:                 *  Basic sleep plan:                          *reduce or eliminate caffeine and daytime naps                          * relaxation before bed                          * limit screen time 1-2 hours prior to bed                          * establish dark/quiet sleep environment                          * go to bed at target bedtime:   pm                          * keep consistent wake time:   am.              *  Minimize switching days and nights by staying active throughout the day.              * We'll talk more about a consistent sleep plan when we meet next time.                3.  Physical Activity:                 * Formal physical rehabilitation:                          HOME PT for shoulder              * Home/community based activity:                          * Home based exercises given by lymphedema nurse with breathing exercises built in as well - daily                          * Aerobic exercise/endurance exercise:  elliptical machine  - 5 minute intervals with sit ups in between for 30 minutes - daily.  Has a  in the builiding. Has exercise tape in her apartment.                          * Cleaning and baking with body awareness and stretching              * Listen to your body.  Pace yourself for success.  Don't over-do it.  Plan to get PCA back to help with cleaning and laundry so as to not overdo it.                4.  Nutrition/Weight:                 * Keep a food journal in a notebook at home and bring this to your next visit with Dr. Sky.  Eat small frequent meals.              * Review your I Can Prevent Diabetes materials.              * Limit processed foods and foods high in sugar, sodium and fat.              * Keep up the good work eating many healthy foods.              * When you make a less healthy choice approach yourself with kindness and compassion.  Try to understand what contributed to that choice:  Was I too hungry?  Was I too tired?  Stressed?  Worried?  Use this information to help support healthier choices in the future.    5.  Mood/Stress Management:     SELF COMPASSION!              * Formal interventions:                        * schedule follow up with Dr. Sky in about month or so.              * Home/community based interventions:                          * Regular contact with family  * Relaxation techniques - breathing exercises  * Create your pyramid to focus you on your strengths and hopes.  * Movies  * Meditation  * Yoga  * Creative activity  * Spiritual /prayer:  Prayer at home, attending services at [facility], wellness auxiliary group  * Service-based activity.              * Medications: Cymbalta, Hydroxyzine as needed.    6.  Tobacco/Alcohol/Drug Use:                               * Congratulations on quitting smoking and staying quit!  Keep up the good work managing stress/anxiety in other ways (prayer, talking it out, deep breaths, exercise,  etc..                         * Maintain healthy relationship with alcohol                          * For women this would be no more than 1 drink per day                          * For men, this would be no more than 2 drinks per day              * Eliminate recreational drugs    7.  Pain:                 * Non-medication treatments:                          * ice/heat: use heating pad as you are doing.                          * massage                          * acupuncture  YOGA is planned for when you return.                          8.  Pain Medications: Gabapentin 600mg twice a day Tramadol one pill two times day.   Oxycodone 10mg at night for #20 for left elbow acute pain after fall last month.  Tylenol arthritis as needed for break through.                      Follow-ups after your visit        Follow-up notes from your care team     Return in about 3 weeks (around 2018) for Chronic pain and acute elbow pain.      Who to contact     Please call your clinic at [phone] to:    Ask questions about your health    Make or cancel appointments    Discuss your medicines    Learn about your test results    Speak to your doctor            Additional Information About Your Visit        [employer] Information     [employer] is an electronic gateway that provides easy, online access to your medical records. With [employer], you can request a clinic appointment, read your test results, renew a prescription or communicate with your care team.     To sign up for [employer] visit the website at [email or web address]   You will be asked to enter the access code listed below, as well as some personal information. Please follow the directions to create your username and password.     Your access code is: [email or web address]  Expires: 10/16/2018  6:30 AM     Your access code will  in 90 days. If you need help or a new code, please contact your [facility] or call [phone] for assistance.         Care EveryWhere ID     This is your Care EveryWhere ID. This could be used by other organizations to access your [place] medical records  [id number]        Your Vitals Were     Pulse Temperature Respirations Pulse Oximetry BMI (Body Mass Index)       72 98.2  F (36.8  C) (Oral) 16 100% 31.13 kg/m2        Blood Pressure from Last 3 Encounters:   07/27/18 130/74   07/24/18 116/71   05/31/18 109/69    Weight from Last 3 Encounters:   07/27/18 184 lb 3.2 oz (83.6 kg)   05/31/18 184 lb 3.2 oz (83.6 kg)   05/02/18 184 lb 12.8 oz (83.8 kg)              We Performed the Following     Chlamydia/Gono Amplified ([facility])     Hepatitis B Surface Ag ([facility])     HIV Ag/Ab Screen [place] ([facility])     Rapid Urine Drug Screen ([facility])     Syphilis Screen [place] ([facility])          Today's Medication Changes          These changes are accurate as of 7/27/18  1:57 PM.  If you have any questions, ask your nurse or doctor.               Start taking these medicines.        Dose/Directions    oxyCODONE IR 10 MG tablet   Commonly known as:  ROXICODONE   Used for:  Left elbow pain   Started by:  Sally Pierre MD        Dose:  10 mg   Take 1 tablet (10 mg) by mouth nightly as needed for severe pain   Quantity:  20 tablet   Refills:  0            Where to get your medicines      Some of these will need a paper prescription and others can be bought over the counter.  Ask your nurse if you have questions.     Bring a paper prescription for each of these medications     oxyCODONE IR 10 MG tablet    traMADol 50 MG tablet               Information about OPIOIDS     PRESCRIPTION OPIOIDS: WHAT YOU NEED TO KNOW   We gave you an opioid (narcotic) pain medicine. It is important to manage your pain, but opioids are not always the best choice. You should first try all the other options your care team gave you. Take this medicine for as short a time (and as few doses) as possible.     These medicines have risks:    DO NOT drive  when on new or higher doses of pain medicine. These medicines can affect your alertness and reaction times, and you could be arrested for driving under the influence (DUI). If you need to use opioids long-term, talk to your care team about driving.    DO NOT operate heave machinery    DO NOT do any other dangerous activities while taking these medicines.     DO NOT drink any alcohol while taking these medicines.      If the opioid prescribed includes acetaminophen, DO NOT take with any other medicines that contain acetaminophen. Read all labels carefully. Look for the word  acetaminophen  or  Tylenol.  Ask your pharmacist if you have questions or are unsure.    You can get addicted to pain medicines, especially if you have a history of addiction (chemical, alcohol or substance dependence). Talk to your care team about ways to reduce this risk.    Store your pills in a secure place, locked if possible. We will not replace any lost or stolen medicine. If you don t finish your medicine, please throw away (dispose) as directed by your pharmacist. The [place] Pollution [employer] has more information about safe disposal: https://www.pca.[facility].[place].us/living-green/managing-unwanted-medications.     All opioids tend to cause constipation. Drink plenty of water and eat foods that have a lot of fiber, such as fruits, vegetables, prune juice, apple juice and high-fiber cereal. Take a laxative (Miralax, milk of magnesia, Colace, Senna) if you don t move your bowels at least every other day.          Primary Care Provider Office Phone # Fax #    Sally Pierre [phone]       [address] [place] [address]        Equal Access to Services     [facility] AH: Hadii thelma rodriguez Sodonna, waaxda luqadaha, qaybta kaalmada adebill, una tafoya. [facility] [phone].    ATENCIÓN: Si habla español, tiene a monson disposición servicios gratuitos de asistencia lingüística. Llame al  [phone].    We comply with applicable federal civil rights laws and [place] laws. We do not discriminate on the basis of race, color, national origin, age, disability, sex, sexual orientation, or gender identity.            Thank you!     Thank you for choosing [facility]  for your care. Our goal is always to provide you with excellent care. Hearing back from our patients is one way we can continue to improve our services. Please take a few minutes to complete the written survey that you may receive in the mail after your visit with us. Thank you!             Your Updated Medication List - Protect others around you: Learn how to safely use, store and throw away your medicines at www.disposemymeds.org.          This list is accurate as of 7/27/18  1:57 PM.  Always use your most recent med list.                   Brand Name Dispense Instructions for use Diagnosis    aspirin 81 MG EC tablet     90 tablet    Take 1 tablet (81 mg) by mouth daily    Coronary artery disease involving native coronary artery of native heart with angina pectoris (H)       baclofen 10 MG tablet    LIORESAL    90 tablet    Take 1 tablet (10 mg) by mouth 3 times daily as needed for muscle spasms    Chronic pain syndrome, Primary osteoarthritis involving multiple joints       CANE, ANY MATERIAL     1 Device    1 Device by Device route as needed    Peripheral neuropathy, Leg edema, left, Stroke (H)       * clopidogrel 75 MG tablet    PLAVIX    90 tablet    Take 1 tablet (75 mg) by mouth daily . Give name brand Plavix. Approved by insurance through 2/2/1016.    Chronic ischemic heart disease       * PLAVIX 75 MG tablet   Generic drug:  clopidogrel     90 tablet    Take 1 tablet (75 mg) by mouth daily    Chronic ischemic heart disease       dexlansoprazole 30 MG Cpdr CR capsule    DEXILANT    90 capsule    Take 1 capsule (30 mg) by mouth daily    Esophageal stricture       diclofenac 1 % Gel topical gel    VOLTAREN    100 g    Apply 2-4  grams to painful areas four times daily as needed using enclosed dosing card.    Chronic pain syndrome, Closed fracture of proximal end of left forearm with delayed healing, subsequent encounter, Primary osteoarthritis involving multiple joints, Rotator cuff impingement syndrome of right shoulder, Lymphedema of left leg       diphenhydrAMINE 25 MG tablet    BENADRYL    100 tablet    Take 1-2 tablets (25-50 mg) by mouth every 6 hours as needed for other (for congestion, cough)    Acute nasopharyngitis       docusate sodium 100 MG tablet    COLACE    60 tablet    Take 100 mg by mouth daily    Chronic constipation, Chronic pain syndrome       DULoxetine 60 MG EC capsule    CYMBALTA    90 capsule    Take 1 capsule (60 mg) by mouth daily    Major depressive disorder, recurrent, severe without psychotic features (H)       gabapentin 300 MG capsule    NEURONTIN    360 capsule    Take 2 capsules (600 mg) by mouth 2 times daily    Other polyneuropathy       Heating Pads Pads     1 each    Apply to painful areas prn.    Stroke (H), Peripheral neuropathy, Leg edema, left       isosorbide mononitrate 30 MG 24 hr tablet    IMDUR    90 tablet    Take 1 tablet (30 mg) by mouth daily    Chronic ischemic heart disease       LASIX 20 MG tablet   Generic drug:  furosemide      2 tablets a day per cardiology.    Lymphedema of left leg       linaclotide 290 MCG capsule    LINZESS    90 capsule    Take 1 capsule (290 mcg) by mouth every morning (before breakfast)    Chronic constipation       metoprolol succinate 25 MG 24 hr tablet    TOPROL-XL    90 tablet    Take 1 tablet (25 mg) by mouth daily    Benign essential hypertension       nitroGLYcerin 0.4 MG sublingual tablet    NITROSTAT    30 tablet    Place 1 tablet (0.4 mg) under the tongue every 5 minutes as needed    Anginal pain (H)       * order for DME     2 Device    Equipment being ordered: Jobst stockings thigh high 15-20mmHg.  Please measure for fit. 2 pairs.    Leg edema, left        * order for DME     2 Device    Bilateral compression stockings 15-20mmHg. One pair knee high and one pair thigh high.  Please measure for fit.    Lymphedema of left leg       oxyCODONE IR 10 MG tablet    ROXICODONE    20 tablet    Take 1 tablet (10 mg) by mouth nightly as needed for severe pain    Left elbow pain       polyethylene glycol powder    MIRALAX    510 g    Take 17 g (1 capful) by mouth 2 times daily    Chronic constipation       rosuvastatin 20 MG tablet    CRESTOR    90 tablet    Take 1 tablet (20 mg) by mouth daily    Coronary artery disease involving native coronary artery of native heart with angina pectoris (H)       traMADol 50 MG tablet    ULTRAM    60 tablet    Take 1 tablet (50 mg) by mouth 2 times daily as needed    Chronic pain syndrome, Other polyneuropathy, Primary osteoarthritis involving multiple joints       TYLENOL 325 MG tablet   Generic drug:  acetaminophen     100 tablet    Take 2 tablets (650 mg) by mouth daily        * Notice:  This list has 4 medication(s) that are the same as other medications prescribed for you. Read the directions carefully, and ask your doctor or other care provider to review them with you.

## 2018-07-27 NOTE — PROGRESS NOTES
Chronic Pain Follow-Up Visit    Pain Update:  Location of pain: elbow  Analgesia/pain control: Recent changes:  worse    Overall control: Tolerable with discomfort    Adherance     How often do you take extra pain medicine:Never    Did you take your pain medication today? No,  Has been out of town and out of refills.    Adverse effects: No     Fell in [place] June 23.  Seeing Luis on the Monday the 30th for left elbow.  Seeing Myrna for right shoulder on Tuesday 31st.   Database checked today?No.    PHQ-9 SCORE 4/17/2017 7/18/2017 11/8/2017   Total Score - - -   Total Score 8 3 6     FAYE-7 SCORE 7/18/2017 11/8/2017 7/24/2018   Total Score - - -   Total Score - - 6 (mild anxiety)   Total Score 5 5 6       Care Plan discussed and updated as needed.  See the end of this note.       FUNCTIONAL ASSESSMENT QUESTIONNAIRE SCORE 4/25/2018 6/4/2018   Total Score 40 40        Problem, Medication and Allergy Lists were reviewed and are current..           Physical Exam:     Vitals:    07/27/18 1322   BP: 130/74   Pulse: 72   Resp: 16   Temp: 98.2  F (36.8  C)   TempSrc: Oral   SpO2: 100%   Weight: 83.6 kg (184 lb 3.2 oz)     Body mass index is 31.13 kg/(m^2).  Vitals were reviewed and were normal  GENERAL: healthy, alert, well nourished, well hydrated, no distress  RESP: lungs clear to auscultation - no rales, no rhonchi, no wheezes  CV: regular rates and rhythm, normal S1 S2, no S3 or S4 and no murmur, no click or rub -  MS: extremities- Mild edema in left leg noted, chronic.  Left elbow tender over lateral epicondyle.  No bruising or swelling noted. Decreased flexion and extension.  Right shoulder ROM also decreased.        Results:     Results for orders placed or performed in visit on 07/27/18   Rapid Urine Drug Screen ([facility])   Result Value Ref Range    Phencyclidine NEGATIVE NEGATIVE    Propoxyphene NEGATIVE NEGATIVE    Tricyclic Antidepressants NEGATIVE NEGATIVE    Amphetamines Qual NEGATIVE NEGATIVE     Barbiturates Qual Urine NEGATIVE NEGATIVE    Buprenorphine Qual Urine NEGATIVE NEGATIVE    Benzodiazepine Qual Urine NEGATIVE NEGATIVE    Cocaine Qual Urine NEGATIVE NEGATIVE    Cannabinoids Qual Urine NEGATIVE NEGATIVE    Methamphetamine Qual NEGATIVE NEGATIVE    Methadone Qual NEGATIVE NEGATIVE    Morphine Qual NEGATIVE NEGATIVE    Oxycodone Qual NEGATIVE NEGATIVE    Temperature of Urine was Between  Degrees F YES YES   Hepatitis B Surface Ag ([employer])   Result Value Ref Range    Hepatitis B Surface Antigen Negative Negative    Narrative    Test performed by:  [facility] [address] [place] [address]   HIV Ag/Ab Screen [place] ([facility])   Result Value Ref Range    HIV Antigen/Antibody Negative Negative    Narrative    Test performed by:  [facility] [address] [place] [address]  Method is Abbott HIV Ag/Ab for the detection of HIV p24 antigen, HIV-1   antibodies and HIV-2 antibodies.   Syphilis Screen [place] ([facility])   Result Value Ref Range    Treponema Antibody (Syphilis) Negative Negative    Narrative    Test performed by:  [facility] [address] [place] [address]      rapid urine drug screen obtained today: Yes    Assessment and Plan    Shira Guthrie is here for follow up of chronic pain caused by arthritis and reinjured left elbow which had fracture in the past.    Shira was seen today for pain, refill request, fall and derm problem.    Diagnoses and all orders for this visit:    Chronic pain syndrome:  Other polyneuropathy  Primary osteoarthritis involving multiple joints:  REfilled tramadol.  Do not use with oxycodone  Plans to use tramadol in day and oxycodone at night to help sleep with new elbow injury pain.  Following up with PT for Right shoulder as well.  -     traMADol (ULTRAM) 50 MG tablet; Take 1 tablet (50 mg) by mouth 2 times daily as needed  -     Rapid Urine Drug Screen ([facility])    Saw Pain clinic as well.   Recommend pain psychotherapy, which I recommend she schedule with Dr. Sky again.    Screen for STD (sexually transmitted disease): Requests screening as has sexual partner in [place] and needs testing.  -     Chlamydia/Gono Amplified ([facility])  -     Hepatitis B Surface Ag ([facility])  -     HIV Ag/Ab Screen [place] ([facility])  -     Syphilis Screen [place] ([facility])    Left elbow pain: Fell in [place].  REviewed ER records.  Following with Dr. Smith at [place] as has had fracture and hardware placed and removed in the past.  HAd been doing well until this injury. Short term oxycodone for night pain.  -     oxyCODONE IR (ROXICODONE) 10 MG tablet; Take 1 tablet (10 mg) by mouth nightly as needed for severe pain    Lymphedema of left leg:  REfilled.  -     furosemide (LASIX) 20 MG tablet; Take 2 tablets (40 mg) by mouth daily          Chronic Pain Syndrome:  Care plan updated with patient, see below for details.  Patient is being prescribed 10mg of oxycodone IR (Percocet) per day. 10 mg MEDD  Care Plan Date: July/2018  Naloxone has not been prescribed.           If opioids prescribed patient was asked to bring pill bottle to each appointment and was informed that refills would only be provided at office visits.   Asked patient to F/U in 1 month(s) with same provider for 20 minute  Visit.     Sally Pierre MD    Patient Instructions           Follow up Dr. العلي for elbow and shoulder as scheduled.     Make appointment with Dr. Sky!    Personal Care Plan for Chronic Pain    1.  Personal Goals:                * take less medication              * lose weight: goal of losing 15 to 20 pounds.              * continue working on keeping thoughts positive through [employer]              * to feel confident enough that when someone gives me a compliment I can simply say thank you    2.  Sleep:                 *  Basic sleep plan:                          *reduce or eliminate caffeine and daytime  naps                          * relaxation before bed                          * limit screen time 1-2 hours prior to bed                          * establish dark/quiet sleep environment                          * go to bed at target bedtime:   pm                          * keep consistent wake time:   am.              *  Minimize switching days and nights by staying active throughout the day.              * We'll talk more about a consistent sleep plan when we meet next time.                3.  Physical Activity:                 * Formal physical rehabilitation:                          HOME PT for shoulder              * Home/community based activity:                          * Home based exercises given by lymphedema nurse with breathing exercises built in as well - daily                          * Aerobic exercise/endurance exercise:  elliptical machine - 5 minute intervals with sit ups in between for 30 minutes - daily.  Has a  in the builiding. Has exercise tape in her apartment.                          * Cleaning and baking with body awareness and stretching              * Listen to your body.  Pace yourself for success.  Don't over-do it.  Plan to get PCA back to help with cleaning and laundry so as to not overdo it.                4.  Nutrition/Weight:                 * Keep a food journal in a notebook at home and bring this to your next visit with Dr. Sky.  Eat small frequent meals.              * Review your I Can Prevent Diabetes materials.              * Limit processed foods and foods high in sugar, sodium and fat.              * Keep up the good work eating many healthy foods.              * When you make a less healthy choice approach yourself with kindness and compassion.  Try to understand what contributed to that choice:  Was I too hungry?  Was I too tired?  Stressed?  Worried?  Use this information to help support healthier choices in the future.    5.  Mood/Stress Management:     SELF  COMPASSION!              * Formal interventions:                        * schedule follow up with Dr. Sky in about month or so.              * Home/community based interventions:                          * Regular contact with family  * Relaxation techniques - breathing exercises  * Create your pyramid to focus you on your strengths and hopes.  * Movies  * Meditation  * Yoga  * Creative activity  * Spiritual /prayer:  Prayer at home, attending services at [facility], wellness auxiliary group  * Service-based activity.              * Medications: Cymbalta, Hydroxyzine as needed.    6.  Tobacco/Alcohol/Drug Use:                               * Congratulations on quitting smoking and staying quit!  Keep up the good work managing stress/anxiety in other ways (prayer, talking it out, deep breaths, exercise, etc..                         * Maintain healthy relationship with alcohol                          * For women this would be no more than 1 drink per day                          * For men, this would be no more than 2 drinks per day              * Eliminate recreational drugs    7.  Pain:                 * Non-medication treatments:                          * ice/heat: use heating pad as you are doing.                          * massage                          * acupuncture  YOGA is planned for when you return.                          8.  Pain Medications: Gabapentin 600mg twice a day Tramadol one pill two times day.   Oxycodone 10mg at night for #20 for left elbow acute pain after fall last month.  Tylenol arthritis as needed for break through.

## 2018-07-28 LAB
HBV SURFACE AG SERPL QL IA: NEGATIVE
TREPONEMA ANTIBODY (SYPHILIS): NEGATIVE

## 2018-07-30 ENCOUNTER — TRANSFERRED RECORDS (OUTPATIENT)
Dept: HEALTH INFORMATION MANAGEMENT | Facility: CLINIC | Age: 63
End: 2018-07-30

## 2018-07-30 LAB
C TRACH RRNA SPEC QL NAA+PROBE: NEGATIVE
N GONORRHOEA RRNA SPEC QL NAA+PROBE: NEGATIVE

## 2018-07-31 ENCOUNTER — TRANSFERRED RECORDS (OUTPATIENT)
Dept: HEALTH INFORMATION MANAGEMENT | Facility: CLINIC | Age: 63
End: 2018-07-31

## 2018-07-31 NOTE — PROGRESS NOTES
Good news!  All of your tests were negative for infections.  Your urine test was negative for all drugs.

## 2018-08-01 ENCOUNTER — OFFICE VISIT (OUTPATIENT)
Dept: PSYCHOLOGY | Facility: CLINIC | Age: 63
End: 2018-08-01
Payer: MEDICARE

## 2018-08-01 DIAGNOSIS — G89.4 CHRONIC PAIN SYNDROME: Primary | ICD-10-CM

## 2018-08-01 NOTE — MR AVS SNAPSHOT
After Visit Summary   8/1/2018    Shira Guthrie    MRN: [id number]           Patient Information     Date Of Birth          1955        Visit Information        Provider Department      8/1/2018 10:00 AM Dior Sky, PhD [facility]        Care Instructions    Schedule with  in about one month to check in on how things are going on you way out of clinic today.  This should be around September 1.     Personal Care Plan for Chronic Pain     1.  Personal Goals:                * take less medication              * lose weight: goal of losing 15 to 20 pounds.              * continue working on keeping thoughts positive through [employer]              * to feel confident enough that when someone gives me a compliment I can simply say thank you     2.  Sleep:                 *  Basic sleep plan:                          *reduce or eliminate caffeine and daytime naps                          * relaxation before bed                          * limit screen time 1-2 hours prior to bed                          * establish dark/quiet sleep environment                          * go to bed at target bedtime:   pm                          * keep consistent wake time:   am.              *  Minimize switching days and nights by staying active throughout the day.              * We'll talk more about a consistent sleep plan when we meet next time.      3.  Physical Activity:                 * Formal physical rehabilitation:                          * PT in [place].  Start on August 8.  Will have 12 visits.              * Home/community based activity:                          * Home based exercises given by lymphedema nurse with breathing exercises built in as well - daily                          * Aerobic exercise/endurance exercise:  elliptical machine - 5 minute intervals with sit ups in between for 30 minutes - daily.  Has a  in the builiding. Has exercise tape in her  apartment.                          * Cleaning and baking with body awareness and stretching              * Listen to your body.  Pace yourself for success.  Don't over-do it.  Plan to get PCA back to help with cleaning and laundry so as to not overdo it.        4.  Nutrition/Weight:                 * Keep a food journal in a notebook at home and bring this to your next visit with Dr. Sky.  Eat small frequent meals.              * Review your I Can Prevent Diabetes materials.              * Limit processed foods and foods high in sugar, sodium and fat.              * Keep up the good work eating many healthy foods.              * When you make a less healthy choice approach yourself with kindness and compassion.  Try to understand what contributed to that choice:  Was I too hungry?  Was I too tired?  Stressed?  Worried?  Use this information to help support healthier choices in the future.     5.  Mood/Stress Management:     SELF COMPASSION!              * Formal interventions:                        * schedule follow up with Dr. Sky in about month or so.              * Home/community based interventions:                          * Regular contact with family  * Relaxation techniques - breathing exercises  * Create your pyramid to focus you on your strengths and hopes.  * Movies  * Meditation  * Yoga  * Creative activity  * Spiritual /prayer:  Prayer at home, attending services at [facility], wellness auxiliary group  * Service-based activity.              * Medications: Cymbalta, Hydroxyzine as needed.     6.  Tobacco/Alcohol/Drug Use:                               * Congratulations on quitting smoking and staying quit!  Keep up the good work managing stress/anxiety in other ways (prayer, talking it out, deep breaths, exercise, etc..                         * Maintain healthy relationship with alcohol                          * For women this would be no more than 1 drink per  day                          * For men, this would be no more than 2 drinks per day              * Eliminate recreational drugs     7.  Pain:                 * Non-medication treatments:                          * ice/heat: use heating pad as you are doing.                          * massage                          * acupuncture  YOGA is planned for when you return.            8.  Pain Medications: Gabapentin 600mg twice a day Tramadol one pill two times day.   Oxycodone 10mg at night for #20 for left elbow acute pain after fall last month.  Tylenol arthritis as needed for break through.                Follow-ups after your visit        Your next 10 appointments already scheduled     Aug 16, 2018  9:20 AM CDT   Return Visit with Sally Pierre MD   [facility] ([facility])    [address] [place] [address]   [phone]              Who to contact     Please call your clinic at [phone] to:    Ask questions about your health    Make or cancel appointments    Discuss your medicines    Learn about your test results    Speak to your doctor            Additional Information About Your Visit        MyChart Information     [email or web address] is an electronic gateway that provides easy, online access to your medical records. With [email or web address], you can request a clinic appointment, read your test results, renew a prescription or communicate with your care team.     To sign up for [email or web address] visit the website at [email or web address]   You will be asked to enter the access code listed below, as well as some personal information. Please follow the directions to create your username and password.     Your access code is: [email or web address]  Expires: 10/16/2018  6:30 AM     Your access code will  in 90 days. If you need help or a new code, please contact your [facility] or call [phone] for assistance.        Care EveryWhere ID     This is your Care EveryWhere ID. This could be used  by other organizations to access your [facility] medical records  [id number]         Blood Pressure from Last 3 Encounters:   07/27/18 130/74   07/24/18 116/71   05/31/18 109/69    Weight from Last 3 Encounters:   07/27/18 184 lb 3.2 oz (83.6 kg)   05/31/18 184 lb 3.2 oz (83.6 kg)   05/02/18 184 lb 12.8 oz (83.8 kg)              Today, you had the following     No orders found for display       Primary Care Provider Office Phone # Fax #    Sally Pierre [phone]       [address] [place] [address]        Equal Access to Services     KASANDRA VALLECILLO : Raj Bautista, wagalen holt, hans kaalmada don, una tafoya. So [facility] [phone].    ATENCIÓN: Si habla español, tiene a monson disposición servicios gratuitos de asistencia lingüística. Llame al [phone].    We comply with applicable federal civil rights laws and [place] laws. We do not discriminate on the basis of race, color, national origin, age, disability, sex, sexual orientation, or gender identity.            Thank you!     Thank you for choosing [facility]  for your care. Our goal is always to provide you with excellent care. Hearing back from our patients is one way we can continue to improve our services. Please take a few minutes to complete the written survey that you may receive in the mail after your visit with us. Thank you!             Your Updated Medication List - Protect others around you: Learn how to safely use, store and throw away your medicines at www.disposemymeds.org.          This list is accurate as of 8/1/18 11:06 AM.  Always use your most recent med list.                   Brand Name Dispense Instructions for use Diagnosis    aspirin 81 MG EC tablet     90 tablet    Take 1 tablet (81 mg) by mouth daily    Coronary artery disease involving native coronary artery of native heart with angina pectoris (H)       baclofen 10 MG tablet    LIORESAL    90 tablet     Take 1 tablet (10 mg) by mouth 3 times daily as needed for muscle spasms    Chronic pain syndrome, Primary osteoarthritis involving multiple joints       CANE, ANY MATERIAL     1 Device    1 Device by Device route as needed    Peripheral neuropathy, Leg edema, left, Stroke (H)       * clopidogrel 75 MG tablet    PLAVIX    90 tablet    Take 1 tablet (75 mg) by mouth daily . Give name brand Plavix. Approved by insurance through 2/2/1016.    Chronic ischemic heart disease       * PLAVIX 75 MG tablet   Generic drug:  clopidogrel     90 tablet    Take 1 tablet (75 mg) by mouth daily    Chronic ischemic heart disease       dexlansoprazole 30 MG Cpdr CR capsule    DEXILANT    90 capsule    Take 1 capsule (30 mg) by mouth daily    Esophageal stricture       diclofenac 1 % Gel topical gel    VOLTAREN    100 g    Apply 2-4 grams to painful areas four times daily as needed using enclosed dosing card.    Chronic pain syndrome, Closed fracture of proximal end of left forearm with delayed healing, subsequent encounter, Primary osteoarthritis involving multiple joints, Rotator cuff impingement syndrome of right shoulder, Lymphedema of left leg       diphenhydrAMINE 25 MG tablet    BENADRYL    100 tablet    Take 1-2 tablets (25-50 mg) by mouth every 6 hours as needed for other (for congestion, cough)    Acute nasopharyngitis       docusate sodium 100 MG tablet    COLACE    60 tablet    Take 100 mg by mouth daily    Chronic constipation, Chronic pain syndrome       DULoxetine 60 MG EC capsule    CYMBALTA    90 capsule    Take 1 capsule (60 mg) by mouth daily    Major depressive disorder, recurrent, severe without psychotic features (H)       furosemide 20 MG tablet    LASIX    180 tablet    Take 2 tablets (40 mg) by mouth daily    Lymphedema of left leg       gabapentin 300 MG capsule    NEURONTIN    360 capsule    Take 2 capsules (600 mg) by mouth 2 times daily    Other polyneuropathy       Heating Pads Pads     1 each    Apply  to painful areas prn.    Stroke (H), Peripheral neuropathy, Leg edema, left       isosorbide mononitrate 30 MG 24 hr tablet    IMDUR    90 tablet    Take 1 tablet (30 mg) by mouth daily    Chronic ischemic heart disease       linaclotide 290 MCG capsule    LINZESS    90 capsule    Take 1 capsule (290 mcg) by mouth every morning (before breakfast)    Chronic constipation       metoprolol succinate 25 MG 24 hr tablet    TOPROL-XL    90 tablet    Take 1 tablet (25 mg) by mouth daily    Benign essential hypertension       nitroGLYcerin 0.4 MG sublingual tablet    NITROSTAT    30 tablet    Place 1 tablet (0.4 mg) under the tongue every 5 minutes as needed    Anginal pain (H)       * order for DME     2 Device    Equipment being ordered: Jobst stockings thigh high 15-20mmHg.  Please measure for fit. 2 pairs.    Leg edema, left       * order for DME     2 Device    Bilateral compression stockings 15-20mmHg. One pair knee high and one pair thigh high.  Please measure for fit.    Lymphedema of left leg       oxyCODONE IR 10 MG tablet    ROXICODONE    20 tablet    Take 1 tablet (10 mg) by mouth nightly as needed for severe pain    Left elbow pain       polyethylene glycol powder    MIRALAX    510 g    Take 17 g (1 capful) by mouth 2 times daily    Chronic constipation       rosuvastatin 20 MG tablet    CRESTOR    90 tablet    Take 1 tablet (20 mg) by mouth daily    Coronary artery disease involving native coronary artery of native heart with angina pectoris (H)       traMADol 50 MG tablet    ULTRAM    60 tablet    Take 1 tablet (50 mg) by mouth 2 times daily as needed    Chronic pain syndrome, Other polyneuropathy, Primary osteoarthritis involving multiple joints       TYLENOL 325 MG tablet   Generic drug:  acetaminophen     100 tablet    Take 2 tablets (650 mg) by mouth daily        * Notice:  This list has 4 medication(s) that are the same as other medications prescribed for you. Read the directions carefully, and ask your  doctor or other care provider to review them with you.

## 2018-08-01 NOTE — PROGRESS NOTES
"Behavioral Health Chronic Pain Management Visit     Visit type: Follow Up  Number of visits post Initial Assessment:  6  Meeting lasted: 60 minutes  Others present: no one    Reason for Consultation:  Shira Guthrie is a 62 year old,  female referred by Dr. Pierre for behavioral health consultation as part of the Chronic Pain Management protocol at [facility]. The goal of behavioral health visits is to help the patient with the psychosocial aspects of pain management. Ms. Guthrie was initially seen by this provider for her initial  CPM consult on 12/15/16.  Our last visit was on May 2, 2018.      Topics Discussed:   1.  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  Had to visit ED for this at Hospital and sees that this generated a surprisingly large bill.  Plans to follow up with her insurance on this.  Right shoulder continues to be sore and left arm is in a sling today.  Still has abrasions on her knees.  Split her lip open and hit her head at time of fall but this is now healed.  Had MRI recently to see if recent fall aggravated recent rotator cuff surgery.  Seeing Dr. Smith and will start PT soon.  Plan is for 12 sessions of PT to start next week.  Strained ligaments.  Would like referral for pain clinic if she travels in the future as accessing care was difficult while she was away.  Plans to discuss this request with Dr. Pierre at next visit.  2.  Family updates:  Son who was diagnosed with lung cancer is doing well.  Treatment is progressing successfully.  Sadly, son-in-law who was only 47 years old  suddenly from heart disease while she was in [place].  Daughter is grieving and at times angry with God which is hard for Shira to tolerate.  Shira has set limits with daughter about how much of this type of conversation she can tolerate.  3.  Mood/coping:  \" I broke yesterday.\"  Stress of pain experience, injury " "and subsequent impact on function (difficult to keep apartment clean, etc.) has had negative impact on mood.  Has been trying to keep thoughts positive, but at times this leads to isolating self from others and not sharing difficulty which may contribute to unhappiness.  Yesterday in [employer] study was challenged by  telling her that being late to study if she was sleeping was unacceptable.  When she shared her recent struggles and related fatigue, he labeled this as an \"excuse\" which was highly distressing to her.  Received comfort and support from friend later in the day which resulted in outpouring of tears.  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  This was painful for her and she acknowledged it was not a good idea.  She was also encouraged to consider that it was often good for others to be able to provide support to her and she was able to reflect on how good she felt when she gave to others.  Shira agreed she would work on \"replenishing\" and sharing her burden with others on a more regular basis.  4.  Home care:  Shira is struggling to take care of her home in wake of recent injury.  Neighbor did help with this recently, but she could likely benefit from ongoing support for the time being.  Agreed I would route request for referral for home-based services to Dr. Pierre (e.g., PCA, housekeeping for a short time).  5.  Transportation:  Driving is difficult with sling.  Shira would like to access [employer] and plans to bring form to Dr. Pierre for help with this.  Last form was denied as Dr. Pierre returned their " portions separately to [employer] who has a policy that these must be received together in the same packet (confirmed with Marine after the visit that this was actually the policy).    Objective: Ms. Guthrie appears to be awake and alert for today's visit.      PHQ-9 SCORE 4/17/2017 7/18/2017 11/8/2017   Total Score - - -   Total Score 8 3 6     FAYE-7 SCORE 7/18/2017 11/8/2017 7/24/2018   Total Score - - -   Total Score - - 6 (mild anxiety)   Total Score 5 5 6     Wt Readings from Last 4 Encounters:   07/27/18 184 lb 3.2 oz (83.6 kg)   05/31/18 184 lb 3.2 oz (83.6 kg)   05/02/18 184 lb 12.8 oz (83.8 kg)   04/25/18 185 lb 9.6 oz (84.2 kg)       Assessment:   Ms. Guthrie was referred by Dr. Pierre for a behavioral health evaluation to address chronic pain syndrome.  Ms. Guthrie appeared sad and tearful at times when discussing her recent struggles.  She was pleasant, engaged and receptive to feedback throughout.  Ms. Guthrie may benefit from continued meetings with this provider to support improved pain management via weight loss, healthy food choices, regular physical activity, improved sleep and improved management of mood/stress.    Stage of change: Action  Diagnosis: chronic pain syndrome    Plan:   1.  Updated Personal Care Plan for Chronic Pain (see patient instructions). Care Plan Date: August/2018   2.  Will bring [employer] paperwork to next visit with Dr. Pierre which is on 8/16/18.  3.  Will route question to Dr. Pierre about evaluation for housekeeping while healing from fall as completion of household tasks is difficult for her at this time due to injury.  4.  Encouraged follow up with behavioral health in about one month to support practice of pacing, self-compassion and staying connected to others when she is struggling.  Noted that she did not set this up on her way out of clinic today and will plan check in call with her if this is not arranged after visit with Dr. Pierre on 8/16/18.

## 2018-08-01 NOTE — PATIENT INSTRUCTIONS
Schedule with  in about one month to check in on how things are going on you way out of clinic today.  This should be around September 1.     Personal Care Plan for Chronic Pain     1.  Personal Goals:                * take less medication              * lose weight: goal of losing 15 to 20 pounds.              * continue working on keeping thoughts positive through [employer]              * to feel confident enough that when someone gives me a compliment I can simply say thank you     2.  Sleep:                 *  Basic sleep plan:                          *reduce or eliminate caffeine and daytime naps                          * relaxation before bed                          * limit screen time 1-2 hours prior to bed                          * establish dark/quiet sleep environment                          * go to bed at target bedtime:   pm                          * keep consistent wake time:   am.              *  Minimize switching days and nights by staying active throughout the day.              * We'll talk more about a consistent sleep plan when we meet next time.      3.  Physical Activity:                 * Formal physical rehabilitation:                          * PT in [place].  Start on August 8.  Will have 12 visits.              * Home/community based activity:                          * Home based exercises given by lymphedema nurse with breathing exercises built in as well - daily                          * Aerobic exercise/endurance exercise:  elliptical machine - 5 minute intervals with sit ups in between for 30 minutes - daily.  Has a  in the builiding. Has exercise tape in her apartment.                          * Cleaning and baking with body awareness and stretching              * Listen to your body.  Pace yourself for success.  Don't over-do it.  Plan to get PCA back to help with cleaning and laundry so as to not overdo it.        4.  Nutrition/Weight:                 * Keep a food  journal in a notebook at home and bring this to your next visit with Dr. Sky.  Eat small frequent meals.              * Review your I Can Prevent Diabetes materials.              * Limit processed foods and foods high in sugar, sodium and fat.              * Keep up the good work eating many healthy foods.              * When you make a less healthy choice approach yourself with kindness and compassion.  Try to understand what contributed to that choice:  Was I too hungry?  Was I too tired?  Stressed?  Worried?  Use this information to help support healthier choices in the future.     5.  Mood/Stress Management:     SELF COMPASSION!              * Formal interventions:                        * schedule follow up with Dr. Sky in about month or so.              * Home/community based interventions:                          * Regular contact with family  * Relaxation techniques - breathing exercises  * Create your pyramid to focus you on your strengths and hopes.  * Movies  * Meditation  * Yoga  * Creative activity  * Spiritual /prayer:  Prayer at home, attending services at [facility], wellness auxiliary group  * Service-based activity.              * Medications: Cymbalta, Hydroxyzine as needed.     6.  Tobacco/Alcohol/Drug Use:                               * Congratulations on quitting smoking and staying quit!  Keep up the good work managing stress/anxiety in other ways (prayer, talking it out, deep breaths, exercise, etc..                         * Maintain healthy relationship with alcohol                          * For women this would be no more than 1 drink per day                          * For men, this would be no more than 2 drinks per day              * Eliminate recreational drugs     7.  Pain:                 * Non-medication treatments:                          * ice/heat: use heating pad as you are doing.                          * massage                          *  acupuncture  [facility] is planned for when you return.            8.  Pain Medications: Gabapentin 600mg twice a day Tramadol one pill two times day.   Oxycodone 10mg at night for #20 for left elbow acute pain after fall last month.  Tylenol arthritis as needed for break through.

## 2018-08-01 NOTE — Clinical Note
Hi Dr. Pierre - see my most recent note for update on Shira Guthrie.  I was wondering if you would be willing to order assessment for potential home services including PCA and/or housekeeping given how recent injury is interfering with ability to take care of things in the home.  She will be seeing you on 8/14 if you want to discuss this with her at that time.  She will also be bringing [employer] form for completion.  Apparently [employer] won't accept form if both physician and patient portions don't arrive in the same packet?!  Strange rule, but confirmed this was true with Marine today.  Wondering if we still had the old form and could use this and just staple to her information so that you are not having to re-do work.  I've routed Marine into this conversation in the event that she can provide support for this process.  Let me know if you have questions!  Thanks!  Dior Weems  I thought it might be good to see her back in a month or so but she didn't schedule on her way out today.  Could y

## 2018-08-14 ENCOUNTER — TRANSFERRED RECORDS (OUTPATIENT)
Dept: HEALTH INFORMATION MANAGEMENT | Facility: CLINIC | Age: 63
End: 2018-08-14

## 2018-08-16 ENCOUNTER — OFFICE VISIT (OUTPATIENT)
Dept: FAMILY MEDICINE | Facility: CLINIC | Age: 63
End: 2018-08-16
Payer: MEDICARE

## 2018-08-16 VITALS
HEART RATE: 71 BPM | WEIGHT: 188.8 LBS | DIASTOLIC BLOOD PRESSURE: 68 MMHG | TEMPERATURE: 98.4 F | RESPIRATION RATE: 16 BRPM | BODY MASS INDEX: 31.91 KG/M2 | OXYGEN SATURATION: 97 % | SYSTOLIC BLOOD PRESSURE: 121 MMHG

## 2018-08-16 DIAGNOSIS — G89.4 CHRONIC PAIN SYNDROME: Primary | ICD-10-CM

## 2018-08-16 DIAGNOSIS — M15.0 PRIMARY OSTEOARTHRITIS INVOLVING MULTIPLE JOINTS: ICD-10-CM

## 2018-08-16 DIAGNOSIS — M25.522 LEFT ELBOW PAIN: ICD-10-CM

## 2018-08-16 DIAGNOSIS — M75.41 ROTATOR CUFF IMPINGEMENT SYNDROME OF RIGHT SHOULDER: ICD-10-CM

## 2018-08-16 DIAGNOSIS — K59.09 CHRONIC CONSTIPATION: ICD-10-CM

## 2018-08-16 DIAGNOSIS — G62.89 OTHER POLYNEUROPATHY: ICD-10-CM

## 2018-08-16 LAB
AMPHETAMINES QUAL: NEGATIVE
BARBITURATES QUAL URINE: NEGATIVE
BENZODIAZEPINE QUAL URINE: NEGATIVE
BUPRENORPHINE QUAL URINE: NEGATIVE
CANNABINOIDS UR QL SCN: NEGATIVE
COCAINE QUAL URINE: NEGATIVE
METHAMPHETAMINE: NEGATIVE
METHODONE QUAL: NEGATIVE
MORPHINE QUAL: NEGATIVE
OXYCODONE QUAL: NEGATIVE
PHENCYCLIDINE: NEGATIVE
PROPOXYPHENE: NEGATIVE
TEMPERATURE OF URINE WAS BETWEEN 90-100 DEGREES F: YES
TRICYCLIC ANTIDEPRESSANTS: NEGATIVE

## 2018-08-16 RX ORDER — TRAMADOL HYDROCHLORIDE 50 MG/1
50 TABLET ORAL 2 TIMES DAILY PRN
Qty: 60 TABLET | Refills: 0 | Status: SHIPPED | OUTPATIENT
Start: 2018-08-16 | End: 2018-09-20

## 2018-08-16 RX ORDER — OXYCODONE HYDROCHLORIDE 10 MG/1
10 TABLET ORAL
Qty: 20 TABLET | Refills: 0 | Status: CANCELLED | OUTPATIENT
Start: 2018-08-16

## 2018-08-16 RX ORDER — ASPIRIN 81 MG
100 TABLET, DELAYED RELEASE (ENTERIC COATED) ORAL DAILY
Qty: 60 TABLET | Refills: 11 | Status: SHIPPED | OUTPATIENT
Start: 2018-08-16

## 2018-08-16 NOTE — PROGRESS NOTES
Chronic Pain Follow-Up Visit    Pain Update:  Location of pain: left elbow and right shoulder.  Back pain is chronic.  Analgesia/pain control: Recent changes:  same    Overall control: Tolerable with discomfort    Adherance     How often do you take extra pain medicine:Never    Did you take your pain medication today? No took yesterday after PT.  Tramadol at 4:30AM.    Adverse effects: No, constipation is chronic and well managed right now.     Database checked today? Yes. Details: as expected.  Percocet from [place] for acute issue.    PHQ-9 SCORE 4/17/2017 7/18/2017 11/8/2017   Total Score - - -   Total Score 8 3 6     FAYE-7 SCORE 7/18/2017 11/8/2017 7/24/2018   Total Score - - -   Total Score - - 6 (mild anxiety)   Total Score 5 5 6       Care Plan discussed and updated as needed.  See the end of this note.       FUNCTIONAL ASSESSMENT QUESTIONNAIRE SCORE 6/4/2018 8/16/2018   Total Score 40 40          Problem, Medication and Allergy Lists were reviewed and are current..           Physical Exam:     Vitals:    08/16/18 0930   BP: 121/68   BP Location: Right arm   Patient Position: Sitting   Cuff Size: Adult Regular   Pulse: 71   Resp: 16   Temp: 98.4  F (36.9  C)   TempSrc: Oral   SpO2: 97%   Weight: 188 lb 12.8 oz (85.6 kg)     Body mass index is 31.91 kg/(m^2).  Vitals were reviewed and were normal  GENERAL: healthy, alert, well nourished, well hydrated, no distress  NECK: no tenderness, no adenopathy, no asymmetry, no masses, no stiffness; thyroid- normal to palpation  RESP: lungs clear to auscultation - no rales, no rhonchi, no wheezes  CV: regular rates and rhythm, normal S1 S2, no S3 or S4 and no murmur, no click or rub -  MS: extremities- padding over left elbow, mild swelling and tenderness over lateral epicondyle.  Tneder over anterior right shoulder.  Severely decrease ROM of right shoulder.        Results:     Results for orders placed or performed in visit on 08/16/18   Rapid Urine Drug Screen ([facility]  FM)   Result Value Ref Range    Phencyclidine NEGATIVE NEGATIVE    Propoxyphene NEGATIVE NEGATIVE    Tricyclic Antidepressants NEGATIVE NEGATIVE    Amphetamines Qual NEGATIVE NEGATIVE    Barbiturates Qual Urine NEGATIVE NEGATIVE    Buprenorphine Qual Urine NEGATIVE NEGATIVE    Benzodiazepine Qual Urine NEGATIVE NEGATIVE    Cocaine Qual Urine NEGATIVE NEGATIVE    Cannabinoids Qual Urine NEGATIVE NEGATIVE    Methamphetamine Qual NEGATIVE NEGATIVE    Methadone Qual NEGATIVE NEGATIVE    Morphine Qual NEGATIVE NEGATIVE    Oxycodone Qual NEGATIVE NEGATIVE    Temperature of Urine was Between  Degrees F YES YES      rapid urine drug screen obtained today: Yes    Assessment and Plan    Shira Guthrie is here for follow up of chronic pain caused by arthriis and recent injuries of right shoulder and left elbow that exacerbated previous surgeries and chronic issues..    Shira was seen today for pain management and medication reconciliation.    Diagnoses and all orders for this visit:    Chronic pain syndrome: Continue tramadol which helps with chronic back pain and arthritis pains.  In addition she is using oxycodone 10 mg daily after PT from [facility].  This is short term while working on left elbow pain and right shoulder pain.  -     Rapid Urine Drug Screen ([facility] FM)  -     docusate sodium (COLACE) 100 MG tablet; Take 100 mg by mouth daily  -     traMADol (ULTRAM) 50 MG tablet; Take 1 tablet (50 mg) by mouth 2 times daily as needed  Left elbow pain  Primary osteoarthritis involving multiple joints  Rotator cuff impingement syndrome of right shoulder    Chronic constipation: refilled meds.  Stable on this regimen.  -     docusate sodium (COLACE) 100 MG tablet; Take 100 mg by mouth daily  -     linaclotide (LINZESS) 290 MCG capsule; Take 1 capsule (290 mcg) by mouth every morning (before breakfast)    Chronic Pain Syndrome:  Care plan updated with patient, see below for details.  Patient is being prescribed Tramadol  50mg of tramadol IR per day. 25 mg MEDD  Taking 10mg oxycodone daily.  Care Plan Date: August/2018  Naloxone has not been prescribed.       If opioids prescribed patient was asked to bring pill bottle to each appointment and was informed that refills would only be provided at office visits.   Asked patient to F/U in 1 month(s) with same provider for 20 minute  Visit.     Sally Pierre MD    Patient Instructions        Personal Care Plan for Chronic Pain    1.  Personal Goals:                * take less medication              * lose weight: goal of losing 15 to 20 pounds.              * continue working on keeping thoughts positive through [employer]              * to feel confident enough that when someone gives me a compliment I can simply say thank you    2.  Sleep:                 *  Basic sleep plan:                          *reduce or eliminate caffeine and daytime naps                          * relaxation before bed                          * limit screen time 1-2 hours prior to bed                          * establish dark/quiet sleep environment                          * go to bed at target bedtime:   pm                          * keep consistent wake time:   am.              *  Minimize switching days and nights by staying active throughout the day.              * We'll talk more about a consistent sleep plan when we meet next time.                3.  Physical Activity:                 * Formal physical rehabilitation:                          HOME PT for shoulder              * Home/community based activity:                          * Home based exercises given by lymphedema nurse with breathing exercises built in as well - daily                          * Aerobic exercise/endurance exercise:  elliptical machine - 5 minute intervals with sit ups in between for 30 minutes - daily.  Has a  in the builiding. Has exercise tape in her apartment.                          * Cleaning and baking with body  awareness and stretching              * Listen to your body.  Pace yourself for success.  Don't over-do it.  Plan to get PCA back to help with cleaning and laundry so as to not overdo it.                4.  Nutrition/Weight:                 * Keep a food journal in a notebook at home and bring this to your next visit with Dr. kSy.  Eat small frequent meals.              * Review your I Can Prevent Diabetes materials.              * Limit processed foods and foods high in sugar, sodium and fat.              * Keep up the good work eating many healthy foods.              * When you make a less healthy choice approach yourself with kindness and compassion.  Try to understand what contributed to that choice:  Was I too hungry?  Was I too tired?  Stressed?  Worried?  Use this information to help support healthier choices in the future.    5.  Mood/Stress Management:     SELF COMPASSION!              * Formal interventions:                        * schedule follow up with Dr. Sky in about month or so.              * Home/community based interventions:                          * Regular contact with family  * Relaxation techniques - breathing exercises  * Create your pyramid to focus you on your strengths and hopes.  * Movies  * Meditation  * Yoga  * Creative activity  * Spiritual /prayer:  Prayer at home, attending services at [facility], wellness auxiliary group  * Service-based activity.              * Medications: Cymbalta, Hydroxyzine as needed.    6.  Tobacco/Alcohol/Drug Use:                               * Congratulations on quitting smoking and staying quit!  Keep up the good work managing stress/anxiety in other ways (prayer, talking it out, deep breaths, exercise, etc..                         * Maintain healthy relationship with alcohol                          * For women this would be no more than 1 drink per day                          * For men, this would be no more than  2 drinks per day              * Eliminate recreational drugs    7.  Pain:                 * Non-medication treatments:                          * ice/heat: use heating pad as you are doing.                          * massage                          * acupuncture  [facility] is planned for when you return.                      We can help with [employer] paperwork when you are ready.    8.  Pain Medications: Gabapentin 600mg twice a day Tramadol one pill two times day.   Percocet from [facility] 5/325, two tablets after PT for short term management of right shoulder and left elbow pain.    Tylenol arthritis as needed for break through.

## 2018-08-16 NOTE — MR AVS SNAPSHOT
After Visit Summary   8/16/2018    Shira Guthrie    MRN: [id number]           Patient Information     Date Of Birth          1955        Visit Information        Provider Department      8/16/2018 9:20 AM Sally Pierre MD [facility]        Today's Diagnoses     Chronic pain syndrome    -  1    Left elbow pain        Primary osteoarthritis involving multiple joints        Rotator cuff impingement syndrome of right shoulder        Chronic constipation        Other polyneuropathy          Care Instructions       Personal Care Plan for Chronic Pain    1.  Personal Goals:                * take less medication              * lose weight: goal of losing 15 to 20 pounds.              * continue working on keeping thoughts positive through [employer]              * to feel confident enough that when someone gives me a compliment I can simply say thank you    2.  Sleep:                 *  Basic sleep plan:                          *reduce or eliminate caffeine and daytime naps                          * relaxation before bed                          * limit screen time 1-2 hours prior to bed                          * establish dark/quiet sleep environment                          * go to bed at target bedtime:   pm                          * keep consistent wake time:   am.              *  Minimize switching days and nights by staying active throughout the day.              * We'll talk more about a consistent sleep plan when we meet next time.                3.  Physical Activity:                 * Formal physical rehabilitation:                          HOME PT for shoulder              * Home/community based activity:                          * Home based exercises given by lymphedema nurse with breathing exercises built in as well - daily                          * Aerobic exercise/endurance exercise:  elliptical machine - 5 minute intervals with sit ups in between for 30 minutes - daily.  Has a   in the builiding. Has exercise tape in her apartment.                          * Cleaning and baking with body awareness and stretching              * Listen to your body.  Pace yourself for success.  Don't over-do it.  Plan to get PCA back to help with cleaning and laundry so as to not overdo it.                4.  Nutrition/Weight:                 * Keep a food journal in a notebook at home and bring this to your next visit with Dr. Sky.  Eat small frequent meals.              * Review your I Can Prevent Diabetes materials.              * Limit processed foods and foods high in sugar, sodium and fat.              * Keep up the good work eating many healthy foods.              * When you make a less healthy choice approach yourself with kindness and compassion.  Try to understand what contributed to that choice:  Was I too hungry?  Was I too tired?  Stressed?  Worried?  Use this information to help support healthier choices in the future.    5.  Mood/Stress Management:     SELF COMPASSION!              * Formal interventions:                        * schedule follow up with Dr. Sky in about month or so.              * Home/community based interventions:                          * Regular contact with family  * Relaxation techniques - breathing exercises  * Create your pyramid to focus you on your strengths and hopes.  * Movies  * Meditation  * Yoga  * Creative activity  * Spiritual /prayer:  Prayer at home, attending services at [facility], wellness auxiliary group  * Service-based activity.              * Medications: Cymbalta, Hydroxyzine as needed.    6.  Tobacco/Alcohol/Drug Use:                               * Congratulations on quitting smoking and staying quit!  Keep up the good work managing stress/anxiety in other ways (prayer, talking it out, deep breaths, exercise, etc..                         * Maintain healthy relationship with  alcohol                          * For women this would be no more than 1 drink per day                          * For men, this would be no more than 2 drinks per day              * Eliminate recreational drugs    7.  Pain:                 * Non-medication treatments:                          * ice/heat: use heating pad as you are doing.                          * massage                          * acupuncture  [facility] is planned for when you return.                      We can help with metro mobility paperwork when you are ready.    8.  Pain Medications: Gabapentin 600mg twice a day Tramadol one pill two times day.   Percocet from [facility] 5/325, two tablets after PT for short term management of right shoulder and left elbow pain.    Tylenol arthritis as needed for break through.                      Follow-ups after your visit        Your next 10 appointments already scheduled     Sep 20, 2018 10:00 AM CDT   Return Visit with Sally Pierre MD   [facility] ([facility])    [address] [place] [address]   [phone]              Who to contact     Please call your clinic at [phone] to:    Ask questions about your health    Make or cancel appointments    Discuss your medicines    Learn about your test results    Speak to your doctor            Additional Information About Your Visit        [employer] Information     [employer] is an electronic gateway that provides easy, online access to your medical records. With [employer], you can request a clinic appointment, read your test results, renew a prescription or communicate with your care team.     To sign up for [employer] visit the website at [email or web address]   You will be asked to enter the access code listed below, as well as some personal information. Please follow the directions to create your username and password.     Your access code is: [email or web address]  Expires: 10/16/2018  6:30 AM     Your access code will  in 90 days. If you  need help or a new code, please contact your [facility] or call [phone] for assistance.        Care EveryWhere ID     This is your Care EveryWhere ID. This could be used by other organizations to access your [facility] medical records  [id number]        Your Vitals Were     Pulse Temperature Respirations Pulse Oximetry BMI (Body Mass Index)       71 98.4  F (36.9  C) (Oral) 16 97% 31.91 kg/m2        Blood Pressure from Last 3 Encounters:   08/16/18 121/68   07/27/18 130/74   07/24/18 116/71    Weight from Last 3 Encounters:   08/16/18 188 lb 12.8 oz (85.6 kg)   07/27/18 184 lb 3.2 oz (83.6 kg)   05/31/18 184 lb 3.2 oz (83.6 kg)              We Performed the Following     Rapid Urine Drug Screen ([facility])          Today's Medication Changes          These changes are accurate as of 8/16/18 10:21 AM.  If you have any questions, ask your nurse or doctor.               Stop taking these medicines if you haven't already. Please contact your care team if you have questions.     oxyCODONE IR 10 MG tablet   Commonly known as:  ROXICODONE   Stopped by:  Sally Pierre MD                Where to get your medicines      These medications were sent to [facility] - [place], [place] - [address], [place] [address]     Phone:  [phone]     docusate sodium 100 MG tablet    linaclotide 290 MCG capsule         Some of these will need a paper prescription and others can be bought over the counter.  Ask your nurse if you have questions.     Bring a paper prescription for each of these medications     traMADol 50 MG tablet               Information about OPIOIDS     PRESCRIPTION OPIOIDS: WHAT YOU NEED TO KNOW   We gave you an opioid (narcotic) pain medicine. It is important to manage your pain, but opioids are not always the best choice. You should first try all the other options your care team gave you. Take this medicine for as short a time (and as few doses)  as possible.    Some activities can increase your pain, such as bandage changes or therapy sessions. It may help to take your pain medicine 30 to 60 minutes before these activities. Reduce your stress by getting enough sleep, working on hobbies you enjoy and practicing relaxation or meditation. Talk to your care team about ways to manage your pain beyond prescription opioids.    These medicines have risks:    DO NOT drive when on new or higher doses of pain medicine. These medicines can affect your alertness and reaction times, and you could be arrested for driving under the influence (DUI). If you need to use opioids long-term, talk to your care team about driving.    DO NOT operate heavy machinery    DO NOT do any other dangerous activities while taking these medicines.    DO NOT drink any alcohol while taking these medicines.     If the opioid prescribed includes acetaminophen, DO NOT take with any other medicines that contain acetaminophen. Read all labels carefully. Look for the word  acetaminophen  or  Tylenol.  Ask your pharmacist if you have questions or are unsure.    You can get addicted to pain medicines, especially if you have a history of addiction (chemical, alcohol or substance dependence). Talk to your care team about ways to reduce this risk.    All opioids tend to cause constipation. Drink plenty of water and eat foods that have a lot of fiber, such as fruits, vegetables, prune juice, apple juice and high-fiber cereal. Take a laxative (Miralax, milk of magnesia, Colace, Senna) if you don t move your bowels at least every other day. Other side effects include upset stomach, sleepiness, dizziness, throwing up, tolerance (needing more of the medicine to have the same effect), physical dependence and slowed breathing.    Store your pills in a secure place, locked if possible. We will not replace any lost or stolen medicine. If you don t finish your medicine, please throw away (dispose) as directed by  your pharmacist. The [place] Pollution Control Agency has more information about safe disposal: [email or web address]         Primary Care Provider Office Phone # Fax #    Sally Pierre [phone]       [address] [place] [address]        Equal Access to Services     JAMISONWAAQR RUTH ANN : Hadii aad ku hadasho Soomaali, waaxda luqadaha, qaybta kaalmada adeegyada, waxcitlalli montgomery haymaguen shell caponedavirodrigo mcbride . So [facility] [phone].    ATENCIÓN: Si habla español, tiene a monson disposición servicios gratuitos de asistencia lingüística. [facility] [phone].    We comply with applicable federal civil rights laws and [place] laws. We do not discriminate on the basis of race, color, national origin, age, disability, sex, sexual orientation, or gender identity.            Thank you!     Thank you for choosing [facility]  for your care. Our goal is always to provide you with excellent care. Hearing back from our patients is one way we can continue to improve our services. Please take a few minutes to complete the written survey that you may receive in the mail after your visit with us. Thank you!             Your Updated Medication List - Protect others around you: Learn how to safely use, store and throw away your medicines at www.disposemymeds.org.          This list is accurate as of 8/16/18 10:21 AM.  Always use your most recent med list.                   Brand Name Dispense Instructions for use Diagnosis    aspirin 81 MG EC tablet     90 tablet    Take 1 tablet (81 mg) by mouth daily    Coronary artery disease involving native coronary artery of native heart with angina pectoris (H)       baclofen 10 MG tablet    LIORESAL    90 tablet    Take 1 tablet (10 mg) by mouth 3 times daily as needed for muscle spasms    Chronic pain syndrome, Primary osteoarthritis involving multiple joints       CANE, ANY MATERIAL     1 Device    1 Device by Device route as  needed    Peripheral neuropathy, Leg edema, left, Stroke (H)       * clopidogrel 75 MG tablet    PLAVIX    90 tablet    Take 1 tablet (75 mg) by mouth daily . Give name brand Plavix. Approved by insurance through 2/2/1016.    Chronic ischemic heart disease       * PLAVIX 75 MG tablet   Generic drug:  clopidogrel     90 tablet    Take 1 tablet (75 mg) by mouth daily    Chronic ischemic heart disease       dexlansoprazole 30 MG Cpdr CR capsule    DEXILANT    90 capsule    Take 1 capsule (30 mg) by mouth daily    Esophageal stricture       diclofenac 1 % Gel topical gel    VOLTAREN    100 g    Apply 2-4 grams to painful areas four times daily as needed using enclosed dosing card.    Chronic pain syndrome, Closed fracture of proximal end of left forearm with delayed healing, subsequent encounter, Primary osteoarthritis involving multiple joints, Rotator cuff impingement syndrome of right shoulder, Lymphedema of left leg       diphenhydrAMINE 25 MG tablet    BENADRYL    100 tablet    Take 1-2 tablets (25-50 mg) by mouth every 6 hours as needed for other (for congestion, cough)    Acute nasopharyngitis       docusate sodium 100 MG tablet    COLACE    60 tablet    Take 100 mg by mouth daily    Chronic constipation, Chronic pain syndrome       DULoxetine 60 MG EC capsule    CYMBALTA    90 capsule    Take 1 capsule (60 mg) by mouth daily    Major depressive disorder, recurrent, severe without psychotic features (H)       furosemide 20 MG tablet    LASIX    180 tablet    Take 2 tablets (40 mg) by mouth daily    Lymphedema of left leg       gabapentin 300 MG capsule    NEURONTIN    360 capsule    Take 2 capsules (600 mg) by mouth 2 times daily    Other polyneuropathy       Heating Pads Pads     1 each    Apply to painful areas prn.    Stroke (H), Peripheral neuropathy, Leg edema, left       isosorbide mononitrate 30 MG 24 hr tablet    IMDUR    90 tablet    Take 1 tablet (30 mg) by mouth daily    Chronic ischemic heart disease        linaclotide 290 MCG capsule    LINZESS    90 capsule    Take 1 capsule (290 mcg) by mouth every morning (before breakfast)    Chronic constipation       metoprolol succinate 25 MG 24 hr tablet    TOPROL-XL    90 tablet    Take 1 tablet (25 mg) by mouth daily    Benign essential hypertension       nitroGLYcerin 0.4 MG sublingual tablet    NITROSTAT    30 tablet    Place 1 tablet (0.4 mg) under the tongue every 5 minutes as needed    Anginal pain (H)       * order for DME     2 Device    Equipment being ordered: Jobst stockings thigh high 15-20mmHg.  Please measure for fit. 2 pairs.    Leg edema, left       * order for DME     2 Device    Bilateral compression stockings 15-20mmHg. One pair knee high and one pair thigh high.  Please measure for fit.    Lymphedema of left leg       polyethylene glycol powder    MIRALAX    510 g    Take 17 g (1 capful) by mouth 2 times daily    Chronic constipation       rosuvastatin 20 MG tablet    CRESTOR    90 tablet    Take 1 tablet (20 mg) by mouth daily    Coronary artery disease involving native coronary artery of native heart with angina pectoris (H)       traMADol 50 MG tablet    ULTRAM    60 tablet    Take 1 tablet (50 mg) by mouth 2 times daily as needed    Chronic pain syndrome, Other polyneuropathy, Primary osteoarthritis involving multiple joints       TYLENOL 325 MG tablet   Generic drug:  acetaminophen     100 tablet    Take 2 tablets (650 mg) by mouth daily        * Notice:  This list has 4 medication(s) that are the same as other medications prescribed for you. Read the directions carefully, and ask your doctor or other care provider to review them with you.

## 2018-08-20 ENCOUNTER — TRANSFERRED RECORDS (OUTPATIENT)
Dept: HEALTH INFORMATION MANAGEMENT | Facility: CLINIC | Age: 63
End: 2018-08-20

## 2018-09-20 ENCOUNTER — OFFICE VISIT (OUTPATIENT)
Dept: FAMILY MEDICINE | Facility: CLINIC | Age: 63
End: 2018-09-20
Payer: MEDICARE

## 2018-09-20 VITALS
BODY MASS INDEX: 31.91 KG/M2 | DIASTOLIC BLOOD PRESSURE: 75 MMHG | SYSTOLIC BLOOD PRESSURE: 112 MMHG | HEART RATE: 66 BPM | OXYGEN SATURATION: 98 % | TEMPERATURE: 98.1 F | WEIGHT: 188.8 LBS | RESPIRATION RATE: 12 BRPM

## 2018-09-20 DIAGNOSIS — M15.0 PRIMARY OSTEOARTHRITIS INVOLVING MULTIPLE JOINTS: ICD-10-CM

## 2018-09-20 DIAGNOSIS — G62.89 OTHER POLYNEUROPATHY: ICD-10-CM

## 2018-09-20 DIAGNOSIS — G89.4 CHRONIC PAIN SYNDROME: ICD-10-CM

## 2018-09-20 RX ORDER — TRAMADOL HYDROCHLORIDE 50 MG/1
50 TABLET ORAL 2 TIMES DAILY PRN
Qty: 60 TABLET | Refills: 0 | Status: SHIPPED | OUTPATIENT
Start: 2018-09-20 | End: 2018-10-30

## 2018-09-20 NOTE — LETTER
September 21, 2018      Shira Guthrie  [address]  [place] [address]        Dear Shira,    Please see below for your test results.    Resulted Orders   Rapid Urine Drug Screen ([facility])   Result Value Ref Range    Phencyclidine NEGATIVE NEGATIVE    Propoxyphene NEGATIVE NEGATIVE    Tricyclic Antidepressants NEGATIVE NEGATIVE    Amphetamines Qual NEGATIVE NEGATIVE    Barbiturates Qual Urine NEGATIVE NEGATIVE    Buprenorphine Qual Urine NEGATIVE NEGATIVE    Benzodiazepine Qual Urine NEGATIVE NEGATIVE    Cocaine Qual Urine NEGATIVE NEGATIVE    Cannabinoids Qual Urine NEGATIVE NEGATIVE    Methamphetamine Qual NEGATIVE NEGATIVE    Methadone Qual NEGATIVE NEGATIVE    Morphine Qual NEGATIVE NEGATIVE    Oxycodone Qual NEGATIVE NEGATIVE    Temperature of Urine was Between  Degrees F YES YES      Comment:      This is a preliminary screening test that detects drugs-of-abuse in urine at   specified detection levels.  To confirm preliminary results, a more specific   method such as Gas Chromatography/Mass Spectrometry (GC/MS) must be used.          Your urine is as expected      If you have any questions, please call the clinic to make an appointment.    Sincerely,    Sally Pierre MD

## 2018-09-20 NOTE — PATIENT INSTRUCTIONS
Personal Care Plan for Chronic Pain    1.  Personal Goals:                * take less medication              * lose weight: goal of losing 15 to 20 pounds.              * continue working on keeping thoughts positive through [employer]              * to feel confident enough that when someone gives me a compliment I can simply say thank you    2.  Sleep:                 *  Basic sleep plan:                          *reduce or eliminate caffeine and daytime naps                          * relaxation before bed                          * limit screen time 1-2 hours prior to bed                          * establish dark/quiet sleep environment                          * go to bed at target bedtime:   pm                          * keep consistent wake time:   am.              *  Minimize switching days and nights by staying active throughout the day.              * We'll talk more about a consistent sleep plan when we meet next time.                3.  Physical Activity:                 * Formal physical rehabilitation:                          HOME PT for shoulder              * Home/community based activity:                          * Home based exercises given by lymphedema nurse with breathing exercises built in as well - daily                          * Aerobic exercise/endurance exercise:  elliptical machine - 5 minute intervals with sit ups in between for 30 minutes - daily.  Has a  in the builiding. Has exercise tape in her apartment.                          * Cleaning and baking with body awareness and stretching              * Listen to your body.  Pace yourself for success.  Don't over-do it.  Plan to get PCA back to help with cleaning and laundry so as to not overdo it.                4.  Nutrition/Weight:                 * Keep a food journal in a notebook at home and bring this to your next visit with Dr. Sky.  Eat small frequent meals.              * Review your I Can Prevent Diabetes  materials.              * Limit processed foods and foods high in sugar, sodium and fat.              * Keep up the good work eating many healthy foods.              * When you make a less healthy choice approach yourself with kindness and compassion.  Try to understand what contributed to that choice:  Was I too hungry?  Was I too tired?  Stressed?  Worried?  Use this information to help support healthier choices in the future.    5.  Mood/Stress Management:     SELF COMPASSION!              * Formal interventions:                        * schedule follow up with Dr. Sky in about month or so.              * Home/community based interventions:                          * Regular contact with family  * Relaxation techniques - breathing exercises  * Create your pyramid to focus you on your strengths and hopes.  * Movies  * Meditation  * Yoga  * Creative activity  * Spiritual /prayer:  Prayer at home, attending services at [facility], wellness auxiliary group  * Service-based activity.              * Medications: Cymbalta, Hydroxyzine as needed.    6.  Tobacco/Alcohol/Drug Use:                               * Congratulations on quitting smoking and staying quit!  Keep up the good work managing stress/anxiety in other ways (prayer, talking it out, deep breaths, exercise, etc..                         * Maintain healthy relationship with alcohol                          * For women this would be no more than 1 drink per day                          * For men, this would be no more than 2 drinks per day              * Eliminate recreational drugs    7.  Pain:                 * Non-medication treatments:                          * ice/heat: use heating pad as you are doing.                          * massage                          * acupuncture  YOGA is planned.                        We can help with metro mobility paperwork when you are ready.    8.  Pain Medications: Gabapentin 600mg  twice a day Tramadol one pill two times day.    Tylenol arthritis as needed for break through.

## 2018-09-20 NOTE — MR AVS SNAPSHOT
After Visit Summary   9/20/2018    Shira Guthrie    MRN: [id number]           Patient Information     Date Of Birth          1955        Visit Information        Provider Department      9/20/2018 10:00 AM Sally Pierre MD [facility]        Today's Diagnoses     Chronic pain syndrome        Other polyneuropathy        Primary osteoarthritis involving multiple joints          Care Instructions       Personal Care Plan for Chronic Pain    1.  Personal Goals:                * take less medication              * lose weight: goal of losing 15 to 20 pounds.              * continue working on keeping thoughts positive through [employer]wn[employer]              * to feel confident enough that when someone gives me a compliment I can simply say thank you    2.  Sleep:                 *  Basic sleep plan:                          *reduce or eliminate caffeine and daytime naps                          * relaxation before bed                          * limit screen time 1-2 hours prior to bed                          * establish dark/quiet sleep environment                          * go to bed at target bedtime:   pm                          * keep consistent wake time:   am.              *  Minimize switching days and nights by staying active throughout the day.              * We'll talk more about a consistent sleep plan when we meet next time.                3.  Physical Activity:                 * Formal physical rehabilitation:                          HOME PT for shoulder              * Home/community based activity:                          * Home based exercises given by lymphedema nurse with breathing exercises built in as well - daily                          * Aerobic exercise/endurance exercise:  elliptical machine - 5 minute intervals with sit ups in between for 30 minutes - daily.  Has a  in the builiding. Has exercise tape in her apartment.                          * Cleaning and baking with  body awareness and stretching              * Listen to your body.  Pace yourself for success.  Don't over-do it.  Plan to get PCA back to help with cleaning and laundry so as to not overdo it.                4.  Nutrition/Weight:                 * Keep a food journal in a notebook at home and bring this to your next visit with Dr. Sky.  Eat small frequent meals.              * Review your I Can Prevent Diabetes materials.              * Limit processed foods and foods high in sugar, sodium and fat.              * Keep up the good work eating many healthy foods.              * When you make a less healthy choice approach yourself with kindness and compassion.  Try to understand what contributed to that choice:  Was I too hungry?  Was I too tired?  Stressed?  Worried?  Use this information to help support healthier choices in the future.    5.  Mood/Stress Management:     SELF COMPASSION!              * Formal interventions:                        * schedule follow up with Dr. Sky in about month or so.              * Home/community based interventions:                          * Regular contact with family  * Relaxation techniques - breathing exercises  * Create your pyramid to focus you on your strengths and hopes.  * Movies  * Meditation  * Yoga  * Creative activity  * Spiritual /prayer:  Prayer at home, attending services at [facility], wellness auxiliary group  * Service-based activity.              * Medications: Cymbalta, Hydroxyzine as needed.    6.  Tobacco/Alcohol/Drug Use:                               * Congratulations on quitting smoking and staying quit!  Keep up the good work managing stress/anxiety in other ways (prayer, talking it out, deep breaths, exercise, etc..                         * Maintain healthy relationship with alcohol                          * For women this would be no more than 1 drink per day                          * For men, this would be no more  than 2 drinks per day              * Eliminate recreational drugs    7.  Pain:                 * Non-medication treatments:                          * ice/heat: use heating pad as you are doing.                          * massage                          * acupuncture  [facility] is planned.                        We can help with metro mobility paperwork when you are ready.    8.  Pain Medications: Gabapentin 600mg twice a day Tramadol one pill two times day.   Percocet from [facility] 5/325, two tablets after PT for short term management of right shoulder and left elbow pain.    Tylenol arthritis as needed for break through.                      Follow-ups after your visit        Who to contact     Please call your clinic at [phone] to:    Ask questions about your health    Make or cancel appointments    Discuss your medicines    Learn about your test results    Speak to your doctor            Additional Information About Your Visit        Care EveryWhere ID     This is your Care EveryWhere ID. This could be used by other organizations to access your [place] medical records  [id number]        Your Vitals Were     Pulse Temperature Respirations Pulse Oximetry BMI (Body Mass Index)       66 98.1  F (36.7  C) (Oral) 12 98% 31.91 kg/m2        Blood Pressure from Last 3 Encounters:   09/20/18 112/75   08/16/18 121/68   07/27/18 130/74    Weight from Last 3 Encounters:   09/20/18 188 lb 12.8 oz (85.6 kg)   08/16/18 188 lb 12.8 oz (85.6 kg)   07/27/18 184 lb 3.2 oz (83.6 kg)              We Performed the Following     Rapid Urine Drug Screen ([facility])          Today's Medication Changes          These changes are accurate as of 9/20/18 10:58 AM.  If you have any questions, ask your nurse or doctor.               These medicines have changed or have updated prescriptions.        Dose/Directions    traMADol 50 MG tablet   Commonly known as:  ULTRAM   This may have changed:  reasons to take this   Used for:  Chronic pain  syndrome, Other polyneuropathy, Primary osteoarthritis involving multiple joints   Changed by:  Sally Pierre MD        Dose:  50 mg   Take 1 tablet (50 mg) by mouth 2 times daily as needed for severe pain   Quantity:  60 tablet   Refills:  0            Where to get your medicines      Some of these will need a paper prescription and others can be bought over the counter.  Ask your nurse if you have questions.     Bring a paper prescription for each of these medications     traMADol 50 MG tablet               Information about OPIOIDS     PRESCRIPTION OPIOIDS: WHAT YOU NEED TO KNOW   We gave you an opioid (narcotic) pain medicine. It is important to manage your pain, but opioids are not always the best choice. You should first try all the other options your care team gave you. Take this medicine for as short a time (and as few doses) as possible.    Some activities can increase your pain, such as bandage changes or therapy sessions. It may help to take your pain medicine 30 to 60 minutes before these activities. Reduce your stress by getting enough sleep, working on hobbies you enjoy and practicing relaxation or meditation. Talk to your care team about ways to manage your pain beyond prescription opioids.    These medicines have risks:    DO NOT drive when on new or higher doses of pain medicine. These medicines can affect your alertness and reaction times, and you could be arrested for driving under the influence (DUI). If you need to use opioids long-term, talk to your care team about driving.    DO NOT operate heavy machinery    DO NOT do any other dangerous activities while taking these medicines.    DO NOT drink any alcohol while taking these medicines.     If the opioid prescribed includes acetaminophen, DO NOT take with any other medicines that contain acetaminophen. Read all labels carefully. Look for the word  acetaminophen  or  Tylenol.  Ask your pharmacist if you have questions or are unsure.    You  can get addicted to pain medicines, especially if you have a history of addiction (chemical, alcohol or substance dependence). Talk to your care team about ways to reduce this risk.    All opioids tend to cause constipation. Drink plenty of water and eat foods that have a lot of fiber, such as fruits, vegetables, prune juice, apple juice and high-fiber cereal. Take a laxative (Miralax, milk of magnesia, Colace, Senna) if you don t move your bowels at least every other day. Other side effects include upset stomach, sleepiness, dizziness, throwing up, tolerance (needing more of the medicine to have the same effect), physical dependence and slowed breathing.    Store your pills in a secure place, locked if possible. We will not replace any lost or stolen medicine. If you don t finish your medicine, please throw away (dispose) as directed by your pharmacist. The [place] Pollution Control Agency has more information about safe disposal: [email or web address]         Primary Care Provider Office Phone # Fax #    Sally Pierre [phone]       [address] [place] [address]        Equal Access to Services     KASANDRA VALLECILLO : Hadii thelma paulinoo Sodonna, waaxda luqadaha, qaybta kaalmada don, una tafoya. So [facility] [phone].    ATENCIÓN: Si habla español, tiene a monson disposición servicios gratuitos de asistencia lingüística. Vipul al [phone].    We comply with applicable federal civil rights laws and [place] laws. We do not discriminate on the basis of race, color, national origin, age, disability, sex, sexual orientation, or gender identity.            Thank you!     Thank you for choosing [facility]  for your care. Our goal is always to provide you with excellent care. Hearing back from our patients is one way we can continue to improve our services. Please take a few minutes to complete the written  survey that you may receive in the mail after your visit with us. Thank you!             Your Updated Medication List - Protect others around you: Learn how to safely use, store and throw away your medicines at www.disposemymeds.org.          This list is accurate as of 9/20/18 10:58 AM.  Always use your most recent med list.                   Brand Name Dispense Instructions for use Diagnosis    aspirin 81 MG EC tablet     90 tablet    Take 1 tablet (81 mg) by mouth daily    Coronary artery disease involving native coronary artery of native heart with angina pectoris (H)       baclofen 10 MG tablet    LIORESAL    90 tablet    Take 1 tablet (10 mg) by mouth 3 times daily as needed for muscle spasms    Chronic pain syndrome, Primary osteoarthritis involving multiple joints       CANE, ANY MATERIAL     1 Device    1 Device by Device route as needed    Peripheral neuropathy, Leg edema, left, Stroke (H)       * clopidogrel 75 MG tablet    PLAVIX    90 tablet    Take 1 tablet (75 mg) by mouth daily . Give name brand Plavix. Approved by insurance through 2/2/1016.    Chronic ischemic heart disease       * PLAVIX 75 MG tablet   Generic drug:  clopidogrel     90 tablet    Take 1 tablet (75 mg) by mouth daily    Chronic ischemic heart disease       dexlansoprazole 30 MG Cpdr CR capsule    DEXILANT    90 capsule    Take 1 capsule (30 mg) by mouth daily    Esophageal stricture       diclofenac 1 % Gel topical gel    VOLTAREN    100 g    Apply 2-4 grams to painful areas four times daily as needed using enclosed dosing card.    Chronic pain syndrome, Closed fracture of proximal end of left forearm with delayed healing, subsequent encounter, Primary osteoarthritis involving multiple joints, Rotator cuff impingement syndrome of right shoulder, Lymphedema of left leg       diphenhydrAMINE 25 MG tablet    BENADRYL    100 tablet    Take 1-2 tablets (25-50 mg) by mouth every 6 hours as needed for other (for congestion, cough)    Acute  nasopharyngitis       docusate sodium 100 MG tablet    COLACE    60 tablet    Take 100 mg by mouth daily    Chronic constipation, Chronic pain syndrome       DULoxetine 60 MG EC capsule    CYMBALTA    90 capsule    Take 1 capsule (60 mg) by mouth daily    Major depressive disorder, recurrent, severe without psychotic features (H)       furosemide 20 MG tablet    LASIX    180 tablet    Take 2 tablets (40 mg) by mouth daily    Lymphedema of left leg       gabapentin 300 MG capsule    NEURONTIN    360 capsule    Take 2 capsules (600 mg) by mouth 2 times daily    Other polyneuropathy       Heating Pads Pads     1 each    Apply to painful areas prn.    Stroke (H), Peripheral neuropathy, Leg edema, left       isosorbide mononitrate 30 MG 24 hr tablet    IMDUR    90 tablet    Take 1 tablet (30 mg) by mouth daily    Chronic ischemic heart disease       linaclotide 290 MCG capsule    LINZESS    90 capsule    Take 1 capsule (290 mcg) by mouth every morning (before breakfast)    Chronic constipation       metoprolol succinate 25 MG 24 hr tablet    TOPROL-XL    90 tablet    Take 1 tablet (25 mg) by mouth daily    Benign essential hypertension       nitroGLYcerin 0.4 MG sublingual tablet    NITROSTAT    30 tablet    Place 1 tablet (0.4 mg) under the tongue every 5 minutes as needed    Anginal pain (H)       * order for DME     2 Device    Equipment being ordered: Jobst stockings thigh high 15-20mmHg.  Please measure for fit. 2 pairs.    Leg edema, left       * order for DME     2 Device    Bilateral compression stockings 15-20mmHg. One pair knee high and one pair thigh high.  Please measure for fit.    Lymphedema of left leg       polyethylene glycol powder    MIRALAX    510 g    Take 17 g (1 capful) by mouth 2 times daily    Chronic constipation       rosuvastatin 20 MG tablet    CRESTOR    90 tablet    Take 1 tablet (20 mg) by mouth daily    Coronary artery disease involving native coronary artery of native heart with angina  pectoris (H)       traMADol 50 MG tablet    ULTRAM    60 tablet    Take 1 tablet (50 mg) by mouth 2 times daily as needed for severe pain    Chronic pain syndrome, Other polyneuropathy, Primary osteoarthritis involving multiple joints       TYLENOL 325 MG tablet   Generic drug:  acetaminophen     100 tablet    Take 2 tablets (650 mg) by mouth daily        * Notice:  This list has 4 medication(s) that are the same as other medications prescribed for you. Read the directions carefully, and ask your doctor or other care provider to review them with you.

## 2018-09-20 NOTE — PROGRESS NOTES
Chronic Pain Follow-Up Visit    Other concern: Isosorbide generic changed to red color and she is worried about taking it due to facial swelling and trouble breathing with pink generic plavix in the past. She does not want to take the red pills.    Pain Update:  Location of pain: right shoulder blade and right knee.  Fell again last Thursday, was walking too far with cane on the sidewalk. Had spasm in her back that caused the fall. Right knee is bruised and mildly swollen, but weight bearing okay.  Wants ortho to look at it next week.  Does not want work up knee today as it is improving some.  Analgesia/pain control: Recent changes:  worse    Overall control: Tolerable with discomfort    Still going to PT, but gets tight after PT.  Seeing Dr. Myrna Dao for her shoulder on the 25th.  She will look at her knee as well.    Following with Dr. Smith for left elbow pain.  Doing therapy for that.    Adherance     How often do you take extra pain medicine:Never    Did you take your pain medication today? YES, this am.    Adverse effects: no, constipation is chronic but controlled with meds.       Database checked today? Yes. Details: as expected.    PHQ-9 SCORE 4/17/2017 7/18/2017 11/8/2017   Total Score - - -   Total Score 8 3 6     FAYE-7 SCORE 7/18/2017 11/8/2017 7/24/2018   Total Score - - -   Total Score - - 6 (mild anxiety)   Total Score 5 5 6       Care Plan discussed and updated as needed.  See the end of this note.            FUNCTIONAL ASSESSMENT QUESTIONNAIRE SCORE 8/16/2018 9/20/2018   Total Score 40 35          Problem, Medication and Allergy Lists were reviewed and are current..           Physical Exam:     Vitals:    09/20/18 1018   BP: 112/75   Pulse: 66   Resp: 12   Temp: 98.1  F (36.7  C)   TempSrc: Oral   SpO2: 98%   Weight: 188 lb 12.8 oz (85.6 kg)     Body mass index is 31.91 kg/(m^2).  Vitals were reviewed and were normal  GENERAL: healthy, alert, well nourished, well hydrated, no  distress  RESP: lungs clear to auscultation - no rales, no rhonchi, no wheezes  CV: regular rates and rhythm, normal S1 S2, no S3 or S4 and no murmur, no click or rub -  ABDOMEN: soft, no tenderness,   MS: extremities- no gross deformities noted, no edema. Mild bruising over right knee, no effusion or hematoma noted.  Normal weight bearing with cane.        Results:     Results for orders placed or performed in visit on 09/20/18   Rapid Urine Drug Screen ([facility])   Result Value Ref Range    Phencyclidine NEGATIVE NEGATIVE    Propoxyphene NEGATIVE NEGATIVE    Tricyclic Antidepressants NEGATIVE NEGATIVE    Amphetamines Qual NEGATIVE NEGATIVE    Barbiturates Qual Urine NEGATIVE NEGATIVE    Buprenorphine Qual Urine NEGATIVE NEGATIVE    Benzodiazepine Qual Urine NEGATIVE NEGATIVE    Cocaine Qual Urine NEGATIVE NEGATIVE    Cannabinoids Qual Urine NEGATIVE NEGATIVE    Methamphetamine Qual NEGATIVE NEGATIVE    Methadone Qual NEGATIVE NEGATIVE    Morphine Qual NEGATIVE NEGATIVE    Oxycodone Qual NEGATIVE NEGATIVE    Temperature of Urine was Between  Degrees F YES YES      rapid urine drug screen obtained today: Yes    Assessment and Plan    Shira Guthrie is here for follow up of chronic pain caused by  Arthritis and has had surgeries in the past for fractured elbow.  Has had exacerbations from recent falls.  Had past stroke contributing to left sided neuropathic pain as well. Also has rotator cuff repair to right shoulder with complications of recovery due to fall last summer.    Shira was seen today for pain.    Diagnoses and all orders for this visit:    Chronic pain syndrome: OA of back, knees, elbows.  Follows with ortho hand for elbow (Luis) and Myrna Dao for shoulder,she will also look at her knee which is sore from recent fall.  We will discussed wheeled walker with a seat at her next visit in more detail.  This may help prevent falls.  -     traMADol (ULTRAM) 50 MG tablet; Take 1 tablet (50 mg) by mouth  2 times daily as needed for severe pain  -     Rapid Urine Drug Screen ([facility])    Other polyneuropathy  -     traMADol (ULTRAM) 50 MG tablet; Take 1 tablet (50 mg) by mouth 2 times daily as needed for severe pain    Primary osteoarthritis involving multiple joints  -     traMADol (ULTRAM) 50 MG tablet; Take 1 tablet (50 mg) by mouth 2 times daily as needed for severe pain    CAD: pharmacist helping to get non-dyed generic isosorbide.  See telephone not 9/21.    Chronic Pain Syndrome:  Care plan updated with patient, see below for details.  Patient is being prescribed 100mg of tramadol IR per day. 10 mg MEDD. Previously on oxycodone for acute shoulder pain after fall as well. No longer taking that.  She never takes them together and tramadol helps with neuropathy and arthritis pains better than oxycodone.   Care Plan Date: September/2018  Naloxone has not been prescribed.       If opioids prescribed patient was asked to bring pill bottle to each appointment and was informed that refills would only be provided at office visits.   Asked patient to F/U in 1 month(s) with same provider for 20 minute  Visit.     Sally Pierre MD    Patient Instructions        Personal Care Plan for Chronic Pain    1.  Personal Goals:                * take less medication              * lose weight: goal of losing 15 to 20 pounds.              * continue working on keeping thoughts positive through [employer]              * to feel confident enough that when someone gives me a compliment I can simply say thank you    2.  Sleep:                 *  Basic sleep plan:                          *reduce or eliminate caffeine and daytime naps                          * relaxation before bed                          * limit screen time 1-2 hours prior to bed                          * establish dark/quiet sleep environment                          * go to bed at target bedtime:   pm                          * keep consistent wake time:    am.              *  Minimize switching days and nights by staying active throughout the day.              * We'll talk more about a consistent sleep plan when we meet next time.                3.  Physical Activity:                 * Formal physical rehabilitation:                          HOME PT for shoulder              * Home/community based activity:                          * Home based exercises given by lymphedema nurse with breathing exercises built in as well - daily                          * Aerobic exercise/endurance exercise:  elliptical machine - 5 minute intervals with sit ups in between for 30 minutes - daily.  Has a  in the builiding. Has exercise tape in her apartment.                          * Cleaning and baking with body awareness and stretching              * Listen to your body.  Pace yourself for success.  Don't over-do it.  Plan to get PCA back to help with cleaning and laundry so as to not overdo it.                4.  Nutrition/Weight:                 * Keep a food journal in a notebook at home and bring this to your next visit with Dr. Sky.  Eat small frequent meals.              * Review your I Can Prevent Diabetes materials.              * Limit processed foods and foods high in sugar, sodium and fat.              * Keep up the good work eating many healthy foods.              * When you make a less healthy choice approach yourself with kindness and compassion.  Try to understand what contributed to that choice:  Was I too hungry?  Was I too tired?  Stressed?  Worried?  Use this information to help support healthier choices in the future.    5.  Mood/Stress Management:     SELF COMPASSION!              * Formal interventions:                        * schedule follow up with Dr. Sky in about month or so.              * Home/community based interventions:                          * Regular contact with family  * Relaxation techniques - breathing exercises  * Create your  pyramid to focus you on your strengths and hopes.  * Movies  * Meditation  * Yoga  * Creative activity  * Spiritual /prayer:  Prayer at home, attending services at [facility], wellness auxiliary group  * Service-based activity.              * Medications: Cymbalta, Hydroxyzine as needed.    6.  Tobacco/Alcohol/Drug Use:                               * Congratulations on quitting smoking and staying quit!  Keep up the good work managing stress/anxiety in other ways (prayer, talking it out, deep breaths, exercise, etc..                         * Maintain healthy relationship with alcohol                          * For women this would be no more than 1 drink per day                          * For men, this would be no more than 2 drinks per day              * Eliminate recreational drugs    7.  Pain:                 * Non-medication treatments:                          * ice/heat: use heating pad as you are doing.                          * massage                          * acupuncture  [facility] is planned.                        We can help with metro mobility paperwork when you are ready.    8.  Pain Medications: Gabapentin 600mg twice a day Tramadol one pill two times day.    Tylenol arthritis as needed for break through.

## 2018-09-21 DIAGNOSIS — I25.9 CHRONIC ISCHEMIC HEART DISEASE: ICD-10-CM

## 2018-09-21 RX ORDER — ISOSORBIDE MONONITRATE 30 MG/1
30 TABLET, EXTENDED RELEASE ORAL DAILY
Qty: 90 TABLET | Refills: 4 | Status: SHIPPED | OUTPATIENT
Start: 2018-09-21 | End: 2019-08-12

## 2018-09-21 NOTE — TELEPHONE ENCOUNTER
Attempted to transfer to [facility] yesterday because [facility] changed generics and the new tablet [facility] tablet was red and patient was afraid that she would be allergic.  Has had allergic rxn to pink clopidogrel in the past.     S/w Nathaniel today and he is able to get the non-dyed [facility] so rx sent to them.  Will call [facility] to tell them we don't need to get it filled there anymore.     Nehal Rivera, Pharm.D.

## 2018-09-25 ENCOUNTER — TRANSFERRED RECORDS (OUTPATIENT)
Dept: HEALTH INFORMATION MANAGEMENT | Facility: CLINIC | Age: 63
End: 2018-09-25

## 2018-10-22 ENCOUNTER — OFFICE VISIT (OUTPATIENT)
Dept: PSYCHOLOGY | Facility: CLINIC | Age: 63
End: 2018-10-22
Payer: MEDICARE

## 2018-10-22 VITALS — BODY MASS INDEX: 31.64 KG/M2 | WEIGHT: 187.2 LBS

## 2018-10-22 DIAGNOSIS — G89.4 CHRONIC PAIN SYNDROME: Primary | ICD-10-CM

## 2018-10-22 DIAGNOSIS — Z65.3 PROBLEMS RELATED TO OTHER LEGAL CIRCUMSTANCES: ICD-10-CM

## 2018-10-22 NOTE — PROGRESS NOTES
"Behavioral Health Chronic Pain Management Visit     Visit type: Follow Up  Number of visits post Initial Assessment:  7  Meeting lasted: 60 minutes  Others present: no one    Reason for Consultation:  Shira Guthrie is a 63 year old,  female referred by Dr. Pierre for behavioral health consultation as part of the Chronic Pain Management protocol at [facility]. The goal of behavioral health visits is to help the patient with the psychosocial aspects of pain management. Ms. Guthrie was initially seen by this provider for her initial  CPM consult on 12/15/16.  Our last visit was on August 1, 2018.      Topics Discussed:   1.  Pain/coping: \"I hurt 24/7.\"  Cold and rain makes it worse.   Pain has a negative impact on sleep (which ranges from 3-4 hours on a bad night to up to 6 hours on a good night).  Recognizes that she will likely always have some pain and is working towards acceptance with this, but can become angry/frustrated with herself at times.  Reflected on value of making space for these feelings while also not being trapped by them. Reviewed coping skills discussed at previous visits (e.g., practicing self-compassion, patience, etc.).  Ms. Guthrie appeared receptive to this feedback and is making strides in this area.  Her ethan and connection to the [employer] continue to be strengths for her.  2.  Weight:  This was her most important objective to address today.  Has been discouraged by weight gain in the past year.  Perceives that weight increased since she initially injured her shoulder which led to decreased physical activity.  Wants to lose 20 lbs (\"I want to be back in the 160s.\").  While Ms. Guthrie stated that she had been in this range about one year ago, review of EPIC after our visit today indicates that she has not been in the 160s since April 2016. Highest weight was 191lb in October 2017.  She is down one pound in the past month.  Discussed healthy/sustainable rate of " weight loss (1-2 lbs per week).  Ms. Guthrie is bothered by her weight which she believes contributes to her physical pain and recent falls (as it pulls her off balance).  When frustrated by her weight, she considers resuming tobacco use with the idea that this could help her to lose weight (see discussion below).  Currently, she is committed to weight loss effort via physical activity and nutrition.  See below for additional detail on these areas:   * Physical activity:  Starting to use yoga/pilates tapes at home.  We discussed the loss of muscle mass with age and how this can slow metabolism and contribute to weight gain.  Discussed the importance of strength training to increase muscle mass to rev up metabolism.  Has been participating in PT for shoulder and elbow, but notes that PT often results in increased pain and several days of diminished activity afterwards (this can be true after doing laundry as well).  Currently finishing up a course of PT for her shoulder and plans to do exercises on her own at home subsequent to this.  The cold can also increase her pain and she anticipates that she will limit her time out of doors in the coming months.  Thinks she can keep herself active indoors. Discussed recent fall and conversation with Dr. Pierre about the possibility of obtaining a wheeled walker to prevent future falls.  At this time, Ms. Guthrie would like to hold off on that to see if yoga/pilates will help with strength and balance.  Will consider walker if this does not improve.  We also continued discussion of pacing activity and Ms. Guthrie is making strides in this area (better able to delay housework at times when she is not feeling up to it without excessive guilt, etc.).   * Nutrition:  Ms. Guthrie describes a quite healthy diet overall.  She has made a commitment not to eat after 5pm.  Planning liquid fasting on Wednesdays.  Limiting red meat.  Eating fish/poultry.  Turkey soup. Made her own stock.  Adds  "vegetables.  Eats soup or sandwich with salad.  We discussed the potential value of apps/online programs like MyFitness Pal, but she is not interested in this at this time.  Using her \"I Can Prevent Diabetes\" packet to track nutrition.  Has participated in [employer] in the past and considering renewing membership.  3.  Tobacco:  Ms. Guthrie has been tobacco free since 2006, but frustration with weight has increased thoughts of smoking to lose weight recently.  Reflected on pros and cons of this strategy and Ms. Guthrie agrees this is a poor idea at this time. Reiterated her reasons for quitting in the first place (see care plan) and the fact that starting to smoke again may not actually help with weight loss, but just add to health problems.    4.  Family updates: Son is doing well with his cancer treatment.  5.  Transportation: Followed up on prior discussion of [employer] request.  Today, Ms. Guthrie reports she got a car instead so she can drive herself.  This was donated to her.  She does have some worry about driving given physical limitations, but prefers this to asking family for rides.  Doesn't want to bother son while he is getting chemo.  6.  Legal question:  Ms. Guthrie asked about limits to earning income/receiving gifts while receiving social security benefits.  Offered to connect her to [facility] to answer this question and she agreed to this referral.    Objective: Ms. Guthrie appears to be awake and alert for today's visit.      PHQ-9 SCORE 4/17/2017 7/18/2017 11/8/2017   Total Score - - -   Total Score 8 3 6     FAYE-7 SCORE 7/18/2017 11/8/2017 7/24/2018   Total Score - - -   Total Score - - 6 (mild anxiety)   Total Score 5 5 6     Wt Readings from Last 4 Encounters:   10/22/18 187 lb 3.2 oz (84.9 kg)   09/20/18 188 lb 12.8 oz (85.6 kg)   08/16/18 188 lb 12.8 oz (85.6 kg)   07/27/18 184 lb 3.2 oz (83.6 kg)     Assessment:   Ms. Guthrie was referred by Dr. Pierre for a behavioral health evaluation to address " chronic pain syndrome.  Ms. Guthrie's mood appeared more euthymic than at our last visit, but she is clearly frustrated by her pain and difficulty losing weight.  She was pleasant, engaged and receptive to feedback throughout.  Ms. Guthrie may benefit from continued meetings with this provider on an as needed basis to support improved pain management via weight loss, regular physical activity, improved sleep,improved management of mood/stress and support for continued tobacco cessation.    Stage of change: Action  Diagnosis: chronic pain syndrome    Plan:   1.  Updated Personal Care Plan for Chronic Pain (see patient instructions). Care Plan Date: October/20182.  Next visit with Dr. Pierre is scheduled for 10/30/18.  3.  Sees Dr. Dao (orthopedist) this coming Friday. (shoulder).  Will follow up with shoulder surgeon in November.  4.  Schedule follow up with behavioral health as needed.

## 2018-10-22 NOTE — PATIENT INSTRUCTIONS
Personal Care Plan for Chronic Pain     1.  Personal Goals:                * take less medication              * lose weight: goal of losing 15 to 20 pounds.              * continue working on keeping thoughts positive through [employer]              * to feel confident enough that when someone gives me a compliment I can simply say thank you     2.  Sleep:                 *  Basic sleep plan:                          *reduce or eliminate caffeine and daytime naps                          * relaxation before bed                          * limit screen time 1-2 hours prior to bed                          * establish dark/quiet sleep environment                          * go to bed at target bedtime:   pm                          * keep consistent wake time:   am.              *  Minimize switching days and nights by staying active throughout the day.              * We'll talk more about a consistent sleep plan when we meet next time.      3.  Physical Activity:                 * Formal physical rehabilitation:                          * Complete PT for shoulder              * Home/community based activity:                          * Home based exercises given by lymphedema nurse with breathing exercises built in as well - daily                          * Aerobic exercise/endurance exercise:  elliptical machine - 5 minute intervals with sit ups in between for 30 minutes - daily.  Has a  in the builiding.     * Keep working with your new yoga/pilates exercise tape in your apartment.                          * Cleaning and baking with body awareness and stretching              * Listen to your body.  Pace yourself for success.  Don't over-do it.  Plan to get PCA back to help with cleaning and laundry so as to not overdo it.   * Let us know if you want help with wheeled walker in the future.      4.  Nutrition/Weight:                 * Keep a food journal in a notebook at home.   * Limit eating after 5pm.   * Eat small  frequent meals.              * Review your I Can Prevent Diabetes materials.              * Limit processed foods and foods high in sugar, sodium and fat.              * Keep up the good work eating many healthy foods.              * When you make a less healthy choice approach yourself with kindness and compassion.  Try to understand what contributed to that choice:  Was I too hungry?  Was I too tired?  Stressed?  Worried?  Use this information to help support healthier choices in the future.     5.  Mood/Stress Management:     SELF COMPASSION!              * Formal interventions:                        * schedule follow up with Dr. Sky as needed.              * Home/community based interventions:                          * Regular contact with family  * Relaxation techniques - breathing exercises  * Create your pyramid to focus you on your strengths and hopes.  * Movies  * Meditation  * Yoga  * Creative activity  * Spiritual /prayer:  Prayer at home, attending services at [facility], wellness auxiliary group  * Service-based activity.              * Medications: Cymbalta, Hydroxyzine as needed.     6.  Tobacco/Alcohol/Drug Use:                               * Congratulations on quitting smoking and staying quit!  Keep up the good work managing stress/anxiety in other ways (prayer, talking it out, deep breaths, exercise, etc.).     * Remember why you quit:  Better health, to prevent another stroke, to look good and feel good.                         * Maintain healthy relationship with alcohol                          * For women this would be no more than 1 drink per day                          * For men, this would be no more than 2 drinks per day              * Eliminate recreational drugs     7.  Pain:                 * Non-medication treatments:                          * ice/heat: use heating pad as you are doing.                          * massage                          *  acupuncture    8.  Pain Medications: Gabapentin 600mg twice a day Tramadol one pill two times day.    Tylenol arthritis as needed for break through.    Legal Services Referral - [place] only  October 23, 2018 at 8:06 am Legal referral has been sent to Jasmyn Malone from [facility]. She will review, advise, and contact the patient. kulwant [email or web address]

## 2018-10-22 NOTE — MR AVS SNAPSHOT
After Visit Summary   10/22/2018    Shira Guthrie    MRN: [id number]           Patient Information     Date Of Birth          1955        Visit Information        Provider Department      10/22/2018 10:00 AM Dior Sky, PhD [facility]        Today's Diagnoses     Problems related to other legal circumstances          Care Instructions    Personal Care Plan for Chronic Pain     1.  Personal Goals:                * take less medication              * lose weight: goal of losing 15 to 20 pounds.              * continue working on keeping thoughts positive through [employer]              * to feel confident enough that when someone gives me a compliment I can simply say thank you     2.  Sleep:                 *  Basic sleep plan:                          *reduce or eliminate caffeine and daytime naps                          * relaxation before bed                          * limit screen time 1-2 hours prior to bed                          * establish dark/quiet sleep environment                          * go to bed at target bedtime:   pm                          * keep consistent wake time:   am.              *  Minimize switching days and nights by staying active throughout the day.              * We'll talk more about a consistent sleep plan when we meet next time.      3.  Physical Activity:                 * Formal physical rehabilitation:                          * Complete PT for shoulder              * Home/community based activity:                          * Home based exercises given by lymphedema nurse with breathing exercises built in as well - daily                          * Aerobic exercise/endurance exercise:  elliptical machine - 5 minute intervals with sit ups in between for 30 minutes - daily.  Has a  in the builiding.     * Keep working with your new yoga/pilates exercise tape in your apartment.                          * Cleaning and baking with body awareness and  stretching              * Listen to your body.  Pace yourself for success.  Don't over-do it.  Plan to get PCA back to help with cleaning and laundry so as to not overdo it.   * Let us know if you want help with wheeled walker in the future.      4.  Nutrition/Weight:                 * Keep a food journal in a notebook at home.   * Limit eating after 5pm.   * Eat small frequent meals.              * Review your I Can Prevent Diabetes materials.              * Limit processed foods and foods high in sugar, sodium and fat.              * Keep up the good work eating many healthy foods.              * When you make a less healthy choice approach yourself with kindness and compassion.  Try to understand what contributed to that choice:  Was I too hungry?  Was I too tired?  Stressed?  Worried?  Use this information to help support healthier choices in the future.     5.  Mood/Stress Management:     SELF COMPASSION!              * Formal interventions:                        * schedule follow up with Dr. Sky as needed.              * Home/community based interventions:                          * Regular contact with family  * Relaxation techniques - breathing exercises  * Create your pyramid to focus you on your strengths and hopes.  * Movies  * Meditation  * Yoga  * Creative activity  * Spiritual /prayer:  Prayer at home, attending services at [facility], wellness auxiliary group  * Service-based activity.              * Medications: Cymbalta, Hydroxyzine as needed.     6.  Tobacco/Alcohol/Drug Use:                               * Congratulations on quitting smoking and staying quit!  Keep up the good work managing stress/anxiety in other ways (prayer, talking it out, deep breaths, exercise, etc.).     * Remember why you quit:  Better health, to prevent another stroke, to look good and feel good.                         * Maintain healthy relationship with  alcohol                          * For women this would be no more than 1 drink per day                          * For men, this would be no more than 2 drinks per day              * Eliminate recreational drugs     7.  Pain:                 * Non-medication treatments:                          * ice/heat: use heating pad as you are doing.                          * massage                          * acupuncture    8.  Pain Medications: Gabapentin 600mg twice a day Tramadol one pill two times day.    Tylenol arthritis as needed for break through.          Follow-ups after your visit        Your next 10 appointments already scheduled     Oct 30, 2018  3:10 PM CDT   Return Visit with Sally Pierre MD   [facility]    [address] [place] [address]   [phone]              Who to contact     Please call your clinic at [phone] to:    Ask questions about your health    Make or cancel appointments    Discuss your medicines    Learn about your test results    Speak to your doctor            Additional Information About Your Visit        Care EveryWhere ID     This is your Care EveryWhere ID. This could be used by other organizations to access your [facility] medical records  [id number]        Your Vitals Were     BMI (Body Mass Index)                   31.64 kg/m2            Blood Pressure from Last 3 Encounters:   09/20/18 112/75   08/16/18 121/68   07/27/18 130/74    Weight from Last 3 Encounters:   10/22/18 187 lb 3.2 oz (84.9 kg)   09/20/18 188 lb 12.8 oz (85.6 kg)   08/16/18 188 lb 12.8 oz (85.6 kg)              Today, you had the following     No orders found for display       Primary Care Provider Office Phone # Fax #    Sally Pierre [phone]       [address] [place] [id number]        Equal Access to Services     [facility]: Raj Bautista, lori luap, qauna gresham. So [facility]  [phone].    ATENCIÓN: Si russ millan, tiene a monson disposición servicios gratuitos de asistencia lingüística. Vipul de la garza [phone].    We comply with applicable federal civil rights laws and [place] laws. We do not discriminate on the basis of race, color, national origin, age, disability, sex, sexual orientation, or gender identity.            Thank you!     Thank you for choosing [facility]  for your care. Our goal is always to provide you with excellent care. Hearing back from our patients is one way we can continue to improve our services. Please take a few minutes to complete the written survey that you may receive in the mail after your visit with us. Thank you!             Your Updated Medication List - Protect others around you: Learn how to safely use, store and throw away your medicines at www.disposemymeds.org.          This list is accurate as of 10/22/18 11:07 AM.  Always use your most recent med list.                   Brand Name Dispense Instructions for use Diagnosis    aspirin 81 MG EC tablet     90 tablet    Take 1 tablet (81 mg) by mouth daily    Coronary artery disease involving native coronary artery of native heart with angina pectoris (H)       baclofen 10 MG tablet    LIORESAL    90 tablet    Take 1 tablet (10 mg) by mouth 3 times daily as needed for muscle spasms    Chronic pain syndrome, Primary osteoarthritis involving multiple joints       CANE, ANY MATERIAL     1 Device    1 Device by Device route as needed    Peripheral neuropathy, Leg edema, left, Stroke (H)       * clopidogrel 75 MG tablet    PLAVIX    90 tablet    Take 1 tablet (75 mg) by mouth daily . Give name brand Plavix. Approved by insurance through 2/2/1016.    Chronic ischemic heart disease       * PLAVIX 75 MG tablet   Generic drug:  clopidogrel     90 tablet    Take 1 tablet (75 mg) by mouth daily    Chronic ischemic heart disease       dexlansoprazole 30 MG Cpdr CR capsule    DEXILANT    90 capsule    Take 1  capsule (30 mg) by mouth daily    Esophageal stricture       diclofenac 1 % Gel topical gel    VOLTAREN    100 g    Apply 2-4 grams to painful areas four times daily as needed using enclosed dosing card.    Chronic pain syndrome, Closed fracture of proximal end of left forearm with delayed healing, subsequent encounter, Primary osteoarthritis involving multiple joints, Rotator cuff impingement syndrome of right shoulder, Lymphedema of left leg       diphenhydrAMINE 25 MG tablet    BENADRYL    100 tablet    Take 1-2 tablets (25-50 mg) by mouth every 6 hours as needed for other (for congestion, cough)    Acute nasopharyngitis       docusate sodium 100 MG tablet    COLACE    60 tablet    Take 100 mg by mouth daily    Chronic constipation, Chronic pain syndrome       DULoxetine 60 MG EC capsule    CYMBALTA    90 capsule    Take 1 capsule (60 mg) by mouth daily    Major depressive disorder, recurrent, severe without psychotic features (H)       furosemide 20 MG tablet    LASIX    180 tablet    Take 2 tablets (40 mg) by mouth daily    Lymphedema of left leg       gabapentin 300 MG capsule    NEURONTIN    360 capsule    Take 2 capsules (600 mg) by mouth 2 times daily    Other polyneuropathy       Heating Pads Pads     1 each    Apply to painful areas prn.    Stroke (H), Peripheral neuropathy, Leg edema, left       isosorbide mononitrate 30 MG 24 hr tablet    IMDUR    90 tablet    Take 1 tablet (30 mg) by mouth daily    Chronic ischemic heart disease       linaclotide 290 MCG capsule    LINZESS    90 capsule    Take 1 capsule (290 mcg) by mouth every morning (before breakfast)    Chronic constipation       metoprolol succinate 25 MG 24 hr tablet    TOPROL-XL    90 tablet    Take 1 tablet (25 mg) by mouth daily    Benign essential hypertension       nitroGLYcerin 0.4 MG sublingual tablet    NITROSTAT    30 tablet    Place 1 tablet (0.4 mg) under the tongue every 5 minutes as needed    Anginal pain (H)       * order for DME      2 Device    Equipment being ordered: Jobst stockings thigh high 15-20mmHg.  Please measure for fit. 2 pairs.    Leg edema, left       * order for DME     2 Device    Bilateral compression stockings 15-20mmHg. One pair knee high and one pair thigh high.  Please measure for fit.    Lymphedema of left leg       polyethylene glycol powder    MIRALAX    510 g    Take 17 g (1 capful) by mouth 2 times daily    Chronic constipation       rosuvastatin 20 MG tablet    CRESTOR    90 tablet    Take 1 tablet (20 mg) by mouth daily    Coronary artery disease involving native coronary artery of native heart with angina pectoris (H)       traMADol 50 MG tablet    ULTRAM    60 tablet    Take 1 tablet (50 mg) by mouth 2 times daily as needed for severe pain    Chronic pain syndrome, Other polyneuropathy, Primary osteoarthritis involving multiple joints       TYLENOL 325 MG tablet   Generic drug:  acetaminophen     100 tablet    Take 2 tablets (650 mg) by mouth daily        * Notice:  This list has 4 medication(s) that are the same as other medications prescribed for you. Read the directions carefully, and ask your doctor or other care provider to review them with you.

## 2018-10-26 ENCOUNTER — TRANSFERRED RECORDS (OUTPATIENT)
Dept: HEALTH INFORMATION MANAGEMENT | Facility: CLINIC | Age: 63
End: 2018-10-26

## 2018-10-30 ENCOUNTER — OFFICE VISIT (OUTPATIENT)
Dept: FAMILY MEDICINE | Facility: CLINIC | Age: 63
End: 2018-10-30
Payer: MEDICARE

## 2018-10-30 VITALS
RESPIRATION RATE: 14 BRPM | TEMPERATURE: 97.7 F | BODY MASS INDEX: 31.77 KG/M2 | HEART RATE: 70 BPM | OXYGEN SATURATION: 100 % | SYSTOLIC BLOOD PRESSURE: 122 MMHG | WEIGHT: 188 LBS | DIASTOLIC BLOOD PRESSURE: 77 MMHG

## 2018-10-30 DIAGNOSIS — Z13.820 SCREENING FOR OSTEOPOROSIS: ICD-10-CM

## 2018-10-30 DIAGNOSIS — M15.0 PRIMARY OSTEOARTHRITIS INVOLVING MULTIPLE JOINTS: ICD-10-CM

## 2018-10-30 DIAGNOSIS — G62.89 OTHER POLYNEUROPATHY: ICD-10-CM

## 2018-10-30 DIAGNOSIS — G89.4 CHRONIC PAIN SYNDROME: Primary | ICD-10-CM

## 2018-10-30 DIAGNOSIS — Z23 NEED FOR VACCINATION: ICD-10-CM

## 2018-10-30 DIAGNOSIS — Z23 NEED FOR IMMUNIZATION AGAINST INFLUENZA: ICD-10-CM

## 2018-10-30 DIAGNOSIS — K22.2 ESOPHAGEAL STRICTURE: ICD-10-CM

## 2018-10-30 DIAGNOSIS — Z12.31 VISIT FOR SCREENING MAMMOGRAM: ICD-10-CM

## 2018-10-30 LAB
AMPHETAMINES QUAL: NEGATIVE
BARBITURATES QUAL URINE: NEGATIVE
BENZODIAZEPINE QUAL URINE: NEGATIVE
BUPRENORPHINE QUAL URINE: NEGATIVE
CANNABINOIDS UR QL SCN: NEGATIVE
COCAINE QUAL URINE: NEGATIVE
METHAMPHETAMINE: NEGATIVE
METHODONE QUAL: NEGATIVE
MORPHINE QUAL: NEGATIVE
OXYCODONE QUAL: POSITIVE
PHENCYCLIDINE: NEGATIVE
PROPOXYPHENE: NEGATIVE
TEMPERATURE OF URINE WAS BETWEEN 90-100 DEGREES F: YES
TRICYCLIC ANTIDEPRESSANTS: NEGATIVE

## 2018-10-30 RX ORDER — TRAMADOL HYDROCHLORIDE 50 MG/1
50 TABLET ORAL 2 TIMES DAILY PRN
Qty: 60 TABLET | Refills: 2 | Status: SHIPPED | OUTPATIENT
Start: 2018-10-30 | End: 2018-12-07

## 2018-10-30 ASSESSMENT — ANXIETY QUESTIONNAIRES
2. NOT BEING ABLE TO STOP OR CONTROL WORRYING: SEVERAL DAYS
GAD7 TOTAL SCORE: 7
1. FEELING NERVOUS, ANXIOUS, OR ON EDGE: SEVERAL DAYS
6. BECOMING EASILY ANNOYED OR IRRITABLE: SEVERAL DAYS
5. BEING SO RESTLESS THAT IT IS HARD TO SIT STILL: SEVERAL DAYS
7. FEELING AFRAID AS IF SOMETHING AWFUL MIGHT HAPPEN: NOT AT ALL
3. WORRYING TOO MUCH ABOUT DIFFERENT THINGS: MORE THAN HALF THE DAYS

## 2018-10-30 ASSESSMENT — PATIENT HEALTH QUESTIONNAIRE - PHQ9
5. POOR APPETITE OR OVEREATING: SEVERAL DAYS
SUM OF ALL RESPONSES TO PHQ QUESTIONS 1-9: 8

## 2018-10-30 NOTE — LETTER
October 31, 2018      Shira Guthrie  [address]  [address]  [place] [address]        Dear Shira,    Please see below for your test results.    Resulted Orders   Rapid Urine Drug Screen ([facility])   Result Value Ref Range    Phencyclidine NEGATIVE NEGATIVE    Propoxyphene NEGATIVE NEGATIVE    Tricyclic Antidepressants NEGATIVE NEGATIVE    Amphetamines Qual NEGATIVE NEGATIVE    Barbiturates Qual Urine NEGATIVE NEGATIVE    Buprenorphine Qual Urine NEGATIVE NEGATIVE    Benzodiazepine Qual Urine NEGATIVE NEGATIVE    Cocaine Qual Urine NEGATIVE NEGATIVE    Cannabinoids Qual Urine NEGATIVE NEGATIVE    Methamphetamine Qual NEGATIVE NEGATIVE    Methadone Qual NEGATIVE NEGATIVE    Morphine Qual NEGATIVE NEGATIVE    Oxycodone Qual POSITIVE (A) NEGATIVE    Temperature of Urine was Between  Degrees F YES YES      Comment:      This is a preliminary screening test that detects drugs-of-abuse in urine at   specified detection levels.  To confirm preliminary results, a more specific   method such as Gas Chromatography/Mass Spectrometry (GC/MS) must be used.            Urine as expected. Had short term oxycodone prescription from ortho.    If you have any questions, please call the clinic to make an appointment.    Sincerely,    Sally Pierre MD

## 2018-10-30 NOTE — MR AVS SNAPSHOT
After Visit Summary   10/30/2018    Shira Guthrie    MRN: [id number]           Patient Information     Date Of Birth          1955        Visit Information        Provider Department      10/30/2018 3:10 PM Sally Pierre MD [facility]        Today's Diagnoses     Chronic pain syndrome    -  1    Needs flu shot        Other polyneuropathy        Primary osteoarthritis involving multiple joints        Visit for screening mammogram        Screening for osteoporosis          Care Instructions       Personal Care Plan for Chronic Pain    1.  Personal Goals:                * take less medication              * lose weight: goal of losing 15 to 20 pounds.              * continue working on keeping thoughts positive through [employer]              * to feel confident enough that when someone gives me a compliment I can simply say thank you    2.  Sleep:                 *  Basic sleep plan:                          *reduce or eliminate caffeine and daytime naps                          * relaxation before bed                          * limit screen time 1-2 hours prior to bed                          * establish dark/quiet sleep environment                          * go to bed at target bedtime:   pm                          * keep consistent wake time:   am.              *  Minimize switching days and nights by staying active throughout the day.              * We'll talk more about a consistent sleep plan when we meet next time.                3.  Physical Activity:                 * Formal physical rehabilitation:                          HOME PT for shoulder              * Home/community based activity:                          * Home based exercises given by lymphedema nurse with breathing exercises built in as well - daily                          * Aerobic exercise/endurance exercise:  elliptical machine - 5 minute intervals with sit ups in between for 30 minutes - daily.  Has a  in the  [place]. Has exercise tape in her apartment.                          * Cleaning and baking with body awareness and stretching              * Listen to your body.  Pace yourself for success.  Don't over-do it.  Plan to get PCA back to help with cleaning and laundry so as to not overdo it.                4.  Nutrition/Weight:                 * Keep a food journal in a notebook at home and bring this to your next visit with Dr. Sky.  Eat small frequent meals.              * Review your I Can Prevent Diabetes materials.              * Limit processed foods and foods high in sugar, sodium and fat.              * Keep up the good work eating many healthy foods.              * When you make a less healthy choice approach yourself with kindness and compassion.  Try to understand what contributed to that choice:  Was I too hungry?  Was I too tired?  Stressed?  Worried?  Use this information to help support healthier choices in the future.    5.  Mood/Stress Management:     SELF COMPASSION!              * Formal interventions:                        * schedule follow up with Dr. Sky in about month or so.              * Home/community based interventions:                          * Regular contact with family  * Relaxation techniques - breathing exercises  * Create your pyramid to focus you on your strengths and hopes.  * Movies  * Meditation  * Yoga  * Creative activity  * Spiritual /prayer:  Prayer at home, attending services at [facility], wellness auxiliary group  * Service-based activity.              * Medications: Cymbalta, Hydroxyzine as needed.    6.  Tobacco/Alcohol/Drug Use:                               * Congratulations on quitting smoking and staying quit!  Keep up the good work managing stress/anxiety in other ways (prayer, talking it out, deep breaths, exercise, etc..                         * Maintain healthy relationship with alcohol                          * For women  this would be no more than 1 drink per day                          * For men, this would be no more than 2 drinks per day              * Eliminate recreational drugs    7.  Pain:                 * Non-medication treatments:                          * ice/heat: use heating pad as you are doing.                          * massage                          * acupuncture  YOGA humera is active at home.  8.  Pain Medications: Gabapentin 600mg twice a day Tramadol one pill two times day.    Tylenol arthritis as needed for break through.                Follow-ups after your visit        Follow-up notes from your care team     Return in about 6 weeks (around 12/11/2018) for Chronic pain check in.      Future tests that were ordered for you today     Open Future Orders        Priority Expected Expires Ordered    PCS Use: Screening Digital Bilateral Mammogram Routine  10/30/2019 10/30/2018    Dexa hip/pelvis/spine* Routine  10/30/2019 10/30/2018            Who to contact     Please call your clinic at [phone] to:    Ask questions about your health    Make or cancel appointments    Discuss your medicines    Learn about your test results    Speak to your doctor            Additional Information About Your Visit        Care EveryWhere ID     This is your Care EveryWhere ID. This could be used by other organizations to access your [facility] medical records  [id number]        Your Vitals Were     Pulse Temperature Respirations Pulse Oximetry BMI (Body Mass Index)       70 97.7  F (36.5  C) (Tympanic) 14 100% 31.77 kg/m2        Blood Pressure from Last 3 Encounters:   10/30/18 122/77   09/20/18 112/75   08/16/18 121/68    Weight from Last 3 Encounters:   10/30/18 188 lb (85.3 kg)   10/22/18 187 lb 3.2 oz (84.9 kg)   09/20/18 188 lb 12.8 oz (85.6 kg)              We Performed the Following     ADMIN VACCINE, INITIAL     FLU VACCINE, INCREASED ANTIGEN, PRESV FREE     Rapid Urine Drug Screen ([facility])          Where to get your  medicines      Some of these will need a paper prescription and others can be bought over the counter.  Ask your nurse if you have questions.     Bring a paper prescription for each of these medications     traMADol 50 MG tablet         Information about OPIOIDS     PRESCRIPTION OPIOIDS: WHAT YOU NEED TO KNOW   We gave you an opioid (narcotic) pain medicine. It is important to manage your pain, but opioids are not always the best choice. You should first try all the other options your care team gave you. Take this medicine for as short a time (and as few doses) as possible.    Some activities can increase your pain, such as bandage changes or therapy sessions. It may help to take your pain medicine 30 to 60 minutes before these activities. Reduce your stress by getting enough sleep, working on hobbies you enjoy and practicing relaxation or meditation. Talk to your care team about ways to manage your pain beyond prescription opioids.    These medicines have risks:    DO NOT drive when on new or higher doses of pain medicine. These medicines can affect your alertness and reaction times, and you could be arrested for driving under the influence (DUI). If you need to use opioids long-term, talk to your care team about driving.    DO NOT operate heavy machinery    DO NOT do any other dangerous activities while taking these medicines.    DO NOT drink any alcohol while taking these medicines.     If the opioid prescribed includes acetaminophen, DO NOT take with any other medicines that contain acetaminophen. Read all labels carefully. Look for the word  acetaminophen  or  Tylenol.  Ask your pharmacist if you have questions or are unsure.    You can get addicted to pain medicines, especially if you have a history of addiction (chemical, alcohol or substance dependence). Talk to your care team about ways to reduce this risk.    All opioids tend to cause constipation. Drink plenty of water and eat foods that have a lot of  fiber, such as fruits, vegetables, prune juice, apple juice and high-fiber cereal. Take a laxative (Miralax, milk of magnesia, Colace, Senna) if you don t move your bowels at least every other day. Other side effects include upset stomach, sleepiness, dizziness, throwing up, tolerance (needing more of the medicine to have the same effect), physical dependence and slowed breathing.    Store your pills in a secure place, locked if possible. We will not replace any lost or stolen medicine. If you don t finish your medicine, please throw away (dispose) as directed by your pharmacist. The [place] Pollution Control Agency has more information about safe disposal: [email or web address]://[email or web address].[email or web address]/[email or web address]-medications         Primary Care Provider Office Phone # Fax #    Sally Pierre [phone]       [address] [place] [address]        Equal Access to Services     KASANDRA VALLECILLO : Hadii thelma paulinoo Sodonna, waaxda luqadaha, qaybta kaalmada adebill, una mcbride . So [facility] [phone].    ATENCIÓN: Si habla español, tiene a monson disposición servicios gratuitos de asistencia lingüística. Vipul al [phone].    We comply with applicable federal civil rights laws and [place] laws. We do not discriminate on the basis of race, color, national origin, age, disability, sex, sexual orientation, or gender identity.            Thank you!     Thank you for choosing [facility]  for your care. Our goal is always to provide you with excellent care. Hearing back from our patients is one way we can continue to improve our services. Please take a few minutes to complete the written survey that you may receive in the mail after your visit with us. Thank you!             Your Updated Medication List - Protect others around you: Learn how to safely use, store and throw away your medicines at www.disposemymeds.org.          This list is accurate as of 10/30/18   4:05 PM.  Always use your most recent med list.                   Brand Name Dispense Instructions for use Diagnosis    aspirin 81 MG EC tablet     90 tablet    Take 1 tablet (81 mg) by mouth daily    Coronary artery disease involving native coronary artery of native heart with angina pectoris (H)       baclofen 10 MG tablet    LIORESAL    90 tablet    Take 1 tablet (10 mg) by mouth 3 times daily as needed for muscle spasms    Chronic pain syndrome, Primary osteoarthritis involving multiple joints       CANE, ANY MATERIAL     1 Device    1 Device by Device route as needed    Peripheral neuropathy, Leg edema, left, Stroke (H)       * clopidogrel 75 MG tablet    PLAVIX    90 tablet    Take 1 tablet (75 mg) by mouth daily . Give name brand Plavix. Approved by insurance through 2/2/1016.    Chronic ischemic heart disease       * PLAVIX 75 MG tablet   Generic drug:  clopidogrel     90 tablet    Take 1 tablet (75 mg) by mouth daily    Chronic ischemic heart disease       dexlansoprazole 30 MG Cpdr CR capsule    DEXILANT    90 capsule    Take 1 capsule (30 mg) by mouth daily    Esophageal stricture       diclofenac 1 % Gel topical gel    VOLTAREN    100 g    Apply 2-4 grams to painful areas four times daily as needed using enclosed dosing card.    Chronic pain syndrome, Closed fracture of proximal end of left forearm with delayed healing, subsequent encounter, Primary osteoarthritis involving multiple joints, Rotator cuff impingement syndrome of right shoulder, Lymphedema of left leg       diphenhydrAMINE 25 MG tablet    BENADRYL    100 tablet    Take 1-2 tablets (25-50 mg) by mouth every 6 hours as needed for other (for congestion, cough)    Acute nasopharyngitis       docusate sodium 100 MG tablet    COLACE    60 tablet    Take 100 mg by mouth daily    Chronic constipation, Chronic pain syndrome       DULoxetine 60 MG EC capsule    CYMBALTA    90 capsule    Take 1 capsule (60 mg) by mouth daily    Major depressive  disorder, recurrent, severe without psychotic features (H)       furosemide 20 MG tablet    LASIX    180 tablet    Take 2 tablets (40 mg) by mouth daily    Lymphedema of left leg       gabapentin 300 MG capsule    NEURONTIN    360 capsule    Take 2 capsules (600 mg) by mouth 2 times daily    Other polyneuropathy       Heating Pads Pads     1 each    Apply to painful areas prn.    Stroke (H), Peripheral neuropathy, Leg edema, left       isosorbide mononitrate 30 MG 24 hr tablet    IMDUR    90 tablet    Take 1 tablet (30 mg) by mouth daily    Chronic ischemic heart disease       linaclotide 290 MCG capsule    LINZESS    90 capsule    Take 1 capsule (290 mcg) by mouth every morning (before breakfast)    Chronic constipation       metoprolol succinate 25 MG 24 hr tablet    TOPROL-XL    90 tablet    Take 1 tablet (25 mg) by mouth daily    Benign essential hypertension       nitroGLYcerin 0.4 MG sublingual tablet    NITROSTAT    30 tablet    Place 1 tablet (0.4 mg) under the tongue every 5 minutes as needed    Anginal pain (H)       * order for DME     2 Device    Equipment being ordered: Jobst stockings thigh high 15-20mmHg.  Please measure for fit. 2 pairs.    Leg edema, left       * order for DME     2 Device    Bilateral compression stockings 15-20mmHg. One pair knee high and one pair thigh high.  Please measure for fit.    Lymphedema of left leg       polyethylene glycol powder    MIRALAX    510 g    Take 17 g (1 capful) by mouth 2 times daily    Chronic constipation       rosuvastatin 20 MG tablet    CRESTOR    90 tablet    Take 1 tablet (20 mg) by mouth daily    Coronary artery disease involving native coronary artery of native heart with angina pectoris (H)       traMADol 50 MG tablet    ULTRAM    60 tablet    Take 1 tablet (50 mg) by mouth 2 times daily as needed for severe pain    Chronic pain syndrome, Other polyneuropathy, Primary osteoarthritis involving multiple joints       TYLENOL 325 MG tablet   Generic  drug:  acetaminophen     100 tablet    Take 2 tablets (650 mg) by mouth daily        * Notice:  This list has 4 medication(s) that are the same as other medications prescribed for you. Read the directions carefully, and ask your doctor or other care provider to review them with you.

## 2018-10-30 NOTE — NURSING NOTE
Injectable influenza vaccine documentation    1. Has the patient received the information for the influenza vaccine? YES    2. Does the patient have a severe allergy to eggs (Patients with a severe egg allergy should be assessed by a medical provider, RN, or clinical pharmacist. If they receive the influenza vaccine, please have them observed for 15 minutes.)? No    3. Has the patient had an allergic reaction to previous influenza vaccines? No    4. Has the patient had any severe allergic reactions to past influenza vaccines ? No       5. Does patient have a history of Guillain-Stockholm syndrome? No      Based on responses above, I administered the influenza vaccine.  Yojana Mcintosh, CMA

## 2018-10-30 NOTE — PROGRESS NOTES
Chronic Pain Follow-Up Visit    Pain Update:  Location of pain: Shoulder, elbow  Analgesia/pain control: Recent changes:  same    Overall control: Tolerable with discomfort    Doing PT at home and pilate/yoga too.    Working on strength and balance.    PT hurt the C7 and prefers at home PT as she can pace herself.  PT put her in bed for two days afterward.    Adherance     How often do you take extra pain medicine:Never    Did you take your pain medication today? YES, one this morning. Last oxycodone taken last night.    Adverse effects: No      Database checked today? Yes. Details: as expected. Got a fill from orthopedics last week.  She told her she would see me but sh called it in.  Plan to only have one physician fill pain meds.  No more oxycodone.    PHQ-9 SCORE 7/18/2017 11/8/2017 10/30/2018   Total Score - - -   Total Score 3 6 8     FAYE-7 SCORE 11/8/2017 7/24/2018 10/30/2018   Total Score - - -   Total Score - 6 (mild anxiety) -   Total Score 5 6 7       Care Plan discussed and updated as needed.  See the end of this note.       FUNCTIONAL ASSESSMENT QUESTIONNAIRE SCORE 9/20/2018 10/30/2018   Total Score 35 40          Problem, Medication and Allergy Lists were reviewed and are current..           Physical Exam:     Vitals:    10/30/18 1522   BP: 122/77   Pulse: 70   Resp: 14   Temp: 97.7  F (36.5  C)   TempSrc: Tympanic   SpO2: 100%   Weight: 85.3 kg (188 lb)     Body mass index is 31.77 kg/(m^2).  Vitals were reviewed and were normal  GENERAL: healthy, alert, well nourished, well hydrated, no distress  RESP: lungs clear to auscultation - no rales, no rhonchi, no wheezes  CV: regular rates and rhythm, normal S1 S2, no S3 or S4 and no murmur, no click or rub -  MS: extremities- no gross deformities noted. Decreased right shoulder ROM but improved from last visit.  Tenderness over left elbow.        Results:     Results for orders placed or performed in visit on 10/30/18   Rapid Urine Drug Screen ([facility])   Result Value Ref Range    Phencyclidine NEGATIVE NEGATIVE    Propoxyphene NEGATIVE NEGATIVE    Tricyclic Antidepressants NEGATIVE NEGATIVE    Amphetamines Qual NEGATIVE NEGATIVE    Barbiturates Qual Urine NEGATIVE NEGATIVE    Buprenorphine Qual Urine NEGATIVE NEGATIVE    Benzodiazepine Qual Urine NEGATIVE NEGATIVE    Cocaine Qual Urine NEGATIVE NEGATIVE    Cannabinoids Qual Urine NEGATIVE NEGATIVE    Methamphetamine Qual NEGATIVE NEGATIVE    Methadone Qual NEGATIVE NEGATIVE    Morphine Qual NEGATIVE NEGATIVE    Oxycodone Qual POSITIVE (A) NEGATIVE    Temperature of Urine was Between  Degrees F YES YES      rapid urine drug screen obtained today: Yes    Assessment and Plan    Shira Guthrie is here for follow up of chronic pain caused by osteoarthritis.    Shira was seen today for pain and medication reconciliation.    Diagnoses and all orders for this visit:    Chronic pain syndrome  Other polyneuropathy  Primary osteoarthritis involving multiple joints: stable. Refilled for three months. Discussed no refills from ortho.  -     Rapid Urine Drug Screen ([facility])  -     traMADol (ULTRAM) 50 MG tablet; Take 1 tablet (50 mg) by mouth 2 times daily as needed for severe pain    Visit for screening mammogram: due for screening-     PCS Use: Screening Digital Bilateral Mammogram; Future    Screening for osteoporosis: due for screening.  -     Dexa hip/pelvis/spine*; Future    Need for immunization against influenza  -     FLU VAC QUADRIVLENT SPLIT VIRUS IM 0.5ml dosage  -     ADMIN VACCINE, INITIAL    Need for vaccination  -     Pneumococcal vaccine 13 valent PCV13 IM (Prevnar) [[id number]]  -     ADMIN VACCINE, EACH ADDITIONAL          Chronic Pain Syndrome:  Care plan updated with patient, see below for details.  Patient is being prescribed 100mg of tramadol IR per day. 10 mg MEDD  Care Plan Date: October/2018  Naloxone has not been prescribed.       If opioids prescribed patient was asked to bring pill bottle to  each appointment and was informed that refills would only be provided at office visits.   Asked patient to F/U in 1-2 month(s) with same provider for 20 minute  Visit.     Sally Pierre MD    Patient Instructions        Personal Care Plan for Chronic Pain    1.  Personal Goals:                * take less medication              * lose weight: goal of losing 15 to 20 pounds.              * continue working on keeping thoughts positive through [employer]              * to feel confident enough that when someone gives me a compliment I can simply say thank you    2.  Sleep:                 *  Basic sleep plan:                          *reduce or eliminate caffeine and daytime naps                          * relaxation before bed                          * limit screen time 1-2 hours prior to bed                          * establish dark/quiet sleep environment                          * go to bed at target bedtime:   pm                          * keep consistent wake time:   am.              *  Minimize switching days and nights by staying active throughout the day.              * We'll talk more about a consistent sleep plan when we meet next time.                3.  Physical Activity:                 * Formal physical rehabilitation:                          HOME PT for shoulder              * Home/community based activity:                          * Home based exercises given by lymphedema nurse with breathing exercises built in as well - daily                          * Aerobic exercise/endurance exercise:  elliptical machine - 5 minute intervals with sit ups in between for 30 minutes - daily.  Has a  in the builiding. Has exercise tape in her apartment.                          * Cleaning and baking with body awareness and stretching              * Listen to your body.  Pace yourself for success.  Don't over-do it.  Plan to get PCA back to help with cleaning and laundry so as to not overdo it.                4.   Nutrition/Weight:                 * Keep a food journal in a notebook at home and bring this to your next visit with Dr. Sky.  Eat small frequent meals.              * Review your I Can Prevent Diabetes materials.              * Limit processed foods and foods high in sugar, sodium and fat.              * Keep up the good work eating many healthy foods.              * When you make a less healthy choice approach yourself with kindness and compassion.  Try to understand what contributed to that choice:  Was I too hungry?  Was I too tired?  Stressed?  Worried?  Use this information to help support healthier choices in the future.    5.  Mood/Stress Management:     SELF COMPASSION!              * Formal interventions:                        * schedule follow up with Dr. Sky in about month or so.              * Home/community based interventions:                          * Regular contact with family  * Relaxation techniques - breathing exercises  * Create your pyramid to focus you on your strengths and hopes.  * Movies  * Meditation  * Yoga  * Creative activity  * Spiritual /prayer:  Prayer at home, attending services at [facility], wellness auxiliary group  * Service-based activity.              * Medications: Cymbalta, Hydroxyzine as needed.    6.  Tobacco/Alcohol/Drug Use:                               * Congratulations on quitting smoking and staying quit!  Keep up the good work managing stress/anxiety in other ways (prayer, talking it out, deep breaths, exercise, etc..                         * Maintain healthy relationship with alcohol                          * For women this would be no more than 1 drink per day                          * For men, this would be no more than 2 drinks per day              * Eliminate recreational drugs    7.  Pain:                 * Non-medication treatments:                          * ice/heat: use heating pad as you are  doing.                          * massage                          * acupuncture  [employer]OGA pilates is active at home.  8.  Pain Medications: Gabapentin 600mg twice a day Tramadol one pill two times day.    Tylenol arthritis as needed for break through.          MAMMOGRAM SCREENING AND DEXA SCAN:  [facility]  [address]  [address]  [place] [address]  [phone]  Date:  Tuesday November 13, 2018  Time:  2:00 PM    Mammogram instructions:  No lotion, powder, deodorant or perfume under the arms and around the breast area.    Dexa instructions:  No vitamin pills, calcium supplements or antacids such as tums, rolaids etc.. 2 days prior to appointment and the day of the appointment.  No clothing with metal such as zippers, buttons or belts.  Given to patient.  Neva J Pack  10/30/18

## 2018-10-30 NOTE — PATIENT INSTRUCTIONS
Personal Care Plan for Chronic Pain    1.  Personal Goals:                * take less medication              * lose weight: goal of losing 15 to 20 pounds.              * continue working on keeping thoughts positive through [employer]              * to feel confident enough that when someone gives me a compliment I can simply say thank you    2.  Sleep:                 *  Basic sleep plan:                          *reduce or eliminate caffeine and daytime naps                          * relaxation before bed                          * limit screen time 1-2 hours prior to bed                          * establish dark/quiet sleep environment                          * go to bed at target bedtime:   pm                          * keep consistent wake time:   am.              *  Minimize switching days and nights by staying active throughout the day.              * We'll talk more about a consistent sleep plan when we meet next time.                3.  Physical Activity:                 * Formal physical rehabilitation:                          HOME PT for shoulder              * Home/community based activity:                          * Home based exercises given by lymphedema nurse with breathing exercises built in as well - daily                          * Aerobic exercise/endurance exercise:  elliptical machine - 5 minute intervals with sit ups in between for 30 minutes - daily.  Has a  in the builiding. Has exercise tape in her apartment.                          * Cleaning and baking with body awareness and stretching              * Listen to your body.  Pace yourself for success.  Don't over-do it.  Plan to get PCA back to help with cleaning and laundry so as to not overdo it.                4.  Nutrition/Weight:                 * Keep a food journal in a notebook at home and bring this to your next visit with Dr. Sky.  Eat small frequent meals.              * Review your I Can Prevent Diabetes  materials.              * Limit processed foods and foods high in sugar, sodium and fat.              * Keep up the good work eating many healthy foods.              * When you make a less healthy choice approach yourself with kindness and compassion.  Try to understand what contributed to that choice:  Was I too hungry?  Was I too tired?  Stressed?  Worried?  Use this information to help support healthier choices in the future.    5.  Mood/Stress Management:     SELF COMPASSION!              * Formal interventions:                        * schedule follow up with Dr. Sky in about month or so.              * Home/community based interventions:                          * Regular contact with family  * Relaxation techniques - breathing exercises  * Create your pyramid to focus you on your strengths and hopes.  * Movies  * Meditation  * Yoga  * Creative activity  * Spiritual /prayer:  Prayer at home, attending services at [facility], wellness auxiliary group  * Service-based activity.              * Medications: Cymbalta, Hydroxyzine as needed.    6.  Tobacco/Alcohol/Drug Use:                               * Congratulations on quitting smoking and staying quit!  Keep up the good work managing stress/anxiety in other ways (prayer, talking it out, deep breaths, exercise, etc..                         * Maintain healthy relationship with alcohol                          * For women this would be no more than 1 drink per day                          * For men, this would be no more than 2 drinks per day              * Eliminate recreational drugs    7.  Pain:                 * Non-medication treatments:                          * ice/heat: use heating pad as you are doing.                          * massage                          * acupuncture  MARLO grubbs is active at home.  8.  Pain Medications: Gabapentin 600mg twice a day Tramadol one pill two times day.    Tylenol arthritis as needed  for break through.          MAMMOGRAM SCREENING AND DEXA SCAN:  [facility]  [address]  [address]  [place] [address]  [phone]  Date:  Tuesday November 13, 2018  Time:  2:00 PM    Mammogram instructions:  No lotion, powder, deodorant or perfume under the arms and around the breast area.    Dexa instructions:  No vitamin pills, calcium supplements or antacids such as tums, rolaids etc.. 2 days prior to appointment and the day of the appointment.  No clothing with metal such as zippers, buttons or belts.  Given to patient.  Neva J Pack  10/30/18

## 2018-10-31 RX ORDER — DEXLANSOPRAZOLE 30 MG/1
30 CAPSULE, DELAYED RELEASE ORAL DAILY
Qty: 90 CAPSULE | Refills: 4 | Status: SHIPPED | OUTPATIENT
Start: 2018-10-31 | End: 2019-11-08

## 2018-10-31 ASSESSMENT — ANXIETY QUESTIONNAIRES: GAD7 TOTAL SCORE: 7

## 2018-11-01 ENCOUNTER — TELEPHONE (OUTPATIENT)
Dept: FAMILY MEDICINE | Facility: CLINIC | Age: 63
End: 2018-11-01

## 2018-11-01 NOTE — TELEPHONE ENCOUNTER
Pt takes 300 mg shyam. She usually takes 1 pill during the day and takes 2 tabs in the ketty as needed.    Can you prescribe 600 mg tablets instead? Pt prefers this.     Routed Dr. Pierre. Please send to [facility] pharmacy. Pt would like a call back with Dr. Pierre's thoughts.

## 2018-11-01 NOTE — TELEPHONE ENCOUNTER
[facility] Family Medicine phone call message- general phone call:    Reason for call: Pt is calling to speak with nurse regarding her medication.     Return call needed: Yes    OK to leave a message on voice mail? Yes    Primary language: English      needed? No    Call taken on November 1, 2018 at 12:02 PM by Grisel Flores-Cardona

## 2018-11-06 DIAGNOSIS — G62.89 OTHER POLYNEUROPATHY: ICD-10-CM

## 2018-11-07 RX ORDER — GABAPENTIN 300 MG/1
600 CAPSULE ORAL
Qty: 360 CAPSULE | Refills: 3 | Status: SHIPPED | OUTPATIENT
Start: 2018-11-07 | End: 2018-11-07

## 2018-11-07 RX ORDER — GABAPENTIN 600 MG/1
600 TABLET ORAL 2 TIMES DAILY
Qty: 180 TABLET | Refills: 3 | Status: SHIPPED | OUTPATIENT
Start: 2018-11-07 | End: 2020-02-18

## 2018-11-19 ENCOUNTER — TRANSFERRED RECORDS (OUTPATIENT)
Dept: HEALTH INFORMATION MANAGEMENT | Facility: CLINIC | Age: 63
End: 2018-11-19

## 2018-12-05 ENCOUNTER — TELEPHONE (OUTPATIENT)
Dept: FAMILY MEDICINE | Facility: CLINIC | Age: 63
End: 2018-12-05

## 2018-12-05 DIAGNOSIS — Z12.31 VISIT FOR SCREENING MAMMOGRAM: ICD-10-CM

## 2018-12-05 DIAGNOSIS — Z13.820 SCREENING FOR OSTEOPOROSIS: ICD-10-CM

## 2018-12-05 LAB — MAMMOGRAM: NORMAL

## 2018-12-05 NOTE — TELEPHONE ENCOUNTER
[facility] Family Medicine phone call message- patient requesting a refill:    Full Medication Name: Tramadol    Dose: ?    Pharmacy confirmed as   [facility] [address] [place] [address]), [place] - [address]  [address] [facility] ([place]) [place] [address]  Phone: [phone] Fax: [phone]    [facility] [place] [address]  Phone: [phone] Fax: [phone]    [facility] [address] - [place], [place] - [address] AT [facility]  [address]  [place] [address]  Phone: [phone] Fax: [phone]  : Yes PLEASE USE "[facility] ON [address]    Additional Comments: She is out and needs a refill asap     OK to leave a message on voice mail? Yes    Primary language: English      needed? No    Call taken on December 5, 2018 at 2:32 PM by Earline Curry

## 2018-12-05 NOTE — LETTER
December 20, 2018      Shira Guthrie  [address]  [address]  [place] [address]        Dear Shira,    Your dexa scan reveals that your bones are a little thin.  We call this osteopenia.  This is not as bad as osteoporosis.  We should discuss calcium and vitamin D at your next visit and recheck your yearly labs as well.        Sincerely,    Sally Pierre MD

## 2018-12-05 NOTE — LETTER
December 6, 2018      Shira Guthrie  [address]  [address]  [place] [address]        Dear Shira,    Please see below for your test results.   Your mammogram is negative.  This is good news     Resulted Orders   PCS Use: Screening Digital Bilateral Mammogram   Result Value Ref Range    MAMMOGRAM         If you have any questions, please call the clinic to make an appointment.    Sincerely,    Sally Pierre MD

## 2018-12-06 DIAGNOSIS — G89.4 CHRONIC PAIN SYNDROME: ICD-10-CM

## 2018-12-06 DIAGNOSIS — G62.89 OTHER POLYNEUROPATHY: ICD-10-CM

## 2018-12-06 DIAGNOSIS — M15.0 PRIMARY OSTEOARTHRITIS INVOLVING MULTIPLE JOINTS: ICD-10-CM

## 2018-12-06 RX ORDER — TRAMADOL HYDROCHLORIDE 50 MG/1
50 TABLET ORAL 2 TIMES DAILY PRN
Qty: 60 TABLET | Refills: 2 | OUTPATIENT
Start: 2018-12-06

## 2018-12-06 NOTE — TELEPHONE ENCOUNTER
Pt was checking back on the status of her refill.  She would like this to go to [facility] on [address].

## 2018-12-06 NOTE — TELEPHONE ENCOUNTER
Pt states the pharmacy told her that she needs a new prescription because they can not kirill the one that was written out to [facility]. She is out of meds x 3 days.  She said she needs it today if possible.

## 2018-12-07 ENCOUNTER — OFFICE VISIT (OUTPATIENT)
Dept: FAMILY MEDICINE | Facility: CLINIC | Age: 63
End: 2018-12-07
Payer: MEDICARE

## 2018-12-07 VITALS
DIASTOLIC BLOOD PRESSURE: 75 MMHG | OXYGEN SATURATION: 100 % | TEMPERATURE: 97.5 F | HEART RATE: 76 BPM | BODY MASS INDEX: 30.76 KG/M2 | SYSTOLIC BLOOD PRESSURE: 115 MMHG | WEIGHT: 182 LBS | RESPIRATION RATE: 12 BRPM

## 2018-12-07 DIAGNOSIS — G89.4 CHRONIC PAIN SYNDROME: ICD-10-CM

## 2018-12-07 DIAGNOSIS — G62.89 OTHER POLYNEUROPATHY: ICD-10-CM

## 2018-12-07 DIAGNOSIS — M15.0 PRIMARY OSTEOARTHRITIS INVOLVING MULTIPLE JOINTS: ICD-10-CM

## 2018-12-07 RX ORDER — TRAMADOL HYDROCHLORIDE 50 MG/1
50 TABLET ORAL 2 TIMES DAILY PRN
Qty: 60 TABLET | Refills: 0 | Status: SHIPPED | OUTPATIENT
Start: 2018-12-07 | End: 2019-01-28

## 2018-12-07 NOTE — PROGRESS NOTES
Preceptor Attestation:   Patient seen, evaluated and discussed with the resident. I have verified the content of the note, which accurately reflects my assessment of the patient and the plan of care.   Supervising Physician:  Phillip Rollins MD

## 2018-12-07 NOTE — Clinical Note
Please review and close this encounter on behalf of the preceptor that is away for greater than 7 days. No additional attestation statement needed. Thank you, Anette

## 2018-12-07 NOTE — MR AVS SNAPSHOT
After Visit Summary   12/7/2018    Shira Guthrie    MRN: [id number]           Patient Information     Date Of Birth          1955        Visit Information        Provider Department      12/7/2018 8:00 AM Cisco Byrnes MD [facility]        Today's Diagnoses     Chronic pain syndrome        Other polyneuropathy        Primary osteoarthritis involving multiple joints          Care Instructions    -Please follow-up with Dr. Pierre in one month for continued chronic pain management.          Follow-ups after your visit        Who to contact     Please call your clinic at [phone] to:    Ask questions about your health    Make or cancel appointments    Discuss your medicines    Learn about your test results    Speak to your doctor            Additional Information About Your Visit        Care EveryWhere ID     This is your Care EveryWhere ID. This could be used by other organizations to access your [facility] medical records  [id number]        Your Vitals Were     Pulse Temperature Respirations Pulse Oximetry BMI (Body Mass Index)       76 97.5  F (36.4  C) (Oral) 12 100% 30.76 kg/m2        Blood Pressure from Last 3 Encounters:   12/07/18 115/75   10/30/18 122/77   09/20/18 112/75    Weight from Last 3 Encounters:   12/07/18 182 lb (82.6 kg)   10/30/18 188 lb (85.3 kg)   10/22/18 187 lb 3.2 oz (84.9 kg)              We Performed the Following     Rapid Urine Drug Screen ([facility])          Where to get your medicines      Some of these will need a paper prescription and others can be bought over the counter.  Ask your nurse if you have questions.     Bring a paper prescription for each of these medications     traMADol 50 MG tablet         Information about OPIOIDS     PRESCRIPTION OPIOIDS: WHAT YOU NEED TO KNOW   We gave you an opioid (narcotic) pain medicine. It is important to manage your pain, but opioids are not always the best choice. You should first try all the other options  your care team gave you. Take this medicine for as short a time (and as few doses) as possible.    Some activities can increase your pain, such as bandage changes or therapy sessions. It may help to take your pain medicine 30 to 60 minutes before these activities. Reduce your stress by getting enough sleep, working on hobbies you enjoy and practicing relaxation or meditation. Talk to your care team about ways to manage your pain beyond prescription opioids.    These medicines have risks:    DO NOT drive when on new or higher doses of pain medicine. These medicines can affect your alertness and reaction times, and you could be arrested for driving under the influence (DUI). If you need to use opioids long-term, talk to your care team about driving.    DO NOT operate heavy machinery    DO NOT do any other dangerous activities while taking these medicines.    DO NOT drink any alcohol while taking these medicines.     If the opioid prescribed includes acetaminophen, DO NOT take with any other medicines that contain acetaminophen. Read all labels carefully. Look for the word  acetaminophen  or  Tylenol.  Ask your pharmacist if you have questions or are unsure.    You can get addicted to pain medicines, especially if you have a history of addiction (chemical, alcohol or substance dependence). Talk to your care team about ways to reduce this risk.    All opioids tend to cause constipation. Drink plenty of water and eat foods that have a lot of fiber, such as fruits, vegetables, prune juice, apple juice and high-fiber cereal. Take a laxative (Miralax, milk of magnesia, Colace, Senna) if you don t move your bowels at least every other day. Other side effects include upset stomach, sleepiness, dizziness, throwing up, tolerance (needing more of the medicine to have the same effect), physical dependence and slowed breathing.    Store your pills in a secure place, locked if possible. We will not replace any lost or stolen  medicine. If you don t finish your medicine, please throw away (dispose) as directed by your pharmacist. The [place] Pollution Control Agency has more information about safe disposal: [email or web address]         Primary Care Provider Office Phone # Fax #    Sally Pierre [phone]       [address] [place], [place] [id number]        Equal Access to Services     KASANDRA VALLECILLO : Hadii aad ku hadasho Soomaali, waaxda luqadaha, qaybta kaalmada adeegyada, waxay idiin hayaan shell caponedavirodrigo mcbride . So [facility] [phone].    ATENCIÓN: Si russ millan, tiene a monson disposición servicios gratuitos de asistencia lingüística. Llame al [phone].    We comply with applicable federal civil rights laws and [place] laws. We do not discriminate on the basis of race, color, national origin, age, disability, sex, sexual orientation, or gender identity.            Thank you!     Thank you for choosing [facility]  for your care. Our goal is always to provide you with excellent care. Hearing back from our patients is one way we can continue to improve our services. Please take a few minutes to complete the written survey that you may receive in the mail after your visit with us. Thank you!             Your Updated Medication List - Protect others around you: Learn how to safely use, store and throw away your medicines at www.disposemymeds.org.          This list is accurate as of 12/7/18  8:40 AM.  Always use your most recent med list.                   Brand Name Dispense Instructions for use Diagnosis    aspirin 81 MG EC tablet    ASA    90 tablet    Take 1 tablet (81 mg) by mouth daily    Coronary artery disease involving native coronary artery of native heart with angina pectoris (H)       baclofen 10 MG tablet    LIORESAL    90 tablet    Take 1 tablet (10 mg) by mouth 3 times daily as needed for muscle spasms    Chronic pain syndrome, Primary osteoarthritis  involving multiple joints       CANE, ANY MATERIAL     1 Device    1 Device by Device route as needed    Peripheral neuropathy, Leg edema, left, Stroke (H)       * clopidogrel 75 MG tablet    PLAVIX    90 tablet    Take 1 tablet (75 mg) by mouth daily . Give name brand Plavix. Approved by insurance through 2/2/1016.    Chronic ischemic heart disease       * PLAVIX 75 MG tablet   Generic drug:  clopidogrel     90 tablet    Take 1 tablet (75 mg) by mouth daily    Chronic ischemic heart disease       dexlansoprazole 30 MG Cpdr CR capsule    DEXILANT    90 capsule    Take 1 capsule (30 mg) by mouth daily    Esophageal stricture       diclofenac 1 % topical gel    VOLTAREN    100 g    Apply 2-4 grams to painful areas four times daily as needed using enclosed dosing card.    Chronic pain syndrome, Closed fracture of proximal end of left forearm with delayed healing, subsequent encounter, Primary osteoarthritis involving multiple joints, Rotator cuff impingement syndrome of right shoulder, Lymphedema of left leg       diphenhydrAMINE 25 MG tablet    BENADRYL    100 tablet    Take 1-2 tablets (25-50 mg) by mouth every 6 hours as needed for other (for congestion, cough)    Acute nasopharyngitis       docusate sodium 100 MG tablet    COLACE    60 tablet    Take 100 mg by mouth daily    Chronic constipation, Chronic pain syndrome       DULoxetine 60 MG capsule    CYMBALTA    90 capsule    Take 1 capsule (60 mg) by mouth daily    Major depressive disorder, recurrent, severe without psychotic features (H)       furosemide 20 MG tablet    LASIX    180 tablet    Take 2 tablets (40 mg) by mouth daily    Lymphedema of left leg       gabapentin 600 MG tablet    NEURONTIN    180 tablet    Take 1 tablet (600 mg) by mouth 2 times daily    Other polyneuropathy       Heating Pads Pads     1 each    Apply to painful areas prn.    Stroke (H), Peripheral neuropathy, Leg edema, left       isosorbide mononitrate 30 MG 24 hr tablet    IMDUR     90 tablet    Take 1 tablet (30 mg) by mouth daily    Chronic ischemic heart disease       linaclotide 290 MCG capsule    LINZESS    90 capsule    Take 1 capsule (290 mcg) by mouth every morning (before breakfast)    Chronic constipation       metoprolol succinate ER 25 MG 24 hr tablet    TOPROL-XL    90 tablet    Take 1 tablet (25 mg) by mouth daily    Benign essential hypertension       nitroGLYcerin 0.4 MG sublingual tablet    NITROSTAT    30 tablet    Place 1 tablet (0.4 mg) under the tongue every 5 minutes as needed    Anginal pain (H)       * order for DME     2 Device    Equipment being ordered: Jobst stockings thigh high 15-20mmHg.  Please measure for fit. 2 pairs.    Leg edema, left       * order for DME     2 Device    Bilateral compression stockings 15-20mmHg. One pair knee high and one pair thigh high.  Please measure for fit.    Lymphedema of left leg       polyethylene glycol powder    MIRALAX    510 g    Take 17 g (1 capful) by mouth 2 times daily    Chronic constipation       rosuvastatin 20 MG tablet    CRESTOR    90 tablet    Take 1 tablet (20 mg) by mouth daily    Coronary artery disease involving native coronary artery of native heart with angina pectoris (H)       traMADol 50 MG tablet    ULTRAM    60 tablet    Take 1 tablet (50 mg) by mouth 2 times daily as needed for severe pain    Chronic pain syndrome, Other polyneuropathy, Primary osteoarthritis involving multiple joints       TYLENOL 325 MG tablet   Generic drug:  acetaminophen     100 tablet    Take 2 tablets (650 mg) by mouth daily        * Notice:  This list has 4 medication(s) that are the same as other medications prescribed for you. Read the directions carefully, and ask your doctor or other care provider to review them with you.

## 2018-12-07 NOTE — PROGRESS NOTES
ASSESSMENT AND PLAN      Shira was seen today for refill request.    Diagnoses and all orders for this visit:    Chronic pain syndrome  Other polyneuropathy  Primary osteoarthritis involving multiple joints: Patient had previously been prescribed 3 month prescription of ultram, due to pharmacy changes, unable to fill prescription. Confirmed this on  as well as with the pharmacy to which the transfer of prescriptions was supposed to be made to. Will prescribe one month supply and have patient follow-up with Dr. Pierre for further chronic pain management.  -     traMADol (ULTRAM) 50 MG tablet; Take 1 tablet (50 mg) by mouth 2 times daily as needed for severe pain  -     Rapid Urine Drug Screen ([facility])    Cisco Byrnes MD PGY3  [facility]                SUBJECTIVE       Shira Guthrie is a 63 year old  female with a PMH significant for:     Patient Active Problem List   Diagnosis     CAD (coronary artery disease)     MDD (major depressive disorder)     Chronic constipation     Stroke (H)     Benign essential hypertension     Osteoporosis     PAD (peripheral artery disease) (H)     Fracture closed of upper end of forearm     Synovial sarcoma (H)     Esophageal stricture     Anginal pain (H)     Health Care Home     B12 deficiency     Cataracts, bilateral     Malignant neoplasm of connective and other soft tissue     Chronic pain syndrome     Other polyneuropathy     Primary osteoarthritis involving multiple joints     Lymphedema of left leg     False positive serological test for hepatitis C     Cervical radiculopathy at C7     Rotator cuff impingement syndrome of right shoulder     H/O hysterectomy for benign disease     Problems related to other legal circumstances     She presents with need for medication refill. She has been 4 days of no tramadol. Takes 50mg in the morning, and 50mg in the evening. Per patient, not taking other narcotic medications. This is verified by . Patient had had 3  month script, but was unable to fill this as her pharmacy had closed down and her scripts had not transferred. Patient sees Dr. Pierre regularly for her chronic pain.     Patient notes that her pain is starting to become unbearable again, due to running out of her prescription.     Patient denies current tobacco, alcohol, or other illicit drug use at this time.     PMH, Medications and Allergies were reviewed and updated as needed.        REVIEW OF SYSTEMS     CONSTITUTIONAL: NEGATIVE for fever, chills, change in weight  RESP: NEGATIVE for significant cough or SOB  CV: NEGATIVE for chest pain, palpitations or peripheral edema  GI: NEGATIVE for nausea, abdominal pain, heartburn, or change in bowel habits  : NEGATIVE for frequency, dysuria, or hematuria        OBJECTIVE     Vitals:    12/07/18 0814   BP: 115/75   Pulse: 76   Resp: 12   Temp: 97.5  F (36.4  C)   TempSrc: Oral   SpO2: 100%   Weight: 182 lb (82.6 kg)     Body mass index is 30.76 kg/(m^2).    General: Well appearing, mild distress, answering questions appropriately  CV: S1, S2, RRR. No murmurs noted.  RESP: CTAB, no crackles or wheezes  ABD: Soft, NT, ND, BS+    No results found for this or any previous visit (from the past 24 hour(s)).

## 2018-12-19 NOTE — RESULT ENCOUNTER NOTE
Your dexa scan reveals that your bones are a little thin.  We call this osteopenia.  This is not as bad as osteoporosis.  We should discuss calcium and vitamin D at your next visit and recheck your yearly labs as well.

## 2018-12-31 DIAGNOSIS — G62.89 OTHER POLYNEUROPATHY: ICD-10-CM

## 2018-12-31 DIAGNOSIS — M15.0 PRIMARY OSTEOARTHRITIS INVOLVING MULTIPLE JOINTS: ICD-10-CM

## 2018-12-31 DIAGNOSIS — G89.4 CHRONIC PAIN SYNDROME: ICD-10-CM

## 2019-01-04 ENCOUNTER — TELEPHONE (OUTPATIENT)
Dept: FAMILY MEDICINE | Facility: CLINIC | Age: 64
End: 2019-01-04

## 2019-01-04 RX ORDER — TRAMADOL HYDROCHLORIDE 50 MG/1
50 TABLET ORAL 2 TIMES DAILY PRN
Qty: 60 TABLET | Refills: 0 | OUTPATIENT
Start: 2019-01-04

## 2019-01-04 NOTE — TELEPHONE ENCOUNTER
[facility] Family Medicine phone call message- general phone call:    Reason for call: She needs a call back re her tramadol.     Return call needed: Yes    OK to leave a message on voice mail? Yes    Primary language: English      needed? No    Call taken on January 4, 2019 at 11:42 AM by Earline Curry

## 2019-01-04 NOTE — TELEPHONE ENCOUNTER
"Pt. Stated that she got a call from [facility] [address] concerning her Tramadol.  She told them that she doesn't get them from [facility] anymore and that she hadn't requested them.  Reviewing the chart- [facility] [address] by Dr. TORRES after she had an office visit that same day.  Patient stated that is where she picked them up from   She is currently in [place] and is planning on a clinic visit on 1/28/19.  She wanted us to know that \"maybe someone else is trying to get them from [facility]\" and that \"it wasn't her and she doesn't want any issues when she comes back\".  Patient thanked for the the update.    Routed to Dr. Pierre/XIANG Bauman RN      "

## 2019-01-09 NOTE — TELEPHONE ENCOUNTER
More likely an automatic refill request from the pharmacy.  We will refill them at her January 28 visit.    Sally Pierre MD

## 2019-01-28 ENCOUNTER — OFFICE VISIT (OUTPATIENT)
Dept: FAMILY MEDICINE | Facility: CLINIC | Age: 64
End: 2019-01-28
Payer: MEDICARE

## 2019-01-28 VITALS
DIASTOLIC BLOOD PRESSURE: 75 MMHG | RESPIRATION RATE: 12 BRPM | BODY MASS INDEX: 31.54 KG/M2 | WEIGHT: 186.6 LBS | HEART RATE: 71 BPM | TEMPERATURE: 97.5 F | OXYGEN SATURATION: 98 % | SYSTOLIC BLOOD PRESSURE: 126 MMHG

## 2019-01-28 DIAGNOSIS — G89.4 CHRONIC PAIN SYNDROME: Primary | ICD-10-CM

## 2019-01-28 DIAGNOSIS — M15.0 PRIMARY OSTEOARTHRITIS INVOLVING MULTIPLE JOINTS: ICD-10-CM

## 2019-01-28 DIAGNOSIS — I25.119 CORONARY ARTERY DISEASE INVOLVING NATIVE CORONARY ARTERY OF NATIVE HEART WITH ANGINA PECTORIS (H): ICD-10-CM

## 2019-01-28 DIAGNOSIS — Z11.3 SCREEN FOR STD (SEXUALLY TRANSMITTED DISEASE): ICD-10-CM

## 2019-01-28 DIAGNOSIS — G62.89 OTHER POLYNEUROPATHY: ICD-10-CM

## 2019-01-28 LAB
ALBUMIN SERPL-MCNC: 4.7 MG/DL (ref 3.3–4.9)
ALP SERPL-CCNC: 119.9 U/L (ref 40–150)
ALT SERPL-CCNC: 16.3 U/L (ref 0–45)
AMPHETAMINES QUAL: NEGATIVE
AST SERPL-CCNC: 9.6 U/L (ref 0–45)
BARBITURATES QUAL URINE: NEGATIVE
BENZODIAZEPINE QUAL URINE: NEGATIVE
BILIRUB SERPL-MCNC: <0.3 MG/DL (ref 0.2–1.3)
BUN SERPL-MCNC: 7.2 MG/DL (ref 7–19)
BUPRENORPHINE QUAL URINE: NEGATIVE
CALCIUM SERPL-MCNC: 9.3 MG/DL (ref 8.5–10.1)
CANNABINOIDS UR QL SCN: NEGATIVE
CHLORIDE SERPLBLD-SCNC: 106.4 MMOL/L (ref 98–110)
CHOLEST SERPL-MCNC: 184.3 MG/DL (ref 0–200)
CHOLEST/HDLC SERPL: 2.2 {RATIO} (ref 0–5)
CO2 SERPL-SCNC: 28.9 MMOL/L (ref 20–32)
COCAINE QUAL URINE: NEGATIVE
CREAT SERPL-MCNC: 1 MG/DL (ref 0.5–1)
GFR SERPL CREATININE-BSD FRML MDRD: 59.1 ML/MIN/1.7 M2
GLUCOSE SERPL-MCNC: 95.2 MG'DL (ref 70–99)
HCT VFR BLD AUTO: 37.9 % (ref 35–47)
HDLC SERPL-MCNC: 84 MG/DL
HEMOGLOBIN: 12.1 G/DL (ref 11.7–15.7)
HIV 1+2 AB+HIV1 P24 AG SERPL QL IA: NEGATIVE
LDLC SERPL CALC-MCNC: 88 MG/DL (ref 0–129)
MCH RBC QN AUTO: 31.8 PG (ref 26.5–35)
MCHC RBC AUTO-ENTMCNC: 31.9 G/DL (ref 32–36)
MCV RBC AUTO: 99.6 FL (ref 78–100)
METHAMPHETAMINE: NEGATIVE
METHODONE QUAL: NEGATIVE
MORPHINE QUAL: NEGATIVE
OXYCODONE QUAL: NEGATIVE
PHENCYCLIDINE: NEGATIVE
PLATELET # BLD AUTO: 217 K/UL (ref 150–450)
POTASSIUM SERPL-SCNC: 4 MMOL/DL (ref 3.2–4.6)
PROPOXYPHENE: NEGATIVE
PROT SERPL-MCNC: 7.7 G/DL (ref 6.8–8.8)
RBC # BLD AUTO: 3.8 M/UL (ref 3.8–5.2)
SODIUM SERPL-SCNC: 141.6 MMOL/L (ref 132–142)
TEMPERATURE OF URINE WAS BETWEEN 90-100 DEGREES F: YES
TRICYCLIC ANTIDEPRESSANTS: NEGATIVE
TRIGL SERPL-MCNC: 60.1 MG/DL (ref 0–150)
VLDL CHOLESTEROL: 12 MG/DL (ref 7–32)
WBC # BLD AUTO: 4.7 K/UL (ref 4–11)

## 2019-01-28 RX ORDER — TRAMADOL HYDROCHLORIDE 50 MG/1
50 TABLET ORAL 3 TIMES DAILY PRN
Qty: 90 TABLET | Refills: 0 | Status: SHIPPED | OUTPATIENT
Start: 2019-01-28 | End: 2019-02-25

## 2019-01-28 NOTE — PROGRESS NOTES
Chronic Pain Follow-Up Visit    Pain Update:  Location of pain: elbow and neck  Analgesia/pain control: Recent changes:  improved    Overall control: Tolerable with discomfort    Adherance     How often do you take extra pain medicine:Never    Did you take your pain medication today? No, ran out while out of town.    Adverse effects: No     Depression: a little worse due to weight.  Doing yoga and pilates.  Watching portion sizes and using air fryer. Anette Caal lifetime membership.Planning to continue to work on this with Dr. Sky.     Database checked today? Yes. Details: as expected.    PHQ-9 SCORE 7/18/2017 11/8/2017 10/30/2018   PHQ-9 Total Score - - -   PHQ-9 Total Score 3 6 8     FAYE-7 SCORE 11/8/2017 7/24/2018 10/30/2018   Total Score - - -   Total Score - 6 (mild anxiety) -   Total Score 5 6 7       Care Plan discussed and updated as needed.  See the end of this note.       FUNCTIONAL ASSESSMENT QUESTIONNAIRE SCORE 9/20/2018 10/30/2018   Total Score 35 40          Problem, Medication and Allergy Lists were reviewed and are current..           Physical Exam:     Vitals:    01/28/19 0951   BP: 126/75   Pulse: 71   Resp: 12   Temp: 97.5  F (36.4  C)   TempSrc: Oral   SpO2: 98%   Weight: 84.6 kg (186 lb 9.6 oz)     Body mass index is 31.54 kg/m .  Vitals were reviewed and were normal  GENERAL: healthy, alert, well nourished, well hydrated, no distress  RESP: lungs clear to auscultation - no rales, no rhonchi, no wheezes  CV: regular rates and rhythm, normal S1 S2, no S3 or S4 and no murmur, no click or rub -  MS: extremities- no gross deformities noted, no edema        Results:     Results for orders placed or performed in visit on 01/28/19   Rapid Urine Drug Screen ([facility])   Result Value Ref Range    Phencyclidine NEGATIVE NEGATIVE    Propoxyphene NEGATIVE NEGATIVE    Tricyclic Antidepressants NEGATIVE NEGATIVE    Amphetamines Qual NEGATIVE NEGATIVE    Barbiturates Qual Urine NEGATIVE NEGATIVE     Buprenorphine Qual Urine NEGATIVE NEGATIVE    Benzodiazepine Qual Urine NEGATIVE NEGATIVE    Cocaine Qual Urine NEGATIVE NEGATIVE    Cannabinoids Qual Urine NEGATIVE NEGATIVE    Methamphetamine Qual NEGATIVE NEGATIVE    Methadone Qual NEGATIVE NEGATIVE    Morphine Qual NEGATIVE NEGATIVE    Oxycodone Qual NEGATIVE NEGATIVE    Temperature of Urine was Between  Degrees F YES YES   Comprehensive Metabolic Panel ([facility])   Result Value Ref Range    Albumin 4.7 3.3 - 4.9 mg/dL    Alkaline Phosphatase 119.9 40.0 - 150.0 U/L    ALT 16.3 0.0 - 45.0 U/L    AST 9.6 0.0 - 45.0 U/L    Bilirubin Total <0.3 0.2 - 1.3 mg/dL    Urea Nitrogen 7.2 7.0 - 19.0 mg/dL    Calcium 9.3 8.5 - 10.1 mg/dL    Chloride 106.4 98.0 - 110.0 mmol/L    Carbon Dioxide 28.9 20.0 - 32.0 mmol/L    Creatinine 1.0 0.5 - 1.0 mg/dL    Glucose 95.2 70.0 - 99.0 mg'dL    Potassium 4.0 3.2 - 4.6 mmol/dL    Sodium 141.6 132.0 - 142.0 mmol/L    Protein Total 7.7 6.8 - 8.8 g/dL    GFR Estimate 59.1 (L) >60.0 mL/min/1.7 m2    GFR Estimate If Black 71.6 >60.0 mL/min/1.7 m2   Lipid Panel ([facility])   Result Value Ref Range    Cholesterol 184.3 0.0 - 200.0 mg/dL    Cholesterol/HDL Ratio 2.2 0.0 - 5.0    HDL Cholesterol 84.0 >40.0 mg/dL    LDL Cholesterol Calculated 88 0 - 129 mg/dL    Triglycerides 60.1 0.0 - 150.0 mg/dL    VLDL Cholesterol 12.0 7.0 - 32.0 mg/dL   HIV Ag/Ab Screen [place] ([facility])   Result Value Ref Range    HIV Antigen/Antibody Negative Negative    Narrative    Test performed by:  [facility] [address] [place] [address]  Method is Abbott HIV Ag/Ab for the detection of HIV p24 antigen, HIV-1   antibodies and HIV-2 antibodies.   Syphilis Screen [place] ([facility])   Result Value Ref Range    Treponema Antibody (Syphilis) Negative Negative    Narrative    Test performed by:  [facility] [address] [place] [address]   Chlamydia/Gono Amplified ([facility])   Result Value Ref Range    Chlamydia  trac,Amplified Prb Negative Negative    N gonorrhoeae,Amplified Prb Negative Negative    Narrative    Test performed by:  [facility] [address] [place] [address]   CBC with Plt ([facility])   Result Value Ref Range    WBC 4.7 4.0 - 11.0 K/uL    RBC 3.8 3.8 - 5.2 M/uL    Hemoglobin 12.1 11.7 - 15.7 g/dL    Hematocrit 37.9 35.0 - 47.0 %    MCV 99.6 78.0 - 100.0 fL    MCH 31.8 26.5 - 35.0    MCHC 31.9 (L) 32.0 - 36.0 g/dL    Platelets 217.0 150.0 - 450.0 K/uL      rapid urine drug screen obtained today: Yes    Assessment and Plan    Shira Guthrie is here for follow up of chronic pain caused by OA, neuropathy.    Shira was seen today for pain management.    Diagnoses and all orders for this visit:    Chronic pain syndrome  -     Rapid Urine Drug Screen ([facility])  Other polyneuropathy  Primary osteoarthritis involving multiple joints: Increased to three times a day as not on other opioids now that she was taking after surgery.  -     traMADol (ULTRAM) 50 MG tablet; Take 1 tablet (50 mg) by mouth 3 times daily as needed for severe pain    Coronary artery disease involving native coronary artery of native heart with angina pectoris (H): due for preventive care screening labs.  Will discuss results with her more at CPE in the next month or so.  -     Comprehensive Metabolic Panel ([facility])  -     Lipid Panel ([facility])  -     CBC with Plt ([facility])    Screen for STD (sexually transmitted disease): has sexual partner in [place] and would like her yearly screen.  -     HIV Ag[facility] Screen [place] [facility])  -     Syphilis Screen [place] ([facility])  -     Chlamydia/Gono Amplified ([facility])      Chronic Pain Syndrome:  Care plan updated with patient, see below for details.  Patient is being prescribed 150mg of tramadol IR per day. 10 mg MEDD  Care Plan Date: January/2019  Naloxone has not been prescribed.       If opioids prescribed patient was asked to bring pill bottle to each appointment and was  informed that refills would only be provided at office visits.   Asked patient to F/U in 1 month(s) with same provider for 20 minute  Visit.     Sally Pierre MD    Patient Instructions          Personal Care Plan for Chronic Pain    1.  Personal Goals:                * take less medication              * lose weight: goal of losing 15 to 20 pounds.              * continue working on keeping thoughts positive through Tawny              * to feel confident enough that when someone gives me a compliment I can simply say thank you    2.  Sleep:                 *  Basic sleep plan:                          *reduce or eliminate caffeine and daytime naps                          * relaxation before bed                          * limit screen time 1-2 hours prior to bed                          * establish dark/quiet sleep environment                          * go to bed at target bedtime:   pm                          * keep consistent wake time:   am.              *  Minimize switching days and nights by staying active throughout the day.              * We'll talk more about a consistent sleep plan when we meet next time.                3.  Physical Activity:                 * Formal physical rehabilitation:                          HOME PT for shoulder              * Home/community based activity:                          * Home based exercises given by lymphedema nurse with breathing exercises built in as well - daily                          * Aerobic exercise/endurance exercise:  elliptical machine - 5 minute intervals with sit ups in between for 30 minutes - daily.  Has a  in the builiding. Has exercise tape in her apartment.                          * Cleaning and baking with body awareness and stretching              * Listen to your body.  Pace yourself for success.  Don't over-do it.  Plan to get PCA back to help with cleaning and laundry so as to not overdo it.                4.  Nutrition/Weight:                  * Keep a food journal in a notebook at home and bring this to your next visit with Dr. Sky.  Eat small frequent meals.              * Review your I Can Prevent Diabetes materials.              * Limit processed foods and foods high in sugar, sodium and fat.              * Keep up the good work eating many healthy foods.              * When you make a less healthy choice approach yourself with kindness and compassion.  Try to understand what contributed to that choice:  Was I too hungry?  Was I too tired?  Stressed?  Worried?  Use this information to help support healthier choices in the future.    5.  Mood/Stress Management:     SELF COMPASSION!              * Formal interventions:                        * schedule follow up with Dr. Sky in about month or so.              * Home/community based interventions:                          * Regular contact with family  * Relaxation techniques - breathing exercises  * Create your pyramid to focus you on your strengths and hopes.  * Movies  * Meditation  * Yoga  * Creative activity  * Spiritual /prayer:  Prayer at home, attending services at [facility], wellness auxiliary group  * Service-based activity.              * Medications: Cymbalta, Hydroxyzine as needed.    6.  Tobacco/Alcohol/Drug Use:                               * Congratulations on quitting smoking and staying quit!  Keep up the good work managing stress/anxiety in other ways (prayer, talking it out, deep breaths, exercise, etc..                         * Maintain healthy relationship with alcohol                          * For women this would be no more than 1 drink per day                          * For men, this would be no more than 2 drinks per day              * Eliminate recreational drugs    7.  Pain:                 * Non-medication treatments:                          * ice/heat: use heating pad as you are doing.                          *  massage                          * acupuncture  YOGA pilates is active at home.  8.  Pain Medications: Gabapentin 600mg twice a day   Tramadol one pill three times day.    Tylenol arthritis as needed for break through.

## 2019-01-28 NOTE — LETTER
January 30, 2019      Shira Guthrie  [address]  [place] [address]        Dear Shira,    Good news!  All of your labs were normals.  Continue all of the same medications at this time.  We can discuss any further questions about your labs at your next visit.      Please see below for your test results.    Resulted Orders   Rapid Urine Drug Screen ([facility])   Result Value Ref Range    Phencyclidine NEGATIVE NEGATIVE    Propoxyphene NEGATIVE NEGATIVE    Tricyclic Antidepressants NEGATIVE NEGATIVE    Amphetamines Qual NEGATIVE NEGATIVE    Barbiturates Qual Urine NEGATIVE NEGATIVE    Buprenorphine Qual Urine NEGATIVE NEGATIVE    Benzodiazepine Qual Urine NEGATIVE NEGATIVE    Cocaine Qual Urine NEGATIVE NEGATIVE    Cannabinoids Qual Urine NEGATIVE NEGATIVE    Methamphetamine Qual NEGATIVE NEGATIVE    Methadone Qual NEGATIVE NEGATIVE    Morphine Qual NEGATIVE NEGATIVE    Oxycodone Qual NEGATIVE NEGATIVE    Temperature of Urine was Between  Degrees F YES YES      Comment:      This is a preliminary screening test that detects drugs-of-abuse in urine at   specified detection levels.  To confirm preliminary results, a more specific   method such as Gas Chromatography/Mass Spectrometry (GC/MS) must be used.        Comprehensive Metabolic Panel ([facility])   Result Value Ref Range    Albumin 4.7 3.3 - 4.9 mg/dL    Alkaline Phosphatase 119.9 40.0 - 150.0 U/L    ALT 16.3 0.0 - 45.0 U/L    AST 9.6 0.0 - 45.0 U/L    Bilirubin Total <0.3 0.2 - 1.3 mg/dL    Urea Nitrogen 7.2 7.0 - 19.0 mg/dL    Calcium 9.3 8.5 - 10.1 mg/dL    Chloride 106.4 98.0 - 110.0 mmol/L    Carbon Dioxide 28.9 20.0 - 32.0 mmol/L    Creatinine 1.0 0.5 - 1.0 mg/dL    Glucose 95.2 70.0 - 99.0 mg'dL    Potassium 4.0 3.2 - 4.6 mmol/dL    Sodium 141.6 132.0 - 142.0 mmol/L    Protein Total 7.7 6.8 - 8.8 g/dL    GFR Estimate 59.1 (L) >60.0 mL/min/1.7 m2    GFR Estimate If Black 71.6 >60.0 mL/min/1.7 m2   Lipid Panel ([facility])   Result Value Ref  Range    Cholesterol 184.3 0.0 - 200.0 mg/dL    Cholesterol/HDL Ratio 2.2 0.0 - 5.0    HDL Cholesterol 84.0 >40.0 mg/dL    LDL Cholesterol Calculated 88 0 - 129 mg/dL    Triglycerides 60.1 0.0 - 150.0 mg/dL    VLDL Cholesterol 12.0 7.0 - 32.0 mg/dL   HIV Ag/Ab Screen [place] ([facility])   Result Value Ref Range    HIV Antigen/Antibody Negative Negative    Narrative    Test performed by:  [facility] [address] [place] [address]  Method is Abbott HIV Ag/Ab for the detection of HIV p24 antigen, HIV-1   antibodies and HIV-2 antibodies.   Syphilis Screen [place] ([facility])   Result Value Ref Range    Treponema Antibody (Syphilis) Negative Negative    Narrative    Test performed by:  [facility] [address] [place] [address]   Chlamydia/Gono Amplified ([facility])   Result Value Ref Range    Chlamydia trac,Amplified Prb Negative Negative    N gonorrhoeae,Amplified Prb Negative Negative    Narrative    Test performed by:  [facility] [address] [place] [address]   CBC with Plt ([facility])   Result Value Ref Range    WBC 4.7 4.0 - 11.0 K/uL    RBC 3.8 3.8 - 5.2 M/uL    Hemoglobin 12.1 11.7 - 15.7 g/dL    Hematocrit 37.9 35.0 - 47.0 %    MCV 99.6 78.0 - 100.0 fL    MCH 31.8 26.5 - 35.0    MCHC 31.9 (L) 32.0 - 36.0 g/dL    Platelets 217.0 150.0 - 450.0 K/uL       If you have any questions, please call the clinic to make an appointment.    Sincerely,    Sally Pierre MD

## 2019-01-28 NOTE — PATIENT INSTRUCTIONS
Personal Care Plan for Chronic Pain    1.  Personal Goals:                * take less medication              * lose weight: goal of losing 15 to 20 pounds.              * continue working on keeping thoughts positive through [employer]wn[employer]              * to feel confident enough that when someone gives me a compliment I can simply say thank you    2.  Sleep:                 *  Basic sleep plan:                          *reduce or eliminate caffeine and daytime naps                          * relaxation before bed                          * limit screen time 1-2 hours prior to bed                          * establish dark/quiet sleep environment                          * go to bed at target bedtime:   pm                          * keep consistent wake time:   am.              *  Minimize switching days and nights by staying active throughout the day.              * We'll talk more about a consistent sleep plan when we meet next time.                3.  Physical Activity:                 * Formal physical rehabilitation:                          HOME PT for shoulder              * Home/community based activity:                          * Home based exercises given by lymphedema nurse with breathing exercises built in as well - daily                          * Aerobic exercise/endurance exercise:  elliptical machine - 5 minute intervals with sit ups in between for 30 minutes - daily.  Has a  in the builiding. Has exercise tape in her apartment.                          * Cleaning and baking with body awareness and stretching              * Listen to your body.  Pace yourself for success.  Don't over-do it.  Plan to get PCA back to help with cleaning and laundry so as to not overdo it.                4.  Nutrition/Weight:                 * Keep a food journal in a notebook at home and bring this to your next visit with Dr. Sky.  Eat small frequent meals.              * Review your I Can Prevent Diabetes  materials.              * Limit processed foods and foods high in sugar, sodium and fat.              * Keep up the good work eating many healthy foods.              * When you make a less healthy choice approach yourself with kindness and compassion.  Try to understand what contributed to that choice:  Was I too hungry?  Was I too tired?  Stressed?  Worried?  Use this information to help support healthier choices in the future.    5.  Mood/Stress Management:     SELF COMPASSION!              * Formal interventions:                        * schedule follow up with Dr. Sky in about month or so.              * Home/community based interventions:                          * Regular contact with family  * Relaxation techniques - breathing exercises  * Create your pyramid to focus you on your strengths and hopes.  * Movies  * Meditation  * Yoga  * Creative activity  * Spiritual /prayer:  Prayer at home, attending services at [facility], wellness auxiliary group  * Service-based activity.              * Medications: Cymbalta, Hydroxyzine as needed.    6.  Tobacco/Alcohol/Drug Use:                               * Congratulations on quitting smoking and staying quit!  Keep up the good work managing stress/anxiety in other ways (prayer, talking it out, deep breaths, exercise, etc..                         * Maintain healthy relationship with alcohol                          * For women this would be no more than 1 drink per day                          * For men, this would be no more than 2 drinks per day              * Eliminate recreational drugs    7.  Pain:                 * Non-medication treatments:                          * ice/heat: use heating pad as you are doing.                          * massage                          * acupuncture  MARLO grubbs is active at home.  8.  Pain Medications: Gabapentin 600mg twice a day   Tramadol one pill three times day.    Tylenol arthritis as  needed for break through.

## 2019-01-29 LAB
C TRACH RRNA SPEC QL NAA+PROBE: NEGATIVE
N GONORRHOEA RRNA SPEC QL NAA+PROBE: NEGATIVE
TREPONEMA ANTIBODY (SYPHILIS): NEGATIVE

## 2019-01-30 NOTE — RESULT ENCOUNTER NOTE
Good news!  All of your labs were normals.  Continue all of the same medications at this time.  We can discuss any further questions about your labs at your next visit.

## 2019-01-31 DIAGNOSIS — M15.0 PRIMARY OSTEOARTHRITIS INVOLVING MULTIPLE JOINTS: ICD-10-CM

## 2019-01-31 DIAGNOSIS — G89.4 CHRONIC PAIN SYNDROME: ICD-10-CM

## 2019-01-31 NOTE — TELEPHONE ENCOUNTER
[facility] phone call message- patient requesting a refill:    Full Medication Name: Baclofen     Dose: 10mg    Pharmacy confirmed as   [id number] - [place], [place] - [address] AT [facility] [address]  [address]  [place] [address]  Phone: [phone] Fax: [phone]       Additional Comments: would like the [facility] [address] at [facility] [address].      OK to leave a message on voice mail? Yes    Primary language: English      needed? No    Call taken on January 31, 2019 at 12:35 PM by Yenifer Mckeno

## 2019-02-01 RX ORDER — BACLOFEN 10 MG/1
10 TABLET ORAL 3 TIMES DAILY PRN
Qty: 90 TABLET | Refills: 3 | Status: SHIPPED | OUTPATIENT
Start: 2019-02-01 | End: 2019-09-30

## 2019-02-06 ENCOUNTER — TELEPHONE (OUTPATIENT)
Dept: FAMILY MEDICINE | Facility: CLINIC | Age: 64
End: 2019-02-06

## 2019-02-06 NOTE — TELEPHONE ENCOUNTER
"Spoke with patient.  She stated that she has been eating a lot of salads lately and cutting out meat in her diet.  On Sunday she ate meat and since Sunday night has been having the diarrhea and had some bouts with throwing up.  She states that she is just about \"done with the diarrhea\" and that it's mostly just yellow liquid.  No more n/v.  No c/o abd. pain  On Monday she started to have a slight HA when \"then was all going on\".  She has been taking some tylenol and her tramadol and has been only drinking some liquid broth.  No fever, CP, SOB, no Numbness/tingling in extremities.  (Patient states she does have neuropathy).    Discussed with her about trying to increase her diet -eating the  BRAT diet (and other items such as crackers, white rice, soups) and increasing her fluid intake to see if that will help with the HA and also with her stools.  She stated she is starting to feel better but just wanted to let us know.    Pt verbalizes understanding of the directions and information. Gave pt opportunity to ask additional questions or address concerns. Pt is directed to call back if condition worsens, new symptoms develop, or there is no improvement.        Routed to Dr. Pierre/XIANG Bauman RN    "

## 2019-02-06 NOTE — TELEPHONE ENCOUNTER
[facility] phone call message- general phone call:    Reason for call: She has been having head aches for the last couple of days she would like to talk to a nurse.    Return call needed: Yes    OK to leave a message on voice mail? Yes    Primary language: English      needed? No    Call taken on February 6, 2019 at 11:19 AM by Earline Curry

## 2019-02-25 ENCOUNTER — OFFICE VISIT (OUTPATIENT)
Dept: FAMILY MEDICINE | Facility: CLINIC | Age: 64
End: 2019-02-25
Payer: MEDICARE

## 2019-02-25 VITALS
RESPIRATION RATE: 16 BRPM | HEART RATE: 76 BPM | DIASTOLIC BLOOD PRESSURE: 75 MMHG | OXYGEN SATURATION: 98 % | BODY MASS INDEX: 31.81 KG/M2 | WEIGHT: 188.2 LBS | TEMPERATURE: 97.8 F | SYSTOLIC BLOOD PRESSURE: 123 MMHG

## 2019-02-25 DIAGNOSIS — M15.0 PRIMARY OSTEOARTHRITIS INVOLVING MULTIPLE JOINTS: ICD-10-CM

## 2019-02-25 DIAGNOSIS — M81.0 OSTEOPOROSIS, UNSPECIFIED OSTEOPOROSIS TYPE, UNSPECIFIED PATHOLOGICAL FRACTURE PRESENCE: ICD-10-CM

## 2019-02-25 DIAGNOSIS — G62.89 OTHER POLYNEUROPATHY: ICD-10-CM

## 2019-02-25 DIAGNOSIS — M75.41 ROTATOR CUFF IMPINGEMENT SYNDROME OF RIGHT SHOULDER: ICD-10-CM

## 2019-02-25 DIAGNOSIS — G89.4 CHRONIC PAIN SYNDROME: Primary | ICD-10-CM

## 2019-02-25 RX ORDER — TRAMADOL HYDROCHLORIDE 50 MG/1
50 TABLET ORAL 3 TIMES DAILY PRN
Qty: 90 TABLET | Refills: 1 | Status: SHIPPED | OUTPATIENT
Start: 2019-02-25 | End: 2019-04-22

## 2019-02-25 ASSESSMENT — PATIENT HEALTH QUESTIONNAIRE - PHQ9: 5. POOR APPETITE OR OVEREATING: NOT AT ALL

## 2019-02-25 ASSESSMENT — ANXIETY QUESTIONNAIRES
2. NOT BEING ABLE TO STOP OR CONTROL WORRYING: NOT AT ALL
5. BEING SO RESTLESS THAT IT IS HARD TO SIT STILL: NOT AT ALL
GAD7 TOTAL SCORE: 2
7. FEELING AFRAID AS IF SOMETHING AWFUL MIGHT HAPPEN: NOT AT ALL
1. FEELING NERVOUS, ANXIOUS, OR ON EDGE: SEVERAL DAYS
3. WORRYING TOO MUCH ABOUT DIFFERENT THINGS: NOT AT ALL
6. BECOMING EASILY ANNOYED OR IRRITABLE: SEVERAL DAYS
IF YOU CHECKED OFF ANY PROBLEMS ON THIS QUESTIONNAIRE, HOW DIFFICULT HAVE THESE PROBLEMS MADE IT FOR YOU TO DO YOUR WORK, TAKE CARE OF THINGS AT HOME, OR GET ALONG WITH OTHER PEOPLE: NOT DIFFICULT AT ALL

## 2019-02-25 NOTE — PATIENT INSTRUCTIONS
Personal Care Plan for Chronic Pain    1.  Personal Goals:                * take less medication              * lose weight: goal of losing 15 to 20 pounds.              * continue working on keeping thoughts positive through Tawn[employer]              * to feel confident enough that when someone gives me a compliment I can simply say thank you    2.  Sleep:                 *  Basic sleep plan:                          *reduce or eliminate caffeine and daytime naps                          * relaxation before bed                          * limit screen time 1-2 hours prior to bed                          * establish dark/quiet sleep environment                          * go to bed at target bedtime:   pm                          * keep consistent wake time:   am.              *  Minimize switching days and nights by staying active throughout the day.              * We'll talk more about a consistent sleep plan when we meet next time.                3.  Physical Activity:                 * Formal physical rehabilitation:                          HOME PT for shoulder              * Home/community based activity:                          * Home based exercises given by lymphedema nurse with breathing exercises built in as well - daily                          * Aerobic exercise/endurance exercise:  elliptical machine - 5 minute intervals with sit ups in between for 30 minutes - daily.  Has a  in the builiding. Has exercise tape in her apartment.                          * Cleaning and baking with body awareness and stretching              * Listen to your body.  Pace yourself for success.  Don't over-do it.  Plan to get PCA back to help with cleaning and laundry so as to not overdo it.                4.  Nutrition/Weight:                 * Keep a food journal in a notebook at home and bring this to your next visit with Dr. Sky.  Eat small frequent meals.              * Review your I Can Prevent Diabetes  materials.              * Limit processed foods and foods high in sugar, sodium and fat.              * Keep up the good work eating many healthy foods.              * When you make a less healthy choice approach yourself with kindness and compassion.  Try to understand what contributed to that choice:  Was I too hungry?  Was I too tired?  Stressed?  Worried?  Use this information to help support healthier choices in the future.    5.  Mood/Stress Management:     SELF COMPASSION!              * Formal interventions:                        * schedule follow up with Dr. Sky in about month or so.              * Home/community based interventions:                          * Regular contact with family  * Relaxation techniques - breathing exercises  * Create your pyramid to focus you on your strengths and hopes.  * Movies  * Meditation  * Yoga  * Creative activity  * Spiritual /prayer:  Prayer at home, attending services at [facility], wellness auxiliary group  * Service-based activity.              * Medications: Cymbalta, Hydroxyzine as needed.    6.  Tobacco/Alcohol/Drug Use:                               * Congratulations on quitting smoking and staying quit!  Keep up the good work managing stress/anxiety in other ways (prayer, talking it out, deep breaths, exercise, etc..                         * Maintain healthy relationship with alcohol                          * For women this would be no more than 1 drink per day                          * For men, this would be no more than 2 drinks per day              * Eliminate recreational drugs    7.  Pain:                 * Non-medication treatments:                          * ice/heat: use heating pad as you are doing.                          * massage                          * acupuncture  MARLO grubbs is active at home.  8.  Pain Medications: Gabapentin 600mg twice a day   Tramadol one pill three times day.    Tylenol arthritis as  needed for break through.    You had osteopenia which is a little thin.

## 2019-02-25 NOTE — PROGRESS NOTES
Chronic Pain Follow-Up Visit    Pain Update:  Location of pain: left elbow, fingers.  Some shooting pain left leg at night, neuropathy.    Analgesia/pain control: Recent changes:  same    Overall control: Tolerable with discomfort    Adherance     How often do you take extra pain medicine:Never    Did you take your pain medication today? YES, tramadol at 4AM.    Adverse effects: No     Grand daughter caused some issues with with stress in the family.  Was staying with Randolph, but had to kick her out due to her being disrespectful to his house and to everyone in the family.     Database checked today? Yes. Details: as expected.    PHQ-9 SCORE 7/18/2017 11/8/2017 10/30/2018   PHQ-9 Total Score - - -   PHQ-9 Total Score 3 6 8     FAYE-7 SCORE 11/8/2017 7/24/2018 10/30/2018   Total Score - - -   Total Score - 6 (mild anxiety) -   Total Score 5 6 7       Care Plan discussed and updated as needed.  See the end of this note.       FUNCTIONAL ASSESSMENT QUESTIONNAIRE SCORE 9/20/2018 10/30/2018   Total Score 35 40          Problem, Medication and Allergy Lists were reviewed and are current..           Physical Exam:     Vitals:    02/25/19 1335   BP: 123/75   BP Location: Left arm   Patient Position: Sitting   Cuff Size: Adult Large   Pulse: 76   Resp: 16   Temp: 97.8  F (36.6  C)   TempSrc: Oral   SpO2: 98%   Weight: 85.4 kg (188 lb 3.2 oz)     Body mass index is 31.81 kg/m .  Vitals were reviewed and were normal  GENERAL: healthy, alert, well nourished, well hydrated, no distress  RESP: lungs clear to auscultation - no rales, no rhonchi, no wheezes  CV: regular rates and rhythm, normal S1 S2, no S3 or S4 and no murmur, no click or rub -  MS: extremities- no gross deformities noted, mild edema in left leg, chronic.        Results:     Results for orders placed or performed in visit on 02/25/19   Rapid Urine Drug Screen ([facility])   Result Value Ref Range    Phencyclidine NEGATIVE NEGATIVE    Propoxyphene NEGATIVE NEGATIVE     Tricyclic Antidepressants NEGATIVE NEGATIVE    Amphetamines Qual NEGATIVE NEGATIVE    Barbiturates Qual Urine NEGATIVE NEGATIVE    Buprenorphine Qual Urine NEGATIVE NEGATIVE    Benzodiazepine Qual Urine NEGATIVE NEGATIVE    Cocaine Qual Urine NEGATIVE NEGATIVE    Cannabinoids Qual Urine NEGATIVE NEGATIVE    Methamphetamine Qual NEGATIVE NEGATIVE    Methadone Qual NEGATIVE NEGATIVE    Morphine Qual NEGATIVE NEGATIVE    Oxycodone Qual NEGATIVE NEGATIVE    Temperature of Urine was Between  Degrees F YES YES      rapid urine drug screen obtained today: Yes    Assessment and Plan    Shira Guthrie is here for follow up of chronic pain caused by arthritis and past fractures in left elbow..    Shira was seen today for pain management and medication reconciliation.    Diagnoses and all orders for this visit:    Chronic pain syndrome  Primary osteoarthritis involving multiple joints  Other polyneuropathy: stable refilled meds.  Continue Yoga/pilates program.  Going to every 2 month visits and then to every 3 if still stable.  Follow up with Dr. Sky if needed.  -     Rapid Urine Drug Screen ([facility])  -     traMADol (ULTRAM) 50 MG tablet; Take 1 tablet (50 mg) by mouth 3 times daily as needed for severe pain    Rotator cuff impingement syndrome of right shoulder: doing much better.     Osteopenia: Dexa showed this discussed what this means in details.  Recommended weight bearing exercise and adequate calcium and vitamin D intact. Recheck Dexa in 3 years.    Chronic Pain Syndrome:  Care plan updated with patient, see below for details.  Patient is being prescribed 50mg of tramadol IR 3 times a day. 15 mg MEDD.  Refilled for 2 months.  Care Plan Date: February/2019  Naloxone has not been prescribed.       If opioids prescribed patient was asked to bring pill bottle to each appointment and was informed that refills would only be provided at office visits.   Asked patient to F/U in 2 month(s) with same provider for  20 minute  Visit.     Sally Pierre MD    Patient Instructions          Personal Care Plan for Chronic Pain    1.  Personal Goals:                * take less medication              * lose weight: goal of losing 15 to 20 pounds.              * continue working on keeping thoughts positive through [employer]wn[employer]              * to feel confident enough that when someone gives me a compliment I can simply say thank you    2.  Sleep:                 *  Basic sleep plan:                          *reduce or eliminate caffeine and daytime naps                          * relaxation before bed                          * limit screen time 1-2 hours prior to bed                          * establish dark/quiet sleep environment                          * go to bed at target bedtime:   pm                          * keep consistent wake time:   am.              *  Minimize switching days and nights by staying active throughout the day.              * We'll talk more about a consistent sleep plan when we meet next time.                3.  Physical Activity:                 * Formal physical rehabilitation:                          HOME PT for shoulder              * Home/community based activity:                          * Home based exercises given by lymphedema nurse with breathing exercises built in as well - daily                          * Aerobic exercise/endurance exercise:  elliptical machine - 5 minute intervals with sit ups in between for 30 minutes - daily.  Has a  in the builiding. Has exercise tape in her apartment.                          * Cleaning and baking with body awareness and stretching              * Listen to your body.  Pace yourself for success.  Don't over-do it.  Plan to get PCA back to help with cleaning and laundry so as to not overdo it.                4.  Nutrition/Weight:                 * Keep a food journal in a notebook at home and bring this to your next visit with Dr. Sky.  Eat small  frequent meals.              * Review your I Can Prevent Diabetes materials.              * Limit processed foods and foods high in sugar, sodium and fat.              * Keep up the good work eating many healthy foods.              * When you make a less healthy choice approach yourself with kindness and compassion.  Try to understand what contributed to that choice:  Was I too hungry?  Was I too tired?  Stressed?  Worried?  Use this information to help support healthier choices in the future.    5.  Mood/Stress Management:     SELF COMPASSION!              * Formal interventions:                        * schedule follow up with Dr. Sky in about month or so.              * Home/community based interventions:                          * Regular contact with family  * Relaxation techniques - breathing exercises  * Create your pyramid to focus you on your strengths and hopes.  * Movies  * Meditation  * Yoga  * Creative activity  * Spiritual /prayer:  Prayer at home, attending services at [facility], wellness auxiliary group  * Service-based activity.              * Medications: Cymbalta, Hydroxyzine as needed.    6.  Tobacco/Alcohol/Drug Use:                               * Congratulations on quitting smoking and staying quit!  Keep up the good work managing stress/anxiety in other ways (prayer, talking it out, deep breaths, exercise, etc..                         * Maintain healthy relationship with alcohol                          * For women this would be no more than 1 drink per day                          * For men, this would be no more than 2 drinks per day              * Eliminate recreational drugs    7.  Pain:                 * Non-medication treatments:                          * ice/heat: use heating pad as you are doing.                          * massage                          * acupuncture  YOGA humera is active at home.  8.  Pain Medications: Gabapentin 600mg twice a  day   Tramadol one pill three times day.    Tylenol arthritis as needed for break through.

## 2019-02-26 ASSESSMENT — ANXIETY QUESTIONNAIRES: GAD7 TOTAL SCORE: 2

## 2019-04-09 ENCOUNTER — TELEPHONE (OUTPATIENT)
Dept: FAMILY MEDICINE | Facility: CLINIC | Age: 64
End: 2019-04-09

## 2019-04-09 NOTE — TELEPHONE ENCOUNTER
Called patient back as she had left a message that her son, Randolph Montejo, had passed away on Saturday.  He has mucinous carcinoma of the lung and the cancer came back.  He was on a ventilator.  She was doing ok and has family support. She takes comfort in knowing he is at peace. She will see Dr. Sky tomorrow and me on Thursday.  She will let us know when the  is.  Sally Pierre MD  Routed note to Dr. Sky as well.

## 2019-04-10 ENCOUNTER — TELEPHONE (OUTPATIENT)
Dept: PSYCHOLOGY | Facility: CLINIC | Age: 64
End: 2019-04-10

## 2019-04-10 NOTE — TELEPHONE ENCOUNTER
Placed outreach call to Shira when she did not arrive for our scheduled visit today.  She forgot and was very apologetic about missing our appointment.  Encouraged her not to worry about this as she has a lot on her mind with her son's passing.  Expressed my condolences.  She was appreciative of this support and the call today.  The  is Monday at 11:00am at [address]. .  They will also have a viewing at 9-10am for friends and family to visit.  Let her know that I would share this information with Dr. Pierre but wouldn't be able to attend myself as I have patients scheduled at that time.  She expressed understanding of this.  She is scheduled to see Dr. Pierre tomorrow () at 1:50pm.  Had to get off the phone as the [facility] was calling her but I did encourage her to reschedule with me if she would like this and she stated her intention to do so.  Let her know that I would be out of the office tomorrow and Friday but that I would be happy to see her back some time soon.  Told her to leave a message for me if she could not find a timely appt in my schedule and I would see if I could work her in sooner rather than later if possible.  She expressed appreciation for this.    Will route note from today to Dr. Pierre so she can be aware of outreach call and follow up plan from today.  Will plan outreach call next week some time if I do not hear from her or see her pop up on my schedule in the near future.    Dior Sky, PhD, LP

## 2019-04-11 NOTE — TELEPHONE ENCOUNTER
The Pt called back to let the Dr know that the  will be on 2019 viewing at 10 am burial at 1100 am at [facility]

## 2019-04-11 NOTE — TELEPHONE ENCOUNTER
Called patient back.  She was able to reschedule today's visit for .  She has plenty of medicine until then.     is  [facility]. Visitation 10AM and  11AM.  Discussed that I may not be able to come but will try to find someone to cover my work so I can.  Routed to Dior Sky to let her know about the  Change.  Sally Pierre MD

## 2019-04-22 ENCOUNTER — OFFICE VISIT (OUTPATIENT)
Dept: FAMILY MEDICINE | Facility: CLINIC | Age: 64
End: 2019-04-22
Payer: MEDICARE

## 2019-04-22 VITALS
RESPIRATION RATE: 18 BRPM | HEART RATE: 57 BPM | HEIGHT: 65 IN | WEIGHT: 185.2 LBS | SYSTOLIC BLOOD PRESSURE: 136 MMHG | TEMPERATURE: 97.6 F | DIASTOLIC BLOOD PRESSURE: 75 MMHG | BODY MASS INDEX: 30.86 KG/M2

## 2019-04-22 DIAGNOSIS — Z63.4 BEREAVEMENT: ICD-10-CM

## 2019-04-22 DIAGNOSIS — M15.0 PRIMARY OSTEOARTHRITIS INVOLVING MULTIPLE JOINTS: ICD-10-CM

## 2019-04-22 DIAGNOSIS — F33.2 MAJOR DEPRESSIVE DISORDER, RECURRENT, SEVERE WITHOUT PSYCHOTIC FEATURES (H): ICD-10-CM

## 2019-04-22 DIAGNOSIS — G89.4 CHRONIC PAIN SYNDROME: Primary | ICD-10-CM

## 2019-04-22 DIAGNOSIS — G62.89 OTHER POLYNEUROPATHY: ICD-10-CM

## 2019-04-22 RX ORDER — TRAMADOL HYDROCHLORIDE 50 MG/1
50 TABLET ORAL 3 TIMES DAILY PRN
Qty: 90 TABLET | Refills: 0 | Status: SHIPPED | OUTPATIENT
Start: 2019-04-22 | End: 2019-05-10

## 2019-04-22 RX ORDER — DULOXETIN HYDROCHLORIDE 60 MG/1
60 CAPSULE, DELAYED RELEASE ORAL DAILY
Qty: 90 CAPSULE | Refills: 4 | Status: SHIPPED | OUTPATIENT
Start: 2019-04-22 | End: 2020-04-24

## 2019-04-22 SDOH — SOCIAL STABILITY - SOCIAL INSECURITY: DISSAPEARANCE AND DEATH OF FAMILY MEMBER: Z63.4

## 2019-04-22 ASSESSMENT — MIFFLIN-ST. JEOR: SCORE: 1389.9

## 2019-04-22 NOTE — PROGRESS NOTES
"  Chronic Pain Follow-Up Visit    Pain Update:  Location of pain: left leg, back.  Analgesia/pain control: Recent changes:  Worse since she did not pace herself with the  activities.  Son passed away a couple weeks ago and  was last Thursday.   Overall control: Tolerable with discomfort    Adherance     How often do you take extra pain medicine:took vicodin at the  one time and oxycodone one time yesterday due to overdoing it.    Did you take your pain medication today? YES, this AM.    Adverse effects: No      Database checked today? Yes. Details: as expected.    PHQ-9 SCORE 2017 2017 10/30/2018   PHQ-9 Total Score - - -   PHQ-9 Total Score 3 6 8     FAYE-7 SCORE 2018 10/30/2018 2019   Total Score - - -   Total Score 6 (mild anxiety) - -   Total Score 6 7 2       Care Plan discussed and updated as needed.  See the end of this note.       FUNCTIONAL ASSESSMENT QUESTIONNAIRE SCORE 2018 10/30/2018   Total Score 35 40          Problem, Medication and Allergy Lists were reviewed and are current..           Physical Exam:     Vitals:    19 1527   BP: 136/75   Pulse: 57   Resp: 18   Temp: 97.6  F (36.4  C)   Weight: 84 kg (185 lb 3.2 oz)   Height: 1.657 m (5' 5.25\")     Body mass index is 30.58 kg/m .  Vitals were reviewed and were normal  GENERAL: healthy, alert, well nourished, well hydrated, no distress  RESP: lungs clear to auscultation - no rales, no rhonchi, no wheezes  CV: regular rates and rhythm, normal S1 S2, no S3 or S4 and no murmur, no click or rub -  ABDOMEN: soft, no tenderness, no  hepatosplenomegaly, no masses, normal bowel sounds  MS: extremities- no gross deformities noted, 2+ edema in left leg, Tender to touch, no erythema. Antalgic slow gait.        Results:     Results for orders placed or performed in visit on 19   Rapid Urine Drug Screen ([facility])   Result Value Ref Range    Phencyclidine NEGATIVE -ATIVE    Propoxyphene NEGATIVE -ATIVE    " Tricyclic Antidepressants NEGATIVE -ATIVE    Amphetamines Qual NEGATIVE -ATIVE    Barbiturates Qual Urine NEGATIVE -ATIVE    Buprenorphine Qual Urine NEGATIVE -ATIVE    Benzodiazepine Qual Urine NEGATIVE -ATIVE    Cocaine Qual Urine NEGATIVE -ATIVE    Cannabinoids Qual Urine NEGATIVE -ATIVE    Methamphetamine Qual NEGATIVE -ATIVE    Methadone Qual NEGATIVE -ATIVE    Morphine Qual NEGATIVE -ATIVE    Oxycodone Qual NEGATIVE -ATIVE    Temperature of Urine was Between  Degrees F YES YES      rapid urine drug screen obtained today: Yes    Assessment and Plan    Shira Guthrie is here for follow up of chronic pain caused by Neuropathy and OA.    Shira was seen today for recheck medication.    Diagnoses and all orders for this visit:    Chronic pain syndrome  Primary osteoarthritis involving multiple joints  Other polyneuropathy  -     Rapid Urine Drug Screen ([facility])  -     traMADol (ULTRAM) 50 MG tablet; Take 1 tablet (50 mg) by mouth 3 times daily as needed for severe pain  Took a couple of extra pills from friend/family recently due to extreme flare during  activities. Discussed that this is illegal and not allowed in our contract. Discussed that since it was extenuating circumstances appreciated her being honest and that she should not do this again as it can be quite dangerous.  She agrees. Gave one month follow up. Had just filled last refill 19, so should see me again in 2 months.    Bereavement: very fresh at this time.  Family and friends supportive, but also very tired due to so many guests.  Also some drama with Randolph' daughter and wife complicating things.  She is trying to not be involved in drama.  Continues prayers and starting to get back into her routine.  Plans visit ASAP with Dr. Sky.    Major depressive disorder, recurrent, severe without psychotic features (H): refilled cymbalta.  -     DULoxetine (CYMBALTA) 60 MG capsule; Take 1 capsule (60 mg) by mouth daily          Chronic  Pain Syndrome:  Care plan updated with patient, see below for details.  Patient is being prescribed 150mg of tramadol IR per day. 15 mg MEDD  Care Plan Date: April/2019  Naloxone has not been prescribed.       If opioids prescribed patient was asked to bring pill bottle to each appointment and was informed that refills would only be provided at office visits.   Asked patient to F/U in 2 month(s) with same provider for 20 minute  Visit.     Sally Pierre MD    Patient Instructions     REST!!!!     Personal Care Plan for Chronic Pain    1.  Personal Goals:                * take less medication              * lose weight: goal of losing 15 to 20 pounds.              * continue working on keeping thoughts positive through [employer]wny              * to feel confident enough that when someone gives me a compliment I can simply say thank you    2.  Sleep:                 *  Basic sleep plan:                          *reduce or eliminate caffeine and daytime naps                          * relaxation before bed                          * limit screen time 1-2 hours prior to bed                          * establish dark/quiet sleep environment                          * go to bed at target bedtime:   pm                          * keep consistent wake time:   am.              *  Minimize switching days and nights by staying active throughout the day.              * We'll talk more about a consistent sleep plan when we meet next time.                3.  Physical Activity:                 * Formal physical rehabilitation:                          HOME PT for shoulder              * Home/community based activity:                          * Home based exercises given by lymphedema nurse with breathing exercises built in as well - daily                          * Aerobic exercise/endurance exercise:  elliptical machine - 5 minute intervals with sit ups in between for 30 minutes - daily.  Has a  in the builiding. Has exercise tape  in her apartment.                          * Cleaning and baking with body awareness and stretching              * Listen to your body.  Pace yourself for success.  Don't over-do it.  Plan to get PCA back to help with cleaning and laundry so as to not overdo it.                4.  Nutrition/Weight:                 * Keep a food journal in a notebook at home and bring this to your next visit with Dr. Sky.  Eat small frequent meals.              * Review your I Can Prevent Diabetes materials.              * Limit processed foods and foods high in sugar, sodium and fat.              * Keep up the good work eating many healthy foods.              * When you make a less healthy choice approach yourself with kindness and compassion.  Try to understand what contributed to that choice:  Was I too hungry?  Was I too tired?  Stressed?  Worried?  Use this information to help support healthier choices in the future.    5.  Mood/Stress Management:     SELF COMPASSION!              * Formal interventions:                        * schedule follow up with Dr. Sky in about month or so.              * Home/community based interventions:                          * Regular contact with family  * Relaxation techniques - breathing exercises  * Create your pyramid to focus you on your strengths and hopes.  * Movies  * Meditation  * Yoga  * Creative activity  * Spiritual /prayer:  Prayer at home, attending services at [facility], wellness auxiliary group  * Service-based activity.              * Medications: Cymbalta, Hydroxyzine as needed.    6.  Tobacco/Alcohol/Drug Use:                               * Congratulations on quitting smoking and staying quit!  Keep up the good work managing stress/anxiety in other ways (prayer, talking it out, deep breaths, exercise, etc..                         * Maintain healthy relationship with alcohol                          * For women this would be no more than 1  drink per day                          * For men, this would be no more than 2 drinks per day              * Eliminate recreational drugs    7.  Pain:                 * Non-medication treatments:                          * ice/heat: use heating pad as you are doing.                          * massage                          * acupuncture  YOGA humera is active at home.  8.  Pain Medications: Gabapentin 600mg twice a day   Tramadol one pill three times day.    Tylenol arthritis as needed for break through.

## 2019-04-22 NOTE — PATIENT INSTRUCTIONS
REST!!!!     Personal Care Plan for Chronic Pain    1.  Personal Goals:                * take less medication              * lose weight: goal of losing 15 to 20 pounds.              * continue working on keeping thoughts positive through [employer]              * to feel confident enough that when someone gives me a compliment I can simply say thank you    2.  Sleep:                 *  Basic sleep plan:                          *reduce or eliminate caffeine and daytime naps                          * relaxation before bed                          * limit screen time 1-2 hours prior to bed                          * establish dark/quiet sleep environment                          * go to bed at target bedtime:   pm                          * keep consistent wake time:   am.              *  Minimize switching days and nights by staying active throughout the day.              * We'll talk more about a consistent sleep plan when we meet next time.                3.  Physical Activity:                 * Formal physical rehabilitation:                          HOME PT for shoulder              * Home/community based activity:                          * Home based exercises given by lymphedema nurse with breathing exercises built in as well - daily                          * Aerobic exercise/endurance exercise:  elliptical machine - 5 minute intervals with sit ups in between for 30 minutes - daily.  Has a  in the builiding. Has exercise tape in her apartment.                          * Cleaning and baking with body awareness and stretching              * Listen to your body.  Pace yourself for success.  Don't over-do it.  Plan to get PCA back to help with cleaning and laundry so as to not overdo it.                4.  Nutrition/Weight:                 * Keep a food journal in a notebook at home and bring this to your next visit with Dr. Sky.  Eat small frequent meals.              * Review your I Can Prevent Diabetes  materials.              * Limit processed foods and foods high in sugar, sodium and fat.              * Keep up the good work eating many healthy foods.              * When you make a less healthy choice approach yourself with kindness and compassion.  Try to understand what contributed to that choice:  Was I too hungry?  Was I too tired?  Stressed?  Worried?  Use this information to help support healthier choices in the future.    5.  Mood/Stress Management:     SELF COMPASSION!              * Formal interventions:                        * schedule follow up with Dr. Sky in about month or so.              * Home/community based interventions:                          * Regular contact with family  * Relaxation techniques - breathing exercises  * Create your pyramid to focus you on your strengths and hopes.  * Movies  * Meditation  * Yoga  * Creative activity  * Spiritual /prayer:  Prayer at home, attending services at [facility], wellness auxiliary group  * Service-based activity.              * Medications: Cymbalta, Hydroxyzine as needed.    6.  Tobacco/Alcohol/Drug Use:                               * Congratulations on quitting smoking and staying quit!  Keep up the good work managing stress/anxiety in other ways (prayer, talking it out, deep breaths, exercise, etc..                         * Maintain healthy relationship with alcohol                          * For women this would be no more than 1 drink per day                          * For men, this would be no more than 2 drinks per day              * Eliminate recreational drugs    7.  Pain:                 * Non-medication treatments:                          * ice/heat: use heating pad as you are doing.                          * massage                          * acupuncture  MARLO grubbs is active at home.  8.  Pain Medications: Gabapentin 600mg twice a day   Tramadol one pill three times day.    Tylenol arthritis as  needed for break through.

## 2019-04-24 ENCOUNTER — TELEPHONE (OUTPATIENT)
Dept: FAMILY MEDICINE | Facility: CLINIC | Age: 64
End: 2019-04-24

## 2019-04-24 DIAGNOSIS — K59.09 CHRONIC CONSTIPATION: ICD-10-CM

## 2019-04-24 DIAGNOSIS — I25.119 CORONARY ARTERY DISEASE INVOLVING NATIVE CORONARY ARTERY OF NATIVE HEART WITH ANGINA PECTORIS (H): ICD-10-CM

## 2019-04-24 RX ORDER — ROSUVASTATIN CALCIUM 20 MG/1
20 TABLET, COATED ORAL DAILY
Qty: 90 TABLET | Refills: 4 | Status: SHIPPED | OUTPATIENT
Start: 2019-04-24 | End: 2020-04-24

## 2019-04-24 NOTE — TELEPHONE ENCOUNTER
[facility] phone call message- patient requesting a refill:    Full Medication Name: linaclotide (LINZESS) 290 MCG capsule - Take 1 capsule (290 mcg) by mouth every morning (before breakfast) - Oral    rosuvastatin (CRESTOR) 20 MG tablet - Take 1 tablet (20 mg) by mouth daily - Oral    Pharmacy confirmed as   [id number] - [place], [place] - [address] AT [facility] [address]  [address]  [place] [address]  Phone: [phone] Fax: [phone]  : Yes    Additional Comments: Pt would like a 90 day supply     OK to leave a message on voice mail? Yes    Primary language: English      needed? No    Call taken on April 24, 2019 at 10:45 AM by Roma Edmonds

## 2019-04-30 ENCOUNTER — TELEPHONE (OUTPATIENT)
Dept: PSYCHOLOGY | Facility: CLINIC | Age: 64
End: 2019-04-30

## 2019-04-30 NOTE — TELEPHONE ENCOUNTER
Placed outreach call to check in with Shira given son's recent death and  ().  Our next visit is scheduled for 19.  I do see that she experienced a pain flare around the time of the  and did take additional pain medicines at that time per conversation with Dr. Pierre on 19.  Today, Shira reports she continues to grieve for her son and this has been understandably difficult.  She was thinking we were going to meet on  was disappointed our visit was not until .  I was able to find a sooner time and agreed that we would meet on Friday, May 10 at 11:00am.  Routed this request to the  so this visit could be added to my schedule.  Briefly discussed the nature of grief and the importance of making a little space for the sadness and engaging in self-care (e.g., eating, rest, etc.).  Shira notes she has been coping via bible study, writing thank you cards, etc.  Congratulated her on these self-care efforts and encouraged her to continue with this.  Let her know that I was sorry I could not schedule her to be seen sooner and encouraged her to contact the clinic if urgent needs should arise prior to our visit on 5/10/19.  She expressed appreciation of the call today and voiced confidence that she could manage until our next visit.    Dior Sky, PhD, LP

## 2019-05-08 ENCOUNTER — TELEPHONE (OUTPATIENT)
Dept: FAMILY MEDICINE | Facility: CLINIC | Age: 64
End: 2019-05-08

## 2019-05-08 DIAGNOSIS — G89.4 CHRONIC PAIN SYNDROME: ICD-10-CM

## 2019-05-08 DIAGNOSIS — M15.0 PRIMARY OSTEOARTHRITIS INVOLVING MULTIPLE JOINTS: ICD-10-CM

## 2019-05-08 DIAGNOSIS — G62.89 OTHER POLYNEUROPATHY: ICD-10-CM

## 2019-05-10 ENCOUNTER — OFFICE VISIT (OUTPATIENT)
Dept: PSYCHOLOGY | Facility: CLINIC | Age: 64
End: 2019-05-10
Payer: MEDICARE

## 2019-05-10 DIAGNOSIS — G89.4 CHRONIC PAIN SYNDROME: Primary | ICD-10-CM

## 2019-05-10 RX ORDER — TRAMADOL HYDROCHLORIDE 50 MG/1
50 TABLET ORAL 3 TIMES DAILY PRN
Qty: 90 TABLET | Refills: 0 | Status: SHIPPED | OUTPATIENT
Start: 2019-05-16 | End: 2019-08-06

## 2019-05-10 NOTE — PATIENT INSTRUCTIONS
Cancel visit on 5/13 and reschedule for 5/31 on your way out today.    If you have a hard time getting a good time slot with me, just leave me a message so I can call you and we can find another time to meet.

## 2019-05-10 NOTE — PROGRESS NOTES
"Behavioral Health Chronic Pain Management Visit     Visit type: Follow Up  Number of visits post Initial Assessment:  8  Meeting lasted: 60 minutes  Others present: no one    Reason for Consultation:  Shira Guthrie is a 63 year old,  female referred by Dr. Pierre for behavioral health consultation as part of the Chronic Pain Management protocol at [facility]. The goal of behavioral health visits is to help the patient with the psychosocial aspects of pain management. Ms. Guthrie was initially seen by this provider for her initial  CPM consult on 12/15/16.  Our last visit was on 10/22/18.      Topics Discussed:   1.  Loss:  Followed up with Shira on how she is coping in the wake of her son's recent death.  This has been understandably painful for her.  Grief is complicated by conflict with her daughter in law, Carolina, with whom she had some tension even prior to Randolph' death.  There was also conflict between her daughter and Carolina over Go Fund Me page on the day of the  which was distressing to her (Randolph' sister did not think this page was within keeping with her brother's values and it was not discussed with the rest of the family prior to posting).  Shira is distressed that Carolina opened cards and took money and checks intended for Shira and has been focused on obtaining Rich car (which Shira owns) and things in storage (also owned by Shira).  She also experienced Randolph' only daughter, age 25, as disrespectful at the .  While Shira understands that it is not uncommon to have family strife in the wake of a loss, this adds salt to her wounds.  Provided empathy for this experience.  Shira has been working to keep herself from getting hooked by this and refraining from \"hatred\" and anger.  Praised her for this insight and focus.  Focusing on prayer and ministry to attain a sense of peace and purpose.  Plans to use belongings in storage " "to create \"[facility]\" in [facility] to help ministry.  Fitting tribute to her son, Randolph.  Congratulated her for healthy coping.  Shira expressed deep appreciation for the card and support from Dr. Pierre and this provider.  2.  Coping with Pain:  Pain is chronic and understandably somewhat increased in the wake of her loss.  Coping by taking her medicines (Gabapentin, Tramadol and Cymbalta) as prescribed.  Sleep is poor due to both grief and pain.  Maintaining good nutrition and eating healthy foods.  Would still like to lose weight but we agreed this goal may be lower priority right now.  Congratulated her on continued commitment to health and self care in the wake of loss.  3.  Coping with Mother's Day:  Discussed plans to manage Mother's Day and Shira reports that historically her son would cook her a meal on Saturday night and share it with her.  Had been invited to spend Saturday night with friends but after further reflection decided she was not ready for that and preferred to be alone this weekend.  Going to get her nails done today as an act of self care.  Hopes to finish thank you cards to people who sent her cards/condolences for the .  Reflected that it was good to take this time for herself right now as long as she does not isolate for too long and she is in agreement with this.    Objective: Ms. Guthrie appears to be awake and alert for today's visit.      PHQ-9 SCORE 2017 2017 10/30/2018   PHQ-9 Total Score - - -   PHQ-9 Total Score 3 6 8     FAYE-7 SCORE 2018 10/30/2018 2019   Total Score - - -   Total Score 6 (mild anxiety) - -   Total Score 6 7 2     Wt Readings from Last 4 Encounters:   19 84 kg (185 lb 3.2 oz)   19 85.4 kg (188 lb 3.2 oz)   19 84.6 kg (186 lb 9.6 oz)   18 82.6 kg (182 lb)     Assessment:   Ms. Guthrie was referred by Dr. Pierre for a behavioral health evaluation to address chronic pain syndrome.  Ms. Guthrie's mood " "appeared depressed with congruent affect.  She was tearful at times when reflecting on her recent loss, but also consolable and insightful.  Demonstrating healthy coping.  Ms. Guthrie was pleasant, engaged and receptive to feedback throughout.  Ms. Guthrie may benefit from continued meetings with this provider on an as needed basis to support improved pain management via weight loss, regular physical activity, improved sleep, improved management of mood/stress and support for continued tobacco cessation.    Stage of change: Action  Diagnosis: chronic pain syndrome    Plan:   1.  Will plan to update Personal Care Plan for Chronic Pain (see patient instructions) next visit.  See current Care Plan Date: April/2019   2.  Next visit with Dr. Pierre is scheduled for 6/27/19.  3.  I do see that we have a visit scheduled for this coming Monday, May 13.  Today, Shira decided to cancel this visit and reschedule for brief check in on May 31.  She is satisfied with a 30 minute visit for our next meeting as it's easier right now to take \"small sips\" due to overwhelming nature of grief.  Encouraged Shira to contact this provider via phone if earlier contact would be helpful or supportive and she agreed to do so.    Dior Sky, PhD, LP    "

## 2019-05-10 NOTE — TELEPHONE ENCOUNTER
A controlled substance prescription was provided outside a visit today due to the following circumstances: I only gave her one month instead of two as intended at her last follow up visit..  This is a one time only occurrence.  Shira Guthrie will  the prescription at the clinic and will bring identification to do so.  I left a message on her phone about this today.  The prescription was signed by me and placed in lockbox using the standard work. (Give to the RN if available.  If RN not available, place prescription in the lockbox and fill out the binder appropriately).  I routed the encounter to the RN pool, (lillian Mark RN)    Sally Pierre

## 2019-05-23 ENCOUNTER — OFFICE VISIT (OUTPATIENT)
Dept: FAMILY MEDICINE | Facility: CLINIC | Age: 64
End: 2019-05-23
Payer: MEDICARE

## 2019-05-23 VITALS
WEIGHT: 184.2 LBS | HEIGHT: 64 IN | DIASTOLIC BLOOD PRESSURE: 68 MMHG | HEART RATE: 78 BPM | OXYGEN SATURATION: 100 % | RESPIRATION RATE: 20 BRPM | SYSTOLIC BLOOD PRESSURE: 119 MMHG | BODY MASS INDEX: 31.45 KG/M2 | TEMPERATURE: 98.2 F

## 2019-05-23 DIAGNOSIS — N89.8 VAGINAL ODOR: Primary | ICD-10-CM

## 2019-05-23 DIAGNOSIS — Z65.3 PROBLEMS RELATED TO OTHER LEGAL CIRCUMSTANCES: ICD-10-CM

## 2019-05-23 DIAGNOSIS — N89.8 VAGINAL IRRITATION: ICD-10-CM

## 2019-05-23 LAB
BACTERIA: NORMAL
BACTERIA: NORMAL
BILIRUBIN UR: NEGATIVE
BLOOD UR: NEGATIVE
CASTS: NORMAL /LPF
CLUE CELLS: NORMAL
CRYSTAL URINE: NORMAL /LPF
EPITHELIAL CELLS UR: <2 /LPF (ref 0–2)
GLUCOSE URINE: NEGATIVE
KETONES UR QL: NEGATIVE
LEUKOCYTE ESTERASE UR: ABNORMAL
MOTILE TRICHOMONAS: NEGATIVE
MUCOUS URINE: NORMAL LPF
NITRITE UR QL STRIP: NEGATIVE
ODOR: NORMAL
PH UR STRIP: 5 [PH] (ref 4.5–8)
PH WET PREP: NORMAL (ref 3.8–4.5)
PROTEIN UR: NEGATIVE
RBC URINE: NORMAL /HPF
SP GR UR STRIP: 1.01 (ref 1–1.03)
UROBILINOGEN UR STRIP-ACNC: ABNORMAL
WBC URINE: NORMAL /HPF
WBC WET PREP: <2 (ref 2–5)
YEAST: NORMAL

## 2019-05-23 ASSESSMENT — MIFFLIN-ST. JEOR: SCORE: 1365.53

## 2019-05-23 NOTE — PROGRESS NOTES
"    There are no exam notes on file for this visit.  Chief Complaint   Patient presents with     Urinary Problem     Odor in urine.     RECHECK     Followup from DR. Sky     Blood pressure 119/68, pulse 78, temperature 98.2  F (36.8  C), resp. rate 20, height 1.626 m (5' 4\"), weight 83.6 kg (184 lb 3.2 oz), SpO2 100 %, not currently breastfeeding.                 HPI     Shira Guthrie is a 63 year old  female with a PMH significant for:     Patient Active Problem List   Diagnosis     CAD (coronary artery disease)     MDD (major depressive disorder)     Chronic constipation     Stroke (H)     Benign essential hypertension     Osteoporosis     PAD (peripheral artery disease) (H)     Fracture closed of upper end of forearm     Synovial sarcoma (H)     Esophageal stricture     Anginal pain (H)     Health Care Home     B12 deficiency     Cataracts, bilateral     Malignant neoplasm of connective and other soft tissue     Chronic pain syndrome     Other polyneuropathy     Primary osteoarthritis involving multiple joints     Lymphedema of left leg     False positive serological test for hepatitis C     Cervical radiculopathy at C7     Rotator cuff impingement syndrome of right shoulder     H/O hysterectomy for benign disease     Problems related to other legal circumstances     She presents with urinary/vaginal odor. \"pungent\", too yellow. Had intercourse recently with male partner. Had fistula repair in the past. Felling vaginal irritation.  Feels an itch on the inside. No abdominal pain, fevers, dysuria, hematuria, vaginal discharge.    Planning speaking with prison ministry and a couple churches and in [place].  Knows the spiritual ministry will help.  Magdalena and Dr. Sky have helped. Trouble with daughter in-law, Carolina.  Financial concerns with bankruptcy with Carolina.      PMH, Medications and Allergies were reviewed and updated as needed.           Physical Exam:     Vitals:    05/23/19 0911   BP: 119/68 " "  Pulse: 78   Resp: 20   Temp: 98.2  F (36.8  C)   SpO2: 100%   Weight: 83.6 kg (184 lb 3.2 oz)   Height: 1.626 m (5' 4\")     Body mass index is 31.62 kg/m .    Exam:  Constitutional: healthy, alert and no distress  Cardiovascular:RRR. No murmurs, clicks gallops or rub  Respiratory:  normal respiratory rate and rhythm, lungs clear to auscultation. No wheezes or crackles.  Abdomen: +BS, soft, nontender, nondistended. No HSM.  : deferred today, self swab wet prep.  Psychiatric: mentation appears normal and affect normal/bright      PHQ-9 SCORE 7/18/2017 11/8/2017 10/30/2018   PHQ-9 Total Score - - -   PHQ-9 Total Score 3 6 8     Results for orders placed or performed in visit on 05/23/19   Wet Prep ([facility])   Result Value Ref Range    Yeast Wet Prep None none    Motile Trichomonas Wet Prep Negative Negative    Clue Cells Wet Prep None NONE    WBC WET PREP <2 2 - 5    Bacteria Wet Prep Few None    pH Wet Prep Not performed 3.8 - 4.5    Odor Wet Prep None NONE   Urinalysis, Micro If ([facility])   Result Value Ref Range    Specific Gravity Urine 1.010 1.005 - 1.030    pH Urine 5.0 4.5 - 8.0    Leukocyte Esterase UR Trace (A) -ATIVE    Nitrite Urine Negative -ATIVE    Protein UR Negative -ATIVE    Glucose Urine Negative -ATIVE    Ketones Urine Negative -ATIVE    Urobilinogen mg/dL 0.2 E.U./dL 0.2 E.U./dL    Bilirubin UR Negative -ATIVE    Blood UR Negative -ATIVE   Urine Microscopic ([facility])   Result Value Ref Range    WBC Urine 5-10 <5 /hpf    RBC Urine None <5 /hpf    Epithelial Cells UR <2 0 - 2 /lpf    Mucous Urine None NONE lpf    Casts Urine None NONE /lpf    Crystal Urine None NONE /lpf    Bacteria Wet Prep Many None       Assessment and Plan     Shira was seen today for urinary problem and recheck.    Diagnoses and all orders for this visit:    Vaginal odor: Negative wet prep.  Will treat empirically for atrophic vaginitis.  Discussed with her if that is not helping that she will come back for a exam.  -     " Chlamydia/Gono Amplified ([facility])  -     Wet Prep ([facility])  -     Urinalysis, Micro If ([facility])  -     Urine Microscopic ([facility])    Vaginal irritation: Try estrogens to help with vaginal irritation.  -     conjugated estrogens (PREMARIN) 0.625 MG/GM vaginal cream; 0.5g daily for one week and then twice weekly after that.    Problems related to other legal circumstances: She is guaranteeor her on bankruptcy for her son and daughter-in-law.  Her son recently passed away and she is not getting along with her daughter-in-law.  She does not want to be guaranteeor her or involved with daughter-in-law any more.  She is wanting legal advice about this.  She also has a question about how to obtain her son's medical records from [place].  There is some questions about the care there.  -     Legal Services Referral - [place] only        Patient Instructions   May 23, 2019    Legal Services Referral - [facility]  May 23, 2019 Legal referral has been sent to Jasmyn Malone from [facility]. She will review, advise, and contact the patient. Rosita Morris [facility]rie [facility] Referral Coordinator       Options for treatment and/or follow-up care were reviewed with the patient. Shira Guthrie was engaged and actively involved in the decision making process. She verbalized understanding of the options discussed and was satisfied with the final plan.    Sally Pierre MD

## 2019-05-23 NOTE — LETTER
May 24, 2019      Shira Guthrie  [address]  [place] [address]        Dear Shira,    Please see below for your test results.    Resulted Orders   Chlamydia/Gono Amplified ([facility])   Result Value Ref Range    Chlamydia trac,Amplified Prb Negative Negative    N gonorrhoeae,Amplified Prb Negative Negative    Narrative    Test performed by:  [facility]  [address] [place] [address]   Wet Prep ([facility])   Result Value Ref Range    Yeast Wet Prep None none    Motile Trichomonas Wet Prep Negative Negative    Clue Cells Wet Prep None NONE    WBC WET PREP <2 2 - 5    Bacteria Wet Prep Few None    pH Wet Prep Not performed 3.8 - 4.5    Odor Wet Prep None NONE   Urinalysis, Micro If ([facility])   Result Value Ref Range    Specific Gravity Urine 1.010 1.005 - 1.030    pH Urine 5.0 4.5 - 8.0    Leukocyte Esterase UR Trace (A) -ATIVE    Nitrite Urine Negative -ATIVE    Protein UR Negative -ATIVE    Glucose Urine Negative -ATIVE    Ketones Urine Negative -ATIVE    Urobilinogen mg/dL 0.2 E.U./dL 0.2 E.U./dL    Bilirubin UR Negative -ATIVE    Blood UR Negative -ATIVE   Urine Microscopic ([facility])   Result Value Ref Range    WBC Urine 5-10 <5 /hpf    RBC Urine None <5 /hpf    Epithelial Cells UR <2 0 - 2 /lpf    Mucous Urine None NONE lpf    Casts Urine None NONE /lpf    Crystal Urine None NONE /lpf    Bacteria Wet Prep Many None     All of your tests were normal.  If you continue to have symptoms come back so I can do an exam.    If you have any questions, please call the clinic to make an appointment.    Sincerely,    Sally Pierre MD

## 2019-05-23 NOTE — PATIENT INSTRUCTIONS
May 23, 2019    Legal Services Referral - [place] only  May 23, 2019 Legal referral has been sent to Jasmyn Malone from [facility]. She will review, advise, and contact the patient. Rosita Morris, [facility] Referral Coordinator

## 2019-05-24 LAB
C TRACH RRNA SPEC QL NAA+PROBE: NEGATIVE
N GONORRHOEA RRNA SPEC QL NAA+PROBE: NEGATIVE

## 2019-08-05 ENCOUNTER — TELEPHONE (OUTPATIENT)
Dept: FAMILY MEDICINE | Facility: CLINIC | Age: 64
End: 2019-08-05

## 2019-08-05 NOTE — TELEPHONE ENCOUNTER
[facility] phone call message- general phone call:    Reason for call: She would like a call back from  A nurse re her  Pain meds and her apt with     Action desired: call back.    Return call needed: Yes    OK to leave a message on voice mail? Yes    Advised patient to response may take up to 2 business days: Yes    Primary language: English      needed? No    Call taken on August 5, 2019 at 11:49 AM by Earline Curry

## 2019-08-05 NOTE — TELEPHONE ENCOUNTER
Patient states she needs refills of tramadol, plavix and metoprolol. Patient states her pain has increased since she has been without her tramadol for a week. Patient transferred to call center to schedule a sooner appt.    Note routed to Dr.Wicks Mancini RN

## 2019-08-06 ENCOUNTER — TELEPHONE (OUTPATIENT)
Dept: FAMILY MEDICINE | Facility: CLINIC | Age: 64
End: 2019-08-06

## 2019-08-06 ENCOUNTER — OFFICE VISIT (OUTPATIENT)
Dept: FAMILY MEDICINE | Facility: CLINIC | Age: 64
End: 2019-08-06
Payer: MEDICARE

## 2019-08-06 VITALS
DIASTOLIC BLOOD PRESSURE: 61 MMHG | BODY MASS INDEX: 31.34 KG/M2 | TEMPERATURE: 98.4 F | RESPIRATION RATE: 19 BRPM | OXYGEN SATURATION: 98 % | SYSTOLIC BLOOD PRESSURE: 96 MMHG | HEART RATE: 68 BPM | WEIGHT: 183.6 LBS | HEIGHT: 64 IN

## 2019-08-06 DIAGNOSIS — G62.89 OTHER POLYNEUROPATHY: ICD-10-CM

## 2019-08-06 DIAGNOSIS — G89.4 CHRONIC PAIN SYNDROME: ICD-10-CM

## 2019-08-06 DIAGNOSIS — I10 BENIGN ESSENTIAL HYPERTENSION: ICD-10-CM

## 2019-08-06 DIAGNOSIS — I25.9 CHRONIC ISCHEMIC HEART DISEASE: ICD-10-CM

## 2019-08-06 DIAGNOSIS — M15.0 PRIMARY OSTEOARTHRITIS INVOLVING MULTIPLE JOINTS: ICD-10-CM

## 2019-08-06 DIAGNOSIS — G89.4 CHRONIC PAIN SYNDROME: Primary | ICD-10-CM

## 2019-08-06 RX ORDER — TRAMADOL HYDROCHLORIDE 50 MG/1
50 TABLET ORAL 3 TIMES DAILY PRN
Qty: 90 TABLET | Refills: 0 | Status: SHIPPED | OUTPATIENT
Start: 2019-08-06 | End: 2019-08-12

## 2019-08-06 ASSESSMENT — PATIENT HEALTH QUESTIONNAIRE - PHQ9: SUM OF ALL RESPONSES TO PHQ QUESTIONS 1-9: 9

## 2019-08-06 ASSESSMENT — MIFFLIN-ST. JEOR: SCORE: 1358.83

## 2019-08-06 NOTE — PROGRESS NOTES
"There are no exam notes on file for this visit.  Chief Complaint   Patient presents with     Refill Request     come here today for refill on Tramadol per patient.      Medication Reconciliation     reviewed.        Subjective: The patient comes in today for refill of her tramadol.    The patient has a follow-up visit with Dr. Pierre, her usual physician, on .  However, she ran out of her tramadol and would like to have a refill before that time.    The patient tells me that in April her son .  Since that time she is been dealing with family issues.  She is been much more active than usual, and so taking the tramadol on a 3 times a day basis.  When she is less active, she only needs to take the tramadol in the morning and evening.  On the active days she needs to take a midday dose.  She notes that she needs to do this if she does the laundry, or if she is out traveling.    The patient tells me that she has been out of tramadol for the last 2 weeks.  She has been using the muscle relaxants and the gabapentin.  This is not been satisfactory in terms of relieving her pain, and she is had to limit her activities secondary to being out of the tramadol.    The patient notes that she had 2 children, now one since the one has .  She has 4 grandchildren and 4 great-grandchildren.  She was involved with the birth of her last great grandchild on May 21.    The patient notes that she has pain in multiple joints, and pain in her back.  She tells me that she has \"pain to the bone\".  She tells me that her arthritis is \"very bad\".    The patient has no other concerns, and would like to discuss other health issues with Dr. pierre at her follow-up appointment.    The patient completes a PHQ 9 with a total score of 10.  She does not have any homicidal or suicidal ideation,.    Objective:    Blood pressure 96/61, pulse 68, temperature 98.4  F (36.9  C), temperature source Oral, resp. rate 19, height 1.619 m (5' " "3.75\"), weight 83.3 kg (183 lb 9.6 oz), SpO2 98 %, not currently breastfeeding.  Body mass index is 31.76 kg/m .    General:  Well nourished, and in no acute distress.  The vital signs are reviewed  Psych: Euthymic    The prescription monitoring program is reviewed for this patient.  There are no untoward events.    Results for orders placed or performed in visit on 08/06/19   Rapid Urine Drug Screen ([facility])   Result Value Ref Range    Phencyclidine NEGATIVE NEGATIVE    Propoxyphene NEGATIVE NEGATIVE    Tricyclic Antidepressants NEGATIVE NEGATIVE    Amphetamines Qual NEGATIVE NEGATIVE    Barbiturates Qual Urine NEGATIVE NEGATIVE    Buprenorphine Qual Urine NEGATIVE NEGATIVE    Benzodiazepine Qual Urine NEGATIVE NEGATIVE    Cocaine Qual Urine NEGATIVE NEGATIVE    Cannabinoids Qual Urine NEGATIVE NEGATIVE    Methamphetamine Qual NEGATIVE NEGATIVE    Methadone Qual NEGATIVE NEGATIVE    Morphine Qual NEGATIVE NEGATIVE    Oxycodone Qual NEGATIVE NEGATIVE    Temperature of Urine was Between  Degrees F YES YES     Laboratory work above was discussed with the patient.    Assessment and plan:      Chronic pain syndrome  -     Rapid Urine Drug Screen ([facility])  -     traMADol (ULTRAM) 50 MG tablet; Take 1 tablet (50 mg) by mouth 3 times daily as needed for severe pain    Other polyneuropathy  -     traMADol (ULTRAM) 50 MG tablet; Take 1 tablet (50 mg) by mouth 3 times daily as needed for severe pain    Primary osteoarthritis involving multiple joints  -     traMADol (ULTRAM) 50 MG tablet; Take 1 tablet (50 mg) by mouth 3 times daily as needed for severe pain      Patient Instructions   You have been prescribed a narcotic (opiod) medication    Here are some things you should know:    1.  Narcotics can reduce pain for a few days, but they may not improve pain or function with long term use  2. Our goal for chronic pain is improving your ability to do things, rather than to make pain go away  3. This  medicine can cause a " deadly slowing of your breathing  4. You have a chance of developing a lifelong addiction to narcotics.  This can cause you pain and could prevent you from doing things like not being able to work or care for children.  5. These medicines have common side effects, including:  dry mouth, constipation, upset stomach, vomiting, sleepiness, confusion, tolerance (the medication does not work as well), and dependence (you can have withdrawal symptoms if you stop the medicine).  6. This medicine might make it unsafe for you to drive a car or to make good decisions  7. Do not take this medicine with any alcohol, street drugs, benzodiazepines (Xanax, Valium, Ativan, and others), or other sleeping pills  8. Do not share this medicine with anyone else.  They could have an overdose and die, even though you are able to take this dose.  9. Store this medicine in a locked container or cabinet.  10. Keep this medicine out of the reach of children who could take this accidentally and die.  11. Take any medications you do not use to a the police or  s department for disposal  12. While you are taking this medicine, I will check a state computer program to ensure that you are not getting pain medicine elsewhere.  Also, I will check your urine for drugs.  13. We will talk about your symptoms and the use of narcotics at least every three months, to decide if this medicine is helping you and is the right one for you    Thank you for coming to [facility].    The phone number we will call with results is # [phone] (home) . If this is not the best number please call our clinic and change the number.  If you need any refills please call your pharmacy and they will contact us.  If you have any concerns about today's visit or wish to schedule another appointment please call our office during normal business hours [phone] (8-5:00 M-F)  If you have urgent medical concerns please call [phone] at any time of the day.  If  you a medical emergency please call [phone]  Again thank you for choosing [facility] and please let us know how we can best partner with you to improve you and your family's health.        The patient was actively involved in the decision making process, and all the questions were answered to their satisfaction prior to leaving.

## 2019-08-06 NOTE — PATIENT INSTRUCTIONS
You have been prescribed a narcotic (opiod) medication    Here are some things you should know:    1.  Narcotics can reduce pain for a few days, but they may not improve pain or function with long term use  2. Our goal for chronic pain is improving your ability to do things, rather than to make pain go away  3. This  medicine can cause a deadly slowing of your breathing  4. You have a chance of developing a lifelong addiction to narcotics.  This can cause you pain and could prevent you from doing things like not being able to work or care for children.  5. These medicines have common side effects, including:  dry mouth, constipation, upset stomach, vomiting, sleepiness, confusion, tolerance (the medication does not work as well), and dependence (you can have withdrawal symptoms if you stop the medicine).  6. This medicine might make it unsafe for you to drive a car or to make good decisions  7. Do not take this medicine with any alcohol, street drugs, benzodiazepines (Xanax, Valium, Ativan, and others), or other sleeping pills  8. Do not share this medicine with anyone else.  They could have an overdose and die, even though you are able to take this dose.  9. Store this medicine in a locked container or cabinet.  10. Keep this medicine out of the reach of children who could take this accidentally and die.  11. Take any medications you do not use to a the police or  s department for disposal  12. While you are taking this medicine, I will check a state computer program to ensure that you are not getting pain medicine elsewhere.  Also, I will check your urine for drugs.  13. We will talk about your symptoms and the use of narcotics at least every three months, to decide if this medicine is helping you and is the right one for you    Thank you for coming to [facility].    The phone number we will call with results is # [phone] (home) . If this is not the best number please call our clinic and change  the number.  If you need any refills please call your pharmacy and they will contact us.  If you have any concerns about today's visit or wish to schedule another appointment please call our office during normal business hours [phone] (8-5:00 M-F)  If you have urgent medical concerns please call [phone] at any time of the day.  If you a medical emergency please call [phone]  Again thank you for choosing [facility] and please let us know how we can best partner with you to improve you and your family's health.

## 2019-08-06 NOTE — TELEPHONE ENCOUNTER
[facility] phone call message- patient requesting a refill:    Full Medication Name: PLAVIX 75 MG tablet - Take 1 tablet (75 mg) by mouth daily     metoprolol succinate (TOPROL-XL) 25 MG 24 hr tablet -Route: Take 1 tablet (25 mg) by mouth daily - Oral       Pharmacy confirmed as    [id number] - [place], [place] - [address] AT [facility] [address]  [address]  [place] [address]  Phone: [phone] Fax: [phone]   Yes    Additional Comments: none    OK to leave a message on voice mail? Yes    Primary language: English      needed? No    Call taken on August 6, 2019 at 11:13 AM by Roma Edmonds

## 2019-08-07 RX ORDER — CLOPIDOGREL BISULFATE 75 MG
75 TABLET ORAL DAILY
Qty: 90 TABLET | Refills: 3 | Status: SHIPPED | OUTPATIENT
Start: 2019-08-07 | End: 2019-08-12

## 2019-08-07 RX ORDER — METOPROLOL SUCCINATE 25 MG/1
25 TABLET, EXTENDED RELEASE ORAL DAILY
Qty: 90 TABLET | Refills: 4 | Status: SHIPPED | OUTPATIENT
Start: 2019-08-07 | End: 2020-08-21

## 2019-08-08 ENCOUNTER — TELEPHONE (OUTPATIENT)
Dept: FAMILY MEDICINE | Facility: CLINIC | Age: 64
End: 2019-08-08

## 2019-08-08 NOTE — TELEPHONE ENCOUNTER
[facility] phone call message- general phone call:    Reason for call: She needs a call back re getting a override for the plavix    Action desired: call back    Return call needed: Yes    OK to leave a message on voice mail? Yes    Advised patient to response may take up to 2 business days: Yes    Primary language: English      needed? No    Call taken on August 8, 2019 at 11:54 AM by Earline Curry

## 2019-08-12 ENCOUNTER — TELEPHONE (OUTPATIENT)
Dept: FAMILY MEDICINE | Facility: CLINIC | Age: 64
End: 2019-08-12

## 2019-08-12 ENCOUNTER — OFFICE VISIT (OUTPATIENT)
Dept: FAMILY MEDICINE | Facility: CLINIC | Age: 64
End: 2019-08-12
Payer: MEDICARE

## 2019-08-12 VITALS
HEIGHT: 64 IN | TEMPERATURE: 98 F | WEIGHT: 186 LBS | DIASTOLIC BLOOD PRESSURE: 79 MMHG | OXYGEN SATURATION: 100 % | RESPIRATION RATE: 16 BRPM | BODY MASS INDEX: 31.76 KG/M2 | SYSTOLIC BLOOD PRESSURE: 129 MMHG | HEART RATE: 69 BPM

## 2019-08-12 DIAGNOSIS — I25.9 CHRONIC ISCHEMIC HEART DISEASE: ICD-10-CM

## 2019-08-12 DIAGNOSIS — M15.0 PRIMARY OSTEOARTHRITIS INVOLVING MULTIPLE JOINTS: ICD-10-CM

## 2019-08-12 DIAGNOSIS — I20.9 ANGINAL PAIN (H): ICD-10-CM

## 2019-08-12 DIAGNOSIS — G89.4 CHRONIC PAIN SYNDROME: ICD-10-CM

## 2019-08-12 DIAGNOSIS — I89.0 LYMPHEDEMA OF LEFT LEG: ICD-10-CM

## 2019-08-12 DIAGNOSIS — G62.89 OTHER POLYNEUROPATHY: ICD-10-CM

## 2019-08-12 LAB
BUN SERPL-MCNC: 8.2 MG/DL (ref 7–19)
CALCIUM SERPL-MCNC: 9.4 MG/DL (ref 8.5–10.1)
CHLORIDE SERPLBLD-SCNC: 103.6 MMOL/L (ref 98–110)
CO2 SERPL-SCNC: 31.2 MMOL/L (ref 20–32)
CREAT SERPL-MCNC: 0.9 MG/DL (ref 0.5–1)
GFR SERPL CREATININE-BSD FRML MDRD: 66.8 ML/MIN/1.7 M2
GLUCOSE SERPL-MCNC: 100.3 MG'DL (ref 70–99)
POTASSIUM SERPL-SCNC: 4.1 MMOL/DL (ref 3.2–4.6)
SODIUM SERPL-SCNC: 139 MMOL/L (ref 132–142)

## 2019-08-12 RX ORDER — FUROSEMIDE 20 MG
40 TABLET ORAL DAILY
Qty: 180 TABLET | Refills: 4 | Status: SHIPPED | OUTPATIENT
Start: 2019-08-12 | End: 2020-08-21

## 2019-08-12 RX ORDER — CLOPIDOGREL BISULFATE 75 MG
75 TABLET ORAL DAILY
Qty: 90 TABLET | Refills: 3 | Status: SHIPPED | OUTPATIENT
Start: 2019-08-12 | End: 2020-08-10

## 2019-08-12 RX ORDER — ISOSORBIDE MONONITRATE 30 MG/1
30 TABLET, EXTENDED RELEASE ORAL DAILY
Qty: 90 TABLET | Refills: 4 | Status: SHIPPED | OUTPATIENT
Start: 2019-08-12 | End: 2020-08-21

## 2019-08-12 RX ORDER — TRAMADOL HYDROCHLORIDE 50 MG/1
50 TABLET ORAL 3 TIMES DAILY PRN
Qty: 90 TABLET | Refills: 0 | Status: SHIPPED | OUTPATIENT
Start: 2019-08-26 | End: 2019-09-30

## 2019-08-12 RX ORDER — NITROGLYCERIN 0.4 MG/1
0.4 TABLET SUBLINGUAL EVERY 5 MIN PRN
Qty: 30 TABLET | Refills: 12 | Status: SHIPPED | OUTPATIENT
Start: 2019-08-12 | End: 2020-08-21

## 2019-08-12 ASSESSMENT — MIFFLIN-ST. JEOR: SCORE: 1373.69

## 2019-08-12 ASSESSMENT — PATIENT HEALTH QUESTIONNAIRE - PHQ9: SUM OF ALL RESPONSES TO PHQ QUESTIONS 1-9: 12

## 2019-08-12 NOTE — PROGRESS NOTES
"  Chronic Pain Follow-Up Visit    Pain Update:  Location of pain: right shoulder, left elbow and low back  Analgesia/pain control: Recent changes:  worse  Had been more active.    Overall control: Tolerable with discomfort    Adherance     How often do you take extra pain medicine:Never    Did you take your pain medication today? YES    Adverse effects: No      Database checked today? Yes. Details: as expected.    Saw semaj post who was born in May.  Was in [place] for service trip and visiting family for 2 months. This was spiritually great but taxing physically and mad pain worse.    She has a lot of stress from legal and financial issues she is dealing with her son's .  Daughter-in-law, Carolina, has been trying to take her storage unit.  She is working with a  on this.  It is very stressful right now.  There is also bankruptcy case pending.  She talked a lot about this as it is very stressful for her and causing worsening pain.  She does not think she will be able to move on with any new or additional self-cares until these cases are finished.    PHQ-9 SCORE 10/30/2018 8/6/2019 8/12/2019   PHQ-9 Total Score - - -   PHQ-9 Total Score 8 9 12     FAYE-7 SCORE 7/24/2018 10/30/2018 2/25/2019   Total Score - - -   Total Score 6 (mild anxiety) - -   Total Score 6 7 2       Care Plan discussed and updated as needed.  See the end of this note.       FUNCTIONAL ASSESSMENT QUESTIONNAIRE SCORE 10/30/2018 8/12/2019   Total Score 40 35          Problem, Medication and Allergy Lists were reviewed and are current..           Physical Exam:     Vitals:    08/12/19 0853   BP: 129/79   Pulse: 69   Resp: 16   Temp: 98  F (36.7  C)   SpO2: 100%   Weight: 84.4 kg (186 lb)   Height: 1.626 m (5' 4\")     Body mass index is 31.93 kg/m .  Vitals were reviewed and were normal  GENERAL: healthy, alert, well nourished, well hydrated, no distress  RESP: lungs clear to auscultation - no rales, no rhonchi, no " wheezes  CV: regular rates and rhythm, normal S1 S2, no S3 or S4 and no murmur, no click or rub -  ABDOMEN: soft, no tenderness,  no masses, normal bowel sounds  MS: extremities- no gross deformities noted, 1+ edema in left leg.        Results:     Results for orders placed or performed in visit on 08/12/19   Basic Metabolic Panel ([facility])   Result Value Ref Range    Urea Nitrogen 8.2 7.0 - 19.0 mg/dL    Calcium 9.4 8.5 - 10.1 mg/dL    Chloride 103.6 98.0 - 110.0 mmol/L    Carbon Dioxide 31.2 20.0 - 32.0 mmol/L    Creatinine 0.9 0.5 - 1.0 mg/dL    Glucose 100.3 (H) 70.0 - 99.0 mg'dL    Potassium 4.1 3.2 - 4.6 mmol/dL    Sodium 139.0 132.0 - 142.0 mmol/L    GFR Estimate 66.8 >60.0 mL/min/1.7 m2    GFR Estimate If Black 80.8 >60.0 mL/min/1.7 m2      rapid urine drug screen obtained today: No, just done last week.    Assessment and Plan    Shira Guthrie is here for follow up of chronic pain caused by lymphedema and left leg, rotator cuff tear and right shoulder and arthritis in multiple joints.    Shira was seen today for pain and recheck medication.    Diagnoses and all orders for this visit:    Chronic pain syndrome  Other polyneuropathy  Primary osteoarthritis involving multiple joints:   Pain is been worse due to increased activity and not pacing as much as in the past.  Also stressed with legal and financial issues.  Refilled one more month of tramadol.  We had been doing 2 to 3 months refills in the past.  Discussed the law changed to having to fill within 30 days.  She understands this.  She just did her urine drug screen last week and received August's prescription.  Postdated this prescription for the end of August.  -     traMADol (ULTRAM) 50 MG tablet; Take 1 tablet (50 mg) by mouth 3 times daily as needed for severe pain    Chronic ischemic heart disease: Stable.  Refilled brand-name Plavix due to allergies with generic in the past.  Will do prior Auth if needed.  Refilled Imdur as it was due for yearly  refill  -     isosorbide mononitrate (IMDUR) 30 MG 24 hr tablet; Take 1 tablet (30 mg) by mouth daily  -     PLAVIX 75 MG tablet; Take 1 tablet (75 mg) by mouth daily    Lymphedema of left leg: Refilled Lasix for a year.  She uses it for her left leg swelling.  Recheck BMP was normal in January.  -     furosemide (LASIX) 20 MG tablet; Take 2 tablets (40 mg) by mouth daily  -     Basic Metabolic Panel ([facility])    Anginal pain (H): Refilled nitro today.  -     nitroGLYcerin (NITROSTAT) 0.4 MG sublingual tablet; Place 1 tablet (0.4 mg) under the tongue every 5 minutes as needed for chest pain          Chronic Pain Syndrome:  Care plan updated with patient, see below for details.  Patient is being prescribed 50mg of tramadol IR per day. 15 mg MEDD  Care Plan Date: August/2019  Naloxone has not been prescribed.       If opioids prescribed patient was asked to bring pill bottle to each appointment and was informed that refills would only be provided at office visits.   Asked patient to F/U in 2 month(s) with same provider for 20 minute  Visit.     Sally Pierre MD    Patient Instructions     PACE yourself.     Personal Care Plan for Chronic Pain    1.  Personal Goals:                * take less medication              * lose weight: goal of losing 15 to 20 pounds.              * continue working on keeping thoughts positive through Tawny              * to feel confident enough that when someone gives me a compliment I can simply say thank you    2.  Sleep:                 *  Basic sleep plan:                          *reduce or eliminate caffeine and daytime naps                          * relaxation before bed                          * limit screen time 1-2 hours prior to bed                          * establish dark/quiet sleep environment                          * go to bed at target bedtime:   pm                          * keep consistent wake time:   am.              *  Minimize switching days and nights by  staying active throughout the day.              * We'll talk more about a consistent sleep plan when we meet next time.                3.  Physical Activity:                 * Formal physical rehabilitation:                          HOME PT for shoulder              * Home/community based activity:                          * Home based exercises given by lymphedema nurse with breathing exercises built in as well - daily                          * Aerobic exercise/endurance exercise:  elliptical machine - 5 minute intervals with sit ups in between for 30 minutes - daily.  Has a  in the builiding. Has exercise tape in her apartment.                          * Cleaning and baking with body awareness and stretching              * Listen to your body.  Pace yourself for success.  Don't over-do it.  Plan to get PCA back to help with cleaning and laundry so as to not overdo it.                4.  Nutrition/Weight:                 * Keep a food journal in a notebook at home and bring this to your next visit with Dr. Sky.  Eat small frequent meals.              * Review your I Can Prevent Diabetes materials.              * Limit processed foods and foods high in sugar, sodium and fat.              * Keep up the good work eating many healthy foods.              * When you make a less healthy choice approach yourself with kindness and compassion.  Try to understand what contributed to that choice:  Was I too hungry?  Was I too tired?  Stressed?  Worried?  Use this information to help support healthier choices in the future.    5.  Mood/Stress Management:     SELF COMPASSION!              * Formal interventions:                        * schedule follow up with Dr. Sky in about month or so.              * Home/community based interventions:                          * Regular contact with family  * Relaxation techniques - breathing exercises  * Create your pyramid to focus you on your strengths and hopes.  * Movies  *  Meditation  * Yoga  * Creative activity  * Spiritual /prayer:  Prayer at home, attending services at [facility], wellness auxiliary group  * Service-based activity.              * Medications: Cymbalta, Hydroxyzine as needed.    6.  Tobacco/Alcohol/Drug Use:                               * Congratulations on quitting smoking and staying quit!  Keep up the good work managing stress/anxiety in other ways (prayer, talking it out, deep breaths, exercise, etc..                         * Maintain healthy relationship with alcohol                          * For women this would be no more than 1 drink per day                          * For men, this would be no more than 2 drinks per day              * Eliminate recreational drugs    7.  Pain:                 * Non-medication treatments:                          * ice/heat: use heating pad as you are doing.                          * massage                          * acupuncture    8.  Pain Medications: Gabapentin 600mg twice a day   Tramadol one pill three times day. Have been doing two month refills.    Tylenol arthritis as needed for break through.

## 2019-08-12 NOTE — PATIENT INSTRUCTIONS
PACE yourself.     Personal Care Plan for Chronic Pain    1.  Personal Goals:                * take less medication              * lose weight: goal of losing 15 to 20 pounds.              * continue working on keeping thoughts positive through Tawn[employer]              * to feel confident enough that when someone gives me a compliment I can simply say thank you    2.  Sleep:                 *  Basic sleep plan:                          *reduce or eliminate caffeine and daytime naps                          * relaxation before bed                          * limit screen time 1-2 hours prior to bed                          * establish dark/quiet sleep environment                          * go to bed at target bedtime:   pm                          * keep consistent wake time:   am.              *  Minimize switching days and nights by staying active throughout the day.              * We'll talk more about a consistent sleep plan when we meet next time.                3.  Physical Activity:                 * Formal physical rehabilitation:                          HOME PT for shoulder              * Home/community based activity:                          * Home based exercises given by lymphedema nurse with breathing exercises built in as well - daily                          * Aerobic exercise/endurance exercise:  elliptical machine - 5 minute intervals with sit ups in between for 30 minutes - daily.  Has a  in the builiding. Has exercise tape in her apartment.                          * Cleaning and baking with body awareness and stretching              * Listen to your body.  Pace yourself for success.  Don't over-do it.  Plan to get PCA back to help with cleaning and laundry so as to not overdo it.                4.  Nutrition/Weight:                 * Keep a food journal in a notebook at home and bring this to your next visit with Dr. Sky.  Eat small frequent meals.              * Review your I Can Prevent  Diabetes materials.              * Limit processed foods and foods high in sugar, sodium and fat.              * Keep up the good work eating many healthy foods.              * When you make a less healthy choice approach yourself with kindness and compassion.  Try to understand what contributed to that choice:  Was I too hungry?  Was I too tired?  Stressed?  Worried?  Use this information to help support healthier choices in the future.    5.  Mood/Stress Management:     SELF COMPASSION!              * Formal interventions:                        * schedule follow up with Dr. Sky in about month or so.              * Home/community based interventions:                          * Regular contact with family  * Relaxation techniques - breathing exercises  * Create your pyramid to focus you on your strengths and hopes.  * Movies  * Meditation  * Yoga  * Creative activity  * Spiritual /prayer:  Prayer at home, attending services at [facility], wellness auxiliary group  * Service-based activity.              * Medications: Cymbalta, Hydroxyzine as needed.    6.  Tobacco/Alcohol/Drug Use:                               * Congratulations on quitting smoking and staying quit!  Keep up the good work managing stress/anxiety in other ways (prayer, talking it out, deep breaths, exercise, etc..                         * Maintain healthy relationship with alcohol                          * For women this would be no more than 1 drink per day                          * For men, this would be no more than 2 drinks per day              * Eliminate recreational drugs    7.  Pain:                 * Non-medication treatments:                          * ice/heat: use heating pad as you are doing.                          * massage                          * acupuncture    8.  Pain Medications: Gabapentin 600mg twice a day   Tramadol one pill three times day. Have been doing two month refills.    Tylenol  arthritis as needed for break through.

## 2019-08-12 NOTE — LETTER
August 13, 2019      Shira Guthrie  [address]  [place] [address]        Dear Shira,    Please see below for your test results.    Resulted Orders   Basic Metabolic Panel ([facility])   Result Value Ref Range    Urea Nitrogen 8.2 7.0 - 19.0 mg/dL    Calcium 9.4 8.5 - 10.1 mg/dL    Chloride 103.6 98.0 - 110.0 mmol/L    Carbon Dioxide 31.2 20.0 - 32.0 mmol/L    Creatinine 0.9 0.5 - 1.0 mg/dL    Glucose 100.3 (H) 70.0 - 99.0 mg'dL    Potassium 4.1 3.2 - 4.6 mmol/dL    Sodium 139.0 132.0 - 142.0 mmol/L    GFR Estimate 66.8 >60.0 mL/min/1.7 m2    GFR Estimate If Black 80.8 >60.0 mL/min/1.7 m2     Normal kidney and electrolyte  test.      If you have any questions, please call the clinic to make an appointment.    Sincerely,    Sally Pierre MD

## 2019-08-12 NOTE — TELEPHONE ENCOUNTER
[facility] phone call message- general phone call:    Reason for call: The pt called to let the Dr know the PA for the  Plavix   was not received if the Dr could send it  Again     Return call needed: No    OK to leave a message on voice mail? Yes    Primary language: English      needed? No    Call taken on August 12, 2019 at 3:04 PM by Karel Diaz

## 2019-08-13 NOTE — TELEPHONE ENCOUNTER
Notified patient that a PA was done through [employer] and waiting for the reply from the Insurance Company

## 2019-08-23 ENCOUNTER — TELEPHONE (OUTPATIENT)
Dept: FAMILY MEDICINE | Facility: CLINIC | Age: 64
End: 2019-08-23

## 2019-08-23 NOTE — TELEPHONE ENCOUNTER
[facility] phone call message- general phone call:    Reason for call: She wanted to know if  was able to look at her xray results from [facility] from Saturday.    Action desired: call back.    Return call needed: Yes    OK to leave a message on voice mail? Yes    Advised patient to response may take up to 2 business days: Yes    Primary language: English      needed? No    Call taken on August 23, 2019 at 9:43 AM by Earline Curry

## 2019-08-23 NOTE — TELEPHONE ENCOUNTER
She can take higher dose pain medicine for acute injury.  If still having severe pain on Monday, should likely be seen sooner than 9/9.  She may need further tests to look for torn ligaments as this doesn't show up on Xray.  I reviewed Xray and US reports which confirms no fractures or blood clots.    Please call patient with answer.  Sally Pierre MD  Routed to LATANYA reese.

## 2019-08-23 NOTE — TELEPHONE ENCOUNTER
Patient given recommendation from : she can take higher dose of pain meds for acute pain  If severe pain continues on Monday schedule a sooner appt with the clinic she may need further testing  And informed that her xray and US showed no fx or blood clots  Patient voiced understanding of all information given    Addis PELAEZ

## 2019-08-23 NOTE — TELEPHONE ENCOUNTER
"Patient reports a fall in her apartment building, her left leg \"went out\". She was seen in the ER days later on 8/18/19  She was given a knee brace, antibx and oxycodone 5mg  Patient states the swelling remains and  she has limited movement in the knee and severe pain when she does move.  Patient says the pain medication has not been working for her she would like to know if she could continue the oxycodone at a higher dose. She says she continues to take her other meds responsibly to help control her pain  She would like for you to review the x-ray report and the US report of her left leg. She has an appt scheduled for 9/9/19 but wants to know if she should be seen sooner.      EXAM: US VENOUS LEG LEFT  LOCATION: [facility]  DATE/TIME: 8/18/2019 7:12 PM    INDICATION: Pain and swelling.  COMPARISON: None.  TECHNIQUE: Routine exam without and with compression, augmentation, and duplex utilizing 2D gray-scale imaging, Doppler interrogation with color-flow and spectral waveform analysis.    FINDINGS: The common femoral, femoral, popliteal, and segmentally visualized calf veins were evaluated. The opposite CFV was also included in the evaluation.    Left leg veins are negative for deep venous thrombosis. No popliteal cysts.      EXAM: XR KNEE LEFT PLUS SUNRISE VW    LOCATION: [facility]    DATE/TIME: 8/18/2019 6:23 PM        INDICATION: pain, swelling    COMPARISON: None.        FINDINGS: Small effusion. Normal alignment. No fracture.   HE IMG RESULT       XR Knee Left Plus Sunrise VW (08/18/2019 6:23 PM CDT)   Procedure Note   Interface, Rad Results In - 08/18/2019 6:31 PM CDT    EXAM: XR KNEE LEFT PLUS SUNRISE VW  LOCATION: [facility]  DATE/TIME: 8/18/2019 6:23 PM    INDICATION: pain, swelling  COMPARISON: None.    FINDINGS: Small effusion. Normal alignment. No fracture.       Note routed to Dr. Gabby Mancini RN  "

## 2019-08-26 ENCOUNTER — TELEPHONE (OUTPATIENT)
Dept: FAMILY MEDICINE | Facility: CLINIC | Age: 64
End: 2019-08-26

## 2019-08-26 NOTE — TELEPHONE ENCOUNTER
[facility] phone call message- general phone call:    Reason for call: Pt is calling because she is still in so much pain from her fall that she had. She is wondering if she can be worked in for Dr. Pierre schedule today and if can be given a call back.      Return call needed: Yes    OK to leave a message on voice mail? Yes    Primary language: English      needed? No    Call taken on August 26, 2019 at 11:48 AM by Grisel Flores-Cardona

## 2019-08-26 NOTE — TELEPHONE ENCOUNTER
"Patient reports continuing pain in her right knee radiating down to her ankle and when she is up walking the pain goes up to her thigh. She has had no relief from the pain except when she is sleep. When she is up walking she has a \"buckling\" in her knee. She continues to use gabapentin, muscle relaxer and tramadol with no relief of the pain  She would like to be seen today if possible     Note routed to Dr.Wicks Mancini RN  "

## 2019-08-28 NOTE — TELEPHONE ENCOUNTER
Please call patient to have her be seen at soonest available. I am out of the office through Tuesday.  Sally Pierre MD  Routed to

## 2019-08-29 ENCOUNTER — OFFICE VISIT (OUTPATIENT)
Dept: FAMILY MEDICINE | Facility: CLINIC | Age: 64
End: 2019-08-29
Payer: MEDICARE

## 2019-08-29 VITALS
RESPIRATION RATE: 14 BRPM | DIASTOLIC BLOOD PRESSURE: 70 MMHG | SYSTOLIC BLOOD PRESSURE: 119 MMHG | WEIGHT: 192 LBS | OXYGEN SATURATION: 98 % | BODY MASS INDEX: 32.96 KG/M2 | TEMPERATURE: 98.4 F | HEART RATE: 68 BPM

## 2019-08-29 DIAGNOSIS — R60.0 LEG EDEMA, LEFT: ICD-10-CM

## 2019-08-29 DIAGNOSIS — S83.412A SPRAIN OF MEDIAL COLLATERAL LIGAMENT OF LEFT KNEE, INITIAL ENCOUNTER: Primary | ICD-10-CM

## 2019-08-29 RX ORDER — OXYCODONE HYDROCHLORIDE 10 MG/1
10 TABLET ORAL 2 TIMES DAILY PRN
Qty: 10 TABLET | Refills: 0 | Status: SHIPPED | OUTPATIENT
Start: 2019-08-29 | End: 2019-08-29 | Stop reason: ALTCHOICE

## 2019-08-29 RX ORDER — OXYCODONE AND ACETAMINOPHEN 10; 325 MG/1; MG/1
1 TABLET ORAL 2 TIMES DAILY PRN
Qty: 10 TABLET | Refills: 0 | Status: SHIPPED | OUTPATIENT
Start: 2019-08-29 | End: 2019-09-30

## 2019-08-29 NOTE — PROGRESS NOTES
"[facility] Visit    Subjective:  Shira Guthrie is a 63 year old female with a PMHx significant for   Patient Active Problem List   Diagnosis     CAD (coronary artery disease)     MDD (major depressive disorder)     Chronic constipation     Stroke (H)     Benign essential hypertension     Osteoporosis     PAD (peripheral artery disease) (H)     Fracture closed of upper end of forearm     Synovial sarcoma (H)     Esophageal stricture     Anginal pain (H)     Health Care Home     B12 deficiency     Cataracts, bilateral     Malignant neoplasm of connective and other soft tissue     Chronic pain syndrome     Other polyneuropathy     Primary osteoarthritis involving multiple joints     Lymphedema of left leg     False positive serological test for hepatitis C     Cervical radiculopathy at C7     Rotator cuff impingement syndrome of right shoulder     H/O hysterectomy for benign disease     Problems related to other legal circumstances    who presents for follow-up from [facility] emergency department, this following injury to the left knee.  Patient had a mechanical fall on 8/16/2019 in which see \"belly flopped\" on the sidewalk, hitting her left knee.  She otherwise caught herself with her hands.  She did not hit her head.  She continued to have left knee pain and swelling and presented to the ED on 8/16.  In the ED, XR of left knee revealed small effusion with normal alignment and no fracture.  Lower extremity ultrasound was negative for DVT, this was done due to swelling.  She also had a small abrasion of the anterior knee which the ED provider was concerned for cellulitis for which she prescribed a short course of Keflex; patient took a course of antibiotics as prescribed.  She is prescribed a short course of oxycodone for acute pain and discharge from the emergency department with PCP follow-up.    In her visit today, she continues to have left knee pain and swelling.  She notes that most of " the pain is in the medial part of the knee.  She also continues to have swelling of the leg, this is not worsened.  She has not noticed any redness, heat, or acute swelling of the knee.  Due to the knee pain she has been unable to wear compression or elevate her leg.  She has been walking, but worried about her knee buckling.  She notes that the pain of the knee has been preventing her from sleeping.  The oxycodone given to her in the ED did not work.  She says that Percocet has worked in the past, this is what her PCP has prescribed her.      Of note patient is in our chronic pain program and is a patient of Dr. Pierre.  On chart review Dr. Pierre did give the okay for patient to receive extra narcotics in the setting of acute pain.    She denies fevers, chills, shortness of breath, chest pain, cough, abdominal pain.     ROS:   Complete 12 point ROS was negative except as stated above in HPI    Objective:  Vitals:    08/29/19 0900   BP: 119/70   Pulse: 68   Resp: 14   Temp: 98.4  F (36.9  C)   TempSrc: Oral   SpO2: 98%   Weight: 87.1 kg (192 lb)     Body mass index is 32.96 kg/m .    GEN: In mild distress with walking to and from exam room, no distress at rest, healthy, alert  RESP: CTAB, no w/r/r  CV: RRR, nl S1/S2, no S3/S4, no m/r/g, no peripheral edema, peripheral pulses strong  MSK: Left leg with 1+ pitting edema to the knee.  Left knee with tenderness to palpation over patella, mostly tender over MCL.  Small left knee effusion, no warmth or erythema.  Active ROM limited by pain, nearly full passive ROM.  Negative calf squeeze, negative Homans sign.  SKIN: Prior left patellar abrasion noted in ED healed, no purulence, surrounding erythema or open wounds.  PULSES:   NEURO: Sensation intact and symmetric bilaterally.  PSYCH: mentation appears normal, affect normal/bright    No results found for this or any previous visit (from the past 24 hour(s)).    Assessment/Plan:  Shira was seen today for hospital  f/u.    Diagnoses and all orders for this visit:    Sprain of medial collateral ligament of left knee, initial encounter  Leg edema, left  Patient here for follow-up after left knee injury.  She has continued pain and swelling in the left leg, she is been unable to use compression or elevation due to the knee pain.  Ultrasound was negative for DVT and x-ray was negative for fracture or dislocation the emergency department a week ago.  She was able to walk to the exam room.  Exam seems most consistent for MCL sprain, as this is the area of most tenderness.  No evidence of septic arthritis such as warmth, erythema or fevers.  She continues to have somewhat significant left leg edema, which I suspect is worsened due to her underlying lymphedema.  Low suspicion for DVT today with normal ultrasound, stable edema and no systemic/respiratory symptoms.  She is on Lasix at baseline.  Recommended taking extra dose of Lasix today.  Given that Dr. Pierre gave permission for patient to have acute increase in pain regimen and I do not worry about patient breaking pain contract, will prescribe a short course of Percocet as this is what has worked for her for acute pain in the past.  I am hoping that with better pain control, she will be able to wear compression and elevate her leg to help with the edema.  Informed her that I would like her to be seen by Dr. Pierre next week to assess the leg again.  -     oxyCODONE-acetaminophen (PERCOCET)  MG per tablet; Take 1 tablet by mouth 2 times daily as needed for severe pain; 10 tabs prescribed  -      Take an extra 40 mg of Lasix for swelling  -      Elevate leg as much as possible to help with swelling  -      Follow-up with PCP next week.  -      Discussed signs of DVT/PE that should prompt her to be present to the emergency department.    Options for treatment and follow-up care were reviewed with the patient who was engaged and actively involved in the decision making process,  verbalized understanding of the options discussed, and satisfied with the final plan.    Patient was staffed with supervising physician, Dr. Euceda.     Benjamín Santana MD PGY-2  [facility]

## 2019-08-29 NOTE — PATIENT INSTRUCTIONS
We gave you an extra 10 pills of oxycodone; these are 10 mg tabs.   These should be taken as follows:  1 tablet two times a day as needed for pain.    TODAY: take an extra 40 mg of your lasix to help with the swelling    Elevate your left leg as much as possible.     Follow up with Dr. Pierre next week.

## 2019-09-06 NOTE — PROGRESS NOTES
Preceptor Attestation:   Patient seen, evaluated and discussed with the resident. I have verified the content of the note, which accurately reflects my assessment of the patient and the plan of care.   Supervising Physician:  Arsenio Euceda MD MD

## 2019-09-23 ENCOUNTER — TELEPHONE (OUTPATIENT)
Dept: PSYCHOLOGY | Facility: CLINIC | Age: 64
End: 2019-09-23

## 2019-09-23 NOTE — TELEPHONE ENCOUNTER
Shira returned my call.  Did not have appt on her calendar.  She was very apologetic about missing this visit.  Provided feedback that we all get to be human and make mistakes from time to time.  Good to hear her voice and know she was Ok.  Agreed to meet on Friday, October 11 at 11:00am.  I will route this message to the  and ask them to create this appt in my schedule.  Shira expressed appreciation for the call and follow up.    Dior Sky, PhD, LP

## 2019-09-23 NOTE — TELEPHONE ENCOUNTER
Placed outreach call to Shira when she did not attend our scheduled visit today.  Unable to reach her on the phone today.  Did leave brief message that I called today and would be happy to reschedule or chat with her on the phone if she was encountering a barrier to attendance today.  She is scheduled to see Dr. Pierre on 9/30/19 and did leave a reminder about that appt in my message as well.    Will not plan any additional outreach at this time unless requested by Dr. Pierre after visit on 9/30/19.  Will route note from today to Dr. Pierre so she can be aware of missed visit and outreach effort today.    Dior Sky, PhD, LP

## 2019-09-30 ENCOUNTER — DOCUMENTATION ONLY (OUTPATIENT)
Dept: OTHER | Facility: CLINIC | Age: 64
End: 2019-09-30

## 2019-09-30 ENCOUNTER — AMBULATORY - HEALTHEAST (OUTPATIENT)
Dept: OTHER | Facility: CLINIC | Age: 64
End: 2019-09-30

## 2019-09-30 ENCOUNTER — OFFICE VISIT (OUTPATIENT)
Dept: FAMILY MEDICINE | Facility: CLINIC | Age: 64
End: 2019-09-30
Payer: MEDICARE

## 2019-09-30 ENCOUNTER — RECORDS - HEALTHEAST (OUTPATIENT)
Dept: ADMINISTRATIVE | Facility: OTHER | Age: 64
End: 2019-09-30

## 2019-09-30 VITALS
SYSTOLIC BLOOD PRESSURE: 129 MMHG | HEART RATE: 67 BPM | BODY MASS INDEX: 31.48 KG/M2 | RESPIRATION RATE: 16 BRPM | OXYGEN SATURATION: 99 % | WEIGHT: 184.4 LBS | DIASTOLIC BLOOD PRESSURE: 79 MMHG | TEMPERATURE: 97.8 F | HEIGHT: 64 IN

## 2019-09-30 DIAGNOSIS — G62.89 OTHER POLYNEUROPATHY: ICD-10-CM

## 2019-09-30 DIAGNOSIS — M25.562 ACUTE PAIN OF LEFT KNEE: Primary | ICD-10-CM

## 2019-09-30 DIAGNOSIS — G89.4 CHRONIC PAIN SYNDROME: ICD-10-CM

## 2019-09-30 DIAGNOSIS — M15.0 PRIMARY OSTEOARTHRITIS INVOLVING MULTIPLE JOINTS: ICD-10-CM

## 2019-09-30 RX ORDER — OXYCODONE AND ACETAMINOPHEN 10; 325 MG/1; MG/1
1 TABLET ORAL 2 TIMES DAILY PRN
Qty: 60 TABLET | Refills: 0 | Status: SHIPPED | OUTPATIENT
Start: 2019-09-30 | End: 2019-11-01

## 2019-09-30 RX ORDER — BACLOFEN 10 MG/1
10 TABLET ORAL 3 TIMES DAILY PRN
Qty: 90 TABLET | Refills: 3 | Status: SHIPPED | OUTPATIENT
Start: 2019-09-30 | End: 2020-05-11

## 2019-09-30 RX ORDER — TRAMADOL HYDROCHLORIDE 50 MG/1
50 TABLET ORAL 3 TIMES DAILY PRN
Qty: 90 TABLET | Refills: 0 | Status: SHIPPED | OUTPATIENT
Start: 2019-09-30 | End: 2019-10-21

## 2019-09-30 ASSESSMENT — PAIN SCALES - GENERAL: PAINLEVEL: EXTREME PAIN (8)

## 2019-09-30 ASSESSMENT — PATIENT HEALTH QUESTIONNAIRE - PHQ9: SUM OF ALL RESPONSES TO PHQ QUESTIONS 1-9: 10

## 2019-09-30 ASSESSMENT — MIFFLIN-ST. JEOR: SCORE: 1361.43

## 2019-09-30 NOTE — PATIENT INSTRUCTIONS
PACE yourself.     Personal Care Plan for Chronic Pain    1.  Personal Goals:                * take less medication              * lose weight: goal of losing 15 to 20 pounds.              * continue working on keeping thoughts positive through [employer]              * to feel confident enough that when someone gives me a compliment I can simply say thank you    2.  Sleep:                 *  Basic sleep plan:                          *reduce or eliminate caffeine and daytime naps                          * relaxation before bed                          * limit screen time 1-2 hours prior to bed                          * establish dark/quiet sleep environment                          * go to bed at target bedtime:   pm                          * keep consistent wake time:   am.              *  Minimize switching days and nights by staying active throughout the day.              * We'll talk more about a consistent sleep plan when we meet next time.                3.  Physical Activity:                 * Formal physical rehabilitation:                          HOME PT for shoulder              * Home/community based activity:                          * Home based exercises given by lymphedema nurse with breathing exercises built in as well - daily                          * Aerobic exercise/endurance exercise:  elliptical machine - 5 minute intervals with sit ups in between for 30 minutes - daily.  Has a  in the builiding. Has exercise tape in her apartment.                          * Cleaning and baking with body awareness and stretching              * Listen to your body.  Pace yourself for success.  Don't over-do it.  Plan to get PCA back to help with cleaning and laundry so as to not overdo it.                4.  Nutrition/Weight:                 * Keep a food journal in a notebook at home and bring this to your next visit with Dr. Sky.  Eat small frequent meals.              * Review your I Can Prevent  Diabetes materials.              * Limit processed foods and foods high in sugar, sodium and fat.              * Keep up the good work eating many healthy foods.              * When you make a less healthy choice approach yourself with kindness and compassion.  Try to understand what contributed to that choice:  Was I too hungry?  Was I too tired?  Stressed?  Worried?  Use this information to help support healthier choices in the future.    5.  Mood/Stress Management:     SELF COMPASSION!              * Formal interventions:                        * schedule follow up with Dr. Sky in about month or so.              * Home/community based interventions:                          * Regular contact with family  * Relaxation techniques - breathing exercises  * Create your pyramid to focus you on your strengths and hopes.  * Movies  * Meditation  * Yoga  * Creative activity  * Spiritual /prayer:  Prayer at home, attending services at [facility], wellness auxiliary group  * Service-based activity.              * Medications: Cymbalta, Hydroxyzine as needed.    6.  Tobacco/Alcohol/Drug Use:                               * Congratulations on quitting smoking and staying quit!  Keep up the good work managing stress/anxiety in other ways (prayer, talking it out, deep breaths, exercise, etc..                         * Maintain healthy relationship with alcohol                          * For women this would be no more than 1 drink per day                          * For men, this would be no more than 2 drinks per day              * Eliminate recreational drugs    7.  Pain:                 * Non-medication treatments:                          * ice/heat: use heating pad as you are doing.                          * massage                          * acupuncture    8.  Pain Medications: Gabapentin 600mg twice a day   Tramadol one pill three times day. Have been doing two month refills.    Tylenol  arthritis as needed for break through.  Oxycodone 10mg twice a day.  Do not use oxycodone and tramadol together.              [facility]  [phone]  Fax Orders to [phone]      [address] [address]      Appointment:  2019  Arrival Time:  7:15 am     Leave metal jewelry at home. They will have you change into a set of scrubs prior to scanning.     Please bring a copy of your insurance card and photo ID    If you cannot make this appointment please call [phone] to reschedule    2019 Order faxed to [facility] Scheduling at [phone]. Alejandra Garay

## 2019-09-30 NOTE — PROGRESS NOTES
"  Chronic Pain Follow-Up Visit    Pain Update:  Location of pain: hands, elbows, right shoulder, left knee.  Analgesia/pain control: Recent changes:  Worse after fall. Fall was on 8/15/19.  Fell forward when left side gave out (weak since stroke many years ago.) Landed on knees and hands.  Had a ER visit after the fall did x-rays that were negative.  Also was seen here a little while after that and diagnosed with an MCL sprain.  Knee continues to be really painful while weightbearing and while sleeping.  Has to hold it in certain positions.  Continues to be swollen although it is very slowly improving.  Pain has not significantly improved.  Overall control: Tolerable with discomfort      Adherance     How often do you take extra pain medicine:Never    Did you take your pain medication today? YES    Adverse effects: No      Database checked today? Yes. Details: as expected.    PHQ-9 SCORE 8/6/2019 8/12/2019 9/30/2019   PHQ-9 Total Score - - -   PHQ-9 Total Score 9 12 10     FAYE-7 SCORE 7/24/2018 10/30/2018 2/25/2019   Total Score - - -   Total Score 6 (mild anxiety) - -   Total Score 6 7 2       Care Plan discussed and updated as needed.  See the end of this note.       FUNCTIONAL ASSESSMENT QUESTIONNAIRE SCORE 8/12/2019 9/30/2019   Total Score 35 40          Problem, Medication and Allergy Lists were reviewed and are current..           Physical Exam:     Vitals:    09/30/19 0814   BP: 129/79   Pulse: 67   Resp: 16   Temp: 97.8  F (36.6  C)   SpO2: 99%   Weight: 83.6 kg (184 lb 6.4 oz)   Height: 1.626 m (5' 4\")     Body mass index is 31.65 kg/m .  Vitals were reviewed and were normal  GENERAL: healthy, alert, well nourished, well hydrated, no distress  RESP: lungs clear to auscultation - no rales, no rhonchi, no wheezes  CV: regular rates and rhythm, normal S1 S2, no S3 or S4 and no murmur, no click or rub -  ABDOMEN: soft, no tenderness, no  hepatosplenomegaly, no masses, normal bowel sounds  MS: Left knee has " effusion felt most on left side a posteriorly, no erythema.  Tenderness in medial joint line.  Some crepitus with extension and flexion.          Results:     Results for orders placed or performed in visit on 08/12/19   Basic Metabolic Panel ([facility])   Result Value Ref Range    Urea Nitrogen 8.2 7.0 - 19.0 mg/dL    Calcium 9.4 8.5 - 10.1 mg/dL    Chloride 103.6 98.0 - 110.0 mmol/L    Carbon Dioxide 31.2 20.0 - 32.0 mmol/L    Creatinine 0.9 0.5 - 1.0 mg/dL    Glucose 100.3 (H) 70.0 - 99.0 mg'dL    Potassium 4.1 3.2 - 4.6 mmol/dL    Sodium 139.0 132.0 - 142.0 mmol/L    GFR Estimate 66.8 >60.0 mL/min/1.7 m2    GFR Estimate If Black 80.8 >60.0 mL/min/1.7 m2      rapid urine drug screen obtained today: Yes    Assessment and Plan    Shira Guthrie is here for follow up of chronic pain caused by arthritis and acute left knee pain.    Shira was seen today for pain and knee pain.    Diagnoses and all orders for this visit:    Acute pain of left knee: knee is continuing to be very painful and has effusion.  Difficult exam due to chronic lymphedema in the left leg.  6 weeks out from fall, cocnern for ligament or cartilage damage.  MRI ordered to rule these out. Refill one month of oxycodone 10 mg twice a day. Will not take with tramadol.  -     MR Knee Left w/o Contrast; Future  -     oxyCODONE-acetaminophen (PERCOCET)  MG per tablet; Take 1 tablet by mouth 2 times daily as needed for severe pain  -     order for DME; Equipment being ordered: Neoprene knee sleeve. Measure for size.    Chronic pain syndrome:  Primary osteoarthritis involving multiple joints  Polyneuropathy:   Refilled tramadol and baclofen.   -     Rapid Urine Drug Screen ([facility])  -     traMADol (ULTRAM) 50 MG tablet; Take 1 tablet (50 mg) by mouth 3 times daily as needed for severe pain  -     baclofen (LIORESAL) 10 MG tablet; Take 1 tablet (10 mg) by mouth 3 times daily as needed for muscle spasm        Chronic Pain Syndrome:  Care plan updated  with patient, see below for details.  Patient is being prescribed 20mg of oxycodone IR (Percocet) per day. 67.5 mg MEDD  Care Plan Date: September/2019  Naloxone has not been prescribed.     If opioids prescribed patient was asked to bring pill bottle to each appointment and was informed that refills would only be provided at office visits.   Asked patient to F/U in 2-4 weeks with same provider for 20 minute  Visit.     Sally Pierre MD    Patient Instructions     PACE yourself.     Personal Care Plan for Chronic Pain    1.  Personal Goals:                * take less medication              * lose weight: goal of losing 15 to 20 pounds.              * continue working on keeping thoughts positive through [employer]              * to feel confident enough that when someone gives me a compliment I can simply say thank you    2.  Sleep:                 *  Basic sleep plan:                          *reduce or eliminate caffeine and daytime naps                          * relaxation before bed                          * limit screen time 1-2 hours prior to bed                          * establish dark/quiet sleep environment                          * go to bed at target bedtime:   pm                          * keep consistent wake time:   am.              *  Minimize switching days and nights by staying active throughout the day.              * We'll talk more about a consistent sleep plan when we meet next time.                3.  Physical Activity:                 * Formal physical rehabilitation:                          HOME PT for shoulder              * Home/community based activity:                          * Home based exercises given by lymphedema nurse with breathing exercises built in as well - daily                          * Aerobic exercise/endurance exercise:  elliptical machine - 5 minute intervals with sit ups in between for 30 minutes - daily.  Has a  in the builiding. Has exercise tape in her  apartment.                          * Cleaning and baking with body awareness and stretching              * Listen to your body.  Pace yourself for success.  Don't over-do it.  Plan to get PCA back to help with cleaning and laundry so as to not overdo it.                4.  Nutrition/Weight:                 * Keep a food journal in a notebook at home and bring this to your next visit with Dr. Sky.  Eat small frequent meals.              * Review your I Can Prevent Diabetes materials.              * Limit processed foods and foods high in sugar, sodium and fat.              * Keep up the good work eating many healthy foods.              * When you make a less healthy choice approach yourself with kindness and compassion.  Try to understand what contributed to that choice:  Was I too hungry?  Was I too tired?  Stressed?  Worried?  Use this information to help support healthier choices in the future.    5.  Mood/Stress Management:     SELF COMPASSION!              * Formal interventions:                        * schedule follow up with Dr. Sky in about month or so.              * Home/community based interventions:                          * Regular contact with family  * Relaxation techniques - breathing exercises  * Create your pyramid to focus you on your strengths and hopes.  * Movies  * Meditation  * Yoga  * Creative activity  * Spiritual /prayer:  Prayer at home, attending services at [facility], wellness auxiliary group  * Service-based activity.              * Medications: Cymbalta, Hydroxyzine as needed.    6.  Tobacco/Alcohol/Drug Use:                               * Congratulations on quitting smoking and staying quit!  Keep up the good work managing stress/anxiety in other ways (prayer, talking it out, deep breaths, exercise, etc..                         * Maintain healthy relationship with alcohol                          * For women this would be no more than 1 drink  per day                          * For men, this would be no more than 2 drinks per day              * Eliminate recreational drugs    7.  Pain:                 * Non-medication treatments:                          * ice/heat: use heating pad as you are doing.                          * massage                          * acupuncture    8.  Pain Medications: Gabapentin 600mg twice a day   Tramadol one pill three times day. Have been doing two month refills.    Tylenol arthritis as needed for break through.  Oxycodone 10mg twice a day.  Do not use oxycodone and tramadol together.              [facility]  [phone]  Fax Orders to [phone]      [address] [address]      Appointment:  2019  Arrival Time:  7:15 am     Leave metal jewelry at home. They will have you change into a set of scrubs prior to scanning.     Please bring a copy of your insurance card and photo ID    If you cannot make this appointment please call [phone] to reschedule    2019 Order faxed to [facility] at [phone]. Alejandra Garay

## 2019-10-02 ENCOUNTER — HOSPITAL ENCOUNTER (OUTPATIENT)
Dept: MRI IMAGING | Facility: CLINIC | Age: 64
Discharge: HOME | End: 2019-10-02
Attending: FAMILY MEDICINE

## 2019-10-02 DIAGNOSIS — M25.562 ACUTE PAIN OF LEFT KNEE: ICD-10-CM

## 2019-10-03 DIAGNOSIS — M25.562 ACUTE PAIN OF LEFT KNEE: ICD-10-CM

## 2019-10-04 DIAGNOSIS — S82.002A CLOSED NONDISPLACED FRACTURE OF LEFT PATELLA, UNSPECIFIED FRACTURE MORPHOLOGY, INITIAL ENCOUNTER: Primary | ICD-10-CM

## 2019-10-04 NOTE — PROGRESS NOTES
ORTHOPEDICS ADULT REFERRAL  [facility]  Phone: [phone]  Fax: [phone]    [place] [address] [place] [address]    Appointment:  Thursday October 10th  Arrival Time:  9:30am  Provider:  José    Please bring in a copy of your insurance card and photo ID.    If you cannot make this appointment, please contact [facility] at [phone] to reschedule.     Demographics, referral, office note(s) and radiology reports faxed to [phone].     Courtney Crook

## 2019-10-04 NOTE — RESULT ENCOUNTER NOTE
Called patient about subacute patellar fracture.  Discussed that she should wear her knee immobilizer again until seen by Ortho.  Discussed that there are other cartilage defects in the knee. It is most likely that these are degenerative from arthritis but unclear from report and some of this couple be from recent trauma.  Sending to orthopedics for a clear plan on multiple findings on MRI.

## 2019-10-04 NOTE — LETTER
October 4, 2019      Shira Guthrie  [address]  [place] [address]        Dear Shira,    Here is your appointment information we spoke about on the phone:    [facility]    [place]  [address] [place] [address]    Appointment:  Thursday October 10th  Arrival Time:  9:30am  Provider:  José    Please bring in a copy of your insurance card and photo ID.    If you cannot make this appointment, please contact [facility] at [phone] to reschedule.    Sincerely,    Courtney Crook

## 2019-10-10 ENCOUNTER — TRANSFERRED RECORDS (OUTPATIENT)
Dept: HEALTH INFORMATION MANAGEMENT | Facility: CLINIC | Age: 64
End: 2019-10-10

## 2019-10-11 ENCOUNTER — OFFICE VISIT (OUTPATIENT)
Dept: PSYCHOLOGY | Facility: CLINIC | Age: 64
End: 2019-10-11
Payer: MEDICARE

## 2019-10-11 DIAGNOSIS — G89.4 CHRONIC PAIN SYNDROME: Primary | ICD-10-CM

## 2019-10-11 NOTE — PATIENT INSTRUCTIONS
Schedule follow up with Dr. Sky as needed.  Know that I am always here for you even if we just touch base on the phone.    Personal Care Plan for Chronic Pain     1.  Personal Goals:                * take less medication              * lose weight: goal of losing 15 to 20 pounds.              * continue working on keeping thoughts positive through [employer]              * to feel confident enough that when someone gives me a compliment I can simply say thank you     2.  Sleep:                 *  Basic sleep plan:                          *reduce or eliminate caffeine and daytime naps                          * relaxation before bed                          * limit screen time 1-2 hours prior to bed                          * establish dark/quiet sleep environment                          * go to bed at target bedtime:   pm                          * keep consistent wake time:   am.              *  Minimize switching days and nights by staying active throughout the day.              * We'll talk more about a consistent sleep plan when we meet next time.                3.  Physical Activity:                 * Formal physical rehabilitation:                          HOME PT for shoulder              * Home/community based activity:                          * Home based exercises given by lymphedema nurse with breathing exercises built in as well - daily                          * Aerobic exercise/endurance exercise:  elliptical machine - 5 minute intervals with sit ups in between for 30 minutes - daily.  Has a  in the builiding. Has exercise tape in her apartment.                          * Cleaning and baking with body awareness and stretching              * Listen to your body.  Pace yourself for success.  Don't over-do it.  Plan to get PCA back to help with cleaning and laundry so as to not overdo it.                4.  Nutrition/Weight:                 * Keep a food journal in a notebook at home and bring this  to your next visit with Dr. Sky.  Eat small frequent meals.              * Review your I Can Prevent Diabetes materials.              * Limit processed foods and foods high in sugar, sodium and fat.              * Keep up the good work eating many healthy foods.              * When you make a less healthy choice approach yourself with kindness and compassion.  Try to understand what contributed to that choice:  Was I too hungry?  Was I too tired?  Stressed?  Worried?  Use this information to help support healthier choices in the future.     5.  Mood/Stress Management:     SELF COMPASSION!              * Formal interventions:                        * schedule follow up with Dr. Sky in about month or so.              * Home/community based interventions:                          * Regular contact with family  * Relaxation techniques - breathing exercises  * Create your pyramid to focus you on your strengths and hopes.  * Movies  * Meditation  * Yoga  * Creative activity  * Spiritual /prayer:  Prayer at home, attending services at [facility], wellness auxiliary group  * Service-based activity.              * Medications: Cymbalta, Hydroxyzine as needed.     6.  Tobacco/Alcohol/Drug Use:                               * Congratulations on quitting smoking and staying quit!  Keep up the good work managing stress/anxiety in other ways (prayer, talking it out, deep breaths, exercise, etc..                         * Maintain healthy relationship with alcohol                          * For women this would be no more than 1 drink per day                          * For men, this would be no more than 2 drinks per day              * Eliminate recreational drugs     7.  Pain:                 * Non-medication treatments:                          * ice/heat: use heating pad as you are doing.                          * massage                          * acupuncture     8.  Pain Medications:  Gabapentin 600mg twice a day   Tramadol one pill three times day. Have been doing two month refills.    Tylenol arthritis as needed for break through.  Oxycodone 10mg twice a day.  Do not use oxycodone and tramadol together.

## 2019-10-11 NOTE — PROGRESS NOTES
Behavioral Health Chronic Pain Management Visit     Visit type: Follow Up  Meeting lasted: 60 minutes (arrived 15 minutes late today)  Others present: no one    Reason for Consultation:  Shira Guthrie is a 64 year old,  female referred by Dr. Pierre for behavioral health consultation as part of the Chronic Pain Management protocol at [facility]. The goal of behavioral health visits is to help the patient with the psychosocial aspects of pain management. Ms. Guthrie was initially seen by this provider for her initial  CPM consult on 12/15/16.  Our last visit was on 5/10/19.    Topics Discussed:   1.  Pain experience:  [place] recently that she fractured knee in fall back in August.  Injured left elbow in the fall too.  Getting dressed and ready was painful and laborious today.  Got up at 5:30am to get ready and still struggled to arrive on time which as frustrating for her.  Thanked her for her persistence to arrive today.  Saw orthopedist yesterday and will be getting some advice on PT exercises she can do at home.  Surgery was not recommended.  Should heal on its own.  Lots of information to absorb.  Got a brace but can't drive with it so not wearing at our visit today.  Waiting to get a walker and clearly unhappy about this due to connotations with using this tool.  Encouraged seeing walker as a tool to aid recovery and no more.  Reflected that we all need help and support at times and that this was not a sign of personal failing or shortcoming.  Shira expressed appreciation for this feedback and support as she acknowledged she was being hard on herself for this.  2.  Grief and recovery:  Followed up with Shira on how she is coping in the wake of her son's death.  This has been understandably painful for her.  Grief had been complicated by conflict with her daughter in law, [place], which resulted in legal edwards over belongings in storage unit.  Shira wants to be  forgiving and loving but this has been a struggle in the wake of how this has added to her pain.  Doesn't like her angry thoughts and feelings as they are not consistent with her values.  Provided empathy for this experience.  The bulk of our visit today was spent reflecting on grief experience and providing psychoeducation around grief.  Encouraged making space for sadness, anger and a host of other emotions in the wake of this loss. Normalized experience and encouraged self-compassion, self-care and kindness.  Used analogy of recovering from knee fracture - that this was painful and would take time to recover and this process could not be hurried or rushed as much as we wish we could.  Shira was receptive to this feedback and expressed appreciation for the visit today.  Plans to spend some time alone over the weekend and will engage in spiritual fast and self-care.  Discussed being mindful of safe fasting practices (e.g., needing to eat at least a small amount with medications, being mindful that fasting could cause dizziness with low blood sugar and taking precautions to avoid another fall, etc.).  Discussed the difference between restorative alone time and unhelpful isolation.  Shira expressed appreciation for this difference and promises to engage in the former rather than the latter.  3.  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  Feels good about being able to use the belongings she shared with [place] in this manner.  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  Finally, plans to spend most of the winter in [place].  May need to return every 3 months rather than in 6 months due to need to obtain medications which require UA and this is frustrating to her, but manageable.  Understands reasons behind this.  Timing of trip is still somewhat uncertain due to her  knee and she does plan to discuss this with Dr. Pierre going forward.    Objective: Ms. Guthrie appears to be awake and alert for today's visit.      PHQ-9 SCORE 8/6/2019 8/12/2019 9/30/2019   PHQ-9 Total Score - - -   PHQ-9 Total Score 9 12 10     FAYE-7 SCORE 7/24/2018 10/30/2018 2/25/2019   Total Score - - -   Total Score 6 (mild anxiety) - -   Total Score 6 7 2     Wt Readings from Last 4 Encounters:   09/30/19 83.6 kg (184 lb 6.4 oz)   08/29/19 87.1 kg (192 lb)   08/12/19 84.4 kg (186 lb)   08/06/19 83.3 kg (183 lb 9.6 oz)     Assessment:   Ms. Guthrie was referred by Dr. Pierre for a behavioral health evaluation to address chronic pain syndrome.  Today, Ms. Guthrie's mood appeared depressed with congruent affect.  She was frequently tearful in the visit but also pleasant, engaged and receptive to feedback throughout.  Ms. Guthrie may benefit from continued meetings with this provider on an as needed basis to support improved pain management via help in recovering from loss/managing mood, weight loss, regular physical activity, improved sleep and support for continued tobacco cessation.    Stage of change: Action  Diagnosis: chronic pain syndrome    Plan:   1.  Updated Personal Care Plan for Chronic Pain (see patient instructions) today.  See current Care Plan Date: October/2019   2.  Next visit with Dr. Pierre is scheduled for 10/21/19.  3.  Follow up with this provider as needed.  Given multiple medical visits, winter weather and uncertain travel plans agreed to keep our meeting schedule flexible at this time.  Shira is aware that she can reach out via phone too if she is having a hard time coming to clinic or getting a timely visit.    Dior Sky, PhD, LP

## 2019-10-16 ENCOUNTER — OFFICE VISIT (OUTPATIENT)
Dept: FAMILY MEDICINE | Facility: CLINIC | Age: 64
End: 2019-10-16
Payer: MEDICARE

## 2019-10-16 ENCOUNTER — TELEPHONE (OUTPATIENT)
Dept: FAMILY MEDICINE | Facility: CLINIC | Age: 64
End: 2019-10-16

## 2019-10-16 VITALS
OXYGEN SATURATION: 98 % | HEART RATE: 83 BPM | DIASTOLIC BLOOD PRESSURE: 81 MMHG | RESPIRATION RATE: 16 BRPM | SYSTOLIC BLOOD PRESSURE: 139 MMHG | TEMPERATURE: 97.9 F

## 2019-10-16 DIAGNOSIS — R10.84 ABDOMINAL PAIN, GENERALIZED: Primary | ICD-10-CM

## 2019-10-16 DIAGNOSIS — K56.609 SMALL BOWEL OBSTRUCTION (H): Primary | ICD-10-CM

## 2019-10-16 LAB
ANION GAP SERPL CALCULATED.3IONS-SCNC: 14 MMOL/L (ref 5–18)
BUN SERPL-MCNC: 11 MG/DL (ref 8–22)
CALCIUM SERPL-MCNC: 10 MG/DL (ref 8.5–10.5)
CHLORIDE SERPL-SCNC: 111 MMOL/L (ref 98–107)
CO2 SERPL-SCNC: 16 MMOL/L (ref 22–31)
CREAT SERPL-MCNC: 0.88 MG/DL (ref 0.6–1.1)
GLUCOSE SERPL-MCNC: 87 MG/DL (ref 70–125)
POTASSIUM SERPL-SCNC: 4.4 MMOL/L (ref 3.5–5)
SODIUM SERPL-SCNC: 141 MMOL/L (ref 136–145)

## 2019-10-16 RX ADMIN — KETOROLAC TROMETHAMINE 30 MG: 30 INJECTION, SOLUTION INTRAMUSCULAR; INTRAVENOUS at 08:09

## 2019-10-16 NOTE — TELEPHONE ENCOUNTER
Patient reports 2 days of N?V and now dry heaves. Pt concerned about bowel obstruction due to he history of colostomy. Pt reports pas gas and belching, has been able to keep small amount of water down. She denies pain but does report her body is sore from dry heaving. Pt  Transferred to call center to schedule appt for today, pt aware of her option to be seen in the ER.    Note routed to Dr.Wicks Addis PELAEZ

## 2019-10-16 NOTE — NURSING NOTE
Received call from Dr. Butler, radiologist. Mildly dilated small bowel on abd x-ray, possible ileus or small bowel obstruction. Face to face communication with Dr. Taylor. ./MAHOGANY

## 2019-10-16 NOTE — Clinical Note
Thanks so much to both of your for your help with this patient!  Let me know if you have questions.

## 2019-10-16 NOTE — TELEPHONE ENCOUNTER
[facility] phone call message- general phone call:    Reason for call: She needs to talk to a nurse re some pain she is having. She wants to know if sheould should wait till Monday to see her PCP ir go to the hospital.    Action desired: call back.    Return call needed: Yes    OK to leave a message on voice mail? Yes    Advised patient to response may take up to 2 business days: Yes    Primary language: English      needed? No    Call taken on October 16, 2019 at 8:18 AM by Earline Curry

## 2019-10-16 NOTE — PATIENT INSTRUCTIONS
We will give pain medication shot today.  This should help with the soreness.      For the next day, drink only clear fluids:   --Start with water, can go to broth, gatorade, clear juices for the next 24 hours.   --Try to drink atleast 6 bottles of fluids a day  --Watch your urine and if it goes down, let us know.      IF you either have a bowel movement, OR go 24 hours without vomitting while doing the clear fluids, you can go to thickened fluids  --This would be things like pudding, oatmeal, cream of wheat, cream soup.    --Do this for 24 hours, and then if you are feeling okay you can go to soft foods.      Try to limit the narcotic pain medications if you can.  These can slow up your bowels.      Keep your appointment with Dr. Pierre on 10/21 at 9:40AM  Call us if your pain is getting worse or if you start vomitting again.  If you vomit more than 2 times in a row, go to the ER.

## 2019-10-16 NOTE — PROGRESS NOTES
Nursing Notes:   Alexa Hanley  10/16/2019  4:55 PM  Signed  Received call from Dr. Butler, radiologist. Mildly dilated small bowel on abd x-ray, possible ileus or small bowel obstruction. Face to face communication with Dr. Taylor. ./MAHOGANY    SUBJECTIVE  Shira NIDIA Guthrie is a 64 year old female with past medical history significant for    Patient Active Problem List   Diagnosis     CAD (coronary artery disease)     MDD (major depressive disorder)     Chronic constipation     Stroke (H)     Benign essential hypertension     Osteoporosis     PAD (peripheral artery disease) (H)     Fracture closed of upper end of forearm     Synovial sarcoma (H)     Esophageal stricture     Anginal pain (H)     Health Care Home     B12 deficiency     Cataracts, bilateral     Malignant neoplasm of connective and other soft tissue     Chronic pain syndrome     Other polyneuropathy     Primary osteoarthritis involving multiple joints     Lymphedema of left leg     False positive serological test for hepatitis C     Cervical radiculopathy at C7     Rotator cuff impingement syndrome of right shoulder     H/O hysterectomy for benign disease     Problems related to other legal circumstances     Others present at the visit:  Appreciate input from Dr. Pierre, patient's PCP.      Presents for   Chief Complaint   Patient presents with     Constipation     Pt has been experiencing bowel obstruction with dry heaving.     Medication Reconciliation     Complete.      Patient presents today for evaluation of abdominal pain.  She reports a history of previous abdominal surgery and colostomy, with history of bowel obstructions in the past.  She also has a history of chronic pain and recent fall, leading to a patellar fracture.  Has been taking narcotic pain medications for treatment of this pain and fracture.  Also has a long history of constipation, and has seen GI in the past for evaluation of this.  She takes Linzess, and this is worked quite  well for her.  Takes the medication daily and has a soft regular bowel movement every day.  Started developing pain this past Sunday, 3 days prior to admission.  Initially pain was sharp and was worse with turning and movement.  Better if she stayed still.  She had a couple episodes of vomiting, and has been unable to pass a bowel movement since that time.  Since Monday, however her pain has improved.  She now feels more achy soreness in her upper abdomen.  Previously was unable to eat anything and now was able to take sips of water and she is been able to take multiple sips of water through the day today.  She reports regular urination.  In the clinic earlier today she was able to urinate Vera and passed some gas, but no stool.  Pain is relatively manageable now, and is better when she does not have to move.  She denies any fevers or chills.  No dizziness or lightheadedness.  Has been moving very little because of the pain.  Notes that her knee pain has been better because she is been less physically active due to the stomach discomfort.    Patient and I discussed her abdominal x-ray which showed some dilated loops of bowel concerning for partial versus complete bowel obstruction.  This is what she was expecting.  She actually feels like her symptoms are better and she has not vomited all day today.  Had dry heaves yesterday but no vomiting yesterday.  She is starting to feel more of a pressure sensation in her abdomen like she may be able to have a bowel movement soon.  She would like to avoid hospitalization if possible, it is interested in developing a plan to treat this at home.    OBJECTIVE:  Vitals: /81 (BP Location: Left arm, Patient Position: Sitting, Cuff Size: Adult Large)   Pulse 83   Temp 97.9  F (36.6  C) (Oral)   Resp 16   SpO2 98%   BMI= There is no height or weight on file to calculate BMI.  Objective:    Vitals:  Vitals are reviewed and are within the normal range.  Non-tachycardic.   Blood pressure is appropriate.  No evidence of dehydration.  Gen: Appears tired, but is pleasant, thoughtful, and in no acute distress.  Cardiac:  Regular rate and rhythm, no murmurs, rubs or gallops  Respiratory:  Lungs clear to auscultation bilaterally  Abdomen: Belly overall is soft.  She has some mild tenderness with palpation in the upper epigastric region as well as in the left lower quadrant.  No rebound or guarding.  No CVA tenderness.  Bowel sounds are present but diminished.  Skin: Capillary refill is appropriate.    Results for orders placed or performed in visit on 10/16/19   Basic Metabolic Profile ([facility]) - Results > 1 hr   Result Value Ref Range    Sodium 141 136 - 145 mmol/L    Potassium 4.4 3.5 - 5.0 mmol/L    Chloride 111 (H) 98 - 107 mmol/L    CO2, Total 16 (L) 22 - 31 mmol/L    Anion Gap 14 5 - 18 mmol/L    Glucose 87 70 - 125 mg/dL    Calcium 10.0 8.5 - 10.5 mg/dL    Urea Nitrogen 11 8 - 22 mg/dL    Creatinine 0.88 0.60 - 1.10 mg/dL    GFR Estimate If Black >60 >60 mL/min/1.73m2    GFR Estimate >60 >60 mL/min/1.73m2    Narrative    Test performed by:  [facility] [address] [place] [address]  Fasting Glucose reference range is 70-99 mg/dL per  American Diabetes Association (ADA) guidelines.     Xray: Spoke with LATANYA Junior about these results as well as reviewed the flatplate.  This shows some dilated loops of bowel consistent with possible obstruction.    ASSESSMENT AND PLAN:      Shira was seen today for constipation and medication reconciliation.  History, exam, and imaging consistent with partial small bowel obstruction.  Given that the patient is able to take in fluids, does not appear dehydrated on lab testing, exam her vitals, I am okay with treating this conservatively at home.  Discussed appropriate fluid intake with patient, and outlined a diet advancement plan as described below.  Encouraged her to limit narcotics if possible.  We will hold off on the Linzess  given contraindications to take with obstruction, but she will try to use her MiraLAX, which she still has at home.  We will give one-time dose of Toradol here in clinic to hopefully help with the pain.  She is on a couple of antiplatelet agents, so would not want to do this long-term, but I think a one-time dose will be okay.  She will call us if she has more trouble or if she begins vomiting again.  At that point, would recommend evaluation in the ED and possible admission.  Patient is in agreement with this plan.    Diagnoses and all orders for this visit:    Small bowel obstruction (H)  -     Basic Metabolic Profile ([facility]) - Results > 1 hr  -     CBC w/ Diff and Plt ([facility])  -     ketorolac (TORADOL) injection 30 mg        Patient Instructions   We will give pain medication shot today.  This should help with the soreness.      For the next day, drink only clear fluids:   --Start with water, can go to broth, gatorade, clear juices for the next 24 hours.   --Try to drink atleast 6 bottles of fluids a day  --Watch your urine and if it goes down, let us know.      IF you either have a bowel movement, OR go 24 hours without vomitting while doing the clear fluids, you can go to thickened fluids  --This would be things like pudding, oatmeal, cream of wheat, cream soup.    --Do this for 24 hours, and then if you are feeling okay you can go to soft foods.      Try to limit the narcotic pain medications if you can.  These can slow up your bowels.      Keep your appointment with Dr. Pierre on 10/21 at 9:40AM  Call us if your pain is getting worse or if you start vomitting again.  If you vomit more than 2 times in a row, go to the ER.          Patient has follow-up visit already scheduled with Dr. pierre on Monday.  She was reminded of the time of this visit and will keep this visit.    Sandrine Taylor MD

## 2019-10-16 NOTE — TELEPHONE ENCOUNTER
Patient has appt scheduled for today at 4:10  Writer recommended pt be seen in ER if her symptoms worsen or change prior to her appt today. Patient voices understanding and is agreeable with plan.    Addis PELAEZ

## 2019-10-17 ENCOUNTER — TELEPHONE (OUTPATIENT)
Dept: FAMILY MEDICINE | Facility: CLINIC | Age: 64
End: 2019-10-17

## 2019-10-17 RX ORDER — KETOROLAC TROMETHAMINE 30 MG/ML
30 INJECTION, SOLUTION INTRAMUSCULAR; INTRAVENOUS ONCE
Status: COMPLETED | OUTPATIENT
Start: 2019-10-17 | End: 2019-10-16

## 2019-10-17 NOTE — TELEPHONE ENCOUNTER
Pt wondering if she can take her medications as prescribed with her current partial SBO. She notes she did have a BM when she came home from the clinic yesterday, which was soft but formed. She has been tolerating water and juice. Her stomach is feeling less sore.     She did note she has been avoiding narcotics as instructed, but is wondering about her other medication such as baclofen, gabapentin, BP meds, and fiber powder. Sent to Dr. Taylor for guidance. ./LR

## 2019-10-17 NOTE — TELEPHONE ENCOUNTER
I would avoid taking the Lasix, and the LInzess, but the rest of them are fine.  And the muscle relaxer might be helpful with the pain too.  If she has some regular tylenol, that would be a good idea as well.      I'd recommend that she start with 1-2 pills, see how she feels, and then after an hour or so can try some more.      Sandrine Taylor MD    Routed to LATANYA Junior

## 2019-10-17 NOTE — RESULT ENCOUNTER NOTE
Xray reviewed and discussed with LATANYA Junior and with patient in clinic.  No further action needed at this time.      Sandrine Taylor MD

## 2019-10-17 NOTE — TELEPHONE ENCOUNTER
Spoke with pt, relayed below instructions re: medication. Verbalized understanding.     Pt states she continues to have BMs with decreased stomach pain. Instructed to call the clinic if she begins to feel worse. No further questions or concerns. ./LR

## 2019-10-17 NOTE — TELEPHONE ENCOUNTER
[facility] phone call message- general phone call:    Reason for call: Pt was just in to see Dr Pierre for a bowel obstruction and she is wondering if she can take her BP meds and a couple other ones.    Return call needed: Yes    OK to leave a message on voice mail? Yes    Primary language: English      needed? No    Call taken on October 17, 2019 at 10:02 AM by Roma Edmonds

## 2019-10-18 ENCOUNTER — TELEPHONE (OUTPATIENT)
Dept: FAMILY MEDICINE | Facility: CLINIC | Age: 64
End: 2019-10-18

## 2019-10-18 NOTE — TELEPHONE ENCOUNTER
"Pt called with update on abdomen after partial SBO (see 10/16 visit).     She reports she is still passing gas, and yesterday and today had multiple soft stools. She continues to have soreness in her stomach, but the \"hard, firm\" pain she had before is gone. The soreness makes her feel as though she has to have a BM, and when she is able to have a BM this pressure is relieved. She has had no episodes of emesis or dry heaving since her clinic visit.     Pt reports she feels a little weak, and that she is still taking a clear liquid diet. Encouraged pt to advance this to thickened liquids and soft foods per Dr. Taylor's instructions. Pt reports that she has not taken her medication today, encouraged her to try and take a few pills at a time per Dr. Taylor's instructions. Reminded pt to hold lasix, lizness, and narcotics at this time. Pt verbalized understanding.    Discussed that she would be able to reach an on call provider tonight and over the weekend if she had any questions or concerns. Agreeable to plan.     Routed to Dr. Taylor, Dr. Pierre. ./LR  "

## 2019-10-18 NOTE — TELEPHONE ENCOUNTER
[facility] phone call message- general phone call:    Reason for call: she would like to talk to a nurse re her bowel obstruction and her stool.    Action desired: call back.    Return call needed: Yes    OK to leave a message on voice mail? Yes    Advised patient to response may take up to 2 business days: Yes    Primary language: English      needed? No    Call taken on October 18, 2019 at 2:20 PM by Earline Curry

## 2019-10-18 NOTE — TELEPHONE ENCOUNTER
I also agree with plan.  I am on OB call this weekend, so if the on call  resident has any questions, they should feel free to reach out to me as well.      Sandrine Taylor MD

## 2019-10-21 ENCOUNTER — OFFICE VISIT (OUTPATIENT)
Dept: FAMILY MEDICINE | Facility: CLINIC | Age: 64
End: 2019-10-21
Payer: MEDICARE

## 2019-10-21 VITALS
DIASTOLIC BLOOD PRESSURE: 82 MMHG | BODY MASS INDEX: 30.93 KG/M2 | WEIGHT: 181.2 LBS | HEIGHT: 64 IN | SYSTOLIC BLOOD PRESSURE: 131 MMHG | RESPIRATION RATE: 16 BRPM | OXYGEN SATURATION: 100 % | TEMPERATURE: 98.1 F | HEART RATE: 78 BPM

## 2019-10-21 DIAGNOSIS — K56.609 SMALL BOWEL OBSTRUCTION (H): ICD-10-CM

## 2019-10-21 DIAGNOSIS — G89.4 CHRONIC PAIN SYNDROME: Primary | ICD-10-CM

## 2019-10-21 DIAGNOSIS — M15.0 PRIMARY OSTEOARTHRITIS INVOLVING MULTIPLE JOINTS: ICD-10-CM

## 2019-10-21 DIAGNOSIS — K22.2 ESOPHAGEAL STRICTURE: ICD-10-CM

## 2019-10-21 DIAGNOSIS — G62.89 OTHER POLYNEUROPATHY: ICD-10-CM

## 2019-10-21 RX ORDER — TRAMADOL HYDROCHLORIDE 50 MG/1
50 TABLET ORAL 3 TIMES DAILY PRN
Qty: 90 TABLET | Refills: 2 | Status: SHIPPED | OUTPATIENT
Start: 2019-10-21 | End: 2019-12-02

## 2019-10-21 ASSESSMENT — MIFFLIN-ST. JEOR: SCORE: 1347.79

## 2019-10-21 NOTE — PATIENT INSTRUCTIONS
PACE yourself.     Personal Care Plan for Chronic Pain    1.  Personal Goals:                * take less medication              * lose weight: goal of losing 15 to 20 pounds.              * continue working on keeping thoughts positive through [employer]              * to feel confident enough that when someone gives me a compliment I can simply say thank you    2.  Sleep:                 *  Basic sleep plan:                          *reduce or eliminate caffeine and daytime naps                          * relaxation before bed                          * limit screen time 1-2 hours prior to bed                          * establish dark/quiet sleep environment                          * go to bed at target bedtime:   pm                          * keep consistent wake time:   am.              *  Minimize switching days and nights by staying active throughout the day.              * We'll talk more about a consistent sleep plan when we meet next time.                3.  Physical Activity:                 * Formal physical rehabilitation:                          HOME PT for shoulder              * Home/community based activity:                          * Home based exercises given by lymphedema nurse with breathing exercises built in as well - daily                          * Aerobic exercise/endurance exercise:  elliptical machine - 5 minute intervals with sit ups in between for 30 minutes - daily.  Has a  in the builiding. Has exercise tape in her apartment.                          * Cleaning and baking with body awareness and stretching              * Listen to your body.  Pace yourself for success.  Don't over-do it.  Plan to get PCA back to help with cleaning and laundry so as to not overdo it.                4.  Nutrition/Weight:                 * Keep a food journal in a notebook at home and bring this to your next visit with Dr. Sky.  Eat small frequent meals.              * Review your I Can Prevent  Diabetes materials.              * Limit processed foods and foods high in sugar, sodium and fat.              * Keep up the good work eating many healthy foods.              * When you make a less healthy choice approach yourself with kindness and compassion.  Try to understand what contributed to that choice:  Was I too hungry?  Was I too tired?  Stressed?  Worried?  Use this information to help support healthier choices in the future.    5.  Mood/Stress Management:     SELF COMPASSION!              * Formal interventions:                        * schedule follow up with Dr. Sky in about month or so.              * Home/community based interventions:                          * Regular contact with family  * Relaxation techniques - breathing exercises  * Create your pyramid to focus you on your strengths and hopes.  * Movies  * Meditation  * Yoga  * Creative activity  * Spiritual /prayer:  Prayer at home, attending services at [facility], wellness auxiliary group  * Service-based activity.              * Medications: Cymbalta, Hydroxyzine as needed.    6.  Tobacco/Alcohol/Drug Use:                               * Congratulations on quitting smoking and staying quit!  Keep up the good work managing stress/anxiety in other ways (prayer, talking it out, deep breaths, exercise, etc..                         * Maintain healthy relationship with alcohol                          * For women this would be no more than 1 drink per day                          * For men, this would be no more than 2 drinks per day              * Eliminate recreational drugs    7.  Pain:                 * Non-medication treatments:                          * ice/heat: use heating pad as you are doing.                          * massage                          * acupuncture    8.  Pain Medications: Gabapentin 600mg twice a day   Tramadol one pill three times day. Have been doing two month refills.    Tylenol  arthritis as needed for break through.              XR Upper GI w Small Bowel Follow Through   [facility]  [phone]  Fax Orders to [phone]     Order faxed to [facility] Scheduling at [phone], they will contact patient to schedule.     GASTROENTEROLOGY ADULT REF PROCEDURE ONLY  Online referral placed with [facility] who will contact patient to schedule.     [facility]  Phone [phone]  Fax: [phone]    Courtney Crook

## 2019-10-21 NOTE — PROGRESS NOTES
" Chronic Pain Follow-Up Visit    Pain Update:  Location of pain:left knee, back and left leg. Right shoulder  Analgesia/pain control: Recent changes:  same    Overall control: Tolerable with discomfort    Adherance     How often do you take extra pain medicine:Never    Did you take your pain medication today? YES    Adverse effects: No     Broken patella. Seen by orthopedics since last visit.  Encouraged her to use walker to prevent falls. No surgery indicated at this time.  Suspect that cartilage changes are degenerative more than traumatic.  Will re-examine after patella heals.    Recent abdominal pain and constipation. Was seen by Dr. Taylor last week and has symptoms of abdominal pain, n/v.  Xray showed partial obstruction but symptoms were resolving at that visit.  No further nausea or vomiting. Having regular liquid stools. Still has some burning with hot coffee and tea.  When she swallows it is uncomfortable up by her stomach.  She takes one pill at a time. She is tolerating soft diet without difficulty.            Database checked today? Yes. Details: as expected.    PHQ-9 SCORE 8/6/2019 8/12/2019 9/30/2019   PHQ-9 Total Score - - -   PHQ-9 Total Score 9 12 10     FAYE-7 SCORE 7/24/2018 10/30/2018 2/25/2019   Total Score - - -   Total Score 6 (mild anxiety) - -   Total Score 6 7 2       Care Plan discussed and updated as needed.  See the end of this note.       FUNCTIONAL ASSESSMENT QUESTIONNAIRE SCORE 8/12/2019 9/30/2019   Total Score 35 40          Problem, Medication and Allergy Lists were reviewed and are current..           Physical Exam:     Vitals:    10/21/19 0957   BP: 131/82   BP Location: Left arm   Pulse: 78   Resp: 16   Temp: 98.1  F (36.7  C)   TempSrc: Oral   SpO2: 100%   Weight: 82.2 kg (181 lb 3.2 oz)   Height: 1.627 m (5' 4.06\")     Body mass index is 31.05 kg/m .  Vitals were reviewed and were normal  GENERAL: healthy, alert, well nourished, well hydrated, no distress  RESP: lungs clear " to auscultation - no rales, no rhonchi, no wheezes  CV: regular rates and rhythm, normal S1 S2, no S3 or S4 and no murmur, no click or rub -  ABDOMEN: soft, epigastric tenderness to deep palpation nondistended, no  hepatosplenomegaly, no masses, normal bowel sounds  MS: extremities- no gross deformities noted, no edema.  Left knee effusion has improved.  More range of motion than last visit.  Ambulating with a cane.        Results:     Results for orders placed or performed in visit on 10/21/19   Rapid Urine Drug Screen ([facility])   Result Value Ref Range    Cannabinoids Qual Urine NEGATIVE NEGATIVE    Phencyclidine NEGATIVE NEGATIVE    Cocaine Qual Urine NEGATIVE NEGATIVE    Methamphetamine Qual NEGATIVE NEGATIVE    Morphine Qual NEGATIVE NEGATIVE    Amphetamines Qual NEGATIVE NEGATIVE    Benzodiazepine Qual Urine NEGATIVE NEGATIVE    Tricyclic Antidepressants NEGATIVE NEGATIVE    Methadone Qual NEGATIVE NEGATIVE    Barbiturates Qual Urine NEGATIVE NEGATIVE    Oxycodone Qual NEGATIVE NEGATIVE    Propoxyphene NEGATIVE NEGATIVE    Buprenorphine Qual Urine NEGATIVE NEGATIVE    Temperature of Urine was Between  Degrees F YES YES      rapid urine drug screen obtained today: Yes    Assessment and Plan    Shira Guthrie is here for follow up of chronic pain caused by osteoarthritis and peripheral neuropathy in left leg secondary to past stroke..  Right rotator cuff tear status post repair.  Recent acute patellar fracture from a fall.     Shira was seen today for follow up and pain management.    Diagnoses and all orders for this visit:    Chronic pain syndrome:  Other polyneuropathy  Primary osteoarthritis involving multiple joints   times daily as needed for severe pain Acute pain from patellar fracture is improving.  Swelling has gone down with rest.  She did not like the physical therapist.  She does not plan to return to physical therapy but is feeling better.  She will follow-up with orthopedics for her  patellar fracture as already scheduled.   refilled her tramadol.  -     Rapid Urine Drug Screen ([facility])  -     traMADol (ULTRAM) 50 MG tablet; Take 1 tablet (50 mg) by mouth 3 times daily as needed for severe pain    Small bowel obstruction (H): Had signs and symptoms of bowel obstruction last week that was resolving at her last visit.  Supportive cares done at home with clear liquid diet and slowly advancing.  Her symptoms have resolved.  She has a history of multiple bowel obstructions since she is status post multiple surgeries with probable adhesions.  We will send her back to GI and get a small bowel follow-through to further evaluate her bowels.  Continue soft diet for now.  -     GASTROENTEROLOGY ADULT REF CONSULT ONLY  -     XR Upper GI w Small Bowel Follow Through; Future    Esophageal stricture: History of esophageal stricture now with new symptoms of trouble with swallowing pills and pain near the esophageal junction with this.  Continue soft diet taking 1 pill at a time gave instructions in case something gets stuck to go to the ER.  I am concerned that she will need esophageal stretching as she has had in the past.  We will have close follow-up with GI that she is seen before for this issue.  I suspect this became more of an issue after vomiting last week which likely led to some inflammation around an already present stricture.  -     GASTROENTEROLOGY ADULT REF CONSULT ONLY      Chronic Pain Syndrome:  Care plan updated with patient, see below for details.  Patient is being prescribed 50mg of tramadol IR per day. 45 mg MEDD  Care Plan Date: October/2019  Naloxone has not been prescribed.       If opioids prescribed patient was asked to bring pill bottle to each appointment and was informed that refills would only be provided at office visits.   Asked patient to F/U in 1 month(s) with same provider for 20 minute  Visit.     Sally Pierre MD    Patient Instructions     PACE yourself.     Personal Care  Plan for Chronic Pain    1.  Personal Goals:                * take less medication              * lose weight: goal of losing 15 to 20 pounds.              * continue working on keeping thoughts positive through [employer]              * to feel confident enough that when someone gives me a compliment I can simply say thank you    2.  Sleep:                 *  Basic sleep plan:                          *reduce or eliminate caffeine and daytime naps                          * relaxation before bed                          * limit screen time 1-2 hours prior to bed                          * establish dark/quiet sleep environment                          * go to bed at target bedtime:   pm                          * keep consistent wake time:   am.              *  Minimize switching days and nights by staying active throughout the day.              * We'll talk more about a consistent sleep plan when we meet next time.                3.  Physical Activity:                 * Formal physical rehabilitation:                          HOME PT for shoulder              * Home/community based activity:                          * Home based exercises given by lymphedema nurse with breathing exercises built in as well - daily                          * Aerobic exercise/endurance exercise:  elliptical machine - 5 minute intervals with sit ups in between for 30 minutes - daily.  Has a  in the builiding. Has exercise tape in her apartment.                          * Cleaning and baking with body awareness and stretching              * Listen to your body.  Pace yourself for success.  Don't over-do it.  Plan to get PCA back to help with cleaning and laundry so as to not overdo it.                4.  Nutrition/Weight:                 * Keep a food journal in a notebook at home and bring this to your next visit with Dr. Sky.  Eat small frequent meals.              * Review your I Can Prevent Diabetes materials.              *  Limit processed foods and foods high in sugar, sodium and fat.              * Keep up the good work eating many healthy foods.              * When you make a less healthy choice approach yourself with kindness and compassion.  Try to understand what contributed to that choice:  Was I too hungry?  Was I too tired?  Stressed?  Worried?  Use this information to help support healthier choices in the future.    5.  Mood/Stress Management:     SELF COMPASSION!              * Formal interventions:                        * schedule follow up with Dr. Sky in about month or so.              * Home/community based interventions:                          * Regular contact with family  * Relaxation techniques - breathing exercises  * Create your pyramid to focus you on your strengths and hopes.  * Movies  * Meditation  * Yoga  * Creative activity  * Spiritual /prayer:  Prayer at home, attending services at [facility], wellness auxiliary group  * Service-based activity.              * Medications: Cymbalta, Hydroxyzine as needed.    6.  Tobacco/Alcohol/Drug Use:                               * Congratulations on quitting smoking and staying quit!  Keep up the good work managing stress/anxiety in other ways (prayer, talking it out, deep breaths, exercise, etc..                         * Maintain healthy relationship with alcohol                          * For women this would be no more than 1 drink per day                          * For men, this would be no more than 2 drinks per day              * Eliminate recreational drugs    7.  Pain:                 * Non-medication treatments:                          * ice/heat: use heating pad as you are doing.                          * massage                          * acupuncture    8.  Pain Medications: Gabapentin 600mg twice a day   Tramadol one pill three times day. Have been doing two month refills.    Tylenol arthritis as needed for break  through.

## 2019-10-22 ENCOUNTER — RECORDS - HEALTHEAST (OUTPATIENT)
Dept: ADMINISTRATIVE | Facility: OTHER | Age: 64
End: 2019-10-22

## 2019-10-23 ENCOUNTER — TRANSFERRED RECORDS (OUTPATIENT)
Dept: HEALTH INFORMATION MANAGEMENT | Facility: CLINIC | Age: 64
End: 2019-10-23

## 2019-10-24 ENCOUNTER — HOSPITAL ENCOUNTER (OUTPATIENT)
Dept: RADIOLOGY | Facility: HOSPITAL | Age: 64
Discharge: HOME | End: 2019-10-24
Attending: FAMILY MEDICINE

## 2019-10-24 DIAGNOSIS — K56.609 SMALL BOWEL OBSTRUCTION (H): ICD-10-CM

## 2019-10-24 DIAGNOSIS — K22.2 ESOPHAGEAL STRICTURE: ICD-10-CM

## 2019-10-25 DIAGNOSIS — K22.2 ESOPHAGEAL STRICTURE: ICD-10-CM

## 2019-10-25 DIAGNOSIS — K56.609 SMALL BOWEL OBSTRUCTION (H): ICD-10-CM

## 2019-10-28 ENCOUNTER — TELEPHONE (OUTPATIENT)
Dept: FAMILY MEDICINE | Facility: CLINIC | Age: 64
End: 2019-10-28

## 2019-10-28 NOTE — TELEPHONE ENCOUNTER
"Patient reporting she has a \"cold\" she has a productive cough started with brownish mucous now green. She reports diarrhea over the weekend which she had a few incont stools  which may have been related to the contrast she had for her UGI. She reports she continues to have pain when taking her meds, pain with eating mashed potatoes also.   Patient asking for a flu shot and what is going to be the next step in her treatment. She also wants to know if she needs an antibiotic for her cough.     Note routed to Dr.Wicks Mancini RN  "

## 2019-10-28 NOTE — TELEPHONE ENCOUNTER
[facility] phone call message- patient requesting results:    Test: Lab and X-ray    Date of test: 10/25/2019    Additional Comments: the pt would like a call back      OK to leave a message on voice mail? Yes    Primary language: English      needed? No    Call taken on October 28, 2019 at 8:55 AM by Karel Diaz

## 2019-10-29 NOTE — TELEPHONE ENCOUNTER
Patient waiting for results of recent tests and possible antibiotic    Note routed to Dr.Wicks Mancini RN

## 2019-10-30 NOTE — NURSING NOTE
Clinic Administered Medication Documentation    MEDICATION LIST:   Injectable Medication Documentation    Patient was given Ketorolac Tromethamine (Toradol). Prior to medication administration, verified patients identity using patient s name and date of birth. Please see MAR and medication order for additional information. Patient instructed to remain in clinic for 15 minutes.      Was entire vial of medication used? No, The remainder 30ML of TORODOL was discarded as unavoidable waste.  Vial/Syringe: Single dose vial  Expiration Date:  02/2021  Was this medication supplied by the patient? No    Name of provider who requested the medication administration: Brandon  Name of provider on site (faculty or community preceptor) at the time of performing the medication administration: Brandon    Date of next administration: N/A  Date of next office visit with provider to renew medication plan (must be seen annually): N/A

## 2019-10-30 NOTE — TELEPHONE ENCOUNTER
Pt is calling back regarding her results for the bowel obstruction she had. If she can get a call back as soon as results are in. Thank you.

## 2019-10-31 NOTE — RESULT ENCOUNTER NOTE
Discussed results with patient in detail.  Showed no clear obstruction but did have esophageal spasm.

## 2019-11-01 ENCOUNTER — OFFICE VISIT (OUTPATIENT)
Dept: FAMILY MEDICINE | Facility: CLINIC | Age: 64
End: 2019-11-01
Payer: MEDICARE

## 2019-11-01 VITALS
OXYGEN SATURATION: 98 % | HEART RATE: 79 BPM | SYSTOLIC BLOOD PRESSURE: 140 MMHG | RESPIRATION RATE: 18 BRPM | BODY MASS INDEX: 31.15 KG/M2 | DIASTOLIC BLOOD PRESSURE: 74 MMHG | TEMPERATURE: 98.1 F | WEIGHT: 181.8 LBS

## 2019-11-01 DIAGNOSIS — R05.9 COUGH: ICD-10-CM

## 2019-11-01 DIAGNOSIS — M25.562 ACUTE PAIN OF LEFT KNEE: ICD-10-CM

## 2019-11-01 DIAGNOSIS — H65.02 NON-RECURRENT ACUTE SEROUS OTITIS MEDIA OF LEFT EAR: Primary | ICD-10-CM

## 2019-11-01 RX ORDER — OXYCODONE AND ACETAMINOPHEN 10; 325 MG/1; MG/1
1 TABLET ORAL 2 TIMES DAILY PRN
Qty: 30 TABLET | Refills: 0 | Status: SHIPPED | OUTPATIENT
Start: 2019-11-01 | End: 2019-12-02

## 2019-11-01 RX ORDER — GUAIFENESIN/DEXTROMETHORPHAN 100-10MG/5
5 SYRUP ORAL EVERY 4 HOURS PRN
Qty: 1 BOTTLE | Refills: 1 | Status: SHIPPED | OUTPATIENT
Start: 2019-11-01 | End: 2019-11-26

## 2019-11-01 RX ORDER — AZITHROMYCIN 250 MG/1
TABLET, FILM COATED ORAL
Qty: 6 TABLET | Refills: 0 | Status: SHIPPED | OUTPATIENT
Start: 2019-11-01 | End: 2020-02-27

## 2019-11-01 NOTE — Clinical Note
During patients visit she had a fall and was evaluated for this as well. Please note this when determining the LOS.Thanks,Eliz

## 2019-11-01 NOTE — LETTER
November 5, 2019      Shira Guthrie  [address]  [address]  [place] [address]        Dear Shira,    No evidence of pneumonia on the chest xray.  This is good news!  I hope you are feeling better.  Please call the clinic at [phone] if you have any questions.       If you have any questions, please call the clinic to make an appointment.    Sincerely,    Sandrine Taylor MD

## 2019-11-01 NOTE — PATIENT INSTRUCTIONS
Antibiotics:  Take 2 pills today, then 1 pill per day for a total of 5 days  Use the cough syrup as needed  Salt water gargles and honey  Refill pain medicine.

## 2019-11-02 NOTE — PROGRESS NOTES
There are no exam notes on file for this visit.    SUBJECTIVE  Shiraramya Guthrie is a 64 year old female with past medical history significant for    Patient Active Problem List   Diagnosis     CAD (coronary artery disease)     MDD (major depressive disorder)     Chronic constipation     Stroke (H)     Benign essential hypertension     Osteoporosis     PAD (peripheral artery disease) (H)     Fracture closed of upper end of forearm     Synovial sarcoma (H)     Esophageal stricture     Anginal pain (H)     Health Care Home     B12 deficiency     Cataracts, bilateral     Malignant neoplasm of connective and other soft tissue     Chronic pain syndrome     Other polyneuropathy     Primary osteoarthritis involving multiple joints     Lymphedema of left leg     False positive serological test for hepatitis C     Cervical radiculopathy at C7     Rotator cuff impingement syndrome of right shoulder     H/O hysterectomy for benign disease     Problems related to other legal circumstances     Others present at the visit:  None    Presents for   Chief Complaint   Patient presents with     URI     Pt is here to check on her cold.  She states she is coughing up brown mucous and would like an antibiotic. She states it started last week when she was here for her bowel obstruction.     Medication Reconciliation     Complete.      Patient presents today for evaluation of upper respiratory symptoms.  She says she has been feeling generally unwell ever since her small bowel follow-through at [facility].  Says she had a little trouble with coughing after drinking the solution, and since then she is noticed a cough.  It is productive, with a brownish type sputum.  Also endorses runny nose, but no fevers or chills.  Positive for sweats during the nighttime.  She notes that she has had some nasal discomfort, headache, and sinus pressure.  She has been blowing her nose a lot.  Also notices that she is waking up feeling sweaty at  night.    She continues to eat softer food ever since her small bowel follow-through, and this has been okay.  She has been passing gas and stooling regularly.  No vomiting.  She has been worried about taking her Linzess because of potential bowel obstruction.  She thinks her swelling has been more challenging because of this.    She had a x-ray done prior to her visit, and results were discussed here.  Per my read it was negative.  Unfortunately during the process of getting the chest x-ray, she stepped back and tripped over something in the room.  She fell backwards onto her bottom.  Hit both of her elbows against the wall.  Also banged her left lateral ankle.  Was slowed down in the fall process by the staff that was helping her.  She was able to get up after this and walk appropriately.  She does use a cane and has significant underlying chronic pain and history of osteoporosis with previous fractures.  She last received a prescription for Percocet from Dr. Pierre on September 30.  She is almost out of this medication and is worried that she will need some additional medication because of the fall.  She is wondering if I could refill this today.    OBJECTIVE:  Vitals: BP (!) 140/74 (BP Location: Left arm, Patient Position: Sitting, Cuff Size: Adult Large)   Pulse 79   Temp 98.1  F (36.7  C) (Oral)   Resp 18   Wt 82.5 kg (181 lb 12.8 oz)   SpO2 98%   BMI 31.15 kg/m    BMI= Body mass index is 31.15 kg/m .  Objective:    Vitals:  Vitals are reviewed and are within the normal range  Gen:  Alert, pleasant, no acute distress  Head:  Normal cephalic, atraumatic  Ears:  Tympanic membranes viewed bilaterally, effusion present in the right TM.  No erythema, bulging, or fluid present  Nose: Bilateral congestion.  She has right maxillary sinus tenderness.  Throat:  Clear.  Mildly-erythematous and without exudate  Neck: Mild tonsillar lymphadenopathy  Cardiac:  Regular rate and rhythm, no murmurs, rubs or  gallops  Respiratory:  Lungs clear to auscultation bilaterally  Abdomen:  Soft, non-tender, non-distended, bowel sounds positive  Extremities:  Warm, well-perfused, pulses 2+/4, no lower extremity edema  Skin:  Mucous membranes moist.  No rash.   Capillary refill <2 secs. mild tenderness with examination of the lateral left ankle.  No bruising, normal range of motion.  Patient is able to weight-bear without discomfort today.    Chest x-ray.  This was reviewed by me and discussed with the patient.  This is negative for any signs of pneumonia.    ASSESSMENT AND PLAN:      Shira was seen today for uri and medication reconciliation.  Concerns for aspiration pneumonia given her recent coughing following a contrast receiving procedure.  The chest x-ray looks negative, and she is not having fevers.  Nonetheless, she has a right serous otitis with right frontal sinus tenderness.  Typically would recommend Augmentin or amoxicillin in the situation, but patient has an allergy to these medications.  Instead we will treat with azithromycin.  It did pop up but the patient has an allergy to red dye, but this did not seem to be significant allergy and patient was in agreement with trying azithromycin.  Also prescribe some guaifenesin cough syrup and recommended tea, hot water, salt water gargles, and honey.  Push fluids.  Okay to advance diet to more solid foods and to resume her Linzess.    Patient did have a fall in the clinic.  No significant injuries on my examination.  She was able to ambulate appropriately.  Will likely be more sore by this and has chronic pain at baseline.  She is due for refill on her CPM medications, so I did give her 2 weeks worth of Percocet today.  She will follow-up with Dr. lane, her regular primary for further CPM management.    Diagnoses and all orders for this visit:    Cough  -     Cancel: XR CHEST 2 VW  -     XR CHEST 2 VW  -     azithromycin (ZITHROMAX) 250 MG tablet; Take 2 tablets (500  mg) by mouth daily for 1 day, THEN 1 tablet (250 mg) daily for 4 days.  -     guaiFENesin-dextromethorphan (ROBITUSSIN DM) 100-10 MG/5ML syrup; Take 5 mLs by mouth every 4 hours as needed for cough    Non-recurrent acute serous otitis media of left ear  -     azithromycin (ZITHROMAX) 250 MG tablet; Take 2 tablets (500 mg) by mouth daily for 1 day, THEN 1 tablet (250 mg) daily for 4 days.  -     guaiFENesin-dextromethorphan (ROBITUSSIN DM) 100-10 MG/5ML syrup; Take 5 mLs by mouth every 4 hours as needed for cough    Acute pain of left knee  -     oxyCODONE-acetaminophen (PERCOCET)  MG per tablet; Take 1 tablet by mouth 2 times daily as needed for severe pain Qty for 2 weeks        Patient Instructions   Antibiotics:  Take 2 pills today, then 1 pill per day for a total of 5 days  Use the cough syrup as needed  Salt water gargles and honey  Refill pain medicine.      Follow up in 2 weeks for CPM follow up with Dr. Pierre.      Sandrine Taylor MD

## 2019-11-04 NOTE — RESULT ENCOUNTER NOTE
Shira Guthrie-    No evidence of pneumonia on the chest xray.  This is good news!  I hope you are feeling better.  Please call the clinic at [phone] if you have any questions.      Sandrine Taylor MD    Please send results to patient.

## 2019-11-08 DIAGNOSIS — K22.2 ESOPHAGEAL STRICTURE: ICD-10-CM

## 2019-11-08 RX ORDER — DEXLANSOPRAZOLE 30 MG/1
30 CAPSULE, DELAYED RELEASE ORAL DAILY
Qty: 90 CAPSULE | Refills: 4 | Status: SHIPPED | OUTPATIENT
Start: 2019-11-08 | End: 2020-08-21

## 2019-11-26 ENCOUNTER — OFFICE VISIT (OUTPATIENT)
Dept: FAMILY MEDICINE | Facility: CLINIC | Age: 64
End: 2019-11-26
Payer: MEDICARE

## 2019-11-26 VITALS
DIASTOLIC BLOOD PRESSURE: 73 MMHG | HEIGHT: 63 IN | SYSTOLIC BLOOD PRESSURE: 113 MMHG | HEART RATE: 76 BPM | OXYGEN SATURATION: 98 % | RESPIRATION RATE: 16 BRPM | WEIGHT: 184.8 LBS | TEMPERATURE: 98 F | BODY MASS INDEX: 32.74 KG/M2

## 2019-11-26 DIAGNOSIS — R05.9 COUGH: Primary | ICD-10-CM

## 2019-11-26 DIAGNOSIS — K22.2 ESOPHAGEAL STRICTURE: ICD-10-CM

## 2019-11-26 DIAGNOSIS — M25.562 ACUTE PAIN OF LEFT KNEE: ICD-10-CM

## 2019-11-26 DIAGNOSIS — G89.4 CHRONIC PAIN SYNDROME: ICD-10-CM

## 2019-11-26 DIAGNOSIS — H65.02 NON-RECURRENT ACUTE SEROUS OTITIS MEDIA OF LEFT EAR: ICD-10-CM

## 2019-11-26 DIAGNOSIS — Z63.4 BEREAVEMENT: ICD-10-CM

## 2019-11-26 RX ORDER — GUAIFENESIN/DEXTROMETHORPHAN 100-10MG/5
5 SYRUP ORAL EVERY 4 HOURS PRN
Qty: 1 BOTTLE | Refills: 1 | Status: SHIPPED | OUTPATIENT
Start: 2019-11-26 | End: 2019-12-02

## 2019-11-26 SDOH — SOCIAL STABILITY - SOCIAL INSECURITY: DISSAPEARANCE AND DEATH OF FAMILY MEMBER: Z63.4

## 2019-11-26 ASSESSMENT — MIFFLIN-ST. JEOR: SCORE: 1353.5

## 2019-11-26 NOTE — PATIENT INSTRUCTIONS
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    Plan for pain medicine to cover for 1-2 month while she is on her trip.    Tramadol for the arthritis, oxycodone for the knee and hand and elbow.    Continuing to do exercises.    Make sure urine is okay  Make sure that breathing/cough is okay  Make sure that right hand pain  Talk about the results from the xray--this all looks good.

## 2019-11-26 NOTE — Clinical Note
FYI.  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  Still struggling with missing son and challenging relationship with daughter-in-law.  I deferred CPM/ controlled substances to you for the visit on Monday.  Thanks

## 2019-12-02 ENCOUNTER — OFFICE VISIT (OUTPATIENT)
Dept: FAMILY MEDICINE | Facility: CLINIC | Age: 64
End: 2019-12-02
Payer: MEDICARE

## 2019-12-02 VITALS
WEIGHT: 187.4 LBS | HEART RATE: 75 BPM | SYSTOLIC BLOOD PRESSURE: 112 MMHG | RESPIRATION RATE: 16 BRPM | TEMPERATURE: 97.5 F | DIASTOLIC BLOOD PRESSURE: 71 MMHG | BODY MASS INDEX: 32.79 KG/M2

## 2019-12-02 DIAGNOSIS — M15.0 PRIMARY OSTEOARTHRITIS INVOLVING MULTIPLE JOINTS: ICD-10-CM

## 2019-12-02 DIAGNOSIS — Z23 NEED FOR PROPHYLACTIC VACCINATION AND INOCULATION AGAINST INFLUENZA: Primary | ICD-10-CM

## 2019-12-02 DIAGNOSIS — G89.4 CHRONIC PAIN SYNDROME: ICD-10-CM

## 2019-12-02 DIAGNOSIS — G62.89 OTHER POLYNEUROPATHY: ICD-10-CM

## 2019-12-02 RX ORDER — OXYCODONE AND ACETAMINOPHEN 10; 325 MG/1; MG/1
1 TABLET ORAL 2 TIMES DAILY PRN
Qty: 30 TABLET | Refills: 0 | Status: SHIPPED | OUTPATIENT
Start: 2019-12-02 | End: 2020-02-20

## 2019-12-02 RX ORDER — TRAMADOL HYDROCHLORIDE 50 MG/1
50 TABLET ORAL 3 TIMES DAILY PRN
Qty: 90 TABLET | Refills: 2 | Status: SHIPPED | OUTPATIENT
Start: 2019-12-02 | End: 2020-02-27

## 2019-12-02 ASSESSMENT — PATIENT HEALTH QUESTIONNAIRE - PHQ9: SUM OF ALL RESPONSES TO PHQ QUESTIONS 1-9: 7

## 2019-12-02 NOTE — PROGRESS NOTES
Chronic Pain Follow-Up Visit    Pain Update:  Location of pain: back, hands and knees  Analgesia/pain control: Recent changes:  same , worse with cleaning snow and with cold weather.  Overall control: Tolerable with discomfort    Adherance     How often do you take extra pain medicine:Never    Did you take your pain medication today? Last dose was last night.    Using oxycodone sparingly for breakthrough severe pain to help her be more functional.  Would like one more month for her trip to [place].    Adverse effects: No      Database checked today? Yes as expected.  Restarted Yoga. Knee is still a little swollen, but pain is improving.      Leaving December 5 for [place] for 2 months. Would like refills to last until she gest back as she does not want to try to get refills down there.  She would like #30 oxcyodone because it helps with severe pain flares and helps her be more functional.  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  She does nto take the medications together.     PHQ-9 SCORE 8/12/2019 9/30/2019 12/2/2019   PHQ-9 Total Score - - -   PHQ-9 Total Score 12 10 7     FAYE-7 SCORE 7/24/2018 10/30/2018 2/25/2019   Total Score - - -   Total Score 6 (mild anxiety) - -   Total Score 6 7 2       Care Plan discussed and updated as needed.  See the end of this note.       FUNCTIONAL ASSESSMENT QUESTIONNAIRE SCORE 9/30/2019 12/2/2019   Total Score 40 30          Problem, Medication and Allergy Lists were reviewed and are current..           Physical Exam:     Vitals:    12/02/19 0819   BP: 112/71   Pulse: 75   Resp: 16   Temp: 97.5  F (36.4  C)   Weight: 85 kg (187 lb 6.4 oz)     Body mass index is 32.79 kg/m .  Vitals were reviewed and were normal  GENERAL: healthy, alert, well nourished, well hydrated, no distress  RESP: lungs clear to auscultation - no rales, no rhonchi, no wheezes  CV: regular rates and rhythm,  normal S1 S2, no S3 or S4 and no murmur, no click or rub -  ABDOMEN: soft, no tenderness, no  hepatosplenomegaly, no masses, normal bowel sounds  MS: extremities- no gross deformities noted, Mild edema in left leg.  Leg knee is mildly swollen and slightly tender to the touch but good motion and ambulating near baseline.  Antalgic with cane.        Results:     Results for orders placed or performed in visit on 12/02/19   Rapid Urine Drug Screen ([facility])     Status: None   Result Value Ref Range    Cannabinoids Qual Urine NEGATIVE NEGATIVE    Phencyclidine NEGATIVE NEGATIVE    Cocaine Qual Urine NEGATIVE NEGATIVE    Methamphetamine Qual NEGATIVE NEGATIVE    Morphine Qual NEGATIVE NEGATIVE    Amphetamines Qual NEGATIVE NEGATIVE    Benzodiazepine Qual Urine NEGATIVE NEGATIVE    Tricyclic Antidepressants NEGATIVE NEGATIVE    Methadone Qual NEGATIVE NEGATIVE    Barbiturates Qual Urine NEGATIVE NEGATIVE    Oxycodone Qual NEGATIVE NEGATIVE    Propoxyphene NEGATIVE NEGATIVE    Buprenorphine Qual Urine NEGATIVE NEGATIVE    Temperature of Urine was Between  Degrees F YES YES      rapid urine drug screen obtained today: Yes    Assessment and Plan    Shira Guthrie is here for follow up of chronic pain caused by arthritis and neuropathy.    Shira was seen today for recheck.    Diagnoses and all orders for this visit:    Chronic pain syndrome: Refilled both medications for her chronic pain.  She will return in 2 months.  Discussed that she should call if needing another tramadol refill while in [place] as prescription refill rules for pain medications are challenging with law changes..  -     oxyCODONE-acetaminophen (PERCOCET)  MG per tablet; Take 1 tablet by mouth 2 times daily as needed for severe pain Qty for 2 weeks  -     traMADol (ULTRAM) 50 MG tablet; Take 1 tablet (50 mg) by mouth 3 times daily as needed for severe pain  -     Rapid Urine Drug Screen ([facility])    Other polyneuropathy  -     traMADol  (ULTRAM) 50 MG tablet; Take 1 tablet (50 mg) by mouth 3 times daily as needed for severe pain    Primary osteoarthritis involving multiple joints  -     traMADol (ULTRAM) 50 MG tablet; Take 1 tablet (50 mg) by mouth 3 times daily as needed for severe pain        Chronic Pain Syndrome:  Care plan updated with patient, see below for details.  Patient is being prescribed 90mg of tramadol IR per day. 45 mg MEDD Also #30 oxycodone 10 mg one tab a day as needed for severe breakthrough pain. She knows to not take them together.  Care Plan Date: December/2019  Naloxone has not been prescribed.    If opioids prescribed patient was asked to bring pill bottle to each appointment and was informed that refills would only be provided at office visits.   Asked patient to F/U in 2 month(s) with same provider for 20 minute  Visit.     Sally Pierre MD    Patient Instructions     PACE yourself.     Personal Care Plan for Chronic Pain    1.  Personal Goals:                * take less medication              * lose weight: goal of losing 15 to 20 pounds.              * continue working on keeping thoughts positive through [employer]              * to feel confident enough that when someone gives me a compliment I can simply say thank you    2.  Sleep:                 *  Basic sleep plan:                          *reduce or eliminate caffeine and daytime naps                          * relaxation before bed                          * limit screen time 1-2 hours prior to bed                          * establish dark/quiet sleep environment                          * go to bed at target bedtime:   pm                          * keep consistent wake time:   am.              *  Minimize switching days and nights by staying active throughout the day.              * We'll talk more about a consistent sleep plan when we meet next time.                3.  Physical Activity:                 * Formal physical  rehabilitation:                          HOME PT for shoulder              * Home/community based activity:                          * Home based exercises given by lymphedema nurse with breathing exercises built in as well - daily                          * Aerobic exercise/endurance exercise:  elliptical machine - 5 minute intervals with sit ups in between for 30 minutes - daily.  Has a  in the builiding. Has exercise tape in her apartment.                          * Cleaning and baking with body awareness and stretching              * Listen to your body.  Pace yourself for success.  Don't over-do it.  Plan to get PCA back to help with cleaning and laundry so as to not overdo it.                4.  Nutrition/Weight:                 * Keep a food journal in a notebook at home and bring this to your next visit with Dr. Sky.  Eat small frequent meals.              * Review your I Can Prevent Diabetes materials.              * Limit processed foods and foods high in sugar, sodium and fat.              * Keep up the good work eating many healthy foods.              * When you make a less healthy choice approach yourself with kindness and compassion.  Try to understand what contributed to that choice:  Was I too hungry?  Was I too tired?  Stressed?  Worried?  Use this information to help support healthier choices in the future.    5.  Mood/Stress Management:     SELF COMPASSION!              * Formal interventions:                        * schedule follow up with Dr. Sky in about month or so.              * Home/community based interventions:                          * Regular contact with family  * Relaxation techniques - breathing exercises  * Create your pyramid to focus you on your strengths and hopes.  * Movies  * Meditation  * Yoga  * Creative activity  * Spiritual /prayer:  Prayer at home, attending services at [facility], wellness auxiliary group  * Service-based  activity.              * Medications: Cymbalta, Hydroxyzine as needed.    6.  Tobacco/Alcohol/Drug Use:                               * Congratulations on quitting smoking and staying quit!  Keep up the good work managing stress/anxiety in other ways (prayer, talking it out, deep breaths, exercise, etc..                         * Maintain healthy relationship with alcohol                          * For women this would be no more than 1 drink per day                          * For men, this would be no more than 2 drinks per day              * Eliminate recreational drugs    7.  Pain:                 * Non-medication treatments:                          * ice/heat: use heating pad as you are doing.                          * massage                          * acupuncture    8.  Pain Medications: Gabapentin 600mg twice a day   Tramadol one pill three times day. Have been doing two month refills.    Tylenol arthritis as needed for break through.

## 2019-12-02 NOTE — PATIENT INSTRUCTIONS
PACE yourself.     Personal Care Plan for Chronic Pain    1.  Personal Goals:                * take less medication              * lose weight: goal of losing 15 to 20 pounds.              * continue working on keeping thoughts positive through [employer]              * to feel confident enough that when someone gives me a compliment I can simply say thank you    2.  Sleep:                 *  Basic sleep plan:                          *reduce or eliminate caffeine and daytime naps                          * relaxation before bed                          * limit screen time 1-2 hours prior to bed                          * establish dark/quiet sleep environment                          * go to bed at target bedtime:   pm                          * keep consistent wake time:   am.              *  Minimize switching days and nights by staying active throughout the day.              * We'll talk more about a consistent sleep plan when we meet next time.                3.  Physical Activity:                 * Formal physical rehabilitation:                          HOME PT for shoulder              * Home/community based activity:                          * Home based exercises given by lymphedema nurse with breathing exercises built in as well - daily                          * Aerobic exercise/endurance exercise:  elliptical machine - 5 minute intervals with sit ups in between for 30 minutes - daily.  Has a  in the builiding. Has exercise tape in her apartment.                          * Cleaning and baking with body awareness and stretching              * Listen to your body.  Pace yourself for success.  Don't over-do it.  Plan to get PCA back to help with cleaning and laundry so as to not overdo it.                4.  Nutrition/Weight:                 * Keep a food journal in a notebook at home and bring this to your next visit with Dr. Sky.  Eat small frequent meals.              * Review your I Can Prevent  Diabetes materials.              * Limit processed foods and foods high in sugar, sodium and fat.              * Keep up the good work eating many healthy foods.              * When you make a less healthy choice approach yourself with kindness and compassion.  Try to understand what contributed to that choice:  Was I too hungry?  Was I too tired?  Stressed?  Worried?  Use this information to help support healthier choices in the future.    5.  Mood/Stress Management:     SELF COMPASSION!              * Formal interventions:                        * schedule follow up with Dr. Sky in about month or so.              * Home/community based interventions:                          * Regular contact with family  * Relaxation techniques - breathing exercises  * Create your pyramid to focus you on your strengths and hopes.  * Movies  * Meditation  * Yoga  * Creative activity  * Spiritual /prayer:  Prayer at home, attending services at [facility], wellness auxiliary group  * Service-based activity.              * Medications: Cymbalta, Hydroxyzine as needed.    6.  Tobacco/Alcohol/Drug Use:                               * Congratulations on quitting smoking and staying quit!  Keep up the good work managing stress/anxiety in other ways (prayer, talking it out, deep breaths, exercise, etc..                         * Maintain healthy relationship with alcohol                          * For women this would be no more than 1 drink per day                          * For men, this would be no more than 2 drinks per day              * Eliminate recreational drugs    7.  Pain:                 * Non-medication treatments:                          * ice/heat: use heating pad as you are doing.                          * massage                          * acupuncture    8.  Pain Medications: Gabapentin 600mg twice a day   Tramadol one pill three times day. Have been doing two month refills.    Tylenol  arthritis as needed for break through.

## 2019-12-02 NOTE — LETTER
December 2, 2019      Shira Guthrie  [address]  [address]  [place] [address]        Dear Shira,    Please see below for your test results.    Resulted Orders   Rapid Urine Drug Screen ([facility])   Result Value Ref Range    Cannabinoids Qual Urine NEGATIVE NEGATIVE    Phencyclidine NEGATIVE NEGATIVE    Cocaine Qual Urine NEGATIVE NEGATIVE    Methamphetamine Qual NEGATIVE NEGATIVE    Morphine Qual NEGATIVE NEGATIVE    Amphetamines Qual NEGATIVE NEGATIVE    Benzodiazepine Qual Urine NEGATIVE NEGATIVE    Tricyclic Antidepressants NEGATIVE NEGATIVE    Methadone Qual NEGATIVE NEGATIVE    Barbiturates Qual Urine NEGATIVE NEGATIVE    Oxycodone Qual NEGATIVE NEGATIVE    Propoxyphene NEGATIVE NEGATIVE    Buprenorphine Qual Urine NEGATIVE NEGATIVE    Temperature of Urine was Between  Degrees F YES YES      Comment:      This is a preliminary screening test that detects drugs-of-abuse in urine at   specified detection levels.  To confirm preliminary results, a more specific   method such as Gas Chromatography/Mass Spectrometry (GC/MS) must be used.          Your drug screen was negative as expected.    If you have any questions, please call the clinic to make an appointment.    Sincerely,    Sally Pierre MD

## 2020-02-12 ENCOUNTER — TELEPHONE (OUTPATIENT)
Dept: FAMILY MEDICINE | Facility: CLINIC | Age: 65
End: 2020-02-12

## 2020-02-12 DIAGNOSIS — G89.4 CHRONIC PAIN SYNDROME: Primary | ICD-10-CM

## 2020-02-12 DIAGNOSIS — G62.89 OTHER POLYNEUROPATHY: ICD-10-CM

## 2020-02-12 NOTE — TELEPHONE ENCOUNTER
Patient want to know if she can get a partial prescription for her Tramadol she has an appointment with  on the 27th but she will be out before then.

## 2020-02-14 RX ORDER — TRAMADOL HYDROCHLORIDE 50 MG/1
50 TABLET ORAL 2 TIMES DAILY PRN
Qty: 28 TABLET | Refills: 0 | Status: SHIPPED | OUTPATIENT
Start: 2020-02-14 | End: 2020-02-27

## 2020-02-14 NOTE — TELEPHONE ENCOUNTER
Pt is calling back to check on status of refill. She states she is only wanting partial until she sees Dr. Pierer on the 27th.

## 2020-02-14 NOTE — TELEPHONE ENCOUNTER
Talked to patient and let her know that Dr. Kurtz I sending in a prescription for the Tramadol until she sees Dr. Pierre.

## 2020-02-18 RX ORDER — GABAPENTIN 600 MG/1
TABLET ORAL
Qty: 180 TABLET | Refills: 3 | Status: SHIPPED | OUTPATIENT
Start: 2020-02-18 | End: 2021-02-16

## 2020-02-19 ENCOUNTER — TELEPHONE (OUTPATIENT)
Dept: FAMILY MEDICINE | Facility: CLINIC | Age: 65
End: 2020-02-19

## 2020-02-19 DIAGNOSIS — G89.4 CHRONIC PAIN SYNDROME: ICD-10-CM

## 2020-02-19 NOTE — TELEPHONE ENCOUNTER
"Placed return call to Shira.  She's had some falls including a fall in the xray room at clinic. Provided empathy for this events.  \"I've been enduring the pain\" but struggling with this due to falls.  Has called in request for additional pain medication and will be seeing Dr. Pierre on 2/27/2020.  She is also struggling with some increased depression in the wake of her son's death and with increased pain and would like to schedule a follow up visit with me.  Had difficulty doing this with the  today. Apologized for any confusion and facilitated scheduling a visit with me on 3/9/2020 at 9am.  Shira expressed appreciation for this call back and for help with scheduling.  She denies any other acute needs today.  Encouraged her to call back sooner if mood worsens or if she is needing more support prior to our visit on 3/9/2020.    Will route note from today to Dr. Pierre so she can be aware of conversation and follow up plan.    Dior Sky, PhD, LP    "

## 2020-02-19 NOTE — TELEPHONE ENCOUNTER
[facility] phone call message- patient requesting a refill:    Full Medication Name:     oxyCODONE-acetaminophen (PERCOCET)  MG per tablet    Dose:     Take 1 tablet by mouth 2 times daily as needed for severe pain Qty for 2 weeks - Oral    Pharmacy confirmed as     [id number] - [place], [place] - [address] AT [facility] [address]  [address]  [place] [address]  Phone: [phone] Fax: [phone]  : Yes    Additional Comments:     Pt states she is enduring her pain, however, she is wondering if she can get some before her appt on 02/27/2020    OK to leave a message on voice mail? Yes    Primary language: English      needed? No    Call taken on February 19, 2020 at 8:31 AM by Paula Acosta CMA

## 2020-02-19 NOTE — TELEPHONE ENCOUNTER
[facility] phone call message- general phone call:    Reason for call:     Pt is requesting a call back from Dr. Sky about her depression and what's going on in her life. She's had a couple falls.     Action desired:     Call back    Return call needed: Yes    OK to leave a message on voice mail? Yes    Advised patient to response may take up to 2 business days: Yes    Primary language: English      needed? No    Call taken on February 19, 2020 at 8:49 AM by Paula Acosta CMA

## 2020-02-20 RX ORDER — OXYCODONE AND ACETAMINOPHEN 10; 325 MG/1; MG/1
1 TABLET ORAL 2 TIMES DAILY PRN
Qty: 7 TABLET | Refills: 0 | Status: SHIPPED | OUTPATIENT
Start: 2020-02-20 | End: 2020-02-27

## 2020-02-27 ENCOUNTER — OFFICE VISIT (OUTPATIENT)
Dept: FAMILY MEDICINE | Facility: CLINIC | Age: 65
End: 2020-02-27
Payer: MEDICARE

## 2020-02-27 VITALS
SYSTOLIC BLOOD PRESSURE: 133 MMHG | RESPIRATION RATE: 20 BRPM | HEART RATE: 73 BPM | BODY MASS INDEX: 33.07 KG/M2 | WEIGHT: 189 LBS | DIASTOLIC BLOOD PRESSURE: 79 MMHG | TEMPERATURE: 97.6 F

## 2020-02-27 DIAGNOSIS — Z23 NEED FOR PROPHYLACTIC VACCINATION AND INOCULATION AGAINST VIRAL DISEASE: ICD-10-CM

## 2020-02-27 DIAGNOSIS — F33.2 MAJOR DEPRESSIVE DISORDER, RECURRENT, SEVERE WITHOUT PSYCHOTIC FEATURES (H): ICD-10-CM

## 2020-02-27 DIAGNOSIS — I25.9 CHRONIC ISCHEMIC HEART DISEASE: ICD-10-CM

## 2020-02-27 DIAGNOSIS — M25.511 CHRONIC RIGHT SHOULDER PAIN: ICD-10-CM

## 2020-02-27 DIAGNOSIS — G62.89 OTHER POLYNEUROPATHY: ICD-10-CM

## 2020-02-27 DIAGNOSIS — I20.9 ANGINAL PAIN (H): ICD-10-CM

## 2020-02-27 DIAGNOSIS — C49.9 SYNOVIAL SARCOMA (H): ICD-10-CM

## 2020-02-27 DIAGNOSIS — I73.9 PAD (PERIPHERAL ARTERY DISEASE) (H): ICD-10-CM

## 2020-02-27 DIAGNOSIS — M15.0 PRIMARY OSTEOARTHRITIS INVOLVING MULTIPLE JOINTS: ICD-10-CM

## 2020-02-27 DIAGNOSIS — G89.4 CHRONIC PAIN SYNDROME: Primary | ICD-10-CM

## 2020-02-27 DIAGNOSIS — G89.29 CHRONIC RIGHT SHOULDER PAIN: ICD-10-CM

## 2020-02-27 DIAGNOSIS — R73.03 PREDIABETES: ICD-10-CM

## 2020-02-27 DIAGNOSIS — M75.41 ROTATOR CUFF IMPINGEMENT SYNDROME OF RIGHT SHOULDER: ICD-10-CM

## 2020-02-27 LAB
ALBUMIN SERPL-MCNC: 4.5 MG/DL (ref 3.3–4.9)
ALP SERPL-CCNC: 101.4 U/L (ref 40–150)
ALT SERPL-CCNC: <15 U/L (ref 0–45)
AMPHETAMINES QUAL: NEGATIVE
AST SERPL-CCNC: 14.4 U/L (ref 0–45)
BARBITURATES QUAL URINE: NEGATIVE
BENZODIAZEPINE QUAL URINE: NEGATIVE
BILIRUB SERPL-MCNC: 0.3 MG/DL (ref 0.2–1.3)
BUN SERPL-MCNC: 7.5 MG/DL (ref 7–19)
BUPRENORPHINE QUAL URINE: NEGATIVE
CALCIUM SERPL-MCNC: 9.4 MG/DL (ref 8.5–10.1)
CANNABINOIDS UR QL SCN: NEGATIVE
CHLORIDE SERPLBLD-SCNC: 102 MMOL/L (ref 98–110)
CHOLEST SERPL-MCNC: 161 MG/DL (ref 0–200)
CHOLEST/HDLC SERPL: 2.1 {RATIO} (ref 0–5)
CO2 SERPL-SCNC: 26.7 MMOL/L (ref 20–32)
COCAINE QUAL URINE: NEGATIVE
CREAT SERPL-MCNC: 0.8 MG/DL (ref 0.5–1)
GFR SERPL CREATININE-BSD FRML MDRD: 71.8 ML/MIN/1.7 M2
GLUCOSE SERPL-MCNC: 87.6 MG'DL (ref 70–99)
HBA1C MFR BLD: 5.7 % (ref 4.1–5.7)
HDLC SERPL-MCNC: 78.3 MG/DL
LDLC SERPL CALC-MCNC: 73 MG/DL (ref 0–129)
METHAMPHETAMINE: NEGATIVE
METHODONE QUAL: NEGATIVE
MORPHINE QUAL: NEGATIVE
OXYCODONE QUAL: POSITIVE
PHENCYCLIDINE: NEGATIVE
POTASSIUM SERPL-SCNC: 3.9 MMOL/L (ref 3.2–4.6)
PROPOXYPHENE: NEGATIVE
PROT SERPL-MCNC: 7.4 G/DL (ref 6.8–8.8)
SODIUM SERPL-SCNC: 138.8 MMOL/L (ref 132–142)
TEMPERATURE OF URINE WAS BETWEEN 90-100 DEGREES F: YES
TRICYCLIC ANTIDEPRESSANTS: NEGATIVE
TRIGL SERPL-MCNC: 47.6 MG/DL (ref 0–150)
VLDL CHOLESTEROL: 9.5 MG/DL (ref 7–32)

## 2020-02-27 RX ORDER — OXYCODONE AND ACETAMINOPHEN 10; 325 MG/1; MG/1
1 TABLET ORAL 2 TIMES DAILY PRN
Qty: 30 TABLET | Refills: 0 | Status: SHIPPED | OUTPATIENT
Start: 2020-02-27 | End: 2020-03-24

## 2020-02-27 RX ORDER — TRAMADOL HYDROCHLORIDE 50 MG/1
50 TABLET ORAL 3 TIMES DAILY PRN
Qty: 90 TABLET | Refills: 2 | Status: SHIPPED | OUTPATIENT
Start: 2020-02-27 | End: 2020-03-24

## 2020-02-27 ASSESSMENT — PAIN SCALES - GENERAL: PAINLEVEL: SEVERE PAIN (6)

## 2020-02-27 ASSESSMENT — PATIENT HEALTH QUESTIONNAIRE - PHQ9: SUM OF ALL RESPONSES TO PHQ QUESTIONS 1-9: 7

## 2020-02-27 NOTE — LETTER
February 28, 2020      Shira Guthrie  [address]  [address]  [place] [address]        Dear Shira,    Please see below for your test results.    Resulted Orders   Comprehensive Metabolic Panel ([facility])   Result Value Ref Range    Albumin 4.5 3.3 - 4.9 mg/dL    Alkaline Phosphatase 101.4 40.0 - 150.0 U/L    ALT <15 0.0 - 45.0 U/L    AST 14.4 0.0 - 45.0 U/L    Bilirubin Total 0.3 0.2 - 1.3 mg/dL    Urea Nitrogen 7.5 7.0 - 19.0 mg/dL    Calcium 9.4 8.5 - 10.1 mg/dL    Chloride 102.0 98.0 - 110.0 mmol/L    Carbon Dioxide 26.7 20.0 - 32.0 mmol/L    Creatinine 0.8 0.5 - 1.0 mg/dL    Glucose 87.6 70.0 - 99.0 mg'dL    Potassium 3.9 3.2 - 4.6 mmol/L    Sodium 138.8 132.0 - 142.0 mmol/L    Protein Total 7.4 6.8 - 8.8 g/dL    GFR Estimate 71.8 >60.0 mL/min/1.7 m2    GFR Estimate If Black 86.9 >60.0 mL/min/1.7 m2   Hemoglobin A1c ([facility])   Result Value Ref Range    Hemoglobin A1C 5.7 4.1 - 5.7 %   Lipid Panel ([facility])   Result Value Ref Range    Cholesterol 161.0 0.0 - 200.0 mg/dL    Cholesterol/HDL Ratio 2.1 0.0 - 5.0    HDL Cholesterol 78.3 >40.0 mg/dL    LDL Cholesterol Calculated 73 0 - 129 mg/dL    Triglycerides 47.6 0.0 - 150.0 mg/dL    VLDL Cholesterol 9.5 7.0 - 32.0 mg/dL   Rapid Urine Drug Screen ([facility])   Result Value Ref Range    Cannabinoids Qual Urine NEGATIVE NEGATIVE    Phencyclidine NEGATIVE NEGATIVE    Cocaine Qual Urine NEGATIVE NEGATIVE    Methamphetamine Qual NEGATIVE NEGATIVE    Morphine Qual NEGATIVE NEGATIVE    Amphetamines Qual NEGATIVE NEGATIVE    Benzodiazepine Qual Urine NEGATIVE NEGATIVE    Tricyclic Antidepressants NEGATIVE NEGATIVE    Methadone Qual NEGATIVE NEGATIVE    Barbiturates Qual Urine NEGATIVE NEGATIVE    Oxycodone Qual POSITIVE (A) NEGATIVE    Propoxyphene NEGATIVE NEGATIVE    Buprenorphine Qual Urine NEGATIVE NEGATIVE    Temperature of Urine was Between  Degrees F YES YES      Comment:      This is a preliminary screening test that detects drugs-of-abuse in urine at    specified detection levels.  To confirm preliminary results, a more specific   method such as Gas Chromatography/Mass Spectrometry (GC/MS) must be used.            Good news!  All of your labs are normal. This is very reassuring.    If you have any questions, please call the clinic to make an appointment.    Sincerely,    Sally Pierre MD

## 2020-02-27 NOTE — PROGRESS NOTES
Chronic Pain Follow-Up Visit    Pain Update:  Location of pain: right shoulder, back and knees. Left elbow.    Analgesia/pain control: Recent changes:  worse    Overall control: Tolerable with discomfort    Right shoulder is having trouble with ROM of motion again.  She has not had a new injury but pulled luggage on her trip to [place].  It has been bothering her for about a month in a half.  If she lifts it above her head or internally rotates too far then it catches and to get it back it causes a pop. This is also causing her increased pain for which the oxycodone is helpful during the day.  She usually take one oxycodone during the day, never takes at the same time as tramadol. Oxycodone helps the neuropathy pain as well.    Tramadol is 2-3 times a day.  This helps more at night and with her arthritis pain.    Trip was good spiritually, but was overwhelmed at times as did not have enough privacy.    Adherance     How often do you take extra pain medicine:Never    Did you take your pain medication today? YES, tramadol at 4AM, took last oxycodone yesterday.    Adverse effects: No      Database checked today? No    Mood down from stress around financials, daughter in law trying to get money from her.  Also was in [place] for 2-3 months and was not able to take time for herself as much.     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     PHQ-9 SCORE 8/12/2019 9/30/2019 12/2/2019   PHQ-9 Total Score - - -   PHQ-9 Total Score 12 10 7     FAYE-7 SCORE 7/24/2018 10/30/2018 2/25/2019   Total Score - - -   Total Score 6 (mild anxiety) - -   Total Score 6 7 2       Care Plan discussed and updated as needed.  See the end of this note.       FUNCTIONAL ASSESSMENT QUESTIONNAIRE SCORE 9/30/2019 12/2/2019   Total Score 40 30         Also has vascular disease and prediabetes.  Due for lipid recheck today.    Problem, Medication and Allergy Lists were reviewed and are current..           Physical Exam:     Vitals:     02/27/20 1256 02/27/20 1259   BP: (!) 150/80 133/79   Pulse: 73 73   Resp: 20    Temp: 97.6  F (36.4  C)    Weight: 85.7 kg (189 lb)      Body mass index is 33.07 kg/m .  Vitals were reviewed and were normal  GENERAL: healthy, alert, well nourished, well hydrated, no distress  RESP: lungs clear to auscultation - no rales, no rhonchi, no wheezes  CV: regular rates and rhythm, normal S1 S2, no S3 or S4 and no murmur, no click or rub -  MS: extremities- no gross deformities noted, trace edema on the left lower extremity at baseline.    Shoulder: Nontender to palpation over the entire shoulder.  Decreased internal rotation and flexion to only 110 degrees before she gets pain and catching.  This is with passive and active range of motion.  Rotator cuff is strong in all directions.  Positive Gray sign.        Results:     Results for orders placed or performed in visit on 02/27/20   Rapid Urine Drug Screen ([facility])     Status: Abnormal   Result Value Ref Range    Cannabinoids Qual Urine NEGATIVE NEGATIVE    Phencyclidine NEGATIVE NEGATIVE    Cocaine Qual Urine NEGATIVE NEGATIVE    Methamphetamine Qual NEGATIVE NEGATIVE    Morphine Qual NEGATIVE NEGATIVE    Amphetamines Qual NEGATIVE NEGATIVE    Benzodiazepine Qual Urine NEGATIVE NEGATIVE    Tricyclic Antidepressants NEGATIVE NEGATIVE    Methadone Qual NEGATIVE NEGATIVE    Barbiturates Qual Urine NEGATIVE NEGATIVE    Oxycodone Qual POSITIVE (A) NEGATIVE    Propoxyphene NEGATIVE NEGATIVE    Buprenorphine Qual Urine NEGATIVE NEGATIVE    Temperature of Urine was Between  Degrees F YES YES        rapid urine drug screen obtained today: Yes    Assessment and Plan    Shira Guthrie is here for follow up of chronic pain caused by diagnoses below.    Shira was seen today for recheck medication and shoulder pain.    Diagnoses and all orders for this visit:    Chronic pain syndrome:   Primary osteoarthritis involving multiple joints  Other polyneuropathy: Chronic pain  secondary to osteoarthritis, shoulder pain as below and neuropathy.  Refilling tramadol and oxycodone.  Oxycodone will hopefully only be short-term while we work-up the shoulder pain.  We will see her back in 1 month due to the increase in her pain recently.  -     Rapid Urine Drug Screen ([facility])  -     traMADol (ULTRAM) 50 MG tablet; Take 1 tablet (50 mg) by mouth 3 times daily as needed for severe pain  -     oxyCODONE-acetaminophen (PERCOCET)  MG per tablet; Take 1 tablet by mouth 2 times daily as needed for severe pain    Chronic right shoulder pain  Rotator cuff impingement syndrome of right shoulder: History of rotator cuff tear last year with repair.  Now has impingement signs and popping and decreased range of motion again.  No new injury.  Will repeat MRI.  Will likely need physical therapy and/or orthopedics follow-up.  -     MR Shoulder Right w/o Contrast; Future      Major depressive disorder, recurrent, severe without psychotic features (H): Depression is much worse at this time.  No suicidal ideation.  Is very stressed from financial factors, being away for home for a long time, still grieving her son's loss.  Going to see Dr. Sky soon.  Continue antidepressant.    Prediabetes: Due for yearly screening for prediabetes.  She had had an A1c up to 6 in the past.  Last A1c was not this high.  -     Hemoglobin A1c ([facility])    Chronic ischemic heart disease:  PAD (peripheral artery disease) (H)  Anginal pain (H) History of coronary artery and peripheral artery disease.  Due for lipid and other lab screening today.  Symptoms are currently stable with no angina.  Blood pressure is well controlled and she is on statin and aspirin.  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  Needs surveillance.  Will discuss more at next visit.      Chronic Pain Syndrome:  Care plan updated with patient, see below for  details.  Patient is being prescribed 10mg of oxycodone IR (Percocet) per day. 55 mg MEDD  Care Plan Date: February/2020  Naloxone has not been prescribed.      Total of 40 minutes was spent in face to face contact with patient with > 50% in counseling and coordination of care.  Options for treatment and/or follow-up care were reviewed with the patient. Shira JACOB Cleveland was engaged and actively involved in the decision making process. She verbalized understanding of the options discussed and was satisfied with the final plan.    If opioids prescribed patient was asked to bring pill bottle to each appointment and was informed that refills would only be provided at office visits.   Asked patient to F/U in 1 month(s) with same provider for 20 minute  Visit.     Sally Pierre MD    Patient Instructions     PACE yourself.     Personal Care Plan for Chronic Pain    1.  Personal Goals:                * take less medication              * lose weight: goal of losing 15 to 20 pounds.              * continue working on keeping thoughts positive through Tawny              * to feel confident enough that when someone gives me a compliment I can simply say thank you    2.  Sleep:                 *  Basic sleep plan:                          *reduce or eliminate caffeine and daytime naps                          * relaxation before bed                          * limit screen time 1-2 hours prior to bed                          * establish dark/quiet sleep environment                          * go to bed at target bedtime:   pm                          * keep consistent wake time:   am.              *  Minimize switching days and nights by staying active throughout the day.              * We'll talk more about a consistent sleep plan when we meet next time.                3.  Physical Activity:                 * Formal physical rehabilitation:                          HOME PT for shoulder              * Home/community based  activity:                          * Home based exercises given by lymphedema nurse with breathing exercises built in as well - daily                          * Aerobic exercise/endurance exercise:  elliptical machine - 5 minute intervals with sit ups in between for 30 minutes - daily.  Has a  in the builiding. Has exercise tape in her apartment.                          * Cleaning and baking with body awareness and stretching              * Listen to your body.  Pace yourself for success.  Don't over-do it.  Plan to get PCA back to help with cleaning and laundry so as to not overdo it.                4.  Nutrition/Weight:                 * Keep a food journal in a notebook at home and bring this to your next visit with Dr. Sky.  Eat small frequent meals.              * Review your I Can Prevent Diabetes materials.              * Limit processed foods and foods high in sugar, sodium and fat.              * Keep up the good work eating many healthy foods.              * When you make a less healthy choice approach yourself with kindness and compassion.  Try to understand what contributed to that choice:  Was I too hungry?  Was I too tired?  Stressed?  Worried?  Use this information to help support healthier choices in the future.    5.  Mood/Stress Management:     SELF COMPASSION!              * Formal interventions:                        * schedule follow up with Dr. Sky in about month or so.              * Home/community based interventions:                          * Regular contact with family  * Relaxation techniques - breathing exercises  * Create your pyramid to focus you on your strengths and hopes.  * Movies  * Meditation  * Yoga  * Creative activity  * Spiritual /prayer:  Prayer at home, attending services at [facility], wellness auxiliary group  * Service-based activity.              * Medications: Cymbalta, Hydroxyzine as needed.    6.  Tobacco/Alcohol/Drug Use:                                * Congratulations on quitting smoking and staying quit!  Keep up the good work managing stress/anxiety in other ways (prayer, talking it out, deep breaths, exercise, etc..                         * Maintain healthy relationship with alcohol                          * For women this would be no more than 1 drink per day                          * For men, this would be no more than 2 drinks per day              * Eliminate recreational drugs    7.  Pain:                 * Non-medication treatments:                          * ice/heat: use heating pad as you are doing.                          * massage                          * acupuncture    8.  Pain Medications: Gabapentin 600mg twice a day   Tramadol one pill three times day. Have been doing two month refills.    Tylenol arthritis as needed for break through.

## 2020-03-03 ENCOUNTER — RECORDS - HEALTHEAST (OUTPATIENT)
Dept: ADMINISTRATIVE | Facility: OTHER | Age: 65
End: 2020-03-03

## 2020-03-04 NOTE — MR AVS SNAPSHOT
After Visit Summary   5/2/2018    Shira Guthrie    MRN: [id number]           Patient Information     Date Of Birth          1955        Visit Information        Provider Department      5/2/2018 9:00 AM Dior Sky, PhD [facility]        Care Instructions    Personal Care Plan for Chronic Pain     1.  Personal Goals:                * take less medication              * lose weight: goal of losing 15 to 20 pounds.              * continue working on keeping thoughts positive through Tawny              * to feel confident enough that when someone gives me a compliment I can simply say thank you   * routine practice of mindful self-compassion     2.  Sleep:                 *  Basic sleep plan:                          *reduce or eliminate caffeine and daytime naps                          * relaxation before bed                          * limit screen time 1-2 hours prior to bed                          * establish dark/quiet sleep environment                          * go to bed at target bedtime:   pm                          * keep consistent wake time:   am.              *  Minimize switching days and nights by staying active throughout the day.              * We'll talk more about a consistent sleep plan when we meet next time.      3.  Physical Activity:                 * Formal physical rehabilitation:                          HOME PT for shoulder              * Home/community based activity:                          * Home based exercises given by lymphedema nurse with breathing exercises built in as well - daily                          * Aerobic exercise/endurance exercise:  elliptical machine - 5 minute intervals with sit ups in between for 30 minutes - daily.  Has a  in the building. Has exercise tape in her apartment.                          * Cleaning and baking with body awareness and stretching              * Listen to your body.  Pace yourself for success.  Don't  over-do it.      Options for Adaptive Yoga via [employer]:    [email or web address]   [address] [place] [address]  [facility]   [address] [address]  [facility]   [address] [place] [address]              * [facility] classes require preregistration. Call [phone] to register.  [facility]   [address] [address]    FEES & REGISTRATION INFORMATION  Thanks to the generous support of our donors we subsidize the cost of adaptive classes and individual instruction for those living with mobility disabilities. [facility] also offers two classes exclusively for our veterans, one of which is for women only. These classes are free to veterans (and a family member accompanying them) as is any class held in our [facility] studio.       4.  Nutrition/Weight:                 * Keep a food journal in a notebook at home and bring this to your next visit with Dr. Sky.  Eat small frequent meals.              * Review your I Can Prevent Diabetes materials.              * Limit processed foods and foods high in sugar, sodium and fat.              * Keep up the good work eating many healthy foods.              * When you make a less healthy choice approach yourself with kindness and compassion.  Try to understand what contributed to that choice:  Was I too hungry?  Was I too tired?  Stressed?  Worried?  Use this information to help support healthier choices in the future.     5.  Mood/Stress Management:     SELF COMPASSION!              * Formal interventions:                        * schedule follow up with Dr. Sky as needed (possibly two months - first week in July)                               3 C's for Healthy Thinking    Catch it    Pay attention to thoughts    Observe which ones are happening when you notice you feel more depressed    Notice how these thoughts make you feel    Check it    Is the thought accurate?    Does it tell the  whole story?    Is it helpful?    Change it    If it's not telling the full story, change it to make it more accurate    If it's not helpful, refocus on what's important to you    Acceptance and Commitment:  Remember your values and keep moving towards them.  If you get off the path towards that horizon line, approach yourself with self-compassion and ask yourself what will help you to get back on this path.  Accept what you don't have control over and shift your focus back towards your own actions and how to keep approaching your values.                * Home/community based interventions:                          * Regular contact with family  * Relaxation techniques - breathing exercises  * Create your pyramid to focus you on your strengths and hopes.  * Movies  * Meditation  * Yoga  * Creative activity  * Spiritual /prayer:  Prayer at home, attending services at [facility], wellness auxiliary group  * Service-based activity.              * Medications: Cymbalta, Hydroxyzine as needed.     6.  Tobacco/Alcohol/Drug Use:                               * Congratulations on quitting smoking and staying quit!  Keep up the good work managing stress/anxiety in other ways (prayer, talking it out, deep breaths, exercise, etc..                         * Maintain healthy relationship with alcohol                          * For women this would be no more than 1 drink per day                          * For men, this would be no more than 2 drinks per day              * Eliminate recreational drugs     7.  Pain:                 * Non-medication treatments:                          * ice/heat: use heating pad as you are doing.                          * massage                          * acupuncture     8.  Pain Medications: Gabapentin 600mg twice a day Tramadol one pill two times day.   Oxycodone 5mg at night for the next month and reevaluate.  Tylenol arthritis as needed for break through.           Follow-ups after your visit        Your next 10 appointments already scheduled     2018  1:30 PM CDT   Return Visit with Sally Pierre MD   [facility]    [address] [place] [address]   [phone]              Who to contact     Please call your clinic at [phone] to:    Ask questions about your health    Make or cancel appointments    Discuss your medicines    Learn about your test results    Speak to your doctor            Additional Information About Your Visit        MyChart Information     [employer]ify is an electronic gateway that provides easy, online access to your medical records. With Rem[employer]ify, you can request a clinic appointment, read your test results, renew a prescription or communicate with your care team.     To sign up for [employer] visit the website at [email or web address]   You will be asked to enter the access code listed below, as well as some personal information. Please follow the directions to create your username and password.     Your access code is: [email or web address]  Expires: 7/10/2018  9:31 AM     Your access code will  in 90 days. If you need help or a new code, please contact your [facility] or call [phone] for assistance.        Care EveryWhere ID     This is your Care EveryWhere ID. This could be used by other organizations to access your [facility] medical records  [id number]        Your Vitals Were     BMI (Body Mass Index)                   31.23 kg/m2            Blood Pressure from Last 3 Encounters:   18 127/75   18 112/73   18 107/68    Weight from Last 3 Encounters:   18 184 lb 12.8 oz (83.8 kg)   18 185 lb 9.6 oz (84.2 kg)   18 187 lb 12.8 oz (85.2 kg)              Today, you had the following     No orders found for display       Primary Care Provider Office Phone # Fax #    Sally Pierre [phone]       [place] [address] [address]  [facility] [address]        Equal Access to Services     [facility] RUTH ANN : Hadii thelma cormier jennifer Bautista, wanevaehda luqadaha, qaybta kaalmauna degroot. So [facility] [phone].    ATENCIÓN: Si habla español, tiene a monson disposición servicios gratuitos de asistencia lingüística. Kylieame al [phone].    We comply with applicable federal civil rights laws and [place] laws. We do not discriminate on the basis of race, color, national origin, age, disability, sex, sexual orientation, or gender identity.            Thank you!     Thank you for choosing [facility]  for your care. Our goal is always to provide you with excellent care. Hearing back from our patients is one way we can continue to improve our services. Please take a few minutes to complete the written survey that you may receive in the mail after your visit with us. Thank you!             Your Updated Medication List - Protect others around you: Learn how to safely use, store and throw away your medicines at www.disposemymeds.org.          This list is accurate as of 5/2/18 10:03 AM.  Always use your most recent med list.                   Brand Name Dispense Instructions for use Diagnosis    aspirin 81 MG EC tablet     90 tablet    Take 1 tablet (81 mg) by mouth daily    Coronary artery disease involving native coronary artery of native heart with angina pectoris (H)       baclofen 10 MG tablet    LIORESAL    90 tablet    Take 1 tablet (10 mg) by mouth 3 times daily as needed for muscle spasms    Chronic pain syndrome, Primary osteoarthritis involving multiple joints       CANE, ANY MATERIAL     1 Device    1 Device by Device route as needed    Peripheral neuropathy, Leg edema, left, Stroke (H)       clopidogrel 75 MG tablet    PLAVIX    90 tablet    Take 1 tablet (75 mg) by mouth daily . Give name brand Plavix. Approved by insurance through 2/2/1016.    Chronic ischemic heart disease       dexlansoprazole 30 MG Cpdr  CR capsule    DEXILANT    90 capsule    Take 1 capsule (30 mg) by mouth daily    Esophageal stricture       diphenhydrAMINE 25 MG tablet    BENADRYL    100 tablet    Take 1-2 tablets (25-50 mg) by mouth every 6 hours as needed for other (for congestion, cough)    Acute nasopharyngitis       docusate sodium 100 MG tablet    COLACE    60 tablet    Take 100 mg by mouth daily    Chronic constipation, Chronic pain syndrome       DULoxetine 60 MG EC capsule    CYMBALTA    90 capsule    Take 1 capsule (60 mg) by mouth daily    Major depressive disorder, recurrent, severe without psychotic features (H)       gabapentin 300 MG capsule    NEURONTIN    360 capsule    Take 2 capsules (600 mg) by mouth 2 times daily    Other polyneuropathy       Heating Pads Pads     1 each    Apply to painful areas prn.    Stroke (H), Peripheral neuropathy, Leg edema, left       isosorbide mononitrate 30 MG 24 hr tablet    IMDUR    90 tablet    Take 1 tablet (30 mg) by mouth daily    Chronic ischemic heart disease       LASIX 20 MG tablet   Generic drug:  furosemide      2 tablets a day per cardiology.    Lymphedema of left leg       linaclotide 290 MCG capsule    LINZESS    90 capsule    Take 1 capsule (290 mcg) by mouth every morning (before breakfast)    Chronic constipation       metoprolol succinate 25 MG 24 hr tablet    TOPROL-XL    90 tablet    Take 1 tablet (25 mg) by mouth daily    Benign essential hypertension       nitroGLYcerin 0.4 MG sublingual tablet    NITROSTAT    30 tablet    Place 1 tablet (0.4 mg) under the tongue every 5 minutes as needed    Anginal pain (H)       * order for DME     2 Device    Equipment being ordered: Jobst stockings thigh high 15-20mmHg.  Please measure for fit. 2 pairs.    Leg edema, left       * order for DME     2 Device    Bilateral compression stockings 15-20mmHg. One pair knee high and one pair thigh high.  Please measure for fit.    Lymphedema of left leg       oxyCODONE IR 5 MG tablet     ROXICODONE    30 tablet    Take 1 tablet (5 mg) by mouth At Bedtime maximum 6 tablet(s) per day    Chronic pain syndrome       polyethylene glycol powder    MIRALAX    510 g    Take 17 g (1 capful) by mouth 2 times daily    Chronic constipation       rosuvastatin 20 MG tablet    CRESTOR    90 tablet    Take 1 tablet (20 mg) by mouth daily    Coronary artery disease involving native coronary artery of native heart with angina pectoris (H)       traMADol 50 MG tablet    ULTRAM    90 tablet    Take 1 tablet (50 mg) by mouth 3 times daily as needed    Chronic pain syndrome, Other polyneuropathy, Primary osteoarthritis involving multiple joints       TYLENOL 325 MG tablet   Generic drug:  acetaminophen     100 tablet    Take 2 tablets (650 mg) by mouth daily        * Notice:  This list has 2 medication(s) that are the same as other medications prescribed for you. Read the directions carefully, and ask your doctor or other care provider to review them with you.       Oxybutynin Pregnancy And Lactation Text: This medication is Pregnancy Category B and is considered safe during pregnancy. It is unknown if it is excreted in breast milk.

## 2020-03-05 ENCOUNTER — AMBULATORY - HEALTHEAST (OUTPATIENT)
Dept: SCHEDULING | Facility: CLINIC | Age: 65
End: 2020-03-05

## 2020-03-05 DIAGNOSIS — G89.29 CHRONIC RIGHT SHOULDER PAIN: ICD-10-CM

## 2020-03-05 DIAGNOSIS — M25.511 CHRONIC RIGHT SHOULDER PAIN: ICD-10-CM

## 2020-03-09 ENCOUNTER — OFFICE VISIT (OUTPATIENT)
Dept: PSYCHOLOGY | Facility: CLINIC | Age: 65
End: 2020-03-09
Payer: MEDICARE

## 2020-03-09 DIAGNOSIS — G89.4 CHRONIC PAIN SYNDROME: Primary | ICD-10-CM
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
## 2020-03-09 NOTE — PATIENT INSTRUCTIONS
Schedule with Dr. Syk after your MRI.  If my schedule is too difficult or you can't get a timely visit, please call me so we can talk about a plan.    Personal Care Plan for Chronic Pain     1.  Personal Goals:                * take less medication              * lose weight: goal of losing 15 to 20 pounds.              * continue working on keeping thoughts positive through [employer]              * to feel confident enough that when someone gives me a compliment I can simply say thank you     2.  Sleep:                 *  Basic sleep plan:                          *reduce or eliminate caffeine and daytime naps                          * relaxation before bed                          * limit screen time 1-2 hours prior to bed                          * establish dark/quiet sleep environment                          * go to bed at target bedtime:   pm                          * keep consistent wake time:   am.              *  Minimize switching days and nights by staying active throughout the day.              * We'll talk more about a consistent sleep plan when we meet next time.                3.  Physical Activity:                 * Formal physical rehabilitation:                          HOME PT for shoulder              * Home/community based activity:                          * Home based exercises given by lymphedema nurse with breathing exercises built in as well - daily                          * Aerobic exercise/endurance exercise:  elliptical machine - 5 minute intervals with sit ups in between for 30 minutes - daily.  Has a  in the builiding. Has exercise tape in her apartment.                          * Cleaning and baking with body awareness and stretching              * Listen to your body.  Pace yourself for success.  Don't over-do it.  Plan to get PCA back to help with cleaning and laundry so as to not overdo it.                4.  Nutrition/Weight:                 * Keep a food journal in a  notebook at home and bring this to your next visit with Dr. Sky.  Eat small frequent meals.              * Review your I Can Prevent Diabetes materials.              * Limit processed foods and foods high in sugar, sodium and fat.              * Keep up the good work eating many healthy foods.              * When you make a less healthy choice approach yourself with kindness and compassion.  Try to understand what contributed to that choice:  Was I too hungry?  Was I too tired?  Stressed?  Worried?  Use this information to help support healthier choices in the future.     5.  Mood/Stress Management:     SELF COMPASSION!              * Formal interventions:                        * schedule follow up with Dr. Sky in about month or so.              * Home/community based interventions:                          * Regular contact with family  * Relaxation techniques - breathing exercises  * Create your pyramid to focus you on your strengths and hopes.  * Movies  * Meditation  * Yoga  * Creative activity  * Spiritual /prayer:  Prayer at home, attending services at [facility], wellness auxiliary group  * Service-based activity.              * Medications: Cymbalta, Hydroxyzine as needed.     6.  Tobacco/Alcohol/Drug Use:                               * Congratulations on quitting smoking and staying quit!  Keep up the good work managing stress/anxiety in other ways (prayer, talking it out, deep breaths, exercise, etc..                         * Maintain healthy relationship with alcohol                          * For women this would be no more than 1 drink per day                          * For men, this would be no more than 2 drinks per day              * Eliminate recreational drugs     7.  Pain:                 * Non-medication treatments:                          * ice/heat: use heating pad as you are doing.                          * massage                          *  acupuncture     8.  Pain Medications: Gabapentin 600mg twice a day   Tramadol one pill three times day. Have been doing two month refills.    Tylenol arthritis as needed for break through.  Short term oxycodone 10mg daily. Do not take with tramadol.

## 2020-03-13 DIAGNOSIS — M25.511 CHRONIC RIGHT SHOULDER PAIN: ICD-10-CM

## 2020-03-13 DIAGNOSIS — G89.29 CHRONIC RIGHT SHOULDER PAIN: ICD-10-CM

## 2020-03-16 ENCOUNTER — TELEPHONE (OUTPATIENT)
Dept: FAMILY MEDICINE | Facility: CLINIC | Age: 65
End: 2020-03-16

## 2020-03-16 NOTE — TELEPHONE ENCOUNTER
[facility] phone call message- patient requesting results:    Test: Lab and MRI    Date of test: 3/5/20    Additional Comments: MRI on right shoulder. She would like her results.    OK to leave a message on voice mail? Yes    Primary language: English      needed? No    Call taken on March 16, 2020 at 2:13 PM by Earline Curry

## 2020-03-18 NOTE — RESULT ENCOUNTER NOTE
Please call patient with following result and recommendation:  No rotator cuff tear.  May have a biceps tendon tear but also may just be post surgery changes.  I think she needs a follow up with orthopedics.  I would wait to return to orthopedics until after the COVID virus has calmed down.  Please let us know if she needs help with the number or scheduling with her orthopedic shoulder doctor.  Sally Pierre MD

## 2020-03-19 ENCOUNTER — TELEPHONE (OUTPATIENT)
Dept: FAMILY MEDICINE | Facility: CLINIC | Age: 65
End: 2020-03-19

## 2020-03-19 DIAGNOSIS — G89.4 CHRONIC PAIN SYNDROME: ICD-10-CM

## 2020-03-19 RX ORDER — OXYCODONE AND ACETAMINOPHEN 10; 325 MG/1; MG/1
1 TABLET ORAL 2 TIMES DAILY PRN
Qty: 30 TABLET | Refills: 0 | OUTPATIENT
Start: 2020-03-19

## 2020-03-19 NOTE — TELEPHONE ENCOUNTER
[facility] phone call message- patient requesting a refill:    Full Medication Name: oxyCODONE-acetaminophen (PERCOCET)  MG per tablet    Dose: Take 1 tablet by mouth 2 times daily as needed for severe pain     Pharmacy confirmed as    [id number] - [place], [place] - [address] AT [facility] [address]  [address]  [place] [address]  Phone: [phone] Fax: [phone]   : Yes    Additional Comments: Just in case there is a lock down.  She would like a refill.    OK to leave a message on voice mail? Yes    Primary language: English      needed? No    Call taken on March 19, 2020 at 1:50 PM by Roma Edmonds

## 2020-03-20 ENCOUNTER — TELEPHONE (OUTPATIENT)
Dept: FAMILY MEDICINE | Facility: CLINIC | Age: 65
End: 2020-03-20

## 2020-03-24 ENCOUNTER — VIRTUAL VISIT (OUTPATIENT)
Dept: FAMILY MEDICINE | Facility: CLINIC | Age: 65
End: 2020-03-24
Payer: MEDICARE

## 2020-03-24 VITALS — BODY MASS INDEX: 30.9 KG/M2 | WEIGHT: 181 LBS | HEIGHT: 64 IN

## 2020-03-24 DIAGNOSIS — G89.4 CHRONIC PAIN SYNDROME: ICD-10-CM

## 2020-03-24 DIAGNOSIS — G62.89 OTHER POLYNEUROPATHY: ICD-10-CM

## 2020-03-24 DIAGNOSIS — M15.0 PRIMARY OSTEOARTHRITIS INVOLVING MULTIPLE JOINTS: ICD-10-CM

## 2020-03-24 RX ORDER — TRAMADOL HYDROCHLORIDE 50 MG/1
50 TABLET ORAL 3 TIMES DAILY PRN
Qty: 90 TABLET | Refills: 2 | Status: SHIPPED | OUTPATIENT
Start: 2020-03-24 | End: 2020-05-29

## 2020-03-24 RX ORDER — OXYCODONE AND ACETAMINOPHEN 10; 325 MG/1; MG/1
1 TABLET ORAL 2 TIMES DAILY PRN
Qty: 30 TABLET | Refills: 0 | Status: SHIPPED | OUTPATIENT
Start: 2020-03-24 | End: 2020-04-24

## 2020-03-24 ASSESSMENT — MIFFLIN-ST. JEOR: SCORE: 1346.01

## 2020-03-24 NOTE — PROGRESS NOTES
Chronic Pain Follow-Up Visit    Pain Update:    Location of pain: right shoulder  Has pain with household chores (folding laundry, reaching for coffee) and ADL's (cleaning herself on the toilet, washing her hair). Pain is worst at the back of the shoulder. Has a hard time reaching up, and will have to lean forward to swing her arm forward to get it up. Feels like her shoulder has a sharper pain than normal when she is doing things around the house.    Analgesia/pain control: Recent changes: None; has stayed on the same routine. Takes the tramadol when she wakes up at 4 am. Tramadol works for the joint pain for during the day. The oxycodone helps with the shoulder pain. She times her one oxycodone a day with when she needs to do chores.  She never takes the tramadol and oxycodone at the same time.  Takes the gabapentin whenever she wakes up with the nerve pain at night.   She has not had any recent falls. She recently had an MRI which showed some partial thickness tears vs postop changes of multiple muscles in the R rotator cuff. The plan has been to get her back to ortho for follow up of this, but she is trying to wait until coronavirus dies down so that she isn't exposed.   Overall control: Tolerable with discomfort    She has been trying to lose weight, but her assisted living facility has closed down the exercises. She has been doing yoga in her apartment. She has been laying low and practicing social distancing.     She still is grieving her son. She found her visit with Dr. Sky helpful, and she has her phone number if she wants to talk again.     Adherance     How often do you take extra pain medicine: Never    Did you take your pain medication today? Has ran out, so hasn't taken any today. She hasn't had any since Friday. Has been taking tylenol for the arthritis.     Adverse effects: No      Database checked today? Yes. Details: as expected    PHQ-9 SCORE 9/30/2019 12/2/2019 2/27/2020   PHQ-9 Total  Score - - -   PHQ-9 Total Score 10 7 7     FAYE-7 SCORE 7/24/2018 10/30/2018 2/25/2019   Total Score - - -   Total Score 6 (mild anxiety) - -   Total Score 6 7 2       Care Plan discussed and updated as needed.  See the end of this note.       FUNCTIONAL ASSESSMENT QUESTIONNAIRE SCORE 12/2/2019 2/27/2020   Total Score 30 40        Problem, Medication and Allergy Lists were   reviewed and are current.      Current Outpatient Medications   Medication Sig Dispense Refill     baclofen (LIORESAL) 10 MG tablet Take 1 tablet (10 mg) by mouth 3 times daily as needed for muscle spasms 90 tablet 3     docusate sodium (COLACE) 100 MG tablet Take 100 mg by mouth daily 60 tablet 11     GABAPENTIN 600 MG PO tablet TAKE 1 TABLET BY MOUTH TWICE DAILY 180 tablet 3     isosorbide mononitrate (IMDUR) 30 MG 24 hr tablet Take 1 tablet (30 mg) by mouth daily 90 tablet 4     linaclotide (LINZESS) 290 MCG capsule Take 1 capsule (290 mcg) by mouth every morning (before breakfast) 90 capsule 3     metoprolol succinate ER (TOPROL-XL) 25 MG 24 hr tablet Take 1 tablet (25 mg) by mouth daily 90 tablet 4     nitroGLYcerin (NITROSTAT) 0.4 MG sublingual tablet Place 1 tablet (0.4 mg) under the tongue every 5 minutes as needed for chest pain 30 tablet 12     oxyCODONE-acetaminophen (PERCOCET)  MG per tablet Take 1 tablet by mouth 2 times daily as needed for severe pain 30 tablet 0     PLAVIX 75 MG tablet Take 1 tablet (75 mg) by mouth daily 90 tablet 3     rosuvastatin (CRESTOR) 20 MG tablet Take 1 tablet (20 mg) by mouth daily 90 tablet 4     traMADol (ULTRAM) 50 MG tablet Take 1 tablet (50 mg) by mouth 3 times daily as needed for severe pain 90 tablet 2     acetaminophen (TYLENOL) 325 MG tablet Take 2 tablets (650 mg) by mouth daily 100 tablet 0     aspirin 81 MG EC tablet Take 1 tablet (81 mg) by mouth daily 90 tablet 4     CANE, ANY MATERIAL 1 Device by Device route as needed 1 Device 0     conjugated estrogens (PREMARIN) 0.625 MG/GM  vaginal cream 0.5g daily for one week and then twice weekly after that. 5 g 11     dexlansoprazole (DEXILANT) 30 MG CPDR CR capsule Take 1 capsule (30 mg) by mouth daily 90 capsule 4     diclofenac (VOLTAREN) 1 % GEL topical gel Apply 2-4 grams to painful areas four times daily as needed using enclosed dosing card. 100 g 1     diphenhydrAMINE (BENADRYL) 25 MG tablet Take 1-2 tablets (25-50 mg) by mouth every 6 hours as needed for other (for congestion, cough) 100 tablet 1     DULoxetine (CYMBALTA) 60 MG capsule Take 1 capsule (60 mg) by mouth daily 90 capsule 4     estradiol (ESTRACE) 0.1 MG/GM vaginal cream Place 2 g vaginally twice a week 42.5 g 1     furosemide (LASIX) 20 MG tablet Take 2 tablets (40 mg) by mouth daily 180 tablet 4     Heating Pads PADS Apply to painful areas prn. 1 each 0     order for DME Equipment being ordered: Neoprene knee sleeve. Measure for size. 1 Device 0     order for DME Bilateral compression stockings 15-20mmHg. One pair knee high and one pair thigh high.  Please measure for fit. 2 Device 0     order for DME Equipment being ordered: Jobst stockings thigh high 15-20mmHg.  Please measure for fit. 2 pairs. 2 Device 0     polyethylene glycol (MIRALAX) powder Take 17 g (1 capful) by mouth 2 times daily 510 g 12   .           Physical Exam:   This was a phone visit so not possible.         Results:     No results found for any visits on 03/24/20.   rapid urine drug screen obtained today:No; phone visit    Assessment and Plan    Shira Guthrie is here for follow up of chronic pain caused by chronic osteoarthritis.    Shira was seen today for chronic pain and results.    Diagnoses and all orders for this visit:    Chronic pain syndrome  -     traMADol (ULTRAM) 50 MG tablet; Take 1 tablet (50 mg) by mouth 3 times daily as needed for severe pain  -     oxyCODONE-acetaminophen (PERCOCET)  MG per tablet; Take 1 tablet by mouth 2 times daily as needed for severe pain    Other  polyneuropathy  -     traMADol (ULTRAM) 50 MG tablet; Take 1 tablet (50 mg) by mouth 3 times daily as needed for severe pain    Primary osteoarthritis involving multiple joints  -     traMADol (ULTRAM) 50 MG tablet; Take 1 tablet (50 mg) by mouth 3 times daily as needed for severe pain        Chronic Pain Syndrome:  Care plan updated with patient, see below for details.  Patient is being prescribed  mg of oxycodone /acetaminophen (Percocet) per day. 45 mg MEDD and tramadol 50 mg TID as needed (patient normally takes it about twice a day)  Care Plan Date: March/2020  Naloxone has not been prescribed.       If opioids prescribed patient was asked to bring pill bottle to each appointment and was informed that refills would only be provided at office visits.   Asked patient to F/U in 1 month(s) with Dr. Pierre for 20  minute  Visit.     Joy Leach MD    Patient was discussed with my attending, Dr. Krutz, who agrees with my assessment and plan.     There are no Patient Instructions on file for this visit.

## 2020-03-24 NOTE — PROGRESS NOTES
Preceptor Attestation:  I discussed the patient with the resident. I have verified the content of the note, which accurately reflects my assessment of the patient and the plan of care.   Supervising Physician:  Placido Kurtz MD.

## 2020-03-25 NOTE — PATIENT INSTRUCTIONS
I've refilled your meds, please make another appointment in one month with Dr. Pierre.    It was a pleasure meeting you today- stay safe!

## 2020-04-07 ENCOUNTER — TELEPHONE (OUTPATIENT)
Dept: PSYCHOLOGY | Facility: CLINIC | Age: 65
End: 2020-04-07

## 2020-04-07 ENCOUNTER — TELEPHONE (OUTPATIENT)
Dept: FAMILY MEDICINE | Facility: CLINIC | Age: 65
End: 2020-04-07

## 2020-04-07 NOTE — TELEPHONE ENCOUNTER
"Placed outreach call to check in with Shira.  Our last visit was on 3/9/2020.  Shira states that \"yesterday was rough\" as it was the one year anniversary of her son's death.  Reports she had been meaning to call me to check in around this.  However, she was dealing with a difficult financial situation at the time of our call today and asked if she could call me back.  Agreed that this would be fine and that I would be happy to talk with her or schedule phone visit at a time that is more convenient for her.  Encouraged her to let me know if she had difficulty getting a timely visit and that I would call her back with additional options if this were the case.  She expressed appreciation for the call today and voiced her intention to call back.    Dior Sky, PhD, LP              "

## 2020-04-07 NOTE — TELEPHONE ENCOUNTER
[facility] phone call message- general phone call:    Reason for call: Pt made appt for end of month, but would like to speak w/ .     Return call needed: Yes    OK to leave a message on voice mail? Yes    Primary language: English      needed? No    Call taken on April 7, 2020 at 12:23 PM by Grisel Flores-Cardona

## 2020-04-08 NOTE — TELEPHONE ENCOUNTER
"Placed return call to Shira.  She was very tired and just laying down to rest so declined having an extended conversation at this time, but was hoping to see if we could set up a meeting sooner than the one she has currently (April 29).  The one year anniversary of sonRandolph' death was on 4/6/2020 and she tried to \"bury herself in cleaning\" which led to increased pain and fatigue.  Offered to fit her in earlier, on Friday, April 17 at 8:30am and she did accept that offer.  Also encouraged pacing and self-kindness at this time and she stated she would work on this.      This visit will need to be created by the  so will route request for them to do this.      Shira does have a smartphone and would be open to this being a video visit.  Told her I would ask our staff to reach out to her to give her instruction on how to download the [email or web address] elio for this purpose as she was not up for this conversation today.  She expressed appreciation for that help.    Will route to  for help with scheduling and to Courtney for help with videocall set up.    Dior Sky, PhD, LP    "

## 2020-04-15 NOTE — TELEPHONE ENCOUNTER
Pt is calling to report that she is unable to do video visits because she does not have the storage capacity to download the elio on her phone. She's already taken out a few apps and is unable to download it. She states she is able to do a phone visit.

## 2020-04-17 ENCOUNTER — VIRTUAL VISIT (OUTPATIENT)
Dept: PSYCHOLOGY | Facility: CLINIC | Age: 65
End: 2020-04-17
Payer: MEDICARE

## 2020-04-17 DIAGNOSIS — G89.4 CHRONIC PAIN SYNDROME: Primary | ICD-10-CM
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
## 2020-04-17 NOTE — PATIENT INSTRUCTIONS
Shira - It was so good to talk with you today.  Thanks so much for your prayers and kind words.    Keep up the good job just taking it one day at a time.      Remember to ride the wave.  Don't fight against it.  Just try to float on top until it subsides.    Be kind to yourself.      Remember that fear and anger are contagious too - keep your distance and wash your hands of it as needed!    Don't hesitate to reach out if you need more support before our next scheduled visit.    4/29/

## 2020-04-24 ENCOUNTER — VIRTUAL VISIT (OUTPATIENT)
Dept: FAMILY MEDICINE | Facility: CLINIC | Age: 65
End: 2020-04-24
Payer: MEDICARE

## 2020-04-24 VITALS — WEIGHT: 181 LBS | BODY MASS INDEX: 31.07 KG/M2

## 2020-04-24 DIAGNOSIS — G89.29 CHRONIC RIGHT SHOULDER PAIN: Primary | ICD-10-CM

## 2020-04-24 DIAGNOSIS — I25.119 CORONARY ARTERY DISEASE INVOLVING NATIVE CORONARY ARTERY OF NATIVE HEART WITH ANGINA PECTORIS (H): ICD-10-CM

## 2020-04-24 DIAGNOSIS — M25.511 CHRONIC RIGHT SHOULDER PAIN: Primary | ICD-10-CM

## 2020-04-24 DIAGNOSIS — K59.09 CHRONIC CONSTIPATION: ICD-10-CM

## 2020-04-24 DIAGNOSIS — J00 ACUTE NASOPHARYNGITIS: ICD-10-CM

## 2020-04-24 DIAGNOSIS — G89.4 CHRONIC PAIN SYNDROME: ICD-10-CM

## 2020-04-24 DIAGNOSIS — F33.2 MAJOR DEPRESSIVE DISORDER, RECURRENT, SEVERE WITHOUT PSYCHOTIC FEATURES (H): ICD-10-CM

## 2020-04-24 DIAGNOSIS — M75.41 ROTATOR CUFF IMPINGEMENT SYNDROME OF RIGHT SHOULDER: ICD-10-CM

## 2020-04-24 DIAGNOSIS — N89.8 VAGINAL IRRITATION: ICD-10-CM

## 2020-04-24 RX ORDER — ROSUVASTATIN CALCIUM 20 MG/1
20 TABLET, COATED ORAL DAILY
Qty: 90 TABLET | Refills: 4 | Status: SHIPPED | OUTPATIENT
Start: 2020-04-24

## 2020-04-24 RX ORDER — DIPHENHYDRAMINE HCL 25 MG
25-50 TABLET ORAL EVERY 6 HOURS PRN
Qty: 100 TABLET | Refills: 1 | Status: SHIPPED | OUTPATIENT
Start: 2020-04-24 | End: 2020-10-13

## 2020-04-24 RX ORDER — DULOXETIN HYDROCHLORIDE 60 MG/1
60 CAPSULE, DELAYED RELEASE ORAL DAILY
Qty: 90 CAPSULE | Refills: 4 | Status: SHIPPED | OUTPATIENT
Start: 2020-04-24

## 2020-04-24 RX ORDER — ESTRADIOL 0.1 MG/G
2 CREAM VAGINAL
Qty: 42.5 G | Refills: 4 | Status: SHIPPED | OUTPATIENT
Start: 2020-04-27

## 2020-04-24 RX ORDER — OXYCODONE AND ACETAMINOPHEN 10; 325 MG/1; MG/1
1 TABLET ORAL 2 TIMES DAILY PRN
Qty: 45 TABLET | Refills: 0 | Status: SHIPPED | OUTPATIENT
Start: 2020-04-24 | End: 2020-05-29

## 2020-04-24 ASSESSMENT — PAIN SCALES - GENERAL: PAINLEVEL: SEVERE PAIN (6)

## 2020-04-24 ASSESSMENT — PATIENT HEALTH QUESTIONNAIRE - PHQ9: SUM OF ALL RESPONSES TO PHQ QUESTIONS 1-9: 6

## 2020-04-24 NOTE — PATIENT INSTRUCTIONS
I placed a referral with Dr. Muniz of [facility] to have a visit for your shoulder pain and to review your MRI results from March. Keep doing your shoulder exercises.    PACE yourself.     Personal Care Plan for Chronic Pain    1.  Personal Goals:                * take less medication              * lose weight: goal of losing 15 to 20 pounds.              * continue working on keeping thoughts positive through [employer]              * to feel confident enough that when someone gives me a compliment I can simply say thank you    2.  Sleep:                 *  Basic sleep plan:                          *reduce or eliminate caffeine and daytime naps                          * relaxation before bed                          * limit screen time 1-2 hours prior to bed                          * establish dark/quiet sleep environment                          * go to bed at target bedtime:   pm                          * keep consistent wake time:   am.              *  Minimize switching days and nights by staying active throughout the day.              * We'll talk more about a consistent sleep plan when we meet next time.                3.  Physical Activity:                 * Formal physical rehabilitation:                          HOME PT for shoulder              * Home/community based activity:                          * Home based exercises given by lymphedema nurse with breathing exercises built in as well - daily                          * Aerobic exercise/endurance exercise:  elliptical machine - 5 minute intervals with sit ups in between for 30 minutes - daily.  Has a  in the builiding. Has exercise tape in her apartment.                          * Cleaning and baking with body awareness and stretching              * Listen to your body.  Pace yourself for success.  Don't over-do it.  Plan to get PCA back to help with cleaning and laundry so as to not overdo it.                4.  Nutrition/Weight:                  * Keep a food journal in a notebook at home and bring this to your next visit with Dr. Sky.  Eat small frequent meals.              * Review your I Can Prevent Diabetes materials.              * Limit processed foods and foods high in sugar, sodium and fat.              * Keep up the good work eating many healthy foods.              * When you make a less healthy choice approach yourself with kindness and compassion.  Try to understand what contributed to that choice:  Was I too hungry?  Was I too tired?  Stressed?  Worried?  Use this information to help support healthier choices in the future.    5.  Mood/Stress Management:     SELF COMPASSION!              * Formal interventions:                        * schedule follow up with Dr. Sky in about month or so.              * Home/community based interventions:                          * Regular contact with family  * Relaxation techniques - breathing exercises  * Create your pyramid to focus you on your strengths and hopes.  * Movies  * Meditation  * Yoga  * Creative activity  * Spiritual /prayer:  Prayer at home, attending services at [facility], wellness auxiliary group  * Service-based activity.              * Medications: Cymbalta, Hydroxyzine as needed.    6.  Tobacco/Alcohol/Drug Use:                               * Congratulations on quitting smoking and staying quit!  Keep up the good work managing stress/anxiety in other ways (prayer, talking it out, deep breaths, exercise, etc..                         * Maintain healthy relationship with alcohol                          * For women this would be no more than 1 drink per day                          * For men, this would be no more than 2 drinks per day              * Eliminate recreational drugs    7.  Pain:                 * Non-medication treatments:                          * ice/heat: use heating pad as you are doing.                          *  massage                          * acupuncture    8.  Pain Medications: Gabapentin 600mg twice a day   Tramadol one pill three times day. Have been doing three month refills.    Tylenol arthritis as needed for break through.  Short term oxycodone 10mg daily increased to #45 per month for short term unttil evaluation with Dr. Muniz Do not take with tramadol.             04/29/20   ORTHOPEDICS ADULT REFERRAL  [facility]  Phone: [phone]  Fax: [phone]    Online referral placed.   Demographics, referral and radiology reports faxed to [phone]. They will contact patient to schedule.     Courtney Crook

## 2020-04-29 ENCOUNTER — VIRTUAL VISIT (OUTPATIENT)
Dept: PSYCHOLOGY | Facility: CLINIC | Age: 65
End: 2020-04-29
Payer: MEDICARE

## 2020-04-29 DIAGNOSIS — G89.4 CHRONIC PAIN SYNDROME: Primary | ICD-10-CM

## 2020-04-29 NOTE — PROGRESS NOTES
"The following statement has been read to the patient at past visits:     \"We have found that certain health care needs can be provided without the need for a face to face visit.  This service lets us provide the care you need with a phone conversation. I will have full access to your [facility] medical record during this entire phone call.   I will be taking notes for your medical record.  Since this is like an office visit, we will bill your insurance company for this service. There are potential benefits and risks of telephone visits (e.g. limits to patient confidentiality) that differ from in-person visits.?  Confidentiality still applies for telephone services, and nobody will record the visit.  It is important to be in a quiet, private space that is free of distractions (including cell phone or other devices) during the visit.??If during the course of the call I believe a telephone visit is not appropriate, you will not be charged for this service.\"     Consent has been obtained for this service by care team member: Yes     Emergency contact: Favio Washington (daughter) [phone]  Closest Emergency Room: [facility]  Location at time of call: home    Start of call: 11:04am  End of call: 11:47am    Behavioral Health Chronic Pain Management Visit     Visit type: Follow Up  Meeting lasted: 43 minutes  Others present: no one    Reason for Consultation:  Shira Guthrie is a 64 year old,  female referred by Dr. Pierre for behavioral health consultation as part of the Chronic Pain Management protocol at [facility]. The goal of behavioral health visits is to help the patient with the psychosocial aspects of pain management. Ms. Guthrie was initially seen by this provider for her initial  CPM consult on 12/15/16.      Topics Discussed:     Pain experience:  Stiff today from \"overdoing it\" yesterday.  Left arm is very sore today.  Gets canned goods/dried food delivered (NAPS?) but " "strained herself pushing this around.  Discussed plan to move things in small groups rather than push full box next time to avoid strain.  She agrees this would be a good idea but at times she can be impatient with herself.  Again, discussed pacing.  Hard to pace herself due to \"OCD\" like cleaning habits but she is working on this.  Discussed possible utility of breaking big tasks into smaller ones.  Could set timer to remind her to take breaks.  Getting better about giving herself permission to rest.  Has reframed breaks from \"procrastination\" (of which she was very judgmental towards herself) to \"breaks\" to support health.  Today she is focused on acknowledging this pain and taking action to soothe herself.  Using heating pad. Plans to elevate feet/soak them later on. Congratulated her on this work.     Health:  Has a bit of a cold/cough.  No fever so does not think this is coronavirus.  Wondering if it could be allergies.  Cleaned her filter/screens and closing windows to see if this helps.  Will reach out to PCP if she needs more help or if things worsen.    Mood: Still struggling with sadness and anger.  \"I'm trying to stay away from negativity.\"  Generally coping by isolating herself.  Can get irritable/angry with others which she does not like.  Became very angry yesterday in interaction with someone in her building she encountered at her mailbox who was irritable/attacking/bringing up politics.  Felt she needed to isolate herself after this.  Immersed herself in projects at home to manage anger and over did it so now having more pain (see conversation about pacing above).  Still leery of others in the building as she still doesn't know who glued her keyhole.      Still grieving.  Has been pretty isolated since Randolph passed.  Just opening jar of Miracle W[employer] can trigger sadness as he used to do this for her.  Has friend of Randolph who will check in on her and offer help in his absence.  Has friends in " "building and friends from [employer] who check on her.  Some friends are very assertive with her on this as they know she struggles with depression and can push help away. Very appreciative of this.  Does call her daughter and talk to her grandchildren.  Talks to some of Randolph' friends.    Sleep:  Neuropathic pain in her feet can wake her.  Lack of sleep can contribute to irritability. Reviewed sleep hygiene recommendations again.  Encouraged limiting naps so that sleep doesn't get too dysregulated and she agreed to limit naps to \"power naps\" as she recognizes this has been a problem for her in the past.    Coping:     * We had discussed limiting news coverage and she is now only watching the news every other day.   Has found this helpful in managing her stress.     * Has been working on accepting compliments/gifts from others.  Has prayed on this which helped.  Working to accept the gifts that God makes available to her.  Trying to be more spiritual rather than \"based in the flesh.\"  \"The one that you feed the most is the one that is going to show up.\"   * Trying to have empathy for self and others.    * Trying to stay active.  Did some exercise this morning.  Uses peddling machine.  Doing yoga.    * Watching movies.     * Prayer.  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   * Taking vitamin C.     * Resting.     * Brightening up home (white curtains rather than dark).       Objective: Ms. Guthrie appears to be awake and alert for today's visit.      PHQ-9 SCORE 12/2/2019 2/27/2020 4/24/2020   PHQ-9 Total Score - - -   PHQ-9 Total Score 7 7 6     FAYE-7 SCORE 7/24/2018 10/30/2018 2/25/2019   Total Score - - -   Total Score 6 (mild anxiety) - -   Total Score 6 7 2     Wt Readings from Last 4 Encounters:   04/24/20 82.1 kg (181 lb)   03/24/20 82.1 kg (181 lb)   02/27/20 85.7 kg (189 lb)   12/02/19 85 kg (187 lb 6.4 oz)       Assessment:   Ms. Guthrie was referred by Dr. Pierre for a behavioral health " evaluation to address chronic pain syndrome.  Today, Ms. Guthrie's mood appeared depressed with congruent affect.  She was tearful at times in the visit but also pleasant, engaged and receptive to feedback throughout.  Ms. Guthrie may benefit from continued meetings with this provider on an as needed basis to support improved pain management via help in recovering from loss/managing mood, weight loss, regular physical activity, improved sleep and support for continued tobacco cessation.    Stage of change: Action  Diagnosis: chronic pain syndrome    Plan:   1.  Personal Care Plan for Chronic Pain: Care Plan Date: April/2020 Will update at next face to face visit.  2.  Ms. Guthrie stated she would call back to schedule our next visit some time in the coming weeks.  This will be a phone visit.  She is aware that if she has difficulty getting  A timely visit she can leave me a message so we can trouble shoot around this.  3.  Next visit with Dr. Pierre is scheduled for 5/28/2020.  This will be a phone visit.    Dior Sky, PhD, LP

## 2020-05-11 DIAGNOSIS — G89.4 CHRONIC PAIN SYNDROME: ICD-10-CM

## 2020-05-11 DIAGNOSIS — M15.0 PRIMARY OSTEOARTHRITIS INVOLVING MULTIPLE JOINTS: ICD-10-CM

## 2020-05-11 RX ORDER — BACLOFEN 10 MG/1
TABLET ORAL
Qty: 90 TABLET | Refills: 3 | Status: SHIPPED | OUTPATIENT
Start: 2020-05-11 | End: 2020-12-18

## 2020-05-18 ENCOUNTER — TELEPHONE (OUTPATIENT)
Dept: FAMILY MEDICINE | Facility: CLINIC | Age: 65
End: 2020-05-18

## 2020-05-18 NOTE — TELEPHONE ENCOUNTER
[facility] phone call message- general phone call:    Reason for call: Pt is calling to have Dr. Sky call her to squeeze her into her schedule for the month of May or June.     Return call needed: Yes    OK to leave a message on voice mail? Yes    Primary language: English      needed? No    Call taken on May 18, 2020 at 12:08 PM by Grisel Flores-Cardona

## 2020-05-19 NOTE — TELEPHONE ENCOUNTER
I would be willing to create an appt slot for her this coming Friday, May 22 at 1pm.  Could you call to offer her this?  Let me know if that won't work for her and I'll look to find some other alternatives.    Thanks!      Dior Sky, PhD, LP

## 2020-05-21 NOTE — PROGRESS NOTES
"The following statement has been read to the patient at past visits:     \"We have found that certain health care needs can be provided without the need for a face to face visit.  This service lets us provide the care you need with a phone conversation. I will have full access to your [facility] medical record during this entire phone call.   I will be taking notes for your medical record.  Since this is like an office visit, we will bill your insurance company for this service. There are potential benefits and risks of telephone visits (e.g. limits to patient confidentiality) that differ from in-person visits.?  Confidentiality still applies for telephone services, and nobody will record the visit.  It is important to be in a quiet, private space that is free of distractions (including cell phone or other devices) during the visit.??If during the course of the call I believe a telephone visit is not appropriate, you will not be charged for this service.\"     Consent has been obtained for this service by care team member: Yes     Emergency contact: Favio Washington (daughter) [phone]  Closest Emergency Room: [facility]  Location at time of call: home    Start of call: 1:04pm  End of call: 1:41pm    Behavioral Health Chronic Pain Management Visit     Visit type: Follow Up  Meeting lasted: 37 minutes  Others present: no one    Reason for Consultation:  Shira Guthrie is a 64 year old,  female referred by Dr. Pierre for behavioral health consultation as part of the Chronic Pain Management protocol at [facility]. The goal of behavioral health visits is to help the patient with the psychosocial aspects of pain management. Ms. Guthrie was initially seen by this provider for her initial  CPM consult on 12/15/16.      Topics Discussed:     Pain experience:  \"I did a few projects this weekend to keep my mind going.  Overworked my body.\" Cool/damp weather troubles her " "arthritis.    Pacing:  Reflected on why pacing is so difficult.  Worried she will lose energy or motivation to finish a task.  Reflected on pros and cons of this.  Also, develops \"projects\" to maintain her mental health - especially during context of restrictions of current pandemic.  Reflected on pros and cons of this form of distraction to manage negative emotions and alternatives for this.    Mood: \"I'm a little blah, but I'm holding.\"  Energy is low.  \"Mentally, I'm not clicking today.\"  \"Feeling spacey.\"  Reflected that she sounded very sad today.  One of her [employer] study partners also gave her the feedback that she sounded quieter or nonchalant this week, like she didn't care.  She agreed she is feeling \"off.\"  Initially states she is not sure why today, but later reflects that holidays like Mother's Day and Memorial Day increase grief/loss related to death of her son, Randolph.  Overall, thinks mood is a bit better than where it was before.  Attributes this to cleaning which boosts her mood/takes her mind in a more positive direction.        Coping:  Often escapes by going to sleep.  Thinks this helps.  Resting today, but also reflects that when she has a lot of \"do nothing days\" she gets tired of this and can then overdo (e.g., rearranged furniture to make some more space for her exercise, cleaned pantry, etc.). Working on telling herself it is Ok to break tasks up but this is still hard for her.  Watched the movie the Lion Chao and enjoyed this.  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  Doesn't want to do this now due to painful association.  Provided empathy for this loss.  Finds solace in solitude when she doesn't have to try to satisfy others.  Doesn't need to create a certain image.  Plans to take a bath later today which also helps.     Connection/support:  Oldest granddaughter and " "daughter reach out a lot to support her.  Neighbor checks on her too. May get together with family over Memorial day weekend but ambivalent about this due to pandemic.  Discussed safe social distancing practices.  Discussed Aydee's concept of comfort in seeing \"the lights in the boats bobbing beside us.\"      Medication management:  Discussed option of talking with Dr. Pierre re: increasing antidepressant medication.  Does think she had a dose increase a little bit ago and does think that helps a bit.  \"Some days are better than others.\"   Will be seeing Dr. Pierre next week and did encourage her to discuss this with her at that time.    Objective: Ms. Guthrie appears to be awake and alert for today's visit.      PHQ-9 SCORE 12/2/2019 2/27/2020 4/24/2020   PHQ-9 Total Score - - -   PHQ-9 Total Score 7 7 6     FAYE-7 SCORE 7/24/2018 10/30/2018 2/25/2019   Total Score - - -   Total Score 6 (mild anxiety) - -   Total Score 6 7 2     Wt Readings from Last 4 Encounters:   04/24/20 82.1 kg (181 lb)   03/24/20 82.1 kg (181 lb)   02/27/20 85.7 kg (189 lb)   12/02/19 85 kg (187 lb 6.4 oz)       Assessment:   Ms. Guthrie was referred by Dr. Pierre for a behavioral health evaluation to address chronic pain syndrome.  Today, Ms. Guthrie's mood appeared depressed with congruent affect.  She was tearful at times in the visit but also pleasant, engaged and receptive to feedback throughout.  Physical and emotional pain appear intertwined.  Ms. Guthrie may benefit from continued meetings with this provider on an as needed basis to support improved pain management via help in recovering from loss/managing mood, greater implementation of pacing, improved sleep and support for continued tobacco cessation.    Stage of change: Action  Diagnosis: chronic pain syndrome    Plan:   1.  Personal Care Plan for Chronic Pain: Care Plan Date: April/2020 Will update at next face to face visit.  2.  Shared that I will be out of the office from 6/8 - 6/19 and " offered to facilitate setting up a follow up meeting today, but Shira declined stating she would call back to schedule follow up within a month.  Encouraged Ms. Guthrie to reach out to Dr. Pierre/[facility] staff if any urgent needs should arise during my time away and provided crisis resources in the AVS today.  Agreed I would place outreach call to her if she was not on my schedule upon my return from time away.  3.  Next visit with Dr. Pierre is scheduled for 5/28/2020.  This will be a phone visit.  Encouraged her to discuss dose of current antidepressant medication at that visit given continued struggle with low mood.  Will route note from today to Dr. Pierre so she can be aware of conversation and plan from today.    Dior Sky, PhD, LP

## 2020-05-22 ENCOUNTER — VIRTUAL VISIT (OUTPATIENT)
Dept: PSYCHOLOGY | Facility: CLINIC | Age: 65
End: 2020-05-22
Payer: MEDICARE

## 2020-05-22 DIAGNOSIS — G89.4 CHRONIC PAIN SYNDROME: Primary | ICD-10-CM

## 2020-05-22 NOTE — PATIENT INSTRUCTIONS
It was good to hear your voice today Shira.  I know things are hard right now, but remember that it is Ok to make a little space for your sadness and some stillness.    Don't be so afraid of your feelings that you overwork yourself.  Pace yourself so you can keep going without as many highs and lows.  It's Ok to make adjustments as you work.    Dr. Sky will be out of the office from 6/8-6/19.  She'll give you a call to check in when she gets back if you haven't yet scheduled any follow up.      Remember that there are lots of people who care about you and who you can reach out to if anything urgent arises while Dr. Sky is away (your family, Dr. Pierre, your [employer]).  I'll also put some crisis resources below just in case:    Crisis Lines:    [facility] Adult Crisis:  [phone]   COVID-19 UPDATE (3/20/2020): Still open and taking calls 24/7, limiting some in-person visits if patient has symptoms of COVID-19. If phone support alone is not sufficient and patient has symptoms, they will call 911 or call ambulance to go to patient.    [facility] Adult Crisis:  [phone]  COVID-19 UPDATE (3/20/2020): Still open and taking calls 24/7, no face to face assessments; give recommendations by phone. If phone support alone is not sufficient, they will call 911 or call ambulance to go to patient.     Crisis Text Line: Text [place] to [phone]. Free support at your fingertips 24/7  People who text [place] to [phone] will be connected with a counselor. Crisis Text Line is available 24 hours a day, seven days a week.    You can also consider going to the [facility] at the following address.  Walk ins are welcome:    [address] [place] [address]   [phone] (for 24-hour crisis consultation)    Monday - Friday 8:00am - 7:00pm  Saturday:  11:00am - 3:00pm  Sunday and Holidays Closed    COVID-19 UPDATE (3/20/2020): Hours are: Monday - Friday 7:30am - 5:00 pm  Weekends and holidays  closed

## 2020-05-29 ENCOUNTER — VIRTUAL VISIT (OUTPATIENT)
Dept: FAMILY MEDICINE | Facility: CLINIC | Age: 65
End: 2020-05-29
Payer: MEDICARE

## 2020-05-29 VITALS — DIASTOLIC BLOOD PRESSURE: 76 MMHG | SYSTOLIC BLOOD PRESSURE: 120 MMHG

## 2020-05-29 DIAGNOSIS — G89.4 CHRONIC PAIN SYNDROME: ICD-10-CM

## 2020-05-29 DIAGNOSIS — G62.89 OTHER POLYNEUROPATHY: ICD-10-CM

## 2020-05-29 DIAGNOSIS — M15.0 PRIMARY OSTEOARTHRITIS INVOLVING MULTIPLE JOINTS: ICD-10-CM

## 2020-05-29 RX ORDER — OXYCODONE AND ACETAMINOPHEN 10; 325 MG/1; MG/1
1 TABLET ORAL 2 TIMES DAILY PRN
Qty: 45 TABLET | Refills: 0 | Status: SHIPPED | OUTPATIENT
Start: 2020-05-29 | End: 2020-07-16

## 2020-05-29 RX ORDER — TRAMADOL HYDROCHLORIDE 50 MG/1
50 TABLET ORAL 3 TIMES DAILY PRN
Qty: 90 TABLET | Refills: 0 | Status: SHIPPED | OUTPATIENT
Start: 2020-05-29 | End: 2020-07-16

## 2020-05-29 NOTE — PROGRESS NOTES
"Family Medicine Telephone Visit Note               Telephone Visit Consent   Patient was verbally read the following and verbal consent was obtained.    \"Telephone visits are billed at different rates depending on your insurance coverage. During this emergency period, for some insurers they may be billed the same as an in-person visit.  Please reach out to your insurance provider with any questions.  If during the course of the call the physician/provider feels a telephone visit is not appropriate, you will not be charged for this service.\"    Name person giving consent:  Patient   Date verbal consent given:  5/29/2020  Time verbal consent given:  9:50 AM      No chief complaint on file.             HPI   Patients name: Shira  Appointment start time:  9:50 AM      Chronic Pain Follow-Up Visit    Pain Update:  Location of pain: shoulder, hands, legs, and back  Analgesia/pain control:    Recent changes:  same     Overall control: Tolerable with discomfort  She is keeping busy with housework and chores but try to pace herself as well.  Keeping busy helps her mentally and with her mood.  She has not been able to get into physical therapy yet.  She has not had a call from them.  Pain is been well controlled on current medications.     Adherence     How often do you take extra pain medicine:Never    Did you take your pain medication today? YES    Adverse effects: No      Database checked today? No    Asya is helping a lot.  She had some tearfulness yesterday but trying to stay busy.      Going to be around her family in [place]. Leaving next week and staying there for about a month.  Her family has been very worried about her in the [facility] with all the rides going on now.  She has a mask and discussed that says she will travel carefully.    PHQ-9 SCORE 12/2/2019 2/27/2020 4/24/2020   PHQ-9 Total Score - - -   PHQ-9 Total Score 7 7 6     FAYE-7 SCORE 7/24/2018 10/30/2018 2/25/2019   Total Score - - -   Total " Score 6 (mild anxiety) - -   Total Score 6 7 2       Care Plan discussed and updated as needed.  See the end of this note.     Going to be around her family in [place]. Leaving next week and staying there for about a month.      FUNCTIONAL ASSESSMENT QUESTIONNAIRE SCORE 2/27/2020 4/24/2020   Total Score 40 40      Problem, Medication and Allergy Lists were reviewed and are current..           Physical Exam:     Vitals:    05/29/20 0959   BP: 120/76     There is no height or weight on file to calculate BMI.  Vitals were reviewed and were normal  GENERAL: healthy, alert, no distress  PSYCH: Alert and oriented times 3; speech- coherent , normal rate and volume; able to articulate logical thoughts, able to abstract reason, no tangential thoughts, no hallucinations or delusions, affect- normal        Results:     No results found for any visits on 05/29/20.   rapid urine drug screen obtained today: No    Assessment and Plan    Shira Guthrie is here for follow up of chronic pain caused by arthritis and neuropathy.    Diagnoses and all orders for this visit:    Chronic pain syndrome: Refilled tramadol and oxycodone.  She has been on the tramadol long-term which helps more with her arthritis pain.  Her shoulder pain is helped more with the oxycodone.  Oxycodone has been plan to be short-term until she can get into physical therapy and get her shoulder feeling better.  She has not been able to do physical therapy due to COVID.  -     oxyCODONE-acetaminophen (PERCOCET)  MG per tablet; Take 1 tablet by mouth 2 times daily as needed for severe pain  -     traMADol (ULTRAM) 50 MG tablet; Take 1 tablet (50 mg) by mouth 3 times daily as needed for severe pain    Other polyneuropathy  -     traMADol (ULTRAM) 50 MG tablet; Take 1 tablet (50 mg) by mouth 3 times daily as needed for severe pain    Primary osteoarthritis involving multiple joints  -     traMADol (ULTRAM) 50 MG tablet; Take 1 tablet (50 mg) by mouth 3 times daily  as needed for severe pain          Chronic Pain Syndrome:  Care plan updated with patient, see below for details.  Patient is being prescribed 20mg of oxycodone /acetaminophen (Percocet) per day. 45 mg MEDD  Care Plan Date: April/2020  Naloxone has not been prescribed.           If opioids prescribed patient was asked to bring pill bottle to each appointment and was informed that refills would only be provided at office visits.   Asked patient to F/U in 1 month(s) with same provider for 20 minute  Visit.     Sally Pierre MD    Patient Instructions     I placed a referral with Dr. Muniz of [facility] to have a visit for your shoulder pain and to review your MRI results from March. Keep doing your shoulder exercises.    PACE yourself.     Personal Care Plan for Chronic Pain    1.  Personal Goals:                * take less medication              * lose weight: goal of losing 15 to 20 pounds.              * continue working on keeping thoughts positive through Tawny              * to feel confident enough that when someone gives me a compliment I can simply say thank you    2.  Sleep:                 *  Basic sleep plan:                          *reduce or eliminate caffeine and daytime naps                          * relaxation before bed                          * limit screen time 1-2 hours prior to bed                          * establish dark/quiet sleep environment                          * go to bed at target bedtime:   pm                          * keep consistent wake time:   am.              *  Minimize switching days and nights by staying active throughout the day.              * We'll talk more about a consistent sleep plan when we meet next time.                3.  Physical Activity:                 * Formal physical rehabilitation:                          HOME PT for shoulder              * Home/community based activity:                          * Home based exercises given by lymphedema  nurse with breathing exercises built in as well - daily                          * Aerobic exercise/endurance exercise:  elliptical machine - 5 minute intervals with sit ups in between for 30 minutes - daily.  Has a  in the builiding. Has exercise tape in her apartment.                          * Cleaning and baking with body awareness and stretching              * Listen to your body.  Pace yourself for success.  Don't over-do it.  Plan to get PCA back to help with cleaning and laundry so as to not overdo it.                4.  Nutrition/Weight:                 * Keep a food journal in a notebook at home and bring this to your next visit with Dr. Sky.  Eat small frequent meals.              * Review your I Can Prevent Diabetes materials.              * Limit processed foods and foods high in sugar, sodium and fat.              * Keep up the good work eating many healthy foods.              * When you make a less healthy choice approach yourself with kindness and compassion.  Try to understand what contributed to that choice:  Was I too hungry?  Was I too tired?  Stressed?  Worried?  Use this information to help support healthier choices in the future.    5.  Mood/Stress Management:     SELF COMPASSION!              * Formal interventions:                        * schedule follow up with Dr. Sky in about month or so.              * Home/community based interventions:                          * Regular contact with family  * Relaxation techniques - breathing exercises  * Create your pyramid to focus you on your strengths and hopes.  * Movies  * Meditation  * Yoga  * Creative activity  * Spiritual /prayer:  Prayer at home, attending services at [facility], wellness auxiliary group  * Service-based activity.              * Medications: Cymbalta, Hydroxyzine as needed.    6.  Tobacco/Alcohol/Drug Use:                               * Congratulations on quitting smoking and staying  quit!  Keep up the good work managing stress/anxiety in other ways (prayer, talking it out, deep breaths, exercise, etc..                         * Maintain healthy relationship with alcohol                          * For women this would be no more than 1 drink per day                          * For men, this would be no more than 2 drinks per day              * Eliminate recreational drugs    7.  Pain:                 * Non-medication treatments:                          * ice/heat: use heating pad as you are doing.                          * massage                          * acupuncture    8.  Pain Medications: Gabapentin 600mg twice a day   Tramadol one pill three times day. Have been doing three month refills.    Tylenol arthritis as needed for break through.  Short term oxycodone 10mg daily increased to #45 per month for short term unttil evaluation with Dr. Muniz Do not take with tramadol.                         After Visit Information:  Patient declined AVS     Return in about 4 weeks (around 6/26/2020) for Phone Visit Chronic pain.    Appointment end time: 10:15 AM  This is a telephone visit that took 25 minutes.      Clinician location: [facility]    Sally Pierre MD

## 2020-05-29 NOTE — PATIENT INSTRUCTIONS
I placed a referral with Dr. Muniz of [facility] to have a visit for your shoulder pain and to review your MRI results from March. Keep doing your shoulder exercises.    PACE yourself.     Personal Care Plan for Chronic Pain    1.  Personal Goals:                * take less medication              * lose weight: goal of losing 15 to 20 pounds.              * continue working on keeping thoughts positive through Tawny              * to feel confident enough that when someone gives me a compliment I can simply say thank you    2.  Sleep:                 *  Basic sleep plan:                          *reduce or eliminate caffeine and daytime naps                          * relaxation before bed                          * limit screen time 1-2 hours prior to bed                          * establish dark/quiet sleep environment                          * go to bed at target bedtime:   pm                          * keep consistent wake time:   am.              *  Minimize switching days and nights by staying active throughout the day.              * We'll talk more about a consistent sleep plan when we meet next time.                3.  Physical Activity:                 * Formal physical rehabilitation:                          HOME PT for shoulder              * Home/community based activity:                          * Home based exercises given by lymphedema nurse with breathing exercises built in as well - daily                          * Aerobic exercise/endurance exercise:  elliptical machine - 5 minute intervals with sit ups in between for 30 minutes - daily.  Has a  in the builiding. Has exercise tape in her apartment.                          * Cleaning and baking with body awareness and stretching              * Listen to your body.  Pace yourself for success.  Don't over-do it.  Plan to get PCA back to help with cleaning and laundry so as to not overdo it.                4.  Nutrition/Weight:                  * Keep a food journal in a notebook at home and bring this to your next visit with Dr. Sky.  Eat small frequent meals.              * Review your I Can Prevent Diabetes materials.              * Limit processed foods and foods high in sugar, sodium and fat.              * Keep up the good work eating many healthy foods.              * When you make a less healthy choice approach yourself with kindness and compassion.  Try to understand what contributed to that choice:  Was I too hungry?  Was I too tired?  Stressed?  Worried?  Use this information to help support healthier choices in the future.    5.  Mood/Stress Management:     SELF COMPASSION!              * Formal interventions:                        * schedule follow up with Dr. Sky in about month or so.              * Home/community based interventions:                          * Regular contact with family  * Relaxation techniques - breathing exercises  * Create your pyramid to focus you on your strengths and hopes.  * Movies  * Meditation  * Yoga  * Creative activity  * Spiritual /prayer:  Prayer at home, attending services at [facility], wellness auxiliary group  * Service-based activity.              * Medications: Cymbalta, Hydroxyzine as needed.    6.  Tobacco/Alcohol/Drug Use:                               * Congratulations on quitting smoking and staying quit!  Keep up the good work managing stress/anxiety in other ways (prayer, talking it out, deep breaths, exercise, etc..                         * Maintain healthy relationship with alcohol                          * For women this would be no more than 1 drink per day                          * For men, this would be no more than 2 drinks per day              * Eliminate recreational drugs    7.  Pain:                 * Non-medication treatments:                          * ice/heat: use heating pad as you are doing.                          *  massage                          * acupuncture    8.  Pain Medications: Gabapentin 600mg twice a day   Tramadol one pill three times day. Have been doing three month refills.    Tylenol arthritis as needed for break through.  Short term oxycodone 10mg daily increased to #45 per month for short term unttil evaluation with Dr. Muniz Do not take with tramadol.

## 2020-06-01 ENCOUNTER — TELEPHONE (OUTPATIENT)
Dept: FAMILY MEDICINE | Facility: CLINIC | Age: 65
End: 2020-06-01

## 2020-06-24 ENCOUNTER — DOCUMENTATION ONLY (OUTPATIENT)
Dept: PSYCHOLOGY | Facility: CLINIC | Age: 65
End: 2020-06-24

## 2020-06-24 NOTE — PROGRESS NOTES
Noticed that Ms. Guthrie did not schedule follow up we discussed at our last visit on 5/22/2020.  I do see that she had a visit with Dr. Pierre on 5/29 but did not discuss dosing of antidepressant medication at that time.  No future visits are seen in [facility] upon review today.    Will request  staff place outreach call to see if Ms. Guthrie would like to schedule follow up with me in July as I do still have some openings for this.    Dior Sky, PhD, LP

## 2020-07-08 ENCOUNTER — VIRTUAL VISIT (OUTPATIENT)
Dept: PSYCHOLOGY | Facility: CLINIC | Age: 65
End: 2020-07-08
Payer: MEDICARE

## 2020-07-08 DIAGNOSIS — G89.4 CHRONIC PAIN SYNDROME: Primary | ICD-10-CM

## 2020-07-08 NOTE — Clinical Note
Just wanted to reach out for guidance as Shira was visiting her daughter in [place] at the time of our call and will likely still be there during visit with Dr. Pierre on 7/16.  I know we recently received guidance that we cannot provide services across state lines due to licensure issues, but I believe there were some brief exceptions for patients who generally live in [place] but are away briefly during episode of care.  Just looking for guidance on how to handle billing for this visit and for Dr. Pierre' next visit.  Thanks in advance for your guidance!  Dior

## 2020-07-08 NOTE — PROGRESS NOTES
"The following statement has been read to the patient at past visits:     \"We have found that certain health care needs can be provided without the need for a face to face visit.  This service lets us provide the care you need with a phone conversation. I will have full access to your [facility] medical record during this entire phone call.   I will be taking notes for your medical record.  Since this is like an office visit, we will bill your insurance company for this service. There are potential benefits and risks of telephone visits (e.g. limits to patient confidentiality) that differ from in-person visits.?  Confidentiality still applies for telephone services, and nobody will record the visit.  It is important to be in a quiet, private space that is free of distractions (including cell phone or other devices) during the visit.??If during the course of the call I believe a telephone visit is not appropriate, you will not be charged for this service.\"     Consent has been obtained for this service by care team member: Yes     Emergency contact: Favio Washington (daughter) [phone]  Closest Emergency Room: [facility]  Location at time of call: daughter's home    Start of call: 10:06am  End of call: 10:48am    Behavioral Health Chronic Pain Management Visit     Visit type: Follow Up  Meeting lasted: 42 minutes  Others present: no one    Reason for Consultation:  Shira Guthrie is a 64 year old,  female referred by Dr. Pierre for behavioral health consultation as part of the Chronic Pain Management protocol at [facility]. The goal of behavioral health visits is to help the patient with the psychosocial aspects of pain management. Ms. Guthrie was initially seen by this provider for her initial  CPM consult on 12/15/16.      Topics Discussed:     Update:  Has been visiting daughter in [place].  Was supposed to be for rest and relaxation but has been more difficult due to " "multiple medical crises with family and friends.  Daughter has spinal stenosis and was in this hospital when she arrived.  Stirred up worry about losing another child.  \"I only have two.\"  Has been researching and learning about this condition from [place] website and this has been helpful.  Reflected that Shira has a lot of experience managing pain that she can share with her daughter and Shira agrees. Likes being helpful.  Sister was also ill and spent time in the hospital.  Is on dialysis three times per week.  Another friend was diagnosed with cancer and she has been going with him to chemotherapy.  This is understandably distressing for her and overwhelming at first, but it has also been healing.  \"It helps me to help others.\"  Wore shirt in memory of Randolph recently.  Reaffirms her desire to stay healthy so she can be there for her great grandchildren (2 great grandsons and 2 great granddaughters.)    \"I'm petrified of getting COVID.\"  Being very safe with gloves and mask.      Does need to take a break from social activity at times as she is used to living alone and has been making time for this.    Pain experience:  Managing pain with current medications.  Taking as prescribed.  Does not increase dose even when she experiences increased pain as she is aware of risks of misuse.  Has appt with surgeon who did rotator cuff surgery (Dr. Muniz) on August 8.  Will return to [place] in the first week of August for this.      Pacing:  Again, reviewed value of pacing, but Shira acknowledges that this is hard for her and some days she will overdo - especially when trying to provide care for ill family members.  Caring for three others.  Discussed value of sharing limits with others.  She agrees to do this.      Mood: \"Today is a good day.\"  Grateful she is not in a depression \"yet.\"    Has been taking medicine for depression and this has been helpful.  Confronting physical limitations is often a trigger for " "depression.  \"I've had a few teary days.\"  Responds to this by taking a shower and resting.  Praised awareness of her emotions and responding with self-care.  \"My goal is to be able to smile at the end of the day.\"  She can also get swept away by negative thoughts at times and is working to be aware of this and manage negative thoughts when they occur.  Tendency to worry about potential future (e.g. loss of daughter) is particularly difficult.  Provided empathy and normalized this experience while also talking about how to cope by staying in the present moment.  Shira was very receptive to that feedback and has been working on this.    Nutrition:  Nutrition is improved when she is staying with others.  Had lost interest in cooking after Randolph , but has been making chicken noodle soup which feels nourishing for body and mind.  Cooked a lot for Randolph.  Listens to jazz and Gospel music while she cooks.  Had a Davies Pie yesterday!  Praised reconnection to estefania of cooking and eating nourishing food.  Encouraged her to continue this when she returns home.      Physical activity:  Daughter walks 15 minutes twice per day but this is too far for her.  Heat and humidity are very difficult.   Doing her own exercises at home.    Impact of Porfirio Pickett murder and riots: Checked in about response to these events as our last visit was just prior to that.  Had to limit watching the news to manage the stress of these events. Have had to  to people in her building over the phone.  Looking to God to give her the strength to manage this.  Offered additional support as needed.    Coping:  Prayer.  Likes to play Jacks.  Colors.      Objective: Ms. Guthrie appears to be awake and alert for today's visit.      PHQ-9 SCORE 2019   PHQ-9 Total Score - - -   PHQ-9 Total Score 7 7 6     FAYE-7 SCORE 2018 10/30/2018 2019   Total Score - - -   Total Score 6 (mild anxiety) - -   Total Score 6 7 2 "     Wt Readings from Last 4 Encounters:   04/24/20 82.1 kg (181 lb)   03/24/20 82.1 kg (181 lb)   02/27/20 85.7 kg (189 lb)   12/02/19 85 kg (187 lb 6.4 oz)       Assessment:   Ms. Guthrie was referred by Dr. Pierre for a behavioral health evaluation to address chronic pain syndrome.  Today, Ms. Guthrie's mood appeared generally euthymic with congruent affect.  She was briefly tearful when reflecting on worries about her daughter, but also pleasant, engaged and receptive to feedback throughout.  Ms. Guthrie may benefit from continued meetings with this provider on an as needed basis to support improved pain management via help in recovering from loss/managing mood, greater implementation of pacing, improved sleep and support for continued tobacco cessation.    Stage of change: Action  Diagnosis: chronic pain syndrome    Plan:   1. Personal Care Plan for Chronic Pain: Care Plan Date: April/2020 Will update at next face to face visit.  2  Follow up with Dr. Pierre on 7/16/2020.  3. Follow up with this provider in August.  Did inform Shira that I will be away the week of 8/17.  Will plan to check in via phone if I do not see her on my schedule by the end of that month.    Dior Sky, PhD, LP

## 2020-07-16 ENCOUNTER — VIRTUAL VISIT (OUTPATIENT)
Dept: FAMILY MEDICINE | Facility: CLINIC | Age: 65
End: 2020-07-16
Payer: MEDICARE

## 2020-07-16 DIAGNOSIS — M15.0 PRIMARY OSTEOARTHRITIS INVOLVING MULTIPLE JOINTS: ICD-10-CM

## 2020-07-16 DIAGNOSIS — G89.4 CHRONIC PAIN SYNDROME: Primary | ICD-10-CM

## 2020-07-16 DIAGNOSIS — G62.89 OTHER POLYNEUROPATHY: ICD-10-CM

## 2020-07-16 DIAGNOSIS — S46.001S OSTEOARTHRITIS OF RIGHT SHOULDER DUE TO ROTATOR CUFF INJURY: ICD-10-CM

## 2020-07-16 DIAGNOSIS — M19.111 OSTEOARTHRITIS OF RIGHT SHOULDER DUE TO ROTATOR CUFF INJURY: ICD-10-CM

## 2020-07-16 RX ORDER — TRAMADOL HYDROCHLORIDE 50 MG/1
50 TABLET ORAL 3 TIMES DAILY PRN
Qty: 90 TABLET | Refills: 0 | Status: SHIPPED | OUTPATIENT
Start: 2020-07-16 | End: 2020-08-21

## 2020-07-16 RX ORDER — OXYCODONE AND ACETAMINOPHEN 10; 325 MG/1; MG/1
1 TABLET ORAL 2 TIMES DAILY PRN
Qty: 45 TABLET | Refills: 0 | Status: SHIPPED | OUTPATIENT
Start: 2020-07-16 | End: 2020-08-21

## 2020-07-16 NOTE — PROGRESS NOTES
"Family Medicine Telephone Visit Note               Telephone Visit Consent   Patient was verbally read the following and verbal consent was obtained.    \"Telephone visits are billed at different rates depending on your insurance coverage. During this emergency period, for some insurers they may be billed the same as an in-person visit.  Please reach out to your insurance provider with any questions.  If during the course of the call the physician/provider feels a telephone visit is not appropriate, you will not be charged for this service.\"    Name person giving consent:  Patient   Date verbal consent given:  7/16/2020  Time verbal consent given:  2:38 PM           Chief Complaint   Patient presents with     RECHECK     Followup              HPI   Patients name: Shira  Appointment start time:  3:18 PM      Chronic Pain Follow-Up Visit    Pain Update:  Location of pain: right shoulder, left arm, neuropathy in feet.  Analgesia/pain control:    Recent changes:  same     Overall control: Tolerable with discomfort    Adherence     How often do you take extra pain medicine:Never    Did you take your pain medication today? YES    Adverse effects: No      Database checked today? No    PHQ-9 SCORE 12/2/2019 2/27/2020 4/24/2020   PHQ-9 Total Score - - -   PHQ-9 Total Score 7 7 6     FAYE-7 SCORE 7/24/2018 10/30/2018 2/25/2019   Total Score - - -   Total Score 6 (mild anxiety) - -   Total Score 6 7 2     She has a lot of new stressors. Daughter with spinal stenosis. 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  Hot humid weather is bothering her.  Working on prayers and pacing herself.    Care Plan will discuss at next visit in person.      FUNCTIONAL ASSESSMENT QUESTIONNAIRE SCORE 4/24/2020 7/16/2020   Total Score 40 30          Problem, Medication and Allergy Lists were reviewed and are current..           Physical Exam:   There were no vitals taken for this visit.  Estimated body " "mass index is 31.07 kg/m  as calculated from the following:    Height as of 3/24/20: 1.626 m (5' 4\").    Weight as of 4/24/20: 82.1 kg (181 lb).    Exam:  Constitutional: alert and no distress  Psychiatric: mentation appears normal and affect normal        Results:     No results found for any visits on 07/16/20.   rapid urine drug screen obtained today: No    Assessment and Plan    Shira Guthrie is here for follow up of chronic pain caused by see below.    Shira was seen today for recheck.    Diagnoses and all orders for this visit:    Chronic pain syndrome: stable. Shoulder is the worst right now.  Oxycodone is shorter term while we work on a plan for that.  -     oxyCODONE-acetaminophen (PERCOCET)  MG per tablet; Take 1 tablet by mouth 2 times daily as needed for severe pain  -     traMADol (ULTRAM) 50 MG tablet; Take 1 tablet (50 mg) by mouth 3 times daily as needed for severe pain    Primary osteoarthritis involving multiple joints: Stable and well controlled.  Other polyneuropathy  -     traMADol (ULTRAM) 50 MG tablet; Take 1 tablet (50 mg) by mouth 3 times daily as needed for severe pain    Osteoarthritis of right shoulder due to rotator cuff injury:           Chronic Pain Syndrome:  Care plan updated with patient, see below for details.  Patient is being prescribed  oxycodone IR (Percocet) and tramadol IR per day. 45 mg MEDD  Care Plan Date: April/2020  Naloxone has not been prescribed.     If opioids prescribed patient was asked to bring pill bottle to each appointment and was informed that refills would only be provided at office visits.   Asked patient to F/U in 1 month(s) with same provider for 20 minute  Visit.     Sally Pierre MD    There are no Patient Instructions on file for this visit.      After Visit Information:  Will print and mail AVS     No follow-ups on file.    Appointment end time: 3:36 PM  This is a telephone visit that took 18 minutes.      Clinician location:  [facility]     Sally Pierre MD

## 2020-08-07 ENCOUNTER — TRANSFERRED RECORDS (OUTPATIENT)
Dept: HEALTH INFORMATION MANAGEMENT | Facility: CLINIC | Age: 65
End: 2020-08-07

## 2020-08-08 DIAGNOSIS — I25.9 CHRONIC ISCHEMIC HEART DISEASE: ICD-10-CM

## 2020-08-10 RX ORDER — CLOPIDOGREL BISULFATE 75 MG
TABLET ORAL
Qty: 90 TABLET | Refills: 3 | Status: SHIPPED | OUTPATIENT
Start: 2020-08-10 | End: 2020-11-23

## 2020-08-21 ENCOUNTER — VIRTUAL VISIT (OUTPATIENT)
Dept: FAMILY MEDICINE | Facility: CLINIC | Age: 65
End: 2020-08-21
Payer: MEDICARE

## 2020-08-21 DIAGNOSIS — G62.89 OTHER POLYNEUROPATHY: ICD-10-CM

## 2020-08-21 DIAGNOSIS — G89.4 CHRONIC PAIN SYNDROME: ICD-10-CM

## 2020-08-21 DIAGNOSIS — M15.0 PRIMARY OSTEOARTHRITIS INVOLVING MULTIPLE JOINTS: ICD-10-CM

## 2020-08-21 DIAGNOSIS — I89.0 LYMPHEDEMA OF LEFT LEG: ICD-10-CM

## 2020-08-21 DIAGNOSIS — I25.9 CHRONIC ISCHEMIC HEART DISEASE: ICD-10-CM

## 2020-08-21 DIAGNOSIS — K22.2 ESOPHAGEAL STRICTURE: ICD-10-CM

## 2020-08-21 DIAGNOSIS — I10 BENIGN ESSENTIAL HYPERTENSION: ICD-10-CM

## 2020-08-21 DIAGNOSIS — I20.9 ANGINAL PAIN (H): ICD-10-CM

## 2020-08-21 RX ORDER — TRAMADOL HYDROCHLORIDE 50 MG/1
50 TABLET ORAL 3 TIMES DAILY PRN
Qty: 90 TABLET | Refills: 0 | Status: SHIPPED | OUTPATIENT
Start: 2020-08-21 | End: 2020-09-21

## 2020-08-21 RX ORDER — METOPROLOL SUCCINATE 25 MG/1
25 TABLET, EXTENDED RELEASE ORAL DAILY
Qty: 90 TABLET | Refills: 4 | Status: SHIPPED | OUTPATIENT
Start: 2020-08-21 | End: 2021-01-13

## 2020-08-21 RX ORDER — DEXLANSOPRAZOLE 30 MG/1
30 CAPSULE, DELAYED RELEASE ORAL DAILY
Qty: 90 CAPSULE | Refills: 4 | Status: SHIPPED | OUTPATIENT
Start: 2020-08-21

## 2020-08-21 RX ORDER — NITROGLYCERIN 0.4 MG/1
0.4 TABLET SUBLINGUAL EVERY 5 MIN PRN
Qty: 30 TABLET | Refills: 12 | Status: SHIPPED | OUTPATIENT
Start: 2020-08-21

## 2020-08-21 RX ORDER — OXYCODONE AND ACETAMINOPHEN 10; 325 MG/1; MG/1
1 TABLET ORAL 2 TIMES DAILY PRN
Qty: 45 TABLET | Refills: 0 | Status: SHIPPED | OUTPATIENT
Start: 2020-08-21 | End: 2020-09-21

## 2020-08-21 RX ORDER — ISOSORBIDE MONONITRATE 30 MG/1
30 TABLET, EXTENDED RELEASE ORAL DAILY
Qty: 90 TABLET | Refills: 4 | Status: SHIPPED | OUTPATIENT
Start: 2020-08-21 | End: 2020-10-13

## 2020-08-21 RX ORDER — FUROSEMIDE 20 MG
40 TABLET ORAL DAILY
Qty: 180 TABLET | Refills: 4 | Status: SHIPPED | OUTPATIENT
Start: 2020-08-21

## 2020-08-21 NOTE — PROGRESS NOTES
"Family Medicine Telephone Visit Note               Telephone Visit Consent   Patient was verbally read the following and verbal consent was obtained.    \"Telephone visits are billed at different rates depending on your insurance coverage. During this emergency period, for some insurers they may be billed the same as an in-person visit.  Please reach out to your insurance provider with any questions.  If during the course of the call the physician/provider feels a telephone visit is not appropriate, you will not be charged for this service.\"    Name person giving consent:  Patient   Date verbal consent given:  8/21/2020  Time verbal consent given:  8:39 AM           Chief Complaint   Patient presents with     Counseling     Talk about MRI.             HPI   Patients name: Shira  Appointment start time:  9:00 AM    Sister passed away Wednesday night.  Had a stroke and massive heart attack. Had to change appointment to phone due to that.  She is doing okay with this loss and has a lot of support from family.  Plans to get a visit with Dr. Sky soon to talk about mental health.    Shoulder pain: continues to be quite painful.  Had 8/7 intra-articular cortisone shot with Dr. Muniz with [facility].  It helped for 2 days and then was worse for 4 days. Now back to where she was.  Continues to do home PT exercises.  She is unsure what she should do next for her shoulder.  He offered her more injections but she is not sure she wants that due to them not helping.    COVID19 test negative after she was in [place].  This was reassuring to her.    She is also due for many medication refills.         Review of Systems:     Constitutional, HEENT, cardiovascular, pulmonary, gi and gu systems are negative, except as otherwise noted.         Physical Exam:     There were no vitals taken for this visit.  Estimated body mass index is 31.07 kg/m  as calculated from the following:    Height as of 3/24/20: 1.626 m (5' 4\").    Weight as " of 4/24/20: 82.1 kg (181 lb).    Exam:  Constitutional: healthy, alert and no distress  Psychiatric: mentation appears normal and affect normal/bright            Assessment and Plan   Shira was seen today for counseling.    Diagnoses and all orders for this visit:    Chronic pain syndrome: Overall pain is stable.  Continues to have right shoulder pain since rotator cuff surgery.  Recently had an injection.  Continues to do home PT.  Encouraged her to go back to her orthopedic doctor and discuss other options for her shoulder pain.  Other polyneuropathy  Primary osteoarthritis involving multiple joints  -     traMADol (ULTRAM) 50 MG tablet; Take 1 tablet (50 mg) by mouth 3 times daily as needed for severe pain  -     oxyCODONE-acetaminophen (PERCOCET)  MG per tablet; Take 1 tablet by mouth 2 times daily as needed for severe pain    Chronic ischemic heart disease: No current symptoms stable.  Refilled medication  -     isosorbide mononitrate (IMDUR) 30 MG 24 hr tablet; Take 1 tablet (30 mg) by mouth daily    Lymphedema of left leg: No current symptoms stable.  Labs checked in February refilled.  -     furosemide (LASIX) 20 MG tablet; Take 2 tablets (40 mg) by mouth daily    Esophageal stricture: Needs a refill.  -     dexlansoprazole (DEXILANT) 30 MG CPDR CR capsule; Take 1 capsule (30 mg) by mouth daily    Anginal pain (H)  -     nitroGLYcerin (NITROSTAT) 0.4 MG sublingual tablet; Place 1 tablet (0.4 mg) under the tongue every 5 minutes as needed for chest pain    Benign essential hypertension: Stable.  Needs a refill.  -     metoprolol succinate ER (TOPROL-XL) 25 MG 24 hr tablet; Take 1 tablet (25 mg) by mouth daily      After Visit Information:  Patient declined AVS     Follow up 1 month.    Appointment end time: 9:21 AM  This is a telephone visit that took 21 minutes.      Clinician location:  [facility]    Sally Pierre MD

## 2020-09-21 ENCOUNTER — VIRTUAL VISIT (OUTPATIENT)
Dept: FAMILY MEDICINE | Facility: CLINIC | Age: 65
End: 2020-09-21
Payer: MEDICARE

## 2020-09-21 DIAGNOSIS — G62.89 OTHER POLYNEUROPATHY: ICD-10-CM

## 2020-09-21 DIAGNOSIS — G89.4 CHRONIC PAIN SYNDROME: Primary | ICD-10-CM

## 2020-09-21 DIAGNOSIS — S46.001S OSTEOARTHRITIS OF RIGHT SHOULDER DUE TO ROTATOR CUFF INJURY: ICD-10-CM

## 2020-09-21 DIAGNOSIS — M15.0 PRIMARY OSTEOARTHRITIS INVOLVING MULTIPLE JOINTS: ICD-10-CM

## 2020-09-21 DIAGNOSIS — M19.111 OSTEOARTHRITIS OF RIGHT SHOULDER DUE TO ROTATOR CUFF INJURY: ICD-10-CM

## 2020-09-21 RX ORDER — OXYCODONE AND ACETAMINOPHEN 10; 325 MG/1; MG/1
1 TABLET ORAL 2 TIMES DAILY PRN
Qty: 45 TABLET | Refills: 0 | Status: SHIPPED | OUTPATIENT
Start: 2020-09-21 | End: 2020-11-05

## 2020-09-21 RX ORDER — TRAMADOL HYDROCHLORIDE 50 MG/1
50 TABLET ORAL 3 TIMES DAILY PRN
Qty: 90 TABLET | Refills: 0 | Status: SHIPPED | OUTPATIENT
Start: 2020-09-21 | End: 2020-11-05

## 2020-09-21 ASSESSMENT — PATIENT HEALTH QUESTIONNAIRE - PHQ9: SUM OF ALL RESPONSES TO PHQ QUESTIONS 1-9: 10

## 2020-09-21 ASSESSMENT — PAIN SCALES - GENERAL: PAINLEVEL: MODERATE PAIN (4)

## 2020-09-21 NOTE — PATIENT INSTRUCTIONS
I placed a referral with Dr. Muniz of [facility] to have a visit for your shoulder pain and to review your MRI results from March. Keep doing your shoulder exercises.    PACE yourself.     Personal Care Plan for Chronic Pain    1.  Personal Goals:                * take less medication              * lose weight: goal of losing 15 to 20 pounds.              * continue working on keeping thoughts positive through Tawny              * to feel confident enough that when someone gives me a compliment I can simply say thank you    2.  Sleep:                 *  Basic sleep plan:                          *reduce or eliminate caffeine and daytime naps                          * relaxation before bed                          * limit screen time 1-2 hours prior to bed                          * establish dark/quiet sleep environment                          * go to bed at target bedtime:   pm                          * keep consistent wake time:   am.              *  Minimize switching days and nights by staying active throughout the day.              * We'll talk more about a consistent sleep plan when we meet next time.                3.  Physical Activity:                 * Formal physical rehabilitation:                          HOME PT for shoulder              * Home/community based activity:                          * Home based exercises given by lymphedema nurse with breathing exercises built in as well - daily                          * Aerobic exercise/endurance exercise:  elliptical machine - 5 minute intervals with sit ups in between for 30 minutes - daily.  Has a  in the builiding. Has exercise tape in her apartment.                          * Cleaning and baking with body awareness and stretching              * Listen to your body.  Pace yourself for success.  Don't over-do it.  Plan to get PCA back to help with cleaning and laundry so as to not overdo it.                4.  Nutrition/Weight:                  * Keep a food journal in a notebook at home and bring this to your next visit with Dr. Sky.  Eat small frequent meals.              * Review your I Can Prevent Diabetes materials.              * Limit processed foods and foods high in sugar, sodium and fat.              * Keep up the good work eating many healthy foods.              * When you make a less healthy choice approach yourself with kindness and compassion.  Try to understand what contributed to that choice:  Was I too hungry?  Was I too tired?  Stressed?  Worried?  Use this information to help support healthier choices in the future.    5.  Mood/Stress Management:     SELF COMPASSION!              * Formal interventions:                        * schedule follow up with Dr. Sky in about month or so.              * Home/community based interventions:                          * Regular contact with family  * Relaxation techniques - breathing exercises  * Create your pyramid to focus you on your strengths and hopes.  * Movies  * Meditation  * Yoga  * Creative activity  * Spiritual /prayer:  Prayer at home, attending services at [facility], wellness auxiliary group  * Service-based activity.              * Medications: Cymbalta, Hydroxyzine as needed.    6.  Tobacco/Alcohol/Drug Use:                               * Congratulations on quitting smoking and staying quit!  Keep up the good work managing stress/anxiety in other ways (prayer, talking it out, deep breaths, exercise, etc..                         * Maintain healthy relationship with alcohol                          * For women this would be no more than 1 drink per day                          * For men, this would be no more than 2 drinks per day              * Eliminate recreational drugs    7.  Pain:                 * Non-medication treatments:                          * ice/heat: use heating pad as you are doing.                          *  massage                          * acupuncture    8.  Pain Medications: Gabapentin 600mg twice a day   Tramadol one pill three times day. Have been doing three month refills.    Tylenol arthritis as needed for break through.  Short term oxycodone 10mg daily increased to #45 per month for short term unttil evaluation with Dr. Muniz Do not take with tramadol.                 Patient Care Team       Relationship Specialty Notifications Start End    Sally Pierre MD PCP - General Family Practice  1/16/13     Phone: [phone] Fax: [phone]         [address] [place] [address]    [facility]    7/16/13     Dr Luis hazel, Myrna Dao and Dr. Muniz for shoulder.    Phone: [phone]         Mik Belkys    2/24/14     [facility] , LATONYA signed on 2/24/14    Phone: [phone]         Millie Bradley Psychologist   2/24/14     Chanelle, ext [phone], LATONYA signed on 2/24/14    Phone: [phone]         Ruth Torres and Pedro  Optometry  8/5/14     Phone: [phone]         Dr Leos  Cardiology  2/13/15     [facility]    Phone: [phone] Fax: [phone]        Tarik Napoles MD MD Orthopedics  2/13/15     [facility]    Phone: [phone] Fax: [phone]         [address] [facility] [address]    Sotero Dorsey    2/13/15     [facility] financial worker    Phone: [phone] Fax: [phone]        Dr Donnelly    12/15/15     [facility]    Phone: [phone] Fax: [phone]        Simi Apodaca MD MD Gastroenterology  11/4/16     Phone: [phone] Fax: [phone]         [facility] [address] [place] [address]    Sally Pierre MD Assigned PCP   5/21/20     Phone: [phone] Fax: [phone]         [address] [place] [address]

## 2020-09-21 NOTE — PROGRESS NOTES
"Family Medicine Phone Visit Note  Shira is being evaluated via a billable Phone visit.               Phone Visit Consent     Patient was verbally read the following and verbal consent was obtained.  \"Phone visits are billed at different rates depending on your insurance coverage. During this emergency period, for some insurers they may be billed the same as an in-person visit.  Please reach out to your insurance provider with any questions.  If during the course of the call the physician/provider feels a video visit is not appropriate, you will not be charged for this service.\"     (Name person giving consent:  Patient   Date verbal consent given:  9/21/2020  Time verbal consent given:  8:09 AM)    Patient would like the video invitation sent by: Send to e-mail at: [email or web address]     We could not connect on video so switched to phone visit.        Chief Complaint   Patient presents with     Pain     CPM     RECHECK     Followup                   HPI       Video Start Time: 8:19 AM    Shira presents to clinic today for the following health issues:    Chronic Pain Follow-Up Visit    Pain Update:  Location of pain: shoulder, back and groin, tweaked groin picking up some water.  It is getting better.  Analgesia/pain control:    Recent changes:  same     Overall control: Tolerable with discomfort    Reading Bible and has a sanctuary place in her apartment for spiritual practices. Made her bathroom all white like the clouds.    Discuss time for quiet and peace.    Adherence     How often do you take extra pain medicine:Never    Did you take your pain medication today? YES    Adverse effects: No      Database checked today? No    Once her COVID test is negative, she will set up Dr. Muniz appointment for her shoulder.  She is hoping to get another shot but not sure if these really help much.  Aggravated her shoulder with luggage and recent trip.     Has appointment with Dr. Sky on 10/5 for counseling for " "pain and loss.    PHQ-9 SCORE 2020   PHQ-9 Total Score - - -   PHQ-9 Total Score 7 6 10     FAYE-7 SCORE 2018 10/30/2018 2019   Total Score - - -   Total Score 6 (mild anxiety) - -   Total Score 6 7 2       Care Plan discussed and updated as needed.  See the end of this note.       FUNCTIONAL ASSESSMENT QUESTIONNAIRE SCORE 2020   Total Score 30 40       Went to sister's  in [place] and [facility] in [place] for her birthday.         Problem, Medication and Allergy Lists were reviewed and are current..         Review of Systems:     Constitutional, HEENT, cardiovascular, pulmonary, gi and gu systems are negative, except as otherwise noted.         Physical Exam:     There were no vitals taken for this visit.  Estimated body mass index is 31.07 kg/m  as calculated from the following:    Height as of 3/24/20: 1.626 m (5' 4\").    Weight as of 20: 82.1 kg (181 lb).    GENERAL:  alert and no distress  RESP: No audible wheeze, cough, or increased work of breathing.    PSYCH: Mentation appears normal, affect normal/bright, judgement and insight intact, normal speech. A little anxious.        Assessment and Plan   Shira Guthrie is here for follow up of chronic pain caused by neuropathy and OA.    Shira was seen today for pain and recheck.    Diagnoses and all orders for this visit:    Chronic pain syndrome  -     traMADol (ULTRAM) 50 MG tablet; Take 1 tablet (50 mg) by mouth 3 times daily as needed for severe pain  -     oxyCODONE-acetaminophen (PERCOCET)  MG per tablet; Take 1 tablet by mouth 2 times daily as needed for severe pain    Osteoarthritis of right shoulder due to rotator cuff injury    Other polyneuropathy  -     traMADol (ULTRAM) 50 MG tablet; Take 1 tablet (50 mg) by mouth 3 times daily as needed for severe pain    Primary osteoarthritis involving multiple joints  -     traMADol (ULTRAM) 50 MG tablet; Take 1 tablet (50 mg) by " mouth 3 times daily as needed for severe pain  -     oxyCODONE-acetaminophen (PERCOCET)  MG per tablet; Take 1 tablet by mouth 2 times daily as needed for severe pain          Chronic Pain Syndrome:  Care plan updated with patient, see below for details.  Patient is being prescribed as above . 45 mg MEDD  Care Plan Date: September/2020  Naloxone has not been prescribed.           If opioids prescribed patient was asked to bring pill bottle to each appointment and was informed that refills would only be provided at office visits.   Asked patient to F/U in 1 month(s) with same provider for 40 minute  Visit.  Medicare Wellness and pain.     Sally Pierre MD  After Visit Information:  Will print and mail AVS       Return in about 4 weeks (around 10/19/2020) for In-Clinic Visit Medicare Wellness and chronic pain.      Phone-Visit Details    Type of service:  Phone Visit    Phone End Time (time phone stopped): 9:03 AM  Total time 42 min    Originating Location (pt. Location): Home    Distant Location (provider location):  [facility]     Platform used for Video Visit: Unable to complete video visit      Sally Pierre MD

## 2020-10-05 ENCOUNTER — VIRTUAL VISIT (OUTPATIENT)
Dept: PSYCHOLOGY | Facility: CLINIC | Age: 65
End: 2020-10-05
Payer: MEDICARE

## 2020-10-05 DIAGNOSIS — G89.4 CHRONIC PAIN SYNDROME: Primary | ICD-10-CM

## 2020-10-05 PROCEDURE — 99207 PR NO CHARGE LOS: CPT | Performed by: PSYCHOLOGIST

## 2020-10-05 PROCEDURE — 96159 HLTH BHV IVNTJ INDIV EA ADDL: CPT | Mod: 95 | Performed by: PSYCHOLOGIST

## 2020-10-05 PROCEDURE — 96158 HLTH BHV IVNTJ INDIV 1ST 30: CPT | Mod: 95 | Performed by: PSYCHOLOGIST

## 2020-10-05 NOTE — Clinical Note
Can you please convert today's visit from a video visit to a phone visit?  We weren't able to get the video to work.  Thanks!  Dior

## 2020-10-05 NOTE — PATIENT INSTRUCTIONS
Keep up the good work staying physically active and eating healthy foods.  Attending to your physical health also supports good mental health.    Keep finding creative ways to connect to your family.  This is important right now.    Remember that the sun is always just behind the clouds.  The light is always shining towards you even if you can't see it in that moment.    I'll look forward to talking with you again in November, but feel free to reach out sooner if needed and I am happy to give you a call.

## 2020-10-05 NOTE — PROGRESS NOTES
"The following statement was read to the patient at past visits:     \"We have found that certain health care needs can be provided without the need for a face to face visit.  This service lets us provide the care you need with a phone conversation. I will have full access to your [facility] medical record during this entire phone call.   I will be taking notes for your medical record.  Since this is like an office visit, we will bill your insurance company for this service. There are potential benefits and risks of telephone visits (e.g. limits to patient confidentiality) that differ from in-person visits.?  Confidentiality still applies for telephone services, and nobody will record the visit.  It is important to be in a quiet, private space that is free of distractions (including cell phone or other devices) during the visit.??If during the course of the call I believe a telephone visit is not appropriate, you will not be charged for this service.\"     Consent has been obtained for this service by care team member: Yes     Emergency contact: Favio Washington (daughter) [phone]  Closest Emergency Room: [facility]  Location at time of call: home    Attempted to send [employer] invite at 9:00am but site was not working. Could not send email or text invite.  Sent [employer] invite 9:01am  Placed call to Shira's phone 9:05am  Tried to send [employer] invite via email again at 9:12am which she did receive, but still had problems connecting (due to strength of internet connection?)  She will ask for additional tech support in her building to help with this.  Start of call: 9:05am  End of call: 10:03am    Behavioral Health Chronic Pain Management Visit     Visit type: Follow Up  Meeting lasted: 58 minutes  Others present: no one    Reason for Consultation:  Shiraramya Guthrie is a 65 year old,  female referred by Dr. Pierre for behavioral health consultation as part of the Chronic Pain Management protocol " "at [facility]. The goal of behavioral health visits is to help the patient with the psychosocial aspects of pain management. Ms. Guthrie was initially seen by this provider for her initial  CPM consult on 12/15/16.      Topics Discussed:     Mood/Loss: Checked in with Shira on how she has been coping with loss of sister and son, Randolph. \"I'm coming through.\"  Stayed in this weekend secondary to emotional exhaustion.  Loss of sister was very difficult for her.  When sister was on ventilator, saw the face of her son as well.      Since returning home describes herself as \"disorganized.\"  Describes difficulty finishing projects, cleaning her home.  Didn't even get out of bed or brush her hair over the weekend.  Did engage in some self care:  Rest, bathing, drinking water, etc.  Provided empathy and normalized this in the wake of grief/loss.  Feels better able to face the world now after resting this weekend.  Plans to increase activity again and is preparing for housing inspection.    Family connection:  Returned to [place] the week of September 21.  No family connection in [place] now that Randolph is gone.  Anticipates that the holidays will be hard for her.  Doesn't want to cook for the holidays as this makes her miss her son.  Hard to be so far from family, but does not plan to move.  Stays connected via phone/video calls.  Thinking about how she could connect with family over the holidays.  Would like to visit for 1-2 months with daughter in [place], but wants to be home for [facility] and [place] as she doesn't think she could tolerate this due to grief/depression.    Housing stressors:  Lots of negativity in her building so tries to avoid this.  She attributes this to a turn over in residents - fewer senior citizens.  Many don't follow rules around masking in her building.  Tries to have empathy but this is challenging as she is \"terrified\" of getting COVID.  \"Everyone in this building has a " "problem or they wouldn't be living her in public housing.\"  States that there are police on site which gives her some comfort.      A few people in the building have noticed a change in her (less talkative, etc.) since she returned from [place].  At first this was upsetting to her (as she felt it as a criticism), but she has started keeping her distance now.  Tired of being unhappy at the end of the day with decisions she has made.  Worrying about letting others down.  Wants to be happy and at peace with herself.  Doesn't want to have to explain herself to others or live to make others happy.  Has started to avoid those who gossip and is setting more limits about giving rides, etc.    Pain management:  Trying to get back to doing her exercises to manage pain including yoga and foot peddlar with arm resistance (orbitrack - like nordic trak).  Using this for 15 minutes each morning.  Also stretching.  Unhappy with weight gain that came with being less active in the wake of loss.  \"I don't like the way I look.\"  She is trying to eat healthfully - lots of fresh fruits and veggies.  Offered praise and encouragement for these behaviors.    Coping:   Taking her antidepressant medication.  Knows she is not alone.  Prays. 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  \"I don't give up.\"  \"Just keep going forward.\"  Reflects back on past hardships and how she got through this (e.g., past reversal of colostomy).  Trying to limit negative influences in her daily life.  Took pictures of clouds on her way home as a source of inspiration (ethan).  Has lightened up her home with more white to remind her of this inspiration.  \"This is my prayer closet.\"  This helps her \"put my armor on\" for when she leaves her home.  \"It's God and me.\"  Recognizes there will be some \"dark\" but the light is still there too.  Recognizing she needs to set limits with others.  Discussed the idea of editing in our life (e.g., material things, relationships, " activities that drain rather than empower us, etc.).  Shira was very open to that idea and will continue to work on this.      Objective: Ms. Guthrie appears to be awake and alert for today's visit.      PHQ-9 SCORE 2/27/2020 4/24/2020 9/21/2020   PHQ-9 Total Score - - -   PHQ-9 Total Score 7 6 10     FAYE-7 SCORE 7/24/2018 10/30/2018 2/25/2019   Total Score - - -   Total Score 6 (mild anxiety) - -   Total Score 6 7 2     Wt Readings from Last 4 Encounters:   04/24/20 82.1 kg (181 lb)   03/24/20 82.1 kg (181 lb)   02/27/20 85.7 kg (189 lb)   12/02/19 85 kg (187 lb 6.4 oz)       Assessment:   Ms. Guthrie was referred by Dr. Pierre for a behavioral health evaluation to address chronic pain syndrome.  Today, Ms. Guthrie's mood appeared depressed with congruent affect.  Ms. Guthrie would likely benefit from continued meetings with this provider on an as needed basis to support improved pain management via help in recovering from loss/managing mood, greater implementation of pacing, improved sleep and support for continued tobacco cessation.    Stage of change: Action  Diagnosis: chronic pain syndrome    Plan:   1. Personal Care Plan for Chronic Pain: Care Plan Date: April/2020 Will update at next face to face visit.  2.  Follow up with Dr. Pierre on 11/5/2020 for CPE.  3.  Shira will follow up with this provider on 11/2 at 10am.  Will set this up as a video visit.  Shira is aware that she can reach out sooner if needed via phone.  4.  Mailed AVS with pt instructions and follow up assignments from today.    Dior Sky, PhD, LP

## 2020-10-13 DIAGNOSIS — J00 ACUTE NASOPHARYNGITIS: ICD-10-CM

## 2020-10-13 DIAGNOSIS — I25.9 CHRONIC ISCHEMIC HEART DISEASE: ICD-10-CM

## 2020-10-13 RX ORDER — ISOSORBIDE MONONITRATE 30 MG/1
TABLET, EXTENDED RELEASE ORAL
Qty: 90 TABLET | Refills: 4 | Status: SHIPPED | OUTPATIENT
Start: 2020-10-13

## 2020-10-13 RX ORDER — DIPHENHYDRAMINE HCL 2 %
CREAM (GRAM) TOPICAL
Qty: 100 CAPSULE | Refills: 1 | Status: SHIPPED | OUTPATIENT
Start: 2020-10-13

## 2020-11-02 ENCOUNTER — VIRTUAL VISIT (OUTPATIENT)
Dept: PSYCHOLOGY | Facility: CLINIC | Age: 65
End: 2020-11-02
Payer: MEDICARE

## 2020-11-02 DIAGNOSIS — G89.4 CHRONIC PAIN SYNDROME: Primary | ICD-10-CM

## 2020-11-02 PROCEDURE — 96159 HLTH BHV IVNTJ INDIV EA ADDL: CPT | Mod: 95 | Performed by: PSYCHOLOGIST

## 2020-11-02 PROCEDURE — 99207 PR NO CHARGE LOS: CPT | Performed by: PSYCHOLOGIST

## 2020-11-02 PROCEDURE — 96158 HLTH BHV IVNTJ INDIV 1ST 30: CPT | Mod: 95 | Performed by: PSYCHOLOGIST

## 2020-11-02 NOTE — PATIENT INSTRUCTIONS
My deepest condolences for your loss Shira.  You've had a lot to absorb in the past year or so.  Remember to practice self-compassion and self-kindness.  Connect with others for support as you need it.    My door is always open to you.  Don't hesitate to reach out sooner if you need more support.

## 2020-11-02 NOTE — PROGRESS NOTES
"The following statement was read to the patient at past visits:     \"We have found that certain health care needs can be provided without the need for a face to face visit.  This service lets us provide the care you need with a phone conversation. I will have full access to your [facility] medical record during this entire phone call.   I will be taking notes for your medical record.  Since this is like an office visit, we will bill your insurance company for this service. There are potential benefits and risks of telephone visits (e.g. limits to patient confidentiality) that differ from in-person visits.?  Confidentiality still applies for telephone services, and nobody will record the visit.  It is important to be in a quiet, private space that is free of distractions (including cell phone or other devices) during the visit.??If during the course of the call I believe a telephone visit is not appropriate, you will not be charged for this service.\"     Consent has been obtained for this service by care team member: Yes     Emergency contact: Favio Washington (daughter) [phone]  Closest Emergency Room: [facility]  Location at time of call: In [place] unexpectedly at the time of our call due to the recent death of brother    Start of call: 10:01am  End of call: 10:51am    Behavioral Health Chronic Pain Management Visit     Visit type: Follow Up  Meeting lasted: 50 minutes  Others present: no one    Reason for Consultation:  Shira Guthrie is a 65 year old,  female referred by Dr. Pierre for behavioral health consultation as part of the Chronic Pain Management protocol at [facility]. The goal of behavioral health visits is to help the patient with the psychosocial aspects of pain management. Ms. Guthrie was initially seen by this provider for her initial  CPM consult on 12/15/16.      Topics Discussed:     Mood/Loss: Brother just .  \"He left a wife and a son and a " "daughter.\"  This was a surprise to her as he had withheld the degree of his illness so not to worry her/add to her birthday.  He was 75 years old.  He was oldest of her siblings (10 boys and two girls).  Only 6 brothers are remaining.  One month from the death of her sister.  \"When it rains, it pours.\"      \"More sadness than depression right now.\"  Tearful.  Hard to concentrate. Sleeps less than 5 hours currently even with medication.  Hopeful this is short lived grief response.  This feels appropriate to her given the circumstances. Has not yet really healed from loss of son, Randolph, and continued losses continue to \"wake up\" the pain of that loss.  Knows there are risks this could lead to greater depression.  Feels it has been hard to recover with one loss after another.  Can be irritable at times due to grief - more at self than others.  Tries to suppress her tears at times.  Provided empathy and support for healthy coping (self-compassion, forgiveness/self-kindness, connecting with others, etc.).    Plan to move:  With the loss of so many family members and daughter with spinal stenosis, thinks she may need to move permanently to [place] as this is where her support is.  Has been thinking about this since Randolph . This is bitter sweet for her.  Would like to keep her medical care with us if she can.  Discussed limitations for this given licensing restrictions.  Encouraged connection to local provider after this move.  Offered help finding an alternative provider after the move.  She expressed appreciation for this offer.  Returning to [place] by  to settle things first.  Timeline for move would likely be 2021.   Discussed pacing and allowing herself enough time to absorb loss of brother while also working on plan for move. Has potential property in mind (owned by [facility] [place] [facility]).      Still very cautious with COVID.  Worried about move due to this, but recognizes that COVID risk is everywhere " "right now.  Moving to a place where she can rent home may help decrease risk compared to current apartment building/elevator.      Financial situation is also stressful (especially with storage cost - $240/month).  Worried move will be very expensive.  Overall, thinks moving closer to family in [place] will help with financial stress.  Will look into renting house so she could actually use her furniture and not store it.    Coping:  Prayer.  Talking to ministry.  Talking to daughter and granddaughter.  Continue to encourage self-compassion and kindness rather than being hard on herself.  This is not always easy to do, but she is working on this.  She is a perfectionist so this can be hard to let go of.  Trying to focus on gifts/thanks rather than what she doesn't have.     Tries to keep her mind off herself.  If she thinks too much of herself she can start to be hard on herself (\"You're not even worth a quarter.\").  Tries to talk back to herself when she catches herself doing this.  Tries to think of others.  Reflected that we all make mistakes.  Especially when tired/stressed.  Practice rupesh and forgiveness.  Shira was open to this feedback which is consistent with her ethan.    Nutrition:  Only eating one meal per day right now.  Knows this is not sufficient.  Drinking boost/Gatorade to supplement.  Will continue to monitor.    Pain management:  Having more pain today, trying to work this out with exercise and stretching.  Reflected on likely impact of recent losses/emotional pain on physical pain and strategies for managing this and Shira has good insight into this.  Does not want to increase medication/reliance on medication to manage this.  Focusing on addressing grief and muscle tension.    Objective: Ms. Guthrie appears to be awake and alert for today's visit.      PHQ-9 SCORE 2/27/2020 4/24/2020 9/21/2020   PHQ-9 Total Score - - -   PHQ-9 Total Score 7 6 10     FAYE-7 SCORE 7/24/2018 10/30/2018 2/25/2019 "   Total Score - - -   Total Score 6 (mild anxiety) - -   Total Score 6 7 2     Wt Readings from Last 4 Encounters:   04/24/20 82.1 kg (181 lb)   03/24/20 82.1 kg (181 lb)   02/27/20 85.7 kg (189 lb)   12/02/19 85 kg (187 lb 6.4 oz)       Assessment:   Ms. Guthrie was referred by Dr. Pierre for a behavioral health evaluation to address chronic pain syndrome.  Today, Ms. Guthrie's mood appeared depressed with congruent affect.  Ms. Guthrie would likely benefit from continued meetings with this provider on an as needed basis to support improved pain management via help in recovering from loss/managing mood, greater implementation of pacing, improved sleep and support for continued tobacco cessation.    Stage of change: Action  Diagnosis: chronic pain syndrome    Plan:   1.  Personal Care Plan for Chronic Pain: Care Plan Date: April/2020 Will update at next face to face visit.  2.  Scheduled for follow up with Dr. Pierre on 11/5/2020 for CPE.  Shira stated her intention to reschedule this given that she will still be out of town at that time.  Transferred to the  at the end of our call to facilitate this.  3.  Shira will follow up with this provider on November 30 at 9am.  Would prefer for this to be set up as video visit to her email: [email or web address].  Transferred to the  at the end of our call to facilitate this.  Also encouraged scheduling December visits if she thinks she would like this and this is available for scheduling today.  4.  Mailed AVS with pt instructions and follow up assignments from today.    Dior Sky, PhD, LP

## 2020-11-05 ENCOUNTER — VIRTUAL VISIT (OUTPATIENT)
Dept: FAMILY MEDICINE | Facility: CLINIC | Age: 65
End: 2020-11-05
Payer: MEDICARE

## 2020-11-05 DIAGNOSIS — M15.0 PRIMARY OSTEOARTHRITIS INVOLVING MULTIPLE JOINTS: ICD-10-CM

## 2020-11-05 DIAGNOSIS — Z63.4 BEREAVEMENT: Primary | ICD-10-CM

## 2020-11-05 DIAGNOSIS — G89.4 CHRONIC PAIN SYNDROME: ICD-10-CM

## 2020-11-05 DIAGNOSIS — G62.89 OTHER POLYNEUROPATHY: ICD-10-CM

## 2020-11-05 PROCEDURE — 99443 PR PHYSICIAN TELEPHONE EVALUATION 21-30 MIN: CPT | Mod: 95 | Performed by: FAMILY MEDICINE

## 2020-11-05 RX ORDER — TRAMADOL HYDROCHLORIDE 50 MG/1
50 TABLET ORAL 3 TIMES DAILY PRN
Qty: 90 TABLET | Refills: 0 | Status: SHIPPED | OUTPATIENT
Start: 2020-11-05 | End: 2020-12-08

## 2020-11-05 RX ORDER — OXYCODONE AND ACETAMINOPHEN 10; 325 MG/1; MG/1
1 TABLET ORAL 2 TIMES DAILY PRN
Qty: 45 TABLET | Refills: 0 | Status: SHIPPED | OUTPATIENT
Start: 2020-11-05 | End: 2020-12-08

## 2020-11-05 SDOH — SOCIAL STABILITY - SOCIAL INSECURITY: DISSAPEARANCE AND DEATH OF FAMILY MEMBER: Z63.4

## 2020-11-05 ASSESSMENT — PATIENT HEALTH QUESTIONNAIRE - PHQ9: SUM OF ALL RESPONSES TO PHQ QUESTIONS 1-9: 11

## 2020-11-05 NOTE — PROGRESS NOTES
"Family Medicine Telephone Visit Note               Telephone Visit Consent   Patient was verbally read the following and verbal consent was obtained.    \"Telephone visits are billed at different rates depending on your insurance coverage. During this emergency period, for some insurers they may be billed the same as an in-person visit.  Please reach out to your insurance provider with any questions.  If during the course of the call the physician/provider feels a telephone visit is not appropriate, you will not be charged for this service.\"    Name person giving consent:  Patient   Date verbal consent given:  2020  Time verbal consent given:  12:39 PM           Chief Complaint   Patient presents with     RECHECK     Followup                       HPI   Patients name: Shira  Appointment start time:  12:45 PM    Her brother  one month after sister . She has been depressed but using resources.  She is feeling antsy and this makes her overdo things so pain is worse.  She will be going to the  out of state.  She rescheduled her physical.     She is considering a move to [place] to be closer to family.  She is alone here. Her family is worried about her.       She is losing concentration and hard to explain is going on .  No SI.      Chronic Pain Follow-Up Visit    Pain Update:  Location of pain: Neuropathy pain is still the biggest issue.  Sleep has been worse.   Analgesia/pain control:    Recent changes:  worse     Overall control: Tolerable with discomfort    Adherence     How often do you take extra pain medicine:Never    Did you take your pain medication today? YES    Adverse effects: No      Database checked today? No    PHQ-9 SCORE 2020   PHQ-9 Total Score - - -   PHQ-9 Total Score 6 10 11     FAYE-7 SCORE 2018 10/30/2018 2019   Total Score - - -   Total Score 6 (mild anxiety) - -   Total Score 6 7 2       Care Plan discussed and updated as needed.  See " "the end of this note.       FUNCTIONAL ASSESSMENT QUESTIONNAIRE SCORE 7/16/2020 9/21/2020   Total Score 30 40          Problem, Medication and Allergy Lists were reviewed and are current..         Review of Systems:     Constitutional, HEENT, cardiovascular, pulmonary, gi and gu systems are negative, except as otherwise noted.         Physical Exam:     There were no vitals taken for this visit.  Estimated body mass index is 31.07 kg/m  as calculated from the following:    Height as of 3/24/20: 1.626 m (5' 4\").    Weight as of 4/24/20: 82.1 kg (181 lb).    Exam:  Constitutional: healthy, alert and no distress  Psychiatric: mentation appears normal, anxious, concentration poor and worried        Assessment and Plan    Shira Guthrie is here for follow up of chronic pain caused by neuropathy and OA.    Shira was seen today for recheck.    Diagnoses and all orders for this visit:    Bereavement: provided support and counseling on coping with loss of brother.  Also lost sister last month.  Discussed self care, self-compassion, avoid negative self talk.  Discussed that she is \"Strong in [place]\".  Follow up with Dr. Sky also scheduled.  Normalized the emotions, depression she is currently feeling and the \"scatter-brained\" concentration issues she is having.      Chronic pain syndrome  -     traMADol (ULTRAM) 50 MG tablet; Take 1 tablet (50 mg) by mouth 3 times daily as needed for severe pain  -     oxyCODONE-acetaminophen (PERCOCET)  MG per tablet; Take 1 tablet by mouth 2 times daily as needed for severe pain    Other polyneuropathy  -     traMADol (ULTRAM) 50 MG tablet; Take 1 tablet (50 mg) by mouth 3 times daily as needed for severe pain    Primary osteoarthritis involving multiple joints  -     traMADol (ULTRAM) 50 MG tablet; Take 1 tablet (50 mg) by mouth 3 times daily as needed for severe pain  -     oxyCODONE-acetaminophen (PERCOCET)  MG per tablet; Take 1 tablet by mouth 2 times daily as needed " for severe pain        REfilled medications for one month.  Follow up CPE already schedule later this month.    Chronic Pain Syndrome:  Care plan updated with patient, see below for details.   45 mg MEDD  Care Plan Date: September/2020  Naloxone has not been prescribed.    If opioids prescribed patient was asked to bring pill bottle to each appointment and was informed that refills would only be provided at office visits.    After Visit Information:  Patient declined AVS     Return for Follow up, with me, in person for physical as scheduled, and 1 month for CPM.    Appointment end time: 1:09 PM  This is a telephone visit that took 26 minutes.      Clinician location:  [facility]     Sally Pierre MD

## 2020-11-23 ENCOUNTER — TELEPHONE (OUTPATIENT)
Dept: FAMILY MEDICINE | Facility: CLINIC | Age: 65
End: 2020-11-23

## 2020-11-23 DIAGNOSIS — I25.9 CHRONIC ISCHEMIC HEART DISEASE: ICD-10-CM

## 2020-11-23 RX ORDER — CLOPIDOGREL BISULFATE 75 MG
TABLET ORAL
Qty: 90 TABLET | Refills: 3 | Status: SHIPPED | OUTPATIENT
Start: 2020-11-23

## 2020-11-23 NOTE — TELEPHONE ENCOUNTER
"Returned Shira's call today.      \"I'm having a hissy fit right now.\"  Just got back to [place] Friday night.  Found a place and will be moving to [place] on Monday 11/30/2020.  Getting boxes to pack as we speak and is feeling a bit flustered secondary to the timeline for this move.    Needs a letter to support breaking her lease early based on mental health needs. Would like this from both myself and Dr. Pierre.  Let her know I would be happy to draft this letter for her.  Will need LATONYA and address.  She agrees to stop by clinic today to sign LATONYA and  letter.  Discussed what was Ok to include in letter.    Will draft letter to , Mr. Austin.      Mr. Austin ()  [address] [facility] [address]    We have an appt scheduled on Monday 11/30/2020.  Despite this being the day of her move, Shira would like to keep this visit so we can touch base prior to her move which is bittersweet for her.  She expressed appreciation for the call and support today.      Will route this message and letter created today to Dr. Pierre so she can be aware of this conversation and plan.    Dior Sky, PhD, LP    "

## 2020-11-23 NOTE — TELEPHONE ENCOUNTER
[facility] phone call message- general phone call:    Reason for call:    Requesting a call back from Dr. Sky. Pt states it is about her stress.    Action desired:    Call back    Return call needed: Yes    OK to leave a message on voice mail? Yes    Advised patient to response may take up to 2 business days: Yes    Primary language: English      needed? No    Call taken on November 23, 2020 at 11:15 AM by Paula Acosta CMA

## 2020-11-23 NOTE — TELEPHONE ENCOUNTER
[facility] phone call message- patient requesting a refill:    Full Medication Name:     Plavix, not the generic    Pharmacy confirmed as     [id number] - [place], [place] - [address] AT [facility] [address]  [address]  [place] [address]  Phone: [phone] Fax: [phone]  : Yes    Additional Comments:    Call back    OK to leave a message on voice mail? Yes    Primary language: English      needed? No    Call taken on November 23, 2020 at 10:50 AM by Paula Acosta CMA

## 2020-11-23 NOTE — TELEPHONE ENCOUNTER
[facility] phone call message- patient requesting a refill:    Full Medication Name:PLAVIX 75 MG tablet     Dose: TAKE 1 TABLET BY MOUTH EVERY DAY    Pharmacy confirmed as   [facility] #[id number] - [place], [place] - [address] AT [facility] [address]  [address]  [place] [address]  Phone: [phone] Fax: [phone]  : Yes    Additional Comments: The pharmacy is sending over a request but the patient just wants Dr Pierre to know that she cannot take the genic brand.  She needs the name brand.     OK to leave a message on voice mail? Yes    Primary language: English      needed? No    Call taken on November 23, 2020 at 3:03 PM by Roma Edmonds

## 2020-11-27 ENCOUNTER — TELEPHONE (OUTPATIENT)
Dept: FAMILY MEDICINE | Facility: CLINIC | Age: 65
End: 2020-11-27

## 2020-11-27 NOTE — TELEPHONE ENCOUNTER
Prior Authorization Retail Medication Request    Medication/Dose: Plavix  ICD code (if different than what is on RX):  Chronic ischemic heart disease [I25.9  Previously Tried and Failed:  Generic, patient can only take Brand  Rationale:     Insurance Name:  Medicare  Insurance ID:  [id number]      Pharmacy Information (if different than what is on RX)  Name:  [facility]  Phone:  [phone]

## 2020-11-27 NOTE — TELEPHONE ENCOUNTER
Prior Authorization Approval    Authorization Effective Date: 8/29/2020  Authorization Expiration Date: 11/27/2021  Medication: plavix-APPROVED  Approved Dose/Quantity:   Reference #:     Insurance Company: Medicare Blue - Phone [phone] Fax [phone]  Expected CoPay:       CoPay Card Available:      Foundation Assistance Needed:    Which Pharmacy is filling the prescription (Not needed for infusion/clinic administered): [facility][id number] - [place], [place] - [address] [facility] [address]  Pharmacy Notified: Yes  Patient Notified: No    Pharmacy will notify patient when medication is ready.

## 2020-11-27 NOTE — TELEPHONE ENCOUNTER
Central Prior Authorization Team   Phone: [phone]      PA Initiation    Medication: plavix-Initiated  Insurance Company: Medicare Blue - Phone [phone] Fax [phone]  Pharmacy Filling the Rx: [facility] #[id number] - [place], [place] - [facility] [facility] [address]  Filling Pharmacy Phone: [phone]  Filling Pharmacy Fax:    Start Date: 11/27/2020

## 2020-11-30 ENCOUNTER — VIRTUAL VISIT (OUTPATIENT)
Dept: PSYCHOLOGY | Facility: CLINIC | Age: 65
End: 2020-11-30
Payer: MEDICARE

## 2020-11-30 DIAGNOSIS — G89.4 CHRONIC PAIN SYNDROME: Primary | ICD-10-CM

## 2020-11-30 PROCEDURE — 96158 HLTH BHV IVNTJ INDIV 1ST 30: CPT | Mod: 95 | Performed by: PSYCHOLOGIST

## 2020-11-30 PROCEDURE — 99207 PR NO CHARGE LOS: CPT | Performed by: PSYCHOLOGIST

## 2020-11-30 NOTE — PROGRESS NOTES
"The following statement was read to the patient at past visits:     \"We have found that certain health care needs can be provided without the need for a face to face visit.  This service lets us provide the care you need with a phone conversation. I will have full access to your [facility] medical record during this entire phone call.   I will be taking notes for your medical record.  Since this is like an office visit, we will bill your insurance company for this service. There are potential benefits and risks of telephone visits (e.g. limits to patient confidentiality) that differ from in-person visits.?  Confidentiality still applies for telephone services, and nobody will record the visit.  It is important to be in a quiet, private space that is free of distractions (including cell phone or other devices) during the visit.??If during the course of the call I believe a telephone visit is not appropriate, you will not be charged for this service.\"     Consent has been obtained for this service by care team member: Yes     Emergency contact: Favio [place] (daughter) [phone]  Closest Emergency Room: [facility]  Location at time of call: parking lot at her building/waiting for movers    Start of call: 9:00am   End of call: 9:19am    Needs to convert to phone visit today as she no longer has Internet at home.    Behavioral Health Chronic Pain Management Visit     Visit type: Follow Up  Meeting lasted: 19 minutes  Others present: no one    Reason for Consultation:  Shira Guthrie is a 65 year old,  female referred by Dr. Pierre for behavioral health consultation as part of the Chronic Pain Management protocol at [facility]. The goal of behavioral health visits is to help the patient with the psychosocial aspects of pain management. Ms. Guthrie was initially seen by this provider for her initial  CPM consult on 12/15/16.      Topics Discussed:     Moving stress:  All packed " "up including controversial storage unit.  Got almost everything in the truck yesterday after [employer].  Elevator in building broke down last night which made packing the truck more difficult.  Worked late into the night and didn't sleep all night as she was so frustrated with all these details.  Movers are late today.  Has called but they have not returned her call yet.  Last tasks to do:  Give movers keys for them to drive the truck and hook up her car.  Move final items down to the truck.  Turn in her apartment keys.  Make final payment to movers.  Has flight at 3:55pm and needs to close everything up before she leaves for the airport.  Talked through timeline for this and what is in her control and what is not.  Encouraged her to focus on what she can control and let go of the rest.  She agreed to put the rest \"into God's hands.\"      Also asked who she could ask for help and she has been receiving some support from people at her [employer] and in her building.  Alevism and granddaughter are giving her financial support to help with move.  Encouraged her to reach out again as needed for support.    Transfer of care:  Reflected briefly on the work we have done in the past 4 years.  Reflected on losses of [place], her sister and her brother in such a short time.  Provided empathy for these hardships and encouragement that move to be closer to family/daughter would be helpful.  Shira expressed appreciation for our work together.  Got a recommendation for a PCP in her area but is unsure who to see for mental health.  Would like my help in finding a new mental health provider and I agreed to do some research on this and send to her with her AVS to her new address:    New address:  [address] [place] [address]     Objective: Ms. Guthrie appears to be awake and alert for today's visit.      PHQ-9 SCORE 4/24/2020 9/21/2020 11/5/2020   PHQ-9 Total Score - - -   PHQ-9 Total Score 6 10 11     FAYE-7 SCORE 7/24/2018 " 10/30/2018 2/25/2019   Total Score - - -   Total Score 6 (mild anxiety) - -   Total Score 6 7 2     Wt Readings from Last 4 Encounters:   04/24/20 82.1 kg (181 lb)   03/24/20 82.1 kg (181 lb)   02/27/20 85.7 kg (189 lb)   12/02/19 85 kg (187 lb 6.4 oz)       Assessment:   Ms. Guthrie was referred by Dr. Pierre for a behavioral health evaluation to address chronic pain syndrome.  Today, Ms. Guthrie's mood appeared anxious/stressed with congruent affect.  Today will be our final visit secondary to her move to [place], but Ms. Guthrie would likely benefit from continued mental health care to help in recovering from loss/managing mood, greater implementation of pacing, improved sleep and support for continued tobacco cessation.  She is open to this idea and we will work collaboratively to identify a mental health provider near her new home in the near future.  Will continue to provide some bridging support via phone check ins in the interim.    Stage of change: Action  Diagnosis: chronic pain syndrome    Plan:   1.  Personal Care Plan for Chronic Pain: Care Plan Date: April/2020   2.  Mailed AVS with pt instructions and follow up from today.  Will plan to check in with Ms. Guthrie via phone in a few weeks to see how the move went and to ensure that she received mental health resources and was able to get connected in her new area.  3.  Would like to touch base with Dr. Pierre on the phone if possible to wrap up and say goodbye.  Will be keeping her current phone number after the move.  Agreed I would route this request to Dr. Pierre today.    Dior Sky, PhD, LP

## 2020-11-30 NOTE — Clinical Note
Dr Pierre - Shira would love a final call from you if possible to say goodbye as she is moving today.  Will keep her current phone number.     - can you please update Shira's chart with the following new address in the event that we need to send anything to her in the mail as she is moving and handing in her keys to her current apartment today.  Thanks!  Dior    New address:  [facility] [place] [address]

## 2020-11-30 NOTE — PATIENT INSTRUCTIONS
"It has been so wonderful to have the gift of working with you over the past 4 years Shira.  I wish you the very best of luck with your move and am happy that you will be closer to family for support from now on.  Please know that I will always hold you in my heart with deep respect and affection.    Please see below for some possible options for continued counseling after your move.  I'll give you a call in a few weeks to see if you were able to make a connection with a provider at one of these locations.  I know the one in [place] is far away, but as most providers have been offering telehealth as of late, I thought this might still work for you.  The ones in [place] are closer.  Make some calls once you are settled to see if any of them may be a good fit for you.    Trinity MARTELL, [facility]  [address] [place] [address] [address]  [place] [id number]  Call Ms. Trinity Ramos  [phone]    I liked her description online at [employer] if you want to check this out:    \"I provide individual, couples, family, group therapy, medication management to children, adolescents, adults in the areas of depression, bipolar disorder, anxiety disorders, psychotic disorders, adjustment disorders, personality disorders, addictions, dually-diagnosed, parenting, family issues, childhood issues, adolescent issues, behavior, relationship issues, trauma, abuse, loss/grief/bereavement, women's issues, relationships, self-esteem, self-image, codependency, dependency, anger management, stress management, time management, life skills, work issues. I provide psychoeducational groups and professional trainings. My focus in working with clients is to help them gain and maintain control in their lives despite past or present issues, and to deal with those issues appropriately.  As an -American woman and a first-generation [place]-American, I am sensitive to cultural issues. To this end, I use a variety of theoretical " "approaches as suits the particular client I am working with, including cognitive-behavioral, family-systems, structural, psychodynamic, solution-focused, supportive, existential, and strategic.    I am a Licensed Clinical  and Nurse Practitioner in private practice in [place], [place], serving [facility]. I offer services in person and via telephone internet. Call or email, but it is easier to reach me by telephone at [phone].\"    [facility]  [address] [place] [address]  OFFICE: [phone]  FAX: [phone]  MONDAY - THURSDAY, 7:30 AM - 5:30 PM*  FRIDAY, 8:00 AM - 12:00 PM    * AFTER HOURS BY APPOINTMENT    [facility]  [id number] [place] [address]  Phone: [phone]  Fax Number: [phone]  Email: [email or web address]  Business Hours: 9:00 AM - 5:00 PM      "

## 2020-12-07 ENCOUNTER — TELEPHONE (OUTPATIENT)
Dept: FAMILY MEDICINE | Facility: CLINIC | Age: 65
End: 2020-12-07

## 2020-12-07 DIAGNOSIS — M15.0 PRIMARY OSTEOARTHRITIS INVOLVING MULTIPLE JOINTS: ICD-10-CM

## 2020-12-07 DIAGNOSIS — G62.89 OTHER POLYNEUROPATHY: ICD-10-CM

## 2020-12-07 DIAGNOSIS — G89.4 CHRONIC PAIN SYNDROME: ICD-10-CM

## 2020-12-07 NOTE — TELEPHONE ENCOUNTER
[facility] phone call message- general phone call:    Reason for call: Pt calling to speak with  regarding her depression.     Return call needed: Yes    OK to leave a message on voice mail? Yes    Primary language: English      needed? No    Call taken on December 7, 2020 at 8:27 AM by Grisel Flores-Cardona

## 2020-12-08 ENCOUNTER — TELEPHONE (OUTPATIENT)
Dept: FAMILY MEDICINE | Facility: CLINIC | Age: 65
End: 2020-12-08

## 2020-12-08 ENCOUNTER — TELEPHONE (OUTPATIENT)
Dept: PSYCHOLOGY | Facility: CLINIC | Age: 65
End: 2020-12-08

## 2020-12-08 RX ORDER — TRAMADOL HYDROCHLORIDE 50 MG/1
50 TABLET ORAL 3 TIMES DAILY PRN
Qty: 90 TABLET | Refills: 3 | Status: SHIPPED | OUTPATIENT
Start: 2020-12-08

## 2020-12-08 RX ORDER — OXYCODONE AND ACETAMINOPHEN 10; 325 MG/1; MG/1
1 TABLET ORAL 2 TIMES DAILY PRN
Qty: 45 TABLET | Refills: 0 | Status: SHIPPED | OUTPATIENT
Start: 2020-12-08

## 2020-12-08 NOTE — TELEPHONE ENCOUNTER
Let patient know that the message regarding her pain medication has been sent to Dr. Taylor already, but she is busy seeing patients today so I am not sure when she will be able to address this. Patient voiced understanding. ./LR

## 2020-12-08 NOTE — TELEPHONE ENCOUNTER
Patient last saw Dr. Taylor 11/26/19. Unfortunately, medical licensing prohibits virtual visits where the patient is in states in which the patient is not licensed unless urgent care is needed. Routed to Dr. Taylor and Dr. Pierre. ./LR

## 2020-12-08 NOTE — TELEPHONE ENCOUNTER
Yes, I did route my note to RN team and to both Dr. Pierre and Dr. Taylor.  I also spoke with Addis Spears RN directly about the request.  Can you let the patient know that it may take a little time before someone can get back to her on this, but she should expect someone to follow up on this in the near future.    Dior Sky, PhD, LP

## 2020-12-08 NOTE — TELEPHONE ENCOUNTER
I will send one month's worth. Patient's move timing did not allow for follow up with me as planned. Move is best for patient in the long run to be close family.  The patient has had no aberrant behavior and I have managed her pain for years.    I have sent three months of refills of tramadol as that has been her long term medication plan to allow her to establish care with a new physician in [place].  She had been using oxycodone prn for more acute on chronic shoulder pain. Only one month of this is provided.     Sally Pierre MD

## 2020-12-08 NOTE — TELEPHONE ENCOUNTER
Returned Shira's call.  Spoke on the phone for around 10-15 minutes and discussed the following:    Move to [place]:  The move was difficult (movers were late, made wrong turns on the drive down, etc.). Ultimately, she successfully navigated this difficult journey and has made it to [place].  Congratulated her on this success and praised her persistence and resilience.  Reflected on value of being closer to supportive family and ministry.    Pain medication refills:    Has been moving boxes, etc. Which has exacerbated pain.  She is out of her pain medication and wondering about a refill.  Would like to talk to Dr. Pierre but she is not in clinic until Thursday.  Wondering if another provider could call in a refill to her in [place] until she can get a new PCP.  Possibly Dr. Taylor whom she has also seen in the past.  Agreed I would route this question to Dr. Taylor and our triage nurse for help in addressing this refill need.    Confirmed pharmacy that she is using:     [facility]  [address]    Mental Health:  Can still be hard on herself but she is working on this.  Surrounded by kind, supportive people.  Did let her know that I mailed her mental health resources for care in [place], but she has not yet received this.  She will look for it and let me know if I need to resend it.  Encouraged her to reach out if she needs more support connecting to mental health resources in the future.  Agreed to continue providing bridging support as needed until she is well connected. She will call to check in as needed.    Dior Sky, PhD, LP

## 2020-12-08 NOTE — TELEPHONE ENCOUNTER
Patient spoke with  and voices understanding and appreciation for the care she has received.    BTRN

## 2020-12-08 NOTE — TELEPHONE ENCOUNTER
[facility] phone call message- general phone call:    Reason for call: She is wondering if you have had a chance to check into that thing the two of you discussed this morning.    Return call needed: Yes    OK to leave a message on voice mail? Yes    Primary language: English      needed? No    Call taken on December 8, 2020 at 8:45 AM by Roma Edmonds

## 2020-12-08 NOTE — TELEPHONE ENCOUNTER
[facility] phone call message- general phone call:    Reason for call: She would like a call back from  re her medication.    Action desired: call back.    Return call needed: Yes    OK to leave a message on voice mail? Yes    Advised patient to response may take up to 2 business days: Yes       Primary language: English      needed? No    Call taken on December 8, 2020 at 12:22 PM by Earline Curry

## 2020-12-08 NOTE — TELEPHONE ENCOUNTER
See previous note about this. I called the patient and discussed the plan in detail.  Sally Pierre MD

## 2020-12-09 NOTE — TELEPHONE ENCOUNTER
I apologize that I did not reply in a more timely manner.  I was off on Monday and seeing patients all day Tuesday.  Glad Dr. Pierre was able to take care of this as she knows the patient best.      Sandrine Taylor MD

## 2020-12-17 DIAGNOSIS — G89.4 CHRONIC PAIN SYNDROME: ICD-10-CM

## 2020-12-17 DIAGNOSIS — M15.0 PRIMARY OSTEOARTHRITIS INVOLVING MULTIPLE JOINTS: ICD-10-CM

## 2020-12-18 RX ORDER — BACLOFEN 10 MG/1
TABLET ORAL
Qty: 90 TABLET | Refills: 3 | Status: SHIPPED | OUTPATIENT
Start: 2020-12-18

## 2020-12-23 ENCOUNTER — TELEPHONE (OUTPATIENT)
Dept: PSYCHOLOGY | Facility: CLINIC | Age: 65
End: 2020-12-23

## 2020-12-23 NOTE — TELEPHONE ENCOUNTER
"Placed outreach call to Shira as we had discussed during our last phone call.    Start of call:  8:36am.  End of call:  8:54am.    Shira reports that she is working very hard to unpack and get settled in.  Experiencing aching as she is doing a lot.  Encouraged pacing.    \"Staying in a good spirit.\"  Praying.  Went to group home with ministry.  \"It felt good.\"  Did decorate a bit for [place] but this continues to struggle a bit at the holidays due to loss of her son.  Provided empathy for this.  Also reflected on estefania of spending the holidays with her children, grandchildren and great-grandchildren.  Plans to fast and meditate/pray in the New Year.    Did get letter with mental health resources that I sent to her.  Has not reached out to set up services yet, but states that she does intend to.  Still struggles with excessive self-criticism.  Again, encouraged self-kindness and compassion.      Expresses appreciation for the call today as she had been feeling down.  Appreciated recent call from Dr. Pierre and help with medication refills.  Continues to take medications as prescribed and finds them helpful.  Working on getting established with new PCP.    No additional follow up will be placed going forward unless requested by the patient or PCP.    Dior Sky, PhD, LP                  "

## 2021-01-11 ENCOUNTER — TELEPHONE (OUTPATIENT)
Dept: FAMILY MEDICINE | Facility: CLINIC | Age: 66
End: 2021-01-11

## 2021-01-11 DIAGNOSIS — I10 BENIGN ESSENTIAL HYPERTENSION: ICD-10-CM

## 2021-01-11 NOTE — TELEPHONE ENCOUNTER
[facility] [[email or web address]] Family Medicine phone call message- general phone call:    Reason for call: Pt would like to speak with Dr Pierre about her medications.  She is thinking she not only needs some refills but would like to change a couple of them and her pharmacy also.    Return call needed: Yes    OK to leave a message on voice mail? Yes    Primary language: English      needed? No    Call taken on January 11, 2021 at 2:11 PM by Roma QIU

## 2021-01-12 NOTE — TELEPHONE ENCOUNTER
Patient requesting refill of oxycodone 10mg and metoprolol. Patient states she has an appt to get established with a new provider at the end of Feb. Please call patient for additional information.    Note routed to Dr.Wicks Addis PELAEZ

## 2021-01-12 NOTE — TELEPHONE ENCOUNTER
Pt is calling back and would not mind if a nurse called her instead.  She has not received a phone call back from Dr Pierre.

## 2021-01-13 RX ORDER — METOPROLOL SUCCINATE 25 MG/1
25 TABLET, EXTENDED RELEASE ORAL DAILY
Qty: 90 TABLET | Refills: 1 | Status: SHIPPED | OUTPATIENT
Start: 2021-01-13

## 2021-01-13 NOTE — TELEPHONE ENCOUNTER
I refilled metoprolol for 6 months but had already discussed the pain medications with the patient on our last call. Since I have not had a visit with her since 11/2020 and [place] has [place] out of [place], I cannot refill further opioid medications for her. I already have provided over a month of bridging care and have provided 4 months of tramadol.  I recommend that she move up her appointment if pain is not well controlled.  Opioids cannot be prescribed without a visit per clinic policy.    Sally Pierre MD

## 2021-02-16 DIAGNOSIS — G62.89 OTHER POLYNEUROPATHY: ICD-10-CM

## 2021-02-17 RX ORDER — GABAPENTIN 600 MG/1
600 TABLET ORAL 2 TIMES DAILY
Qty: 60 TABLET | Refills: 0 | Status: SHIPPED | OUTPATIENT
Start: 2021-02-17

## 2021-02-17 NOTE — TELEPHONE ENCOUNTER
GAve one month refill. Needs [place] doctor to take over care as she has moved there.  Sally Pierre MD

## 2021-05-16 DIAGNOSIS — I25.119 CORONARY ARTERY DISEASE INVOLVING NATIVE CORONARY ARTERY OF NATIVE HEART WITH ANGINA PECTORIS (H): ICD-10-CM

## 2021-05-17 RX ORDER — ROSUVASTATIN CALCIUM 20 MG/1
TABLET, COATED ORAL
Qty: 90 TABLET | Refills: 4 | OUTPATIENT
Start: 2021-05-17

## 2021-05-17 NOTE — TELEPHONE ENCOUNTER
Patient moved to [place] many months ago.  She needs to have new provider provide all of her medication refills at this time. Please call pharmacy and have them obtain refills from her new doctor.

## 2021-05-31 VITALS — BODY MASS INDEX: 31.27 KG/M2 | WEIGHT: 183.13 LBS | HEIGHT: 64 IN

## 2021-05-31 VITALS — BODY MASS INDEX: 31.58 KG/M2 | HEIGHT: 64 IN | WEIGHT: 185 LBS

## 2021-05-31 VITALS — BODY MASS INDEX: 31.58 KG/M2 | WEIGHT: 185 LBS | HEIGHT: 64 IN

## 2021-06-13 NOTE — PROGRESS NOTES
[facility]   Outpatient Follow-up evaluation.      Current Outpatient Prescriptions:      aspirin 81 MG EC tablet, Take 81 mg by mouth daily., Disp: , Rfl:      clopidogrel (PLAVIX) 75 mg tablet, Take 75 mg by mouth daily., Disp: , Rfl:      dexlansoprazole (DEXILANT) 30 mg capsule, Take 30 mg by mouth daily., Disp: , Rfl:      docusate sodium (COLACE) 100 MG capsule, Take 100 mg by mouth 2 (two) times a day., Disp: , Rfl:      DULoxetine (CYMBALTA) 60 MG capsule, Take 60 mg by mouth daily., Disp: , Rfl:      furosemide (LASIX) 20 MG tablet, 20 mg. Every other day., Disp: , Rfl:      gabapentin (NEURONTIN) 300 MG capsule, Take 300-900 mg by mouth bedtime. , Disp: , Rfl:      hydrOXYzine (ATARAX) 25 MG tablet, Take 25 mg by mouth 3 (three) times a day as needed for itching., Disp: , Rfl:      isosorbide mononitrate (IMDUR) 30 MG 24 hr tablet, Take 30 mg by mouth daily., Disp: , Rfl:      linaclotide (LINZESS) 145 mcg cap capsule, Take 290 mcg by mouth once., Disp: , Rfl:      LINZESS 290 mcg cap capsule, 290 mcg daily. , Disp: , Rfl:      metoprolol succinate (TOPROL-XL) 25 MG, TAKE 1 TABLET ONCE DAILY. -JEVON LOPEZ RN,, Disp: 90 tablet, Rfl: 1     nitroglycerin (NITROSTAT) 0.4 MG SL tablet, Place 0.4 mg under the tongue every 5 (five) minutes as needed for chest pain (no more than 3 doses per episode). , Disp: , Rfl:      oxyCODONE-acetaminophen (PERCOCET)  mg per tablet, 1 tablet as needed. , Disp: , Rfl:      polyethylene glycol (MIRALAX) 17 gram packet, Take 17 g by mouth daily., Disp: , Rfl:      rosuvastatin (CRESTOR) 20 MG tablet, Take 20 mg by mouth bedtime. , Disp: , Rfl:      traMADol (ULTRAM) 50 mg tablet, Take 50 mg by mouth every 8 (eight) hours as needed for pain., Disp: , Rfl:      metoprolol succinate (TOPROL-XL) 25 MG, Take 25 mg by mouth daily., Disp: , Rfl:         Shira Guthrie is a 62 y.o. Female    Chief Complaint   Patient presents with     Follow-up  "      Diagnoses:  See Problem list   CAD, chronic stable angina, NXT 2016 with mild ischemic defects, known  of mid LAD, chronic noncardiac chest pain due to esophageal dysmotility  Recommendations:    Will start furosemide 20 mg daily in am for now.  Check venous ULS and cardiac echo.  Call us if not resolving within week.      Subjective:   62 year old woman with a history of coronary artery disease.   In 2003, she had sustained what appears to have been an anterior wall  myocardial infarction. Her symptoms were characterized by indigestion and  then heavy \"elephant on the chest\" pressure that lasted for an entire day and  then gradually subsided over several days. However, 4 days later, she began  to notice progressive worsening of dyspnea, dyspnea on exertion and  eventually at rest. She went to the ER then. At that time was told she had  sustained a myocardial infarction. She underwent coronary intervention on  04/23/2003 and underwent placement of a bare metal stent into the mid LAD. A  2.75 x 15 mm stent was placed. She apparently sustained a recurrent episode  of symptoms prompting a reinvestigation on May 5th and was told that there  was a clot in the artery. She subsequently had coronary intervention again  in May 2012. I don't have details of this report. More recently, she  underwent a stress study. It sounds as though it has been a resting  adenosine or similar type examination before she had moved to the [facility], but no recommendations were being made for further testing. She has  not had a history of arrhythmias or syncope. She has not had any active  history of heart failure; however, she did, unfortunately sustain a  cerebrovascular stroke in 2006 that has left her with a chronic paresis of  the left lower extremity and weakness of the left hand. This paresis has  been associated with multiple falling episodes including a recent one that  resulted in a left upper extremity fracture. She " continues to experience  some episodes of chest discomfort and, for this reason, had been managed with  chronic nitrate therapy including Imdur 30 mg a day. She does have  Nitrostat, but has not required use. She does have issues with indigestion.   She is also on chronic Plavix and aspirin therapy at least since his stent  was placed in May 2011. Her history also includes hypertension,  hyperlipidemia, history of peripheral vascular disease. Noncardiac history  includes esophageal stricture and CVA as described above.  2013 underwent angiography here a found a chronic total occlusion of the mid LAD with severe anteroapical hypokinesis.  Nuclear imaging found small anteroapical ischemia with LVEF 70%.   .             2016 Lexiscan stress nuclear study is small area of ischemia involving the apical walls.  There is a small area of underlying distal anteroseptal infarct.  Given the similarity of these findings to previous and lack of any symptom profile c/w IHD she had been managed medically.       Past Medical History:   Diagnosis Date     Anxiety      CAD (coronary artery disease)      Cancer     connective and soft tissue     Constipation, chronic      Decreased circulation      Depression      Esophagus disorder     narrowing     Hypertension      Left leg swelling      Osteoarthritis      Osteoporosis      PAD (peripheral artery disease)      Peripheral neuropathy      Stroke      Past Surgical History:   Procedure Laterality Date     APPENDECTOMY       COLONOSCOPY N/A 11/15/2016    Procedure: COLONOSCOPY;  Surgeon: Keith Malik MD;  Location: [facility] OR;  Service:      CORONARY ANGIOPLASTY WITH STENT PLACEMENT       ESOPHAGOGASTRODUODENOSCOPY N/A 11/15/2016    Procedure: UPPER ENDOSCOPY;  Surgeon: Keith Malik MD;  Location: [facility];  Service:      HYSTERECTOMY  1996     REVISION COLOSTOMY      reversed     Allergies   Allergen Reactions     Jerry Advanced [Aspirin]      325mg  gives dry heeves     Penicillins Nausea And Vomiting     Family History   Problem Relation Age of Onset     Heart disease Mother      No Medical Problems Father      No Medical Problems Sister      No Medical Problems Daughter      No Medical Problems Maternal Grandmother      No Medical Problems Maternal Grandfather      No Medical Problems Paternal Grandmother      No Medical Problems Paternal Grandfather      No Medical Problems Maternal Aunt      No Medical Problems Paternal Aunt      BRCA 1/2 Neg Hx      Breast cancer Neg Hx      Cancer Neg Hx      Colon cancer Neg Hx      Endometrial cancer Neg Hx      Ovarian cancer Neg Hx       Social History     Social History     Marital status: Single     Spouse name: N/A     Number of children: N/A     Years of education: N/A     Occupational History     Not on file.     Social History Main Topics     Smoking status: Former Smoker     Years: 25.00     Quit date: 6/3/2006     Smokeless tobacco: Not on file     Alcohol use 0.5 oz/week     1 Standard drinks or equivalent per week     Drug use: No     Sexual activity: No     Other Topics Concern     Not on file     Social History Narrative    Lives alone in a senior [employer]. Not working     Family history not pertinent to chief complaint or presenting problem    Current Outpatient Prescriptions:      aspirin 81 MG EC tablet, Take 81 mg by mouth daily., Disp: , Rfl:      clopidogrel (PLAVIX) 75 mg tablet, Take 75 mg by mouth daily., Disp: , Rfl:      dexlansoprazole (DEXILANT) 30 mg capsule, Take 30 mg by mouth daily., Disp: , Rfl:      docusate sodium (COLACE) 100 MG capsule, Take 100 mg by mouth 2 (two) times a day., Disp: , Rfl:      DULoxetine (CYMBALTA) 60 MG capsule, Take 60 mg by mouth daily., Disp: , Rfl:      furosemide (LASIX) 20 MG tablet, 20 mg. Every other day., Disp: , Rfl:      gabapentin (NEURONTIN) 300 MG capsule, Take 300-900 mg by mouth bedtime. , Disp: , Rfl:      hydrOXYzine (ATARAX) 25 MG tablet, Take  "25 mg by mouth 3 (three) times a day as needed for itching., Disp: , Rfl:      isosorbide mononitrate (IMDUR) 30 MG 24 hr tablet, Take 30 mg by mouth daily., Disp: , Rfl:      linaclotide (LINZESS) 145 mcg cap capsule, Take 290 mcg by mouth once., Disp: , Rfl:      LINZESS 290 mcg cap capsule, 290 mcg daily. , Disp: , Rfl:      metoprolol succinate (TOPROL-XL) 25 MG, TAKE 1 TABLET ONCE DAILY. -JEVON LOPEZ RN,, Disp: 90 tablet, Rfl: 1     nitroglycerin (NITROSTAT) 0.4 MG SL tablet, Place 0.4 mg under the tongue every 5 (five) minutes as needed for chest pain (no more than 3 doses per episode). , Disp: , Rfl:      oxyCODONE-acetaminophen (PERCOCET)  mg per tablet, 1 tablet as needed. , Disp: , Rfl:      polyethylene glycol (MIRALAX) 17 gram packet, Take 17 g by mouth daily., Disp: , Rfl:      rosuvastatin (CRESTOR) 20 MG tablet, Take 20 mg by mouth bedtime. , Disp: , Rfl:      traMADol (ULTRAM) 50 mg tablet, Take 50 mg by mouth every 8 (eight) hours as needed for pain., Disp: , Rfl:      metoprolol succinate (TOPROL-XL) 25 MG, Take 25 mg by mouth daily., Disp: , Rfl:       Objective:   /78 (Patient Site: Left Arm, Patient Position: Sitting, Cuff Size: Adult Large)  Pulse 68  Resp 18  Ht 5' 4\" (1.626 m)  Wt 185 lb (83.9 kg)  BMI 31.76 kg/m2  185 lb (83.9 kg)   Wt Readings from Last 3 Encounters:   11/06/17 185 lb (83.9 kg)   11/15/16 169 lb 6.4 oz (76.8 kg)   10/27/16 179 lb (81.2 kg)     BP Readings from Last 3 Encounters:   11/06/17 122/78   07/28/17 102/60   07/25/17 118/66     Pulse Readings from Last 3 Encounters:   11/06/17 68   07/28/17 80   07/25/17 67     General appearance: alert, appears stated age and cooperative  Head: Normocephalic, without obvious abnormality, atraumatic  Eyes: Normal external exam without jaundice.  Ears: Normal external auricular exam.  Nose: Normal external exam.  Lungs: clear to auscultation bilaterally  Chest wall: no tenderness  Heart: regular rate and rhythm, " S1, S2 normal, no murmur, click, rub or gallop   Pulses: 2+ and symmetric  Skin: Skin color, texture, turgor normal.   Neurologic: Grossly normal, no focal neurologic findings.  Mild edema of the lower extremity with some induration.  Extends from upper ankle down to the foot.Mild edema with some induration from lower calf to foot bilaterally.  Pluses full.    Review of Systems:   General: WNL  Cardiographics: Reviewed in clinic.    Lab Results:  Lab Results: Personally reviewed  Lab Results   Component Value Date     04/03/2016    K 3.9 04/03/2016     (H) 04/03/2016    CO2 22 04/03/2016    BUN 6 (L) 04/03/2016    CREATININE 0.84 04/03/2016    CALCIUM 9.0 04/03/2016       Clinical evaluation time today including exam 35 minutes.  At least 50% of clinic evaluation time involved in assessment and patient counseling.  Part of this chart was created using a dictation software.  Typographic errors, word substitutions, and grammatical errors may unintentionally occur.    Winston Leos M.D.  [facility]

## 2021-06-15 NOTE — ANESTHESIA CARE TRANSFER NOTE
Last vitals:   Vitals:    01/30/18 1244   BP: 137/71   Pulse: 89   Resp: 16   Temp: 36.3  C (97.4  F)   SpO2: 99%     Patient's level of consciousness is awake  Spontaneous respirations: yes  Maintains airway independently: yes  Dentition unchanged: yes  Oropharynx: oropharynx clear of all foreign objects    QCDR Measures:  ASA# 20 - Surgical Safety Checklist: WHO surgical safety checklist completed prior to induction  PQRS# 430 - Adult PONV Prevention: 4558F - Pt received => 2 anti-emetic agents (different classes) preop & intraop  ASA# 8 - Peds PONV Prevention: NA - Not pediatric patient, not GA or 2 or more risk factors NOT present  PQRS# 424 - Carrie-op Temp Management: 4559F - At least one body temp DOCUMENTED => 35.5C or 95.9F within required timeframe  PQRS# 426 - PACU Transfer Protocol: - Transfer of care checklist used  ASA# 14 - Acute Post-op Pain: ASA14B - Patient did NOT experience pain >= 7 out of 10

## 2021-06-15 NOTE — ANESTHESIA PROCEDURE NOTES
Peripheral Block    Patient location during procedure: pre-op  Start time: 1/30/2018 10:00 AM  End time: 1/30/2018 10:08 AM  post-op analgesia per surgeon order as noted in medical record  Staffing:  Performing  Anesthesiologist: SONIYA WESLEY  Preanesthetic Checklist  Completed: patient identified, site marked, risks, benefits, and alternatives discussed, timeout performed, consent obtained, airway assessed, oxygen available, suction available, emergency drugs available and hand hygiene performed  Peripheral Block  Block type: brachial plexus, interscalene  Prep: ChloraPrep  Patient position: supine  Patient monitoring: cardiac monitor, continuous pulse oximetry, heart rate and blood pressure  Laterality: right  Injection technique: ultrasound guided and nerve stimulator  Nerve Stimulator mAmp: 0.6  Ultrasound used to visualize needle placement in proximity to nerve being blocked: yes   Permanent ultrasound image captured for medical record  Sterile gel and probe cover used for ultrasound.    Needle  Needle type: Stimuplex   Needle gauge: 20G  Needle length: 6 in  no peripheral nerve catheter placed  Assessment  Injection assessment: no difficulty with injection, negative aspiration for heme, no paresthesia on injection and incremental injection

## 2021-06-15 NOTE — ANESTHESIA POSTPROCEDURE EVALUATION
Patient: Shira Guthrie  #2 RIGHT SHOULDER ARTHROSCOPY ROTATOR CUFF REPAIR SUBACROMIAL DECOMPRESSION DEBRDIEMENT BICEPS TENOTOMY  Anesthesia type: No value filed.    Patient location: PACU  Last vitals:   Vitals:    01/30/18 1330   BP: 115/58   Pulse: 75   Resp: 18   Temp: 36.6  C (97.9  F)   SpO2: 97%     Post vital signs: stable  Level of consciousness: awake and responds to simple questions  Post-anesthesia pain: pain controlled  Post-anesthesia nausea and vomiting: no  Pulmonary: unassisted, return to baseline  Cardiovascular: stable and blood pressure at baseline  Hydration: adequate  Anesthetic events: no    QCDR Measures:  ASA# 11 - Carrie-op Cardiac Arrest: ASA11B - Patient did NOT experience unanticipated cardiac arrest  ASA# 12 - Carrie-op Mortality Rate: ASA12B - Patient did NOT die  ASA# 13 - PACU Re-Intubation Rate: ASA13B - Patient did NOT require a new airway mgmt  ASA# 10 - Composite Anes Safety: ASA10A - No serious adverse event    Additional Notes:

## 2021-06-15 NOTE — ANESTHESIA PREPROCEDURE EVALUATION
Anesthesia Evaluation      Patient summary reviewed   No history of anesthetic complications     Airway   Mallampati: II  Neck ROM: full   Pulmonary - normal exam    breath sounds clear to auscultation  (+) a smoker  (-) shortness of breath, sleep apnea                         Cardiovascular   Exercise tolerance: < 4 METS  (+) hypertension, past MI, CAD, CABG/stent, , hypercholesterolemia, PVD    (-) angina, murmur  ECG reviewed  Rhythm: regular  Rate: normal,    no murmur   ROS comment: Summary   1. Normal left ventricular size and systolic performance with a visually estimated ejection fraction of 60%.   2. No significant valvular heart disease is identified on this study.   3. Normal right ventricular size and systolic performance.      When compared to the prior real-time echocardiogram dated 6 February 2013, there has been little appreciable interval change.          Neuro/Psych    (+) CVA , depression, anxiety/panic attacks,     Endo/Other    (+) arthritis, obesity,   (-) no diabetes, hypothyroidism, hyperthyroidism, not pregnant     Comments: Connective tissue CA    GI/Hepatic/Renal    (+) esophageal disease,            Dental    (+) lower dentures and upper dentures                       Anesthesia Plan  Planned anesthetic: general endotracheal and peripheral nerve block    ASA 3   Induction: intravenous   Anesthetic plan and risks discussed with: patient and child/children  Anesthesia plan special considerations: antiemetics,   Post-op plan: routine recovery

## 2021-11-12 DIAGNOSIS — I89.0 LYMPHEDEMA OF LEFT LEG: ICD-10-CM

## 2021-11-12 RX ORDER — FUROSEMIDE 20 MG
TABLET ORAL
Qty: 180 TABLET | Refills: 4 | OUTPATIENT
Start: 2021-11-12

## 2023-01-20 NOTE — MR AVS SNAPSHOT
After Visit Summary   4/14/2017    Shira Guthrie    MRN: [id number]           Patient Information     Date Of Birth          1955        Visit Information        Provider Department      4/14/2017 10:00 AM Dior Sky, PhD [facility]        Care Instructions    Personal Care Plan for Chronic Pain    1.  Personal Goals:                * take less medication              * lose weight: goal of losing 30 pounds.              * continue working on keeping thoughts positive through Tawny   * to feel confident enough that when someone gives me a compliment I can simply say thank you    2.  Sleep:                 *  Basic sleep plan:                          *reduce or eliminate caffeine and daytime naps                          * relaxation before bed                          * limit screen time 1-2 hours prior to bed                          * establish dark/quiet sleep environment                          * go to bed at target bedtime:   pm                          * keep consistent wake time:   am.   *  Minimize switching days and nights by staying active throughout the day.   *  We'll talk more about a consistent sleep plan when we meet next time.                3.  Physical Activity:                 * Formal physical rehabilitation:     * Look into taking class to improve balance in your building.    * Keep practicing your physical therapy exercises while you are away.              * Home/community based activity:    * Let Dr. Sky know if and when you are ready to consider community yoga class                          * Home based exercises given by lymphedema nurse with breathing exercises built in as well - daily                          * Aerobic exercise/endurance exercise:  elliptical machine - 5 minute intervals with sit ups in between for 30 minutes - daily.  Has a  in the building.                          * Cleaning and baking with body awareness and stretching    *   Plan to fill out paperwork for PCA with the help of family when you get back from your trip.              * Listen to your body.  Pace yourself for success.  Don't over-do it.                  4.  Nutrition/Weight:      * Keep up the good work in keeping food journal so you can track what you are eating.   * Continue to review your I Can Prevent Diabetes materials.   * When you get the urge to snack (as a replacement to tobacco), go exercise instead.   * If you do snack, have fruits and veggies or a smoothie.              * Limit processed foods and foods high in sugar, sodium and fat.              * Keep up the good work eating many healthy foods.   * When you make a less healthy choice approach yourself with kindness and compassion.  Try to understand what contributed to that choice:  Was I too hungry?  Was I too tired?  Stressed?  Worried?  Use this information to help support healthier choices in the future.   * Goal weight:  150lbs.  Remember this is a long term goal.  Healthy, sustainable weight loss is about 1-2 lbs per week.    5.  Mood/Stress Management:                 * Formal interventions:    * schedule follow up with Dr. Sky in March.              * Home/community based interventions:                          * Regular contact with family  * Relaxation techniques - breathing exercises  * Create your pyramid to focus you on your strengths and hopes.  * Movies  * Meditation  * Yoga  * Creative activity  * Spiritual /prayer:  Prayer at home, attending services at [facility], wellness auxiliary group    * Keep exploring development of prayer line.  * Service-based activity.              * Medications: Cymbalta, Hydroxyzine as needed.    6.  Tobacco/Alcohol/Drug Use:         * Congratulations on quitting smoking and staying quit!  Keep up the good work managing stress/anxiety in other ways (prayer, talking it out, deep breaths, exercise, etc..                         *  Maintain healthy relationship with alcohol                          * For women this would be no more than 1 drink per day                          * For men, this would be no more than 2 drinks per day              * Eliminate recreational drugs    7.  Pain:                 * Non-medication treatments:                          * ice/heat: use heating pad as you are doing.                          * massage                          * acupuncture                          * chiropractor    8.  Pain Medications: as prescribed by Dr. Pierre    Schedule follow up with Dr. Sky when you return from your trip (late May or early June)        Follow-ups after your visit        Your next 10 appointments already scheduled     Apr 14, 2017  4:10 PM CDT   Return Visit with Sally Pierre MD   [facility]    [address] [place] [address]   [phone]              Who to contact     Please call your clinic at [phone] to:    Ask questions about your health    Make or cancel appointments    Discuss your medicines    Learn about your test results    Speak to your doctor   If you have compliments or concerns about an experience at your clinic, or if you wish to file a complaint, please contact [facility] Patient Relations at [phone] or email us at [email or web address]         Additional Information About Your Visit        [employer] Apphart Information     Printit is an electronic gateway that provides easy, online access to your medical records. With IndiaMART, you can request a clinic appointment, read your test results, renew a prescription or communicate with your care team.     To sign up for Printit visit the website at [email or web address]   You will be asked to enter the access code listed below, as well as some personal information. Please follow the directions to create your username and password.     Your access code is: [email or web address]  Expires:  2017  3:34 PM     Your access code will  in 90 days. If you need help or a new code, please contact your [facility] or call [phone] for assistance.        Care EveryWhere ID     This is your Care EveryWhere ID. This could be used by other organizations to access your [place] medical records  [id number]         Blood Pressure from Last 3 Encounters:   17 124/78   17 124/77   17 136/82    Weight from Last 3 Encounters:   17 177 lb 6.4 oz (80.5 kg)   17 181 lb 3.2 oz (82.2 kg)   17 180 lb 12.8 oz (82 kg)              Today, you had the following     No orders found for display       Primary Care Provider Office Phone # Fax #    Sally Pierre [phone]       [address] [address]        Thank you!     Thank you for choosing [facility]  for your care. Our goal is always to provide you with excellent care. Hearing back from our patients is one way we can continue to improve our services. Please take a few minutes to complete the written survey that you may receive in the mail after your visit with us. Thank you!             Your Updated Medication List - Protect others around you: Learn how to safely use, store and throw away your medicines at www.disposemymeds.org.          This list is accurate as of: 17 10:34 AM.  Always use your most recent med list.                   Brand Name Dispense Instructions for use    aspirin 81 MG EC tablet     90 tablet    Take 1 tablet (81 mg) by mouth daily       CANE, ANY MATERIAL     1 Device    1 Device by Device route as needed       clopidogrel 75 MG tablet    PLAVIX    30 tablet    Take 1 tablet (75 mg) by mouth daily . Give name brand Plavix. Approved by insurance through 1016.       dexlansoprazole 30 MG Cpdr CR capsule    DEXILANT    90 capsule    Take 1 capsule (30 mg) by mouth daily       DULoxetine 60 MG EC capsule    CYMBALTA     30 capsule    Take 1 capsule (60 mg) by mouth daily       gabapentin 300 MG capsule    NEURONTIN    90 capsule    Take 1 capsule (300 mg) by mouth 3 times daily       Heating Pads Pads     1 each    Apply to painful areas prn.       hydrOXYzine 25 MG tablet    ATARAX    100 tablet    1 to 2 tablets three times a day as needed.       isosorbide mononitrate 30 MG 24 hr tablet    IMDUR    90 tablet    Take 1 tablet (30 mg) by mouth daily       LINZESS 290 MCG capsule   Generic drug:  linaclotide      Take 1 capsule (290 mcg) by mouth every morning (before breakfast)       metoprolol 25 MG 24 hr tablet    TOPROL-XL    90 tablet    Take 1 tablet (25 mg) by mouth daily       nitroglycerin 0.4 MG sublingual tablet    NITROSTAT    30 tablet    Place 1 tablet (0.4 mg) under the tongue every 5 minutes as needed       order for DME     2 Device    Equipment being ordered: Jobst stockings thigh high 15-20mmHg.  Please measure for fit. 2 pairs.       polyethylene glycol powder    MIRALAX    510 g    Take 17 g (1 capful) by mouth 2 times daily       rosuvastatin 20 MG tablet    CRESTOR    90 tablet    Take 1 tablet (20 mg) by mouth daily       traMADol 50 MG tablet    ULTRAM    90 tablet    Take 1 tablet (50 mg) by mouth 3 times daily as needed       TYLENOL 325 MG tablet   Generic drug:  acetaminophen     100 tablet    Take 2 tablets (650 mg) by mouth daily          show

## 2023-03-21 NOTE — PATIENT INSTRUCTIONS
Personal Care Plan for Chronic Pain    1.  Personal Goals:                * take less medication              * lose weight: goal of losing 15 to 20 pounds.              * continue working on keeping thoughts positive through Tawny              * to feel confident enough that when someone gives me a compliment I can simply say thank you    2.  Sleep:                 *  Basic sleep plan:                          *reduce or eliminate caffeine and daytime naps                          * relaxation before bed                          * limit screen time 1-2 hours prior to bed                          * establish dark/quiet sleep environment                          * go to bed at target bedtime:   pm                          * keep consistent wake time:   am.              *  Minimize switching days and nights by staying active throughout the day.              * We'll talk more about a consistent sleep plan when we meet next time.                3.  Physical Activity:                 * Formal physical rehabilitation:                          HOME PT for shoulder              * Home/community based activity:                          * Home based exercises given by lymphedema nurse with breathing exercises built in as well - daily                          * Aerobic exercise/endurance exercise:  elliptical machine - 5 minute intervals with sit ups in between for 30 minutes - daily.  Has a  in the builiding. Has exercise tape in her apartment.                          * Cleaning and baking with body awareness and stretching              * Listen to your body.  Pace yourself for success.  Don't over-do it.  Plan to get PCA back to help with cleaning and laundry so as to not overdo it.                4.  Nutrition/Weight:                 * Keep a food journal in a notebook at home and bring this to your next visit with Dr. Sky.  Eat small frequent meals.              * Review your I Can Prevent Diabetes  Discharge Summary/Instructions after an Endoscopic Procedure  Patient Name: Kt Quispe  Patient MRN: [id number]  Patient YOB: 1998 Tuesday, March 21, 2023  Ulysses Holcomb MD  Dear patient,  As a result of recent federal legislation (The Federal Cures Act), you may   receive lab or pathology results from your procedure in your [facility]   account before your physician is able to contact you. Your physician or   their representative will relay the results to you with their   recommendations at their soonest availability.  Thank you,  RESTRICTIONS:  During your procedure today, you received medications for sedation.  These   medications may affect your judgment, balance and coordination.  Therefore,   for 24 hours, you have the following restrictions:   - DO NOT drive a car, operate machinery, make legal/financial decisions,   sign important papers or drink alcohol.    ACTIVITY:  Today: no heavy lifting, straining or running due to procedural   sedation/anesthesia.  The following day: return to full activity including work.  DIET:  Eat and drink normally unless instructed otherwise.     TREATMENT FOR COMMON SIDE EFFECTS:  - Mild abdominal pain, nausea, belching, bloating or excessive gas:  rest,   eat lightly and use a heating pad.  - Sore Throat: treat with throat lozenges and/or gargle with warm salt   water.  - Because air was used during the procedure, expelling large amounts of air   from your rectum or belching is normal.  - If a bowel prep was taken, you may not have a bowel movement for 1-3 days.    This is normal.  SYMPTOMS TO WATCH FOR AND REPORT TO YOUR PHYSICIAN:  1. Abdominal pain or bloating, other than gas cramps.  2. Chest pain.  3. Back pain.  4. Signs of infection such as: chills or fever occurring within 24 hours   after the procedure.  5. Rectal bleeding, which would show as bright red, maroon, or black stools.   (A tablespoon of blood from the rectum is not serious, especially  if   hemorrhoids are present.)  6. Vomiting.  7. Weakness or dizziness.  GO DIRECTLY TO THE NEAREST EMERGENCY ROOM IF YOU HAVE ANY OF THE FOLLOWING:      Difficulty breathing              Chills and/or fever over 101 F   Persistent vomiting and/or vomiting blood   Severe abdominal pain   Severe chest pain   Black, tarry stools   Bleeding- more than one tablespoon   Any other symptom or condition that you feel may need urgent attention  Your doctor recommends these additional instructions:  If any biopsies were taken, your doctors clinic will contact you in 1 to 2   weeks with any results.  - Patient has a contact number available for emergencies.  The signs and   symptoms of potential delayed complications were discussed with the   patient.  Return to normal activities tomorrow.  Written discharge   instructions were provided to the patient.   - Discharge patient to home.   - Resume previous diet.   - Continue present medications.   - Await pathology results.   - Refer to [facility] at appointment to be scheduled.   For questions, problems or results please call your physician - Ulysses Holcomb MD at Work:  [phone].  [facility], EMERGENCY ROOM PHONE NUMBER: [phone]  IF A COMPLICATION OR EMERGENCY SITUATION ARISES AND YOU ARE UNABLE TO REACH   YOUR PHYSICIAN - GO DIRECTLY TO THE EMERGENCY ROOM.  Ulysses Holcomb MD  3/21/2023 2:54:34 PM  This report has been verified and signed electronically.  Dear patient,  As a result of recent federal legislation (The Federal Cures Act), you may   receive lab or pathology results from your procedure in your [facility]   account before your physician is able to contact you. Your physician or   their representative will relay the results to you with their   recommendations at their soonest availability.  Thank you,  [employer]   materials.              * Limit processed foods and foods high in sugar, sodium and fat.              * Keep up the good work eating many healthy foods.              * When you make a less healthy choice approach yourself with kindness and compassion.  Try to understand what contributed to that choice:  Was I too hungry?  Was I too tired?  Stressed?  Worried?  Use this information to help support healthier choices in the future.    5.  Mood/Stress Management:     SELF COMPASSION!              * Formal interventions:                        * schedule follow up with Dr. Sky in about month or so.              * Home/community based interventions:                          * Regular contact with family  * Relaxation techniques - breathing exercises  * Create your pyramid to focus you on your strengths and hopes.  * Movies  * Meditation  * Yoga  * Creative activity  * Spiritual /prayer:  Prayer at home, attending services at [facility], wellness auxiliary group  * Service-based activity.              * Medications: Cymbalta, Hydroxyzine as needed.    6.  Tobacco/Alcohol/Drug Use:                               * Congratulations on quitting smoking and staying quit!  Keep up the good work managing stress/anxiety in other ways (prayer, talking it out, deep breaths, exercise, etc..                         * Maintain healthy relationship with alcohol                          * For women this would be no more than 1 drink per day                          * For men, this would be no more than 2 drinks per day              * Eliminate recreational drugs    7.  Pain:                 * Non-medication treatments:                          * ice/heat: use heating pad as you are doing.                          * massage                          * acupuncture  [facility] is planned for when you return.                      We can help with metro mobility paperwork when you are ready.    8.  Pain Medications:  Gabapentin 600mg twice a day Tramadol one pill two times day.   Percocet from [facility] 5/325, two tablets after PT for short term management of right shoulder and left elbow pain.    Tylenol arthritis as needed for break through.

## 2023-11-06 NOTE — PATIENT INSTRUCTIONS
PACE yourself.     Personal Care Plan for Chronic Pain    1.  Personal Goals:                * take less medication              * lose weight: goal of losing 15 to 20 pounds.              * continue working on keeping thoughts positive through Tawny              * to feel confident enough that when someone gives me a compliment I can simply say thank you    2.  Sleep:                 *  Basic sleep plan:                          *reduce or eliminate caffeine and daytime naps                          * relaxation before bed                          * limit screen time 1-2 hours prior to bed                          * establish dark/quiet sleep environment                          * go to bed at target bedtime:   pm                          * keep consistent wake time:   am.              *  Minimize switching days and nights by staying active throughout the day.              * We'll talk more about a consistent sleep plan when we meet next time.                3.  Physical Activity:                 * Formal physical rehabilitation:                          HOME PT for shoulder              * Home/community based activity:                          * Home based exercises given by lymphedema nurse with breathing exercises built in as well - daily                          * Aerobic exercise/endurance exercise:  elliptical machine - 5 minute intervals with sit ups in between for 30 minutes - daily.  Has a  in the builiding. Has exercise tape in her apartment.                          * Cleaning and baking with body awareness and stretching              * Listen to your body.  Pace yourself for success.  Don't over-do it.  Plan to get PCA back to help with cleaning and laundry so as to not overdo it.                4.  Nutrition/Weight:                 * Keep a food journal in a notebook at home and bring this to your next visit with Dr. Sky.  Eat small frequent meals.              * Review your I Can Prevent  Diabetes materials.              * Limit processed foods and foods high in sugar, sodium and fat.              * Keep up the good work eating many healthy foods.              * When you make a less healthy choice approach yourself with kindness and compassion.  Try to understand what contributed to that choice:  Was I too hungry?  Was I too tired?  Stressed?  Worried?  Use this information to help support healthier choices in the future.    5.  Mood/Stress Management:     SELF COMPASSION!              * Formal interventions:                        * schedule follow up with Dr. Sky in about month or so.              * Home/community based interventions:                          * Regular contact with family  * Relaxation techniques - breathing exercises  * Create your pyramid to focus you on your strengths and hopes.  * Movies  * Meditation  * Yoga  * Creative activity  * Spiritual /prayer:  Prayer at home, attending services at [facility], wellness auxiliary group  * Service-based activity.              * Medications: Cymbalta, Hydroxyzine as needed.    6.  Tobacco/Alcohol/Drug Use:                               * Congratulations on quitting smoking and staying quit!  Keep up the good work managing stress/anxiety in other ways (prayer, talking it out, deep breaths, exercise, etc..                         * Maintain healthy relationship with alcohol                          * For women this would be no more than 1 drink per day                          * For men, this would be no more than 2 drinks per day              * Eliminate recreational drugs    7.  Pain:                 * Non-medication treatments:                          * ice/heat: use heating pad as you are doing.                          * massage                          * acupuncture    8.  Pain Medications: Gabapentin 600mg twice a day   Tramadol one pill three times day. Have been doing two month refills.    Tylenol  arthritis as needed for break through.  Short term oxycodone 10mg daily. Do not take with tramadol.               20   MRI SHOULDER- RIGHT  [facility]   [phone]  Fax Orders to [phone]     Order faxed to [phone], they will contact patient to schedule.     Courtney Crook     Fall risk

## 2024-11-27 NOTE — TELEPHONE ENCOUNTER
ANY [facility] phone call message- patient requesting a refill:    Full Medication Name: oxyCODONE-acetaminophen (PERCOCET)  MG per tablet    Dose:Take 1 tablet by mouth 2 times daily as needed for severe pain      Pharmacy confirmed as   [id number] - [place], [place] - [address] AT [facility] [address]  [address]  [place] [address]  Phone: [phone] Fax: [phone]  : Yes    Additional Comments: Pt was just told that the pharmacy is going to cut their hours and was also hearing the the government might completely shut everything down and is worried about getting her meds.  She doesn't want to cancel her appt but wants to make sure she has meds prior to the shut down.      OK to leave a message on voice mail? Yes    Primary language: English      needed? No    Call taken on March 20, 2020 at 12:49 PM by Roma Edmonds

## 2025-05-06 NOTE — PROGRESS NOTES
"  Family Medicine Telephone Visit Note               Telephone Visit Consent   Patient was verbally read the following and verbal consent was obtained.    \"This telephone visit will be conducted via a call between you and your physician/provider. We have found that certain health care needs can be provided without the need for a physical exam.  This service lets us provide the care you need with a short phone conversation.  If a prescription is necessary we can send it directly to your pharmacy.  If lab work is needed we can place an order for that and you can then stop by our lab to have the test done at a later time.    Telephone visits are billed at different rates depending on your insurance coverage. During this emergency period, for some insurers they may be billed the same as an in-person visit.  Please reach out to your insurance provider with any questions.    If during the course of the call the physician/provider feels a telephone visit is not appropriate, you will not be charged for this service.\"    Name person giving consent:  Patient   Date verbal consent given:  4/24/2020  Time verbal consent given:  8:02 AM       Appointment start time:  8:12 AM  Chronic Pain Follow-Up Visit    Pain Update:  Location of pain: Right Shoulder still hurting and popping.  Has most pain with laundry and other chores. She paces her chores to get through them better. Worse in the posterior shoulder  and radiates down into the arm.   Analgesia/pain control: Recent changes:  same    Overall control: Tolerable with discomfort    She had a MRI on 3/12/20 which we discussed on the phone.  There is nont tear but I would like orthopedics to weigh in on the results since she had rotator cuff repair 1/30/2018.     Adherance     How often do you take extra pain medicine:Never    Did you take your pain medication today? YES    Adverse effects: No Stools goo with  Linzess.    Doing some exercises including yoga, peddler. She does home " Fasting glucose high at 173.  Kidney function is normal.  Make sure to hydrate well.  Liver tests are now normal.  Checking urine culture for infection.  There is no anemia and blood cells are normal.  Thyroid and lipids as well as vitamin D are within normal PT that she learned in the past.      Database checked today? No    PHQ-9 SCORE 2019   PHQ-9 Total Score - - -   PHQ-9 Total Score 7 7 6     FAYE-7 SCORE 2018 10/30/2018 2019   Total Score - - -   Total Score 6 (mild anxiety) - -   Total Score 6 7 2       Care Plan discussed and updated as needed.  See the end of this note.       FUNCTIONAL ASSESSMENT QUESTIONNAIRE SCORE 2020   Total Score 40 40       #Has intermittent allergy symptoms with dust and in the spring. Uses benadryl very rarely. Last prescribed in  . She needs a refill. Discussed risks for elderly in detail.  Offered non-drowsy alternative but  She wanted to stay with the same medication that she knows works. She has had no side effects.    #Has a history of atrophic vaginitis.  She has used estrogen cream which is helped in the past.  She has not used it regularly.  We discussed again how it should appropriately be used.  She states she just has a little bit of pink when she wipes as her skin seems fragile and tears a little.  She has no vaginal bleeding.    #Lane Dickson  April of last year of cancer.  She discussed how she celebrated this anniversary and dealt with the grief.  She did have a visit with Dr. Sky on that day.  This was very helpful.     Problem, Medication and Allergy Lists were .           Physical Exam:     Vitals:    20 0801   Weight: 82.1 kg (181 lb)     Body mass index is 31.07 kg/m .  Vitals were reviewed  GENERAL: alert, no distress  RESP:normal respiratory effort.  PSYCH: Alert and oriented times 3; speech- coherent , normal rate and volume; able to articulate logical thoughts, able to abstract reason,  affect- normal        Results:     No results found for any visits on 20.   rapid urine drug screen obtained today: No    Assessment and Plan    Shira Guthrie is here for follow up of chronic pain caused by arthritis and neuropathy.    Shira was seen  today for pain.    Diagnoses and all orders for this visit:    Chronic right shoulder pain: Chronic pain is multifactorial but she has a acute worsening of her right shoulder since a fall earlier this year.  MRI did show abnormalities some of which were postsurgical.  I do want her to have a visit with her orthopedic doctor to weigh in on the MRI and management plan of the shoulder pain at this time.  Refilled oxycodone for short-term until we have a better plan for the shoulder pain.  -     ORTHOPEDICS ADULT REFERRAL; Future    Rotator cuff impingement syndrome of right shoulder  -     ORTHOPEDICS ADULT REFERRAL; Future  Chronic pain syndrome  -     oxyCODONE-acetaminophen (PERCOCET)  MG per tablet; Take 1 tablet by mouth 2 times daily as needed for severe pain    Acute nasopharyngitis: Refilled Benadryl she uses it very sparingly we discussed the risks and I offered her alternative allergy treatments.  We will consider these in the future. she wants Benadryl for now.  -     diphenhydrAMINE (BENADRYL) 25 MG tablet; Take 1-2 tablets (25-50 mg) by mouth every 6 hours as needed for other (for congestion, cough)    Major depressive disorder, recurrent, severe without psychotic features (H): This is stable at this time has regular visits with Dr. Sky to help with mental health.  -     DULoxetine (CYMBALTA) 60 MG capsule; Take 1 capsule (60 mg) by mouth daily    Vaginal irritation: Discussed atrophic vaginitis and the need to continue the estradiol regularly to help with the symptoms.  Refilled medication.  -     estradiol (ESTRACE) 0.1 MG/GM vaginal cream; Place 2 g vaginally twice a week    Chronic constipation: This is stable on medication refilled for a year.  -     linaclotide (LINZESS) 290 MCG capsule; Take 1 capsule (290 mcg) by mouth every morning (before breakfast)    Coronary artery disease involving native coronary artery of native heart with angina pectoris (H): Stable at this time refilled  medication.  -     rosuvastatin (CRESTOR) 20 MG tablet; Take 1 tablet (20 mg) by mouth daily          Chronic Pain Syndrome:  Care plan updated with patient, see below for details.  Patient is being prescribed 15mg of oxycodone IR (Percocet) per day. 45 mg MEDD  Care Plan Date: April/2020  Naloxone has not been prescribed.           If opioids prescribed patient was asked to bring pill bottle to each appointment and was informed that refills would only be provided at office visits.   Asked patient to F/U in 1 month(s) with same provider for 40 minute  Visit.     After Visit Information:  Will print and mail AVS     No follow-ups on file.    Appointment end time: 8:44 AM  This is a telephone visit that took 32 minutes.      Clinician location:  [facility]    Sally Pierre MD    Patient Instructions     I placed a referral with Dr. Muniz of [facility] to have a visit for your shoulder pain and to review your MRI results from March. Keep doing your shoulder exercises.    PACE yourself.     Personal Care Plan for Chronic Pain    1.  Personal Goals:                * take less medication              * lose weight: goal of losing 15 to 20 pounds.              * continue working on keeping thoughts positive through [employer]              * to feel confident enough that when someone gives me a compliment I can simply say thank you    2.  Sleep:                 *  Basic sleep plan:                          *reduce or eliminate caffeine and daytime naps                          * relaxation before bed                          * limit screen time 1-2 hours prior to bed                          * establish dark/quiet sleep environment                          * go to bed at target bedtime:   pm                          * keep consistent wake time:   am.              *  Minimize switching days and nights by staying active throughout the day.              * We'll talk more about a consistent sleep plan when we meet  next time.                3.  Physical Activity:                 * Formal physical rehabilitation:                          HOME PT for shoulder              * Home/community based activity:                          * Home based exercises given by lymphedema nurse with breathing exercises built in as well - daily                          * Aerobic exercise/endurance exercise:  elliptical machine - 5 minute intervals with sit ups in between for 30 minutes - daily.  Has a  in the builiding. Has exercise tape in her apartment.                          * Cleaning and baking with body awareness and stretching              * Listen to your body.  Pace yourself for success.  Don't over-do it.  Plan to get PCA back to help with cleaning and laundry so as to not overdo it.                4.  Nutrition/Weight:                 * Keep a food journal in a notebook at home and bring this to your next visit with Dr. Sky.  Eat small frequent meals.              * Review your I Can Prevent Diabetes materials.              * Limit processed foods and foods high in sugar, sodium and fat.              * Keep up the good work eating many healthy foods.              * When you make a less healthy choice approach yourself with kindness and compassion.  Try to understand what contributed to that choice:  Was I too hungry?  Was I too tired?  Stressed?  Worried?  Use this information to help support healthier choices in the future.    5.  Mood/Stress Management:     SELF COMPASSION!              * Formal interventions:                        * schedule follow up with Dr. Sky in about month or so.              * Home/community based interventions:                          * Regular contact with family  * Relaxation techniques - breathing exercises  * Create your pyramid to focus you on your strengths and hopes.  * Movies  * Meditation  * Yoga  * Creative activity  * Spiritual /prayer:  Prayer at home, attending services at  [facility], wellness auxiliary group  * Service-based activity.              * Medications: Cymbalta, Hydroxyzine as needed.    6.  Tobacco/Alcohol/Drug Use:                               * Congratulations on quitting smoking and staying quit!  Keep up the good work managing stress/anxiety in other ways (prayer, talking it out, deep breaths, exercise, etc..                         * Maintain healthy relationship with alcohol                          * For women this would be no more than 1 drink per day                          * For men, this would be no more than 2 drinks per day              * Eliminate recreational drugs    7.  Pain:                 * Non-medication treatments:                          * ice/heat: use heating pad as you are doing.                          * massage                          * acupuncture    8.  Pain Medications: Gabapentin 600mg twice a day   Tramadol one pill three times day. Have been doing three month refills.    Tylenol arthritis as needed for break through.  Short term oxycodone 10mg daily increased to #45 per month for short term unttil evaluation with Dr. Muniz Do not take with tramadol.
